# Patient Record
Sex: FEMALE | Race: BLACK OR AFRICAN AMERICAN | Employment: FULL TIME | URBAN - METROPOLITAN AREA
[De-identification: names, ages, dates, MRNs, and addresses within clinical notes are randomized per-mention and may not be internally consistent; named-entity substitution may affect disease eponyms.]

---

## 2017-01-11 RX ORDER — DICLOFENAC SODIUM 75 MG/1
TABLET, DELAYED RELEASE ORAL
Qty: 90 TAB | Refills: 0 | Status: SHIPPED | OUTPATIENT
Start: 2017-01-11 | End: 2018-07-23 | Stop reason: SDUPTHER

## 2017-01-12 ENCOUNTER — OFFICE VISIT (OUTPATIENT)
Dept: FAMILY MEDICINE CLINIC | Age: 62
End: 2017-01-12

## 2017-01-12 VITALS
HEIGHT: 63 IN | RESPIRATION RATE: 16 BRPM | BODY MASS INDEX: 33.66 KG/M2 | SYSTOLIC BLOOD PRESSURE: 109 MMHG | HEART RATE: 76 BPM | TEMPERATURE: 97.3 F | DIASTOLIC BLOOD PRESSURE: 69 MMHG | WEIGHT: 190 LBS | OXYGEN SATURATION: 97 %

## 2017-01-12 DIAGNOSIS — J06.9 VIRAL URI WITH COUGH: ICD-10-CM

## 2017-01-12 DIAGNOSIS — I10 ESSENTIAL HYPERTENSION: ICD-10-CM

## 2017-01-12 DIAGNOSIS — E78.5 HYPERLIPIDEMIA LDL GOAL <100: ICD-10-CM

## 2017-01-12 DIAGNOSIS — J45.909 MILD ASTHMA: ICD-10-CM

## 2017-01-12 DIAGNOSIS — I10 ESSENTIAL HYPERTENSION: Primary | ICD-10-CM

## 2017-01-12 DIAGNOSIS — Z12.31 ENCOUNTER FOR SCREENING MAMMOGRAM FOR MALIGNANT NEOPLASM OF BREAST: ICD-10-CM

## 2017-01-12 RX ORDER — BENZONATATE 200 MG/1
200 CAPSULE ORAL
Qty: 30 CAP | Refills: 1 | Status: SHIPPED | OUTPATIENT
Start: 2017-01-12 | End: 2017-03-15

## 2017-01-12 RX ORDER — HYDROCORTISONE 25 MG/G
CREAM TOPICAL
COMMUNITY
Start: 2016-12-01 | End: 2019-04-11 | Stop reason: ALTCHOICE

## 2017-01-12 NOTE — PROGRESS NOTES
Adelita Mendiola 63 y.o. female   Chief Complaint   Patient presents with    Follow-up     3 month f/u    Hypertension    Cholesterol Problem    Cough     at night without fever, chills, or sore throat         1. Have you been to the ER, urgent care clinic since your last visit? Hospitalized since your last visit? No    2. Have you seen or consulted any other health care providers outside of the [address] [place] [address] since your last visit? Include any pap smears or colon screening.  No

## 2017-01-12 NOTE — PROGRESS NOTES
HISTORY OF PRESENT ILLNESS  Laura Rose is a 63 y.o. female. Chief Complaint   Patient presents with    Follow-up     3 month f/u    Hypertension    Cholesterol Problem    Cough     at night without fever, chills, or sore throat       HPI  Pt is here for follow up of Hypertension, and Cholesterol. Pt does not need medication refills today. New concerns today: Pt reports having a cough at night without fever, chills, or sore throat. She had a cold about 2 wks ago with some sinus congestion and facial pain. That has improved but she still has the pnd and the cough starts at night when she lays down. Review of Systems   Constitutional: Negative. HENT: Positive for congestion. Negative for sore throat. Respiratory: Positive for cough. Negative for sputum production, shortness of breath and wheezing. Cardiovascular: Negative. Neurological: Positive for headaches. All other systems reviewed and are negative. Physical Exam   Constitutional: She is oriented to person, place, and time. She appears well-developed and well-nourished. HENT:   Head: Normocephalic and atraumatic. Right Ear: Hearing, tympanic membrane, external ear and ear canal normal.   Left Ear: Hearing, tympanic membrane, external ear and ear canal normal.   Nose: Mucosal edema present. Right sinus exhibits no maxillary sinus tenderness and no frontal sinus tenderness. Left sinus exhibits no maxillary sinus tenderness and no frontal sinus tenderness. Mouth/Throat: Uvula is midline, oropharynx is clear and moist and mucous membranes are normal.   Eyes: Conjunctivae and EOM are normal.   Neck: Normal range of motion. Neck supple. No JVD present. No thyromegaly present. Cardiovascular: Normal rate, regular rhythm and normal heart sounds. Exam reveals no gallop and no friction rub. No murmur heard. Pulmonary/Chest: Effort normal and breath sounds normal. She has no wheezes. She has no rhonchi.  She has no rales.   Musculoskeletal: Normal range of motion. Lymphadenopathy:     She has no cervical adenopathy. Neurological: She is alert and oriented to person, place, and time. Coordination normal.   Skin: Skin is warm and dry. Psychiatric: She has a normal mood and affect. Her behavior is normal. Judgment and thought content normal.   Nursing note and vitals reviewed. ASSESSMENT and [place] was seen today for follow-up, hypertension, cholesterol problem and cough. Diagnoses and all orders for this visit:    Essential hypertension  -     LIPID PANEL; Future  Stable, cont pres tx plan. Hyperlipidemia LDL goal <100  -     LIPID PANEL; Future  Stable, cont pres tx plan. Encounter for screening mammogram for malignant neoplasm of breast  -     SHIRIN MAMMO BI SCREENING INCL CAD; Future    Viral URI with cough  -     benzonatate (TESSALON) 200 mg capsule; Take 1 Cap by mouth three (3) times daily as needed for Cough. Mild asthma  Discussed prior pulm visit. Pt will resume qvar. She was not aware that she was to cont taking it longterm.       Follow-up Disposition: 3 months; sooner prn

## 2017-01-23 ENCOUNTER — OFFICE VISIT (OUTPATIENT)
Dept: ONCOLOGY | Age: 62
End: 2017-01-23

## 2017-01-23 ENCOUNTER — HOSPITAL ENCOUNTER (OUTPATIENT)
Dept: ONCOLOGY | Age: 62
Discharge: HOME | End: 2017-01-23

## 2017-01-23 VITALS
DIASTOLIC BLOOD PRESSURE: 89 MMHG | TEMPERATURE: 97.9 F | BODY MASS INDEX: 33.66 KG/M2 | HEIGHT: 63 IN | WEIGHT: 190 LBS | HEART RATE: 73 BPM | SYSTOLIC BLOOD PRESSURE: 141 MMHG

## 2017-01-23 DIAGNOSIS — Z12.31 ENCOUNTER FOR SCREENING MAMMOGRAM FOR MALIGNANT NEOPLASM OF BREAST: ICD-10-CM

## 2017-01-23 DIAGNOSIS — D64.9 ANEMIA, UNSPECIFIED TYPE: Primary | ICD-10-CM

## 2017-01-23 DIAGNOSIS — D64.9 ANEMIA, UNSPECIFIED TYPE: ICD-10-CM

## 2017-01-23 DIAGNOSIS — M19.90 ARTHRITIS: ICD-10-CM

## 2017-01-23 DIAGNOSIS — D46.9 MDS (MYELODYSPLASTIC SYNDROME) (HCC): ICD-10-CM

## 2017-01-23 LAB
BASO+EOS+MONOS # BLD AUTO: 0.4 K/UL (ref 0–2.3)
BASO+EOS+MONOS # BLD AUTO: 7 % (ref 0.1–17)
DIFFERENTIAL METHOD BLD: ABNORMAL
ERYTHROCYTE [DISTWIDTH] IN BLOOD BY AUTOMATED COUNT: 13.4 % (ref 11.5–14.5)
HCT VFR BLD AUTO: 29.6 % (ref 36–48)
HGB BLD-MCNC: 10 G/DL (ref 12–16)
LYMPHOCYTES # BLD AUTO: 30 % (ref 14–44)
LYMPHOCYTES # BLD: 1.7 K/UL (ref 1.1–5.9)
MCH RBC QN AUTO: 30.7 PG (ref 25–35)
MCHC RBC AUTO-ENTMCNC: 33.8 G/DL (ref 31–37)
MCV RBC AUTO: 90.8 FL (ref 78–102)
NEUTS SEG # BLD: 3.8 K/UL (ref 1.8–9.5)
NEUTS SEG NFR BLD AUTO: 64 % (ref 40–70)
PLATELET # BLD AUTO: 333 K/UL (ref 140–440)
RBC # BLD AUTO: 3.26 M/UL (ref 4.1–5.1)
WBC # BLD AUTO: 5.9 K/UL (ref 4.5–13)

## 2017-01-23 NOTE — MR AVS SNAPSHOT
Visit Information Date & Time Provider Department Dept. Phone Encounter #  
 1/23/2017  4:15 PM Greald Metz MD [facility] Office [phone] [id number] Follow-up Instructions Return in about 3 months (around 4/23/2017). Your Appointments 1/24/2017 12:45 PM  
Follow Up with Nessa Paul MD  
[address] (--) Appt Note: itching around breast  syb 01/19/17  
 [place] [address] [facility] [address] [address]  
  
    
 3/15/2017  9:40 AM  
Follow Up with Gray Miller MD  
[facility] ([facility]) Appt Note: Follow up in 1 year with EKG  
 [address] [id number] [address] [address] [phone] [address] 4/12/2017 10:00 AM  
Follow Up with Nessa Paul MD  
[address] (--) Appt Note: 3 month follow up [place] [address] [address] [address] [address] [phone] 4/17/2017  1:45 PM  
Office Visit with Gerald Metz MD  
[address] [address] [facility]) Appt Note: 3 month follow up  
 [address], [address] [place] [address] Upcoming Health Maintenance Date Due Pneumococcal 19-64 Highest Risk (1 of 3 - PCV13) 1/17/1976 DTaP/Tdap/Td series (1 - Tdap) 1/17/1978 ZOSTER VACCINE AGE 60> 1/17/2017 COLONOSCOPY 2/25/2017 BREAST CANCER SCRN MAMMOGRAM 4/30/2017* *Topic was postponed. The date shown is not the original due date. Allergies as of 1/23/2017  Review Complete On: 1/23/2017 By: Briana Grover NP No Known Allergies Current Immunizations  Reviewed on 4/25/2016 No immunizations on file. Not reviewed this visit You Were Diagnosed With   
  
 Codes Comments Anemia, unspecified type    -  Primary ICD-10-CM: D64.9 ICD-9-CM: 285.9 MDS (myelodysplastic syndrome) (HCC)     ICD-10-CM: D46.9 ICD-9-CM: 238.75 Arthritis     ICD-10-CM: M19.90 ICD-9-CM: 716.90 Encounter for screening mammogram for malignant neoplasm of breast     ICD-10-CM: Z12.31 
ICD-9-CM: V76.12 Vitals BP Pulse Temp Height(growth percentile) Weight(growth percentile) LMP  
 141/89 73 97.9 °F (36.6 °C) 5' 3\" (1.6 m) 190 lb (86.2 kg) 08/31/1998 BMI OB Status Smoking Status 33.66 kg/m2 Hysterectomy Never Smoker BMI and BSA Data Body Mass Index Body Surface Area  
 33.66 kg/m 2 1.96 m 2 Preferred Pharmacy Pharmacy Name Phone [facility] [address], [address] [facility] Your Updated Medication List  
  
   
This list is accurate as of: 1/23/17  5:24 PM.  Always use your most recent med list.  
  
  
  
  
 albuterol 90 mcg/actuation inhaler Commonly known as:  PROVENTIL HFA, VENTOLIN HFA, PROAIR HFA Take 2 Puffs by inhalation every six (6) hours as needed for Wheezing. beclomethasone 40 mcg/actuation inhaler Commonly known as:  QVAR Take 2 Puffs by inhalation two (2) times a day. benzonatate 200 mg capsule Commonly known as:  TESSALON Take 1 Cap by mouth three (3) times daily as needed for Cough. carvedilol 25 mg tablet Commonly known as:  COREG  
TAKE ONE TABLET BY MOUTH TWICE DAILY WITH MEALS  
  
 diclofenac EC 75 mg EC tablet Commonly known as:  VOLTAREN  
TAKE ONE TABLET BY MOUTH IN THE EVENING  
  
 ergocalciferol 50,000 unit capsule Commonly known as:  ERGOCALCIFEROL  
TAKE ONE CAPSULE BY MOUTH EVERY 7 DAYS FERREX 150 FORTE capsule Generic drug:  iron polysacch complex-b12-fa TAKE ONE CAPSULE BY MOUTH ONCE DAILY  
  
 hydrocortisone 2.5 % topical cream  
Commonly known as:  HYTONE  
  
 latanoprost 0.005 % ophthalmic solution Commonly known as:  Ashlee [place]  
 Administer 1 Drop to both eyes nightly. losartan-hydroCHLOROthiazide 100-12.5 mg per tablet Commonly known as:  HYZAAR  
TAKE ONE TABLET BY MOUTH ONCE DAILY  
  
 meloxicam 15 mg tablet Commonly known as:  MOBIC  
  
 pantoprazole 40 mg tablet Commonly known as:  PROTONIX  
  
 polyethylene glycol 17 gram/dose powder Commonly known as:  Alonza Schimke MIX 17 GRAMS IN LIQUID AND DRINK BY MOUTH ONCE DAILY FOR CONSTIPATION  
  
 simvastatin 20 mg tablet Commonly known as:  ZOCOR  
TAKE ONE TABLET BY MOUTH AT BEDTIME  
  
 triamcinolone acetonide 0.1 % topical cream  
Commonly known as:  KENALOG Apply  to affected area two (2) times a day. valACYclovir 500 mg tablet Commonly known as:  VALTREX  
TAKE ONE TABLET BY MOUTH TWICE DAILY We Performed the Following COMPLETE CBC & AUTO DIFF WBC [30011 CPT(R)] FERRITIN [00502 CPT(R)] IRON PROFILE [id number] CPT(R)] SHIRIN MAMMO BI SCREENING INCL CAD [85510 CPT(R)] METABOLIC PANEL, COMPREHENSIVE [03046 CPT(R)] Follow-up Instructions Return in about 3 months (around 4/23/2017). To-Do List   
 01/23/2017 Lab:  CBC WITH 3 PART DIFF   
  
 01/24/2017 Lab:  FERRITIN   
  
 01/24/2017 Lab:  IRON PROFILE   
  
 01/24/2017 Lab:  METABOLIC PANEL, COMPREHENSIVE Introducing \[facility]\"" & HEALTH SERVICES! Romayne Duster introduces [employer] patient portal. Now you can access parts of your medical record, email your doctor's office, and request medication refills online. 1. In your internet browser, go to [email or web address] 2. Click on the First Time User? Click Here link in the Sign In box. You will see the New Member Sign Up page. 3. Enter your [employer] Access Code exactly as it appears below. You will not need to use this code after youve completed the sign-up process. If you do not sign up before the expiration date, you must request a new code. · [employer] Access Code: [email or web address] Expires: 4/12/2017  8:48 AM 
 
4. Enter the last four digits of your Social Security Number (xxxx) and Date of Birth (mm/dd/yyyy) as indicated and click Submit. You will be taken to the next sign-up page. 5. Create a [employer] ID. This will be your [employer] login ID and cannot be changed, so think of one that is secure and easy to remember. 6. Create a [employer] password. You can change your password at any time. 7. Enter your Password Reset Question and Answer. This can be used at a later time if you forget your password. 8. Enter your e-mail address. You will receive e-mail notification when new information is available in [address] 9. Click Sign Up. You can now view and download portions of your medical record. 10. Click the Download Summary menu link to download a portable copy of your medical information. If you have questions, please visit the Frequently Asked Questions section of the [employer] website. Remember, [employer] is NOT to be used for urgent needs. For medical emergencies, dial 911. Now available from your iPhone and Android! Please provide this summary of care documentation to your next provider. Your primary care clinician is listed as Moise Em. If you have any questions after today's visit, please call [phone].

## 2017-01-23 NOTE — PROGRESS NOTES
Hematology/Oncology  Progress Note    Name: Constantien Palmer  Date: 2017  : 1955    PCP: Dr. Earlene Hernandez    Ms. Gordon Roy is a 63y.o. year old female who was seen for management of her myelodysplastic syndrome/refractory anemia    Current therapy: Iron supplement, Procrit or Aranesp is available whenever her hematocrit is below 30%. Subjective:     Ms. Gordon Roy is continuing to do well. The patient reports that she continues taking the over-the-counter iron supplement once daily. She still has occasional arthritic discomfort in her lower extremities. She uses Tylenol arthrithis as needed. She states that the pain has been well-controlled lately. Her only complaint is cough and congestion. She is using cough syrup and Sudafed as treatment modalities. Complaints of occasional fatigue but denies weakness. She has no other physical complaints at this time. Past medical history, family history, and social history were reviewed and remain unchanged. Past Medical History   Diagnosis Date    Echocardiogram 2011     Tech difficult. EF 60-65%. Mild LVH. RVSP 20-25 mmHg.  Essential hypertension     History of colon polyps     Hx of endometriosis      had hyst    Hyperlipidemia     Hypertension     MDS (myelodysplastic syndrome) (HCC)     Refractory anemia (HCC)      Past Surgical History   Procedure Laterality Date    Hx hysterectomy       Social History     Social History    Marital status: SINGLE     Spouse name: N/A    Number of children: N/A    Years of education: N/A     Occupational History    Not on file.      Social History Main Topics    Smoking status: Never Smoker    Smokeless tobacco: Never Used    Alcohol use No    Drug use: Yes     Special: Prescription, OTC    Sexual activity: Not on file     Other Topics Concern    Not on file     Social History Narrative     Family History   Problem Relation Age of Onset    Cancer Mother    Soni Reza Arthritis-rheumatoid Sister     Diabetes Sister     Hypertension Sister      Current Outpatient Prescriptions   Medication Sig Dispense Refill    hydrocortisone (HYTONE) 2.5 % topical cream       benzonatate (TESSALON) 200 mg capsule Take 1 Cap by mouth three (3) times daily as needed for Cough. 30 Cap 1    diclofenac EC (VOLTAREN) 75 mg EC tablet TAKE ONE TABLET BY MOUTH IN THE EVENING 90 Tab 0    meloxicam (MOBIC) 15 mg tablet       FERREX 150 FORTE capsule TAKE ONE CAPSULE BY MOUTH ONCE DAILY 30 Cap 3    polyethylene glycol (MIRALAX) 17 gram/dose powder MIX 17 GRAMS IN LIQUID AND DRINK BY MOUTH ONCE DAILY FOR CONSTIPATION 527 g 12    pantoprazole (PROTONIX) 40 mg tablet       simvastatin (ZOCOR) 20 mg tablet TAKE ONE TABLET BY MOUTH AT BEDTIME 90 Tab 3    losartan-hydrochlorothiazide (HYZAAR) 100-12.5 mg per tablet TAKE ONE TABLET BY MOUTH ONCE DAILY 90 Tab 3    valACYclovir (VALTREX) 500 mg tablet TAKE ONE TABLET BY MOUTH TWICE DAILY 30 Tab 12    beclomethasone (QVAR) 40 mcg/actuation inhaler Take 2 Puffs by inhalation two (2) times a day. 1 Inhaler 11    carvedilol (COREG) 25 mg tablet TAKE ONE TABLET BY MOUTH TWICE DAILY WITH MEALS 180 Tab 3    albuterol (PROVENTIL HFA, VENTOLIN HFA, PROAIR HFA) 90 mcg/actuation inhaler Take 2 Puffs by inhalation every six (6) hours as needed for Wheezing. 1 Inhaler 11    ergocalciferol (ERGOCALCIFEROL) 50,000 unit capsule TAKE ONE CAPSULE BY MOUTH EVERY 7 DAYS 4 Cap 12    triamcinolone acetonide (KENALOG) 0.1 % topical cream Apply  to affected area two (2) times a day. 15 g 2    latanoprost (XALATAN) 0.005 % ophthalmic solution Administer 1 Drop to both eyes nightly. Review of Systems  Constitutional: The patient complains of occasional fatigue and weakness. HEENT: The patient denies recent head trauma, eye pain, blurred vision,  hearing deficit, oropharyngeal mucosal pain or lesions, and the patient denies throat pain or discomfort. Lymphatics:  The patient denies palpable peripheral lymphadenopathy. Hematologic: The patient denies having bruising, bleeding, or progressive fatigue. Respiratory: Patient denies having shortness of breath, cough, sputum production, fever, or dyspnea on exertion. Cardiovascular: The patient denies having leg pain, leg swelling, heart palpitations, chest permit, chest pain, or lightheadedness. The patient denies having dyspnea on exertion. Gastrointestinal: The patient denies having nausea, emesis, or diarrhea. The patient denies having any hematemesis or blood in the stool. Genitourinary: Patient denies having urinary urgency, frequency, or dysuria. The patient denies having blood in the urine. Psychological: The patient denies having symptoms of nervousness, anxiety, depression, or thoughts of harming himself some of this. Skin: Patient denies having skin rashes, skin, ulcerations, or unexplained itching or pruritus. Musculoskeletal: The patient  is complaining of occasional musculoskeletal pain primarily in the lower extremities. Objective:     Visit Vitals    /89    Pulse 73    Temp 97.9 °F (36.6 °C)    Ht 5' 3\" (1.6 m)    Wt 86.2 kg (190 lb)    LMP 08/31/1998    BMI 33.66 kg/m2     Pain Scale 0/10  Physical Exam:   ECOG=0  Gen. Appearance: The patient is in no acute distress. Skin: There is no bruise or rash. HEENT: The exam is unremarkable. Neck: Supple without lymphadenopathy or thyromegaly. Lungs: Clear to auscultation and percussion; there are no wheezes or rhonchi. Heart: Regular rate and rhythm; there are no murmurs, gallops, or rubs. aanterior chest wall and breasts: Deferred. The axilla reveals no palpable axillary lymphadenopathy. Abdomen: Bowel sounds are present and normal.  There is no guarding, tenderness, or hepatosplenomegaly. Extremities: There is no clubbing, cyanosis, or edema. Neurologic: There are no focal neurologic deficits. Lymphatics: There is no palpable peripheral lymphadenopathy.     Lab data: Results for orders placed or performed during the hospital encounter of 01/23/17   CBC WITH 3 PART DIFF   Result Value Ref Range    WBC 5.9 4.5 - 13.0 K/uL    RBC 3.26 (L) 4.10 - 5.10 M/uL    HGB 10.0 (L) 12.0 - 16.0 g/dL    HCT 29.6 (L) 36 - 48 %    MCV 90.8 78 - 102 FL    MCH 30.7 25.0 - 35.0 PG    MCHC 33.8 31 - 37 g/dL    RDW 13.4 11.5 - 14.5 %    PLATELET 448 600 - 251 K/uL    NEUTROPHILS 64 40 - 70 %    MIXED CELLS 7 0.1 - 17 %    LYMPHOCYTES 30 14 - 44 %    ABS. NEUTROPHILS 3.8 1.8 - 9.5 K/UL    ABS. MIXED CELLS 0.4 0.0 - 2.3 K/uL    ABS. LYMPHOCYTES 1.7 1.1 - 5.9 K/UL    DF AUTOMATED          Assessment:     1. Anemia, unspecified type    2. MDS (myelodysplastic syndrome) ([facility]    3. Arthritis    4. Encounter for screening mammogram for malignant neoplasm of breast        Plan:   Arthritis (chronic problem): The patient reports that she has chronic arthritis and has been taking Tylenol Arthritis as needed. She will continue this plan of care since it is giving her relief. Refractory anemia/MDS: I have explained to the patient that her current hemoglobin and hematocrit were 10 g/dL and 29.6% respectively. We will continue to monitor her hemoglobin and hematocrit every 3 months and if she should have a decrease in her hematocrit below 30% we will offer interventional therapy with either Procrit or Aranesp. At this time I will check a ferritin level and iron profile. The comprehensive metabolic panel will also be ordered. The patient states she has been taking ferrous sulfate once daily. Myelodysplastic syndrome: I have explained to the patient that we do not need to pursue a bone marrow biopsy at this time.   However, if she experiences a rapid decline in the hemoglobin and hematocrit with an elevation in her WBC counts that would be an indication to do a bone marrow biopsy to rule out whether not she is converting to a chronic myelomonocytic leukemia component of her MDS versus converting to an acute leukemic process. Therapeutic intervention with Procrit will be provided for her  If the hematocrit declines below 30% with a hemoglobin below 10 g/dL. I have explained to the patient that the dose of Procrit  60,000 units given subcutaneously every 2 weeks. I will plan to have her return to clinic for a complete assessment again in 3 months. I will have her return to clinic for a complete reassessment again in 3 months.   Orders Placed This Encounter    COMPLETE CBC & AUTO DIFF WBC    InHouse CBC ([facility])     Standing Status:   Future     Number of Occurrences:   1     Standing Expiration Date:   9/43/8097    METABOLIC PANEL, COMPREHENSIVE     Standing Status:   Future     Number of Occurrences:   1     Standing Expiration Date:   1/24/2018    IRON PROFILE     Standing Status:   Future     Number of Occurrences:   1     Standing Expiration Date:   1/24/2018    FERRITIN     Standing Status:   Future     Number of Occurrences:   1     Standing Expiration Date:   1/24/2018       Hayley Brar NP  1/23/2017

## 2017-01-24 ENCOUNTER — OFFICE VISIT (OUTPATIENT)
Dept: FAMILY MEDICINE CLINIC | Age: 62
End: 2017-01-24

## 2017-01-24 VITALS
TEMPERATURE: 97.4 F | HEIGHT: 63 IN | OXYGEN SATURATION: 98 % | RESPIRATION RATE: 18 BRPM | SYSTOLIC BLOOD PRESSURE: 142 MMHG | DIASTOLIC BLOOD PRESSURE: 83 MMHG | WEIGHT: 189 LBS | BODY MASS INDEX: 33.49 KG/M2 | HEART RATE: 69 BPM

## 2017-01-24 DIAGNOSIS — B35.4 TINEA CORPORIS: Primary | ICD-10-CM

## 2017-01-24 LAB
ALBUMIN SERPL-MCNC: 4.5 G/DL (ref 3.6–4.8)
ALBUMIN/GLOB SERPL: 1.3 {RATIO} (ref 1.1–2.5)
ALP SERPL-CCNC: 91 IU/L (ref 39–117)
ALT SERPL-CCNC: 13 IU/L (ref 0–32)
AST SERPL-CCNC: 19 IU/L (ref 0–40)
BILIRUB SERPL-MCNC: 0.4 MG/DL (ref 0–1.2)
BUN SERPL-MCNC: 16 MG/DL (ref 8–27)
BUN/CREAT SERPL: 22 (ref 11–26)
CALCIUM SERPL-MCNC: 9.7 MG/DL (ref 8.7–10.3)
CHLORIDE SERPL-SCNC: 99 MMOL/L (ref 96–106)
CO2 SERPL-SCNC: 26 MMOL/L (ref 18–29)
CREAT SERPL-MCNC: 0.72 MG/DL (ref 0.57–1)
FERRITIN SERPL-MCNC: 293 NG/ML (ref 15–150)
GLOBULIN SER CALC-MCNC: 3.6 G/DL (ref 1.5–4.5)
GLUCOSE SERPL-MCNC: 75 MG/DL (ref 65–99)
IRON SATN MFR SERPL: 18 % (ref 15–55)
IRON SERPL-MCNC: 49 UG/DL (ref 27–159)
POTASSIUM SERPL-SCNC: 4.3 MMOL/L (ref 3.5–5.2)
PROT SERPL-MCNC: 8.1 G/DL (ref 6–8.5)
SODIUM SERPL-SCNC: 140 MMOL/L (ref 134–144)
TIBC SERPL-MCNC: 280 UG/DL (ref 250–450)
UIBC SERPL-MCNC: 231 UG/DL (ref 131–425)

## 2017-01-24 NOTE — PROGRESS NOTES
HISTORY OF PRESENT ILLNESS  Jenae Alonso is a 63 y.o. female. Chief Complaint   Patient presents with    Skin Problem     itching and slight irritation around breast x 1 week       HPI  Pt c/o itching on b breasts x 1 wk. The skin feels irritated. She has tried using a hydrocortisone cream but this seems to make it worse. Review of Systems   Constitutional: Negative. HENT: Negative. Respiratory: Negative. Skin: Positive for itching and rash. All other systems reviewed and are negative. Physical Exam   Constitutional: She is oriented to person, place, and time. She appears well-developed and well-nourished. HENT:   Head: Normocephalic and atraumatic. Right Ear: External ear normal.   Left Ear: External ear normal.   Nose: Nose normal.   Eyes: Conjunctivae and EOM are normal.   Neck: Normal range of motion. Pulmonary/Chest: Effort normal.   Musculoskeletal: Normal range of motion. Neurological: She is alert and oriented to person, place, and time. Coordination normal.   Skin: Skin is warm and dry. No lesion and no rash noted. Mild hyperpigmentation on undersides of breasts   Psychiatric: She has a normal mood and affect. Her behavior is normal. Judgment and thought content normal.   Nursing note and vitals reviewed. [place] and [place] was seen today for skin problem. Diagnoses and all orders for this visit:    Tinea corporis  Pt ed re: dx.  D/c hydrocortisone cream.  Trial lotrimin or lamisil; use bid x 2 wks.       Follow-up Disposition: prn

## 2017-01-24 NOTE — PROGRESS NOTES
Timur Perez 63 y.o. female   Chief Complaint   Patient presents with    Skin Problem     itching and slight irritation around breast x 1 week         1. Have you been to the ER, urgent care clinic since your last visit? Hospitalized since your last visit? No    2. Have you seen or consulted any other health care providers outside of the [facility] since your last visit? Include any pap smears or colon screening.  NO

## 2017-01-24 NOTE — MR AVS SNAPSHOT
Visit Information Date & Time Provider Department Dept. Phone Encounter #  
 1/24/2017 12:45 PM Gracie Salazar MD [address] [phone] [id number] Your Appointments 3/15/2017  9:40 AM  
Follow Up with Silas Finnegan MD  
[facility] ([address]) Appt Note: Follow up in 1 year with EKG  
 [address] [place] [address] [place] [address]  
[phone] [address] 108 4/12/2017 10:00 AM  
Follow Up with Gracie Salazar MD  
[address] (--) Appt Note: 3 month follow up [address] [place] [address]  
[phone]  
  
   
 [address] [place] [address] 4/17/2017  1:45 PM  
Office Visit with Eleanor Bass MD  
[address] [facility]) Appt Note: 3 month follow up  
 [address] [id number] [facility], [address] [place] [address] Upcoming Health Maintenance Date Due Pneumococcal 19-64 Highest Risk (1 of 3 - PCV13) 1/17/1976 DTaP/Tdap/Td series (1 - Tdap) 1/17/1978 ZOSTER VACCINE AGE 60> 1/17/2017 COLONOSCOPY 2/25/2017 BREAST CANCER SCRN MAMMOGRAM 4/30/2017* *Topic was postponed. The date shown is not the original due date. Allergies as of 1/24/2017  Review Complete On: 1/24/2017 By: Lonnie Farrell LPN No Known Allergies Current Immunizations  Reviewed on 4/25/2016 No immunizations on file. Not reviewed this visit Vitals BP Pulse Temp Resp Height(growth percentile) Weight(growth percentile) 142/83 (BP 1 Location: Left arm, BP Patient Position: Sitting) 69 97.4 °F (36.3 °C) (Oral) 18 5' 3\" (1.6 m) 189 lb (85.7 kg) LMP SpO2 BMI OB Status Smoking Status 08/31/1998 98% 33.48 kg/m2 Hysterectomy Never Smoker BMI and BSA Data Body Mass Index Body Surface Area  
 33.48 kg/m 2 1.95 m 2 Preferred Pharmacy Pharmacy Name Phone [facility] [address], [address] [facility] Your Updated Medication List  
  
   
This list is accurate as of: 1/24/17  1:57 PM.  Always use your most recent med list.  
  
  
  
  
 albuterol 90 mcg/actuation inhaler Commonly known as:  PROVENTIL HFA, VENTOLIN HFA, PROAIR HFA Take 2 Puffs by inhalation every six (6) hours as needed for Wheezing. beclomethasone 40 mcg/actuation inhaler Commonly known as:  QVAR Take 2 Puffs by inhalation two (2) times a day. benzonatate 200 mg capsule Commonly known as:  TESSALON Take 1 Cap by mouth three (3) times daily as needed for Cough. carvedilol 25 mg tablet Commonly known as:  COREG  
TAKE ONE TABLET BY MOUTH TWICE DAILY WITH MEALS  
  
 diclofenac EC 75 mg EC tablet Commonly known as:  VOLTAREN  
TAKE ONE TABLET BY MOUTH IN THE EVENING  
  
 ergocalciferol 50,000 unit capsule Commonly known as:  ERGOCALCIFEROL  
TAKE ONE CAPSULE BY MOUTH EVERY 7 DAYS FERREX 150 FORTE capsule Generic drug:  iron polysacch complex-b12-fa TAKE ONE CAPSULE BY MOUTH ONCE DAILY  
  
 hydrocortisone 2.5 % topical cream  
Commonly known as:  HYTONE  
  
 latanoprost 0.005 % ophthalmic solution Commonly known as:  Riceky Pry Administer 1 Drop to both eyes nightly. losartan-hydroCHLOROthiazide 100-12.5 mg per tablet Commonly known as:  HYZAAR  
TAKE ONE TABLET BY MOUTH ONCE DAILY  
  
 meloxicam 15 mg tablet Commonly known as:  MOBIC  
  
 pantoprazole 40 mg tablet Commonly known as:  PROTONIX  
  
 polyethylene glycol 17 gram/dose powder Commonly known as:  Kreg Patron MIX 17 GRAMS IN LIQUID AND DRINK BY MOUTH ONCE DAILY FOR CONSTIPATION  
  
 simvastatin 20 mg tablet Commonly known as:  ZOCOR  
TAKE ONE TABLET BY MOUTH AT BEDTIME  
  
 triamcinolone acetonide 0.1 % topical cream  
Commonly known as:  KENALOG Apply  to affected area two (2) times a day. valACYclovir 500 mg tablet Commonly known as:  VALTREX  
TAKE ONE TABLET BY MOUTH TWICE DAILY Introducing [facility] SERVICES! [address] introduces SwimTopia patient portal. Now you can access parts of your medical record, email your doctor's office, and request medication refills online. 1. In your internet browser, go to [email or web address] 2. Click on the First Time User? Click Here link in the Sign In box. You will see the New Member Sign Up page. 3. Enter your SwimTopia Access Code exactly as it appears below. You will not need to use this code after youve completed the sign-up process. If you do not sign up before the expiration date, you must request a new code. · SwimTopia Access Code: [email or web address] Expires: 4/12/2017  8:48 AM 
 
4. Enter the last four digits of your Social Security Number (xxxx) and Date of Birth (mm/dd/yyyy) as indicated and click Submit. You will be taken to the next sign-up page. 5. Create a SwimTopia ID. This will be your SwimTopia login ID and cannot be changed, so think of one that is secure and easy to remember. 6. Create a SwimTopia password. You can change your password at any time. 7. Enter your Password Reset Question and Answer. This can be used at a later time if you forget your password. 8. Enter your e-mail address. You will receive e-mail notification when new information is available in [address] 9. Click Sign Up. You can now view and download portions of your medical record. 10. Click the Download Summary menu link to download a portable copy of your medical information. If you have questions, please visit the Frequently Asked Questions section of the SwimTopia website. Remember, SwimTopia is NOT to be used for urgent needs. For medical emergencies, dial 911. Now available from your iPhone and Android! Please provide this summary of care documentation to your next provider. Your primary care clinician is listed as Marla Tam. If you have any questions after today's visit, please call [phone].

## 2017-01-31 ENCOUNTER — TELEPHONE (OUTPATIENT)
Dept: FAMILY MEDICINE CLINIC | Age: 62
End: 2017-01-31

## 2017-01-31 NOTE — TELEPHONE ENCOUNTER
Chief Complaint   Patient presents with    Other     Unable to reach patient by phone. Patient must call the surgeon for information regarding their visit.

## 2017-01-31 NOTE — TELEPHONE ENCOUNTER
Patient called and stated that she would like to know what the report said from Dr. Matias Wallace her Surgeon, about the knot on the back of her neck, Three years ago. Please advise.

## 2017-02-14 ENCOUNTER — OFFICE VISIT (OUTPATIENT)
Dept: FAMILY MEDICINE CLINIC | Age: 62
End: 2017-02-14

## 2017-02-14 VITALS
DIASTOLIC BLOOD PRESSURE: 80 MMHG | TEMPERATURE: 97.4 F | BODY MASS INDEX: 33.31 KG/M2 | SYSTOLIC BLOOD PRESSURE: 140 MMHG | WEIGHT: 188 LBS | RESPIRATION RATE: 18 BRPM | OXYGEN SATURATION: 98 % | HEIGHT: 63 IN | HEART RATE: 76 BPM

## 2017-02-14 DIAGNOSIS — R22.9 NODULE, SUBCUTANEOUS: Primary | ICD-10-CM

## 2017-02-14 NOTE — PROGRESS NOTES
Damian Davis 63 y.o. female   Chief Complaint   Patient presents with    Nodule     Right posterior auricular region, previously reported in 2013, and evaluated by [facility]. Pt states a possible increase in size, without pain         1. Have you been to the ER, urgent care clinic since your last visit? Hospitalized since your last visit? No    2. Have you seen or consulted any other health care providers outside of the [address] [place] [address] since your last visit? Include any pap smears or colon screening.  No

## 2017-02-14 NOTE — PROGRESS NOTES
HISTORY OF PRESENT ILLNESS  Ronald Lemon is a 63 y.o. female. Chief Complaint   Patient presents with    Nodule     Right posterior auricular region, previously reported in 2013, and evaluated by [facility]. Pt states a possible increase in size, without pain       HPI  Pt here for f/u of nodule in the r posterior auricular region. She was seen by Dr Reid Uribe in 2013. At that time, he recommended observation. No imaging was done. Pt is concerned that it may have increased in size and wanted to f/u on it. Review of Systems   Constitutional: Negative. HENT: Negative. Respiratory: Negative. Cardiovascular: Negative. Skin:        Nodule behind r ear    All other systems reviewed and are negative. Physical Exam   Constitutional: She is oriented to person, place, and time. She appears well-developed and well-nourished. HENT:   Head: Normocephalic and atraumatic. Right Ear: External ear normal.   Left Ear: External ear normal.   Nose: Nose normal.   7mm x 6mm nodule in R posterior auricular area   Eyes: Conjunctivae and EOM are normal.   Neck: Normal range of motion. Pulmonary/Chest: Effort normal.   Musculoskeletal: Normal range of motion. Neurological: She is alert and oriented to person, place, and time. Coordination normal.   Skin: Skin is warm and dry. Psychiatric: She has a normal mood and affect. Her behavior is normal. Judgment and thought content normal.   Nursing note and vitals reviewed. ASSESSMENT and PLAN  January Thakkar was seen today for nodule. Diagnoses and all orders for this visit:    Nodule, subcutaneous  Discussed imaging. Pt agrees with holding off for now. Will cont to monitor.       Follow-up Disposition: recheck at April 2017 appt, sooner prn

## 2017-03-15 ENCOUNTER — OFFICE VISIT (OUTPATIENT)
Dept: CARDIOLOGY CLINIC | Age: 62
End: 2017-03-15

## 2017-03-15 VITALS
BODY MASS INDEX: 33.31 KG/M2 | DIASTOLIC BLOOD PRESSURE: 82 MMHG | HEIGHT: 63 IN | OXYGEN SATURATION: 97 % | SYSTOLIC BLOOD PRESSURE: 132 MMHG | WEIGHT: 188 LBS | HEART RATE: 65 BPM

## 2017-03-15 DIAGNOSIS — R00.2 PALPITATIONS: Primary | ICD-10-CM

## 2017-03-15 DIAGNOSIS — I10 ESSENTIAL HYPERTENSION: ICD-10-CM

## 2017-03-15 DIAGNOSIS — E78.49 OTHER HYPERLIPIDEMIA: ICD-10-CM

## 2017-03-15 NOTE — PROGRESS NOTES
HISTORY OF PRESENT ILLNESS  Damian Davis is a 63 y.o. female. HPI  She has been doing very well. She looks much younger than her age of [de-identified]. She denies chest pain, dyspnea, orthopnea or PND. She has had some palpitations infrequently, which are described as flip-flops or skipping that last less than ten seconds each time. She has had no prolonged palpitations. She denies dizziness or syncope. She has had no symptoms to indicate claudication, TIA or amaurosis fugax. She was originally referred to my office for evaluation of elevated CRP on 5/6/10. She has a history of hypertension and dyslipidemia. She has no history of diabetes mellitus or tobacco abuse. She has no family history of coronary artery disease. She has been intolerant to statins, but currently on Simvastatin 10 mg a day which she has been able to tolerate. She had an echocardiogram on 2/8/11 which demonstrated normal LV function with EF in the 60-65% range. There is mild concentric left ventricular hypertrophy. There is no significant valvular pathology.    Her repeat CRP in June 2013 was still elevated at 10. 9.    She has had a bone marrow biopsy and her anemia was most likely related to myelodysplastic syndrome.    She underwent the coronary calcium score on 05/19/15, which was calculated at zero indicating very low probability of coronary artery disease statistically. Review of Systems   Constitutional: Negative for malaise/fatigue and weight loss. HENT: Negative for hearing loss. Eyes: Negative for blurred vision and double vision. Respiratory: Negative for shortness of breath. Cardiovascular: Positive for palpitations. Negative for chest pain, orthopnea, claudication, leg swelling and PND. Gastrointestinal: Negative for blood in stool, heartburn and melena. Genitourinary: Negative for dysuria, frequency, hematuria and urgency. Musculoskeletal: Negative for back pain and joint pain.    Skin: Negative for itching and rash. Neurological: Negative for dizziness, loss of consciousness, weakness and headaches. Psychiatric/Behavioral: Negative for depression and memory loss. Physical Exam   Constitutional: She is oriented to person, place, and time. She appears well-developed and well-nourished. HENT:   Head: Normocephalic and atraumatic. Eyes: Conjunctivae are normal. Pupils are equal, round, and reactive to light. Neck: Normal range of motion. Neck supple. No JVD present. Cardiovascular: Normal rate, regular rhythm, S1 normal and S2 normal.   No extrasystoles are present. PMI is not displaced. Exam reveals no gallop and no friction rub. No murmur heard. Pulses:       Carotid pulses are 3+ on the right side, and 3+ on the left side. Pulmonary/Chest: Effort normal. She has no rales. Abdominal: Soft. There is no tenderness. Musculoskeletal: She exhibits no edema. Neurological: She is alert and oriented to person, place, and time. No cranial nerve deficit. Skin: Skin is warm and dry. Psychiatric: She has a normal mood and affect. Her behavior is normal.     Visit Vitals    /82    Pulse 65    Ht 5' 3\" (1.6 m)    Wt 85.3 kg (188 lb)    LMP 08/31/1998    SpO2 97%    BMI 33.3 kg/m2       Past Medical History:   Diagnosis Date    Echocardiogram 02/08/2011    Tech difficult. EF 60-65%. Mild LVH. RVSP 20-25 mmHg.  Essential hypertension     History of colon polyps     Hx of endometriosis     had hyst    Hyperlipidemia     Hypertension     MDS (myelodysplastic syndrome) (HCC)     Refractory anemia (HCC)        Social History     Social History    Marital status: SINGLE     Spouse name: N/A    Number of children: N/A    Years of education: N/A     Occupational History    Not on file.      Social History Main Topics    Smoking status: Never Smoker    Smokeless tobacco: Never Used    Alcohol use No    Drug use: Yes     Special: Prescription, OTC    Sexual activity: Not on file     Other Topics Concern    Not on file     Social History Narrative       Family History   Problem Relation Age of Onset    Cancer Mother     Arthritis-rheumatoid Sister     Diabetes Sister     Hypertension Sister        Past Surgical History:   Procedure Laterality Date    HX HYSTERECTOMY         Current Outpatient Prescriptions   Medication Sig Dispense Refill    ergocalciferol (ERGOCALCIFEROL) 50,000 unit capsule TAKE ONE CAPSULE BY MOUTH ONCE A WEEK 4 Cap 12    hydrocortisone (HYTONE) 2.5 % topical cream       diclofenac EC (VOLTAREN) 75 mg EC tablet TAKE ONE TABLET BY MOUTH IN THE EVENING 90 Tab 0    meloxicam (MOBIC) 15 mg tablet       FERREX 150 FORTE capsule TAKE ONE CAPSULE BY MOUTH ONCE DAILY 30 Cap 3    polyethylene glycol (MIRALAX) 17 gram/dose powder MIX 17 GRAMS IN LIQUID AND DRINK BY MOUTH ONCE DAILY FOR CONSTIPATION 527 g 12    pantoprazole (PROTONIX) 40 mg tablet       simvastatin (ZOCOR) 20 mg tablet TAKE ONE TABLET BY MOUTH AT BEDTIME 90 Tab 3    losartan-hydrochlorothiazide (HYZAAR) 100-12.5 mg per tablet TAKE ONE TABLET BY MOUTH ONCE DAILY 90 Tab 3    valACYclovir (VALTREX) 500 mg tablet TAKE ONE TABLET BY MOUTH TWICE DAILY 30 Tab 12    beclomethasone (QVAR) 40 mcg/actuation inhaler Take 2 Puffs by inhalation two (2) times a day. 1 Inhaler 11    carvedilol (COREG) 25 mg tablet TAKE ONE TABLET BY MOUTH TWICE DAILY WITH MEALS 180 Tab 3    albuterol (PROVENTIL HFA, VENTOLIN HFA, PROAIR HFA) 90 mcg/actuation inhaler Take 2 Puffs by inhalation every six (6) hours as needed for Wheezing. 1 Inhaler 11    triamcinolone acetonide (KENALOG) 0.1 % topical cream Apply  to affected area two (2) times a day. 15 g 2    latanoprost (XALATAN) 0.005 % ophthalmic solution Administer 1 Drop to both eyes nightly. EKG: normal EKG, normal sinus rhythm, unchanged from previous tracings. ASSESSMENT and PLAN  Encounter Diagnoses   Name Primary?     Palpitations Yes  Essential hypertension     Other hyperlipidemia    She has been doing well. Her palpitations seem to be related to benign PACs or PVCs and for which I would not recommend any diagnostic workup or treatment. The patient was once again reassured. She has very low probability of coronary artery disease with a calcium score of zero. The patient was reassured. Her blood pressure has been under control. Her anemia appears to be slightly better. Her cholesterol profile is largely satisfactory. For now, she will be continued on her current medical regimen.

## 2017-03-15 NOTE — MR AVS SNAPSHOT
Visit Information Date & Time Provider Department Dept. Phone Encounter #  
 3/15/2017  9:40 AM Derek Clarke MD [facility] [phone] [id number] Your Appointments 4/12/2017 10:00 AM  
Follow Up with Romana Diallo MD  
[facility] (--) Appt Note: 3 month follow up [address] [id number] [address] [address] [phone]  
  
   
 [address] [address] [address] [address] 4/17/2017  1:45 PM  
Office Visit with Elier Berger MD  
[address] [address] [address] [place] [address]) Appt Note: 3 month follow up  
 [address], [address] [place] [address] Upcoming Health Maintenance Date Due Pneumococcal 19-64 Highest Risk (1 of 3 - PCV13) 1/17/1976 DTaP/Tdap/Td series (1 - Tdap) 1/17/1978 COLONOSCOPY 2/25/2017 BREAST CANCER SCRN MAMMOGRAM 4/30/2017* ZOSTER VACCINE AGE 60> 7/28/2017* *Topic was postponed. The date shown is not the original due date. Allergies as of 3/15/2017  Review Complete On: 3/15/2017 By: Derek Clarke MD  
 No Active Allergies Current Immunizations  Reviewed on 4/25/2016 No immunizations on file. Not reviewed this visit You Were Diagnosed With   
  
 Codes Comments Palpitations    -  Primary ICD-10-CM: R00.2 ICD-9-CM: 785.1 Essential hypertension     ICD-10-CM: I10 
ICD-9-CM: 401.9 Other hyperlipidemia     ICD-10-CM: E78.4 ICD-9-CM: 272.4 Vitals BP Pulse Height(growth percentile) Weight(growth percentile) LMP SpO2  
 132/82 65 5' 3\" (1.6 m) 188 lb (85.3 kg) 08/31/1998 97% BMI OB Status Smoking Status 33.3 kg/m2 Hysterectomy Never Smoker Vitals History BMI and BSA Data Body Mass Index Body Surface Area  
 33.3 kg/m 2 1.95 m 2 Preferred Pharmacy Pharmacy Name Phone [facility] [address] [facility] [address] [facility] Your Updated Medication List  
  
   
This list is accurate as of: 3/15/17 10:41 AM.  Always use your most recent med list.  
  
  
  
  
 albuterol 90 mcg/actuation inhaler Commonly known as:  PROVENTIL HFA, VENTOLIN HFA, PROAIR HFA Take 2 Puffs by inhalation every six (6) hours as needed for Wheezing. beclomethasone 40 mcg/actuation [employer] Commonly known as:  QVAR Take 2 Puffs by inhalation two (2) times a day. carvedilol 25 mg tablet Commonly known as:  COREG  
TAKE ONE TABLET BY MOUTH TWICE DAILY WITH MEALS  
  
 diclofenac EC 75 mg EC tablet Commonly known as:  VOLTAREN  
TAKE ONE TABLET BY MOUTH IN THE EVENING  
  
 ergocalciferol 50,000 unit capsule Commonly known as:  ERGOCALCIFEROL  
TAKE ONE CAPSULE BY MOUTH ONCE A WEEK FERREX 150 FORTE capsule Generic drug:  iron polysacch complex-b12-fa TAKE ONE CAPSULE BY MOUTH ONCE DAILY  
  
 hydrocortisone 2.5 % topical cream  
Commonly known as:  HYTONE  
  
 latanoprost 0.005 % ophthalmic solution Commonly known as:  Nam Slimmer Administer 1 Drop to both eyes nightly. losartan-hydroCHLOROthiazide 100-12.5 mg per tablet Commonly known as:  HYZAAR  
TAKE ONE TABLET BY MOUTH ONCE DAILY  
  
 meloxicam 15 mg tablet Commonly known as:  MOBIC  
  
 pantoprazole 40 mg tablet Commonly known as:  PROTONIX  
  
 polyethylene glycol 17 gram/dose powder Commonly known as:  Austin Armor MIX 17 GRAMS IN LIQUID AND DRINK BY MOUTH ONCE DAILY FOR CONSTIPATION  
  
 simvastatin 20 mg tablet Commonly known as:  ZOCOR  
TAKE ONE TABLET BY MOUTH AT BEDTIME  
  
 triamcinolone acetonide 0.1 % topical cream  
Commonly known as:  KENALOG Apply  to affected area two (2) times a day. valACYclovir 500 mg tablet Commonly known as:  VALTREX  
TAKE ONE TABLET BY MOUTH TWICE DAILY We Performed the Following AMB POC EKG ROUTINE W/ 12 LEADS, INTER & REP [35922 CPT(R)] Introducing [facility] & HE[facility] SERVICES! Kali Farley introduces [employer] patient portal. Now you can access parts of your medical record, email your doctor's office, and request medication refills online. 1. In your internet browser, go to [email or web address] 2. Click on the First Time User? Click Here link in the Sign In box. You will see the New Member Sign Up page. 3. Enter your Re[employer] Access Code exactly as it appears below. You will not need to use this code after youve completed the sign-up process. If you do not sign up before the expiration date, you must request a new code. · [employer] Access Code: [email or web address] Expires: 4/12/2017  9:48 AM 
 
4. Enter the last four digits of your Social Security Number (xxxx) and Date of Birth (mm/dd/yyyy) as indicated and click Submit. You will be taken to the next sign-up page. 5. Create a Reachpod - [employer] ID. This will be your [employer] login ID and cannot be changed, so think of one that is secure and easy to remember. 6. Create a [employer] password. You can change your password at any time. 7. Enter your Password Reset Question and Answer. This can be used at a later time if you forget your password. 8. Enter your e-mail address. You will receive e-mail notification when new information is available in [address] 9. Click Sign Up. You can now view and download portions of your medical record. 10. Click the Download Summary menu link to download a portable copy of your medical information. If you have questions, please visit the Frequently Asked Questions section of the [employer] website. Remember, [employer] is NOT to be used for urgent needs. For medical emergencies, dial 911. Now available from your iPhone and Android! Please provide this summary of care documentation to your next provider. Your primary care clinician is listed as Raoul Shed.  If you have any questions after today's visit, please call [phone].

## 2017-03-15 NOTE — PROGRESS NOTES
1. Have you been to the ER, urgent care clinic since your last visit? Hospitalized since your last visit? no  2. Have you seen or consulted any other health care providers outside of the [address] [place] [address] since your last visit? Include any pap smears or colon screening.  no

## 2017-04-04 LAB — MAMMOGRAPHY, EXTERNAL: NORMAL

## 2017-04-08 LAB
CHOLEST SERPL-MCNC: 165 MG/DL (ref 100–199)
HDLC SERPL-MCNC: 53 MG/DL
INTERPRETATION, 910389: NORMAL
LDLC SERPL CALC-MCNC: 95 MG/DL (ref 0–99)
TRIGL SERPL-MCNC: 84 MG/DL (ref 0–149)
VLDLC SERPL CALC-MCNC: 17 MG/DL (ref 5–40)

## 2017-04-12 ENCOUNTER — OFFICE VISIT (OUTPATIENT)
Dept: FAMILY MEDICINE CLINIC | Age: 62
End: 2017-04-12

## 2017-04-12 VITALS
BODY MASS INDEX: 33.31 KG/M2 | OXYGEN SATURATION: 98 % | SYSTOLIC BLOOD PRESSURE: 121 MMHG | HEIGHT: 63 IN | DIASTOLIC BLOOD PRESSURE: 77 MMHG | WEIGHT: 188 LBS | TEMPERATURE: 97.3 F | HEART RATE: 68 BPM | RESPIRATION RATE: 18 BRPM

## 2017-04-12 DIAGNOSIS — L85.3 DRY SKIN DERMATITIS: ICD-10-CM

## 2017-04-12 DIAGNOSIS — I10 ESSENTIAL HYPERTENSION: ICD-10-CM

## 2017-04-12 DIAGNOSIS — Z23 ENCOUNTER FOR IMMUNIZATION: ICD-10-CM

## 2017-04-12 DIAGNOSIS — E78.5 HYPERLIPIDEMIA, UNSPECIFIED HYPERLIPIDEMIA TYPE: Primary | ICD-10-CM

## 2017-04-12 NOTE — MR AVS SNAPSHOT
Visit Information Date & Time Provider Department Dept. Phone Encounter #  
 4/12/2017 10:00 AM Sophia Garcia MD [address] [id number] Your Appointments 4/17/2017  1:45 PM  
Office Visit with Sajan Isaacs MD  
[address] [facility] [address] [facility] [address]) Appt Note: 3 month follow up  
 [address], [place] [address] [facility] [address] [facility], [address]  
  
    
 7/28/2017  9:00 AM  
Follow Up with MD Connie Wen 72 (--) Appt Note: Follow up [place] [address] [facility] [address] [address]  
  
    
 3/14/2018  8:00 AM  
Follow Up with Blair Sharpe MD  
[facility] 1 ([address]) Appt Note: 1 year follow up [facility] [address] [address] [address] [phone] [address] [place] [address] [address] Upcoming Health Maintenance Date Due COLONOSCOPY 2/25/2017 ZOSTER VACCINE AGE 60> 7/28/2017* BREAST CANCER SCRN MAMMOGRAM 10/4/2017 Pneumococcal 19-64 Highest Risk (2 of 3 - PCV13) 4/12/2018 DTaP/Tdap/Td series (2 - Td) 4/12/2027 *Topic was postponed. The date shown is not the original due date. Allergies as of 4/12/2017  Review Complete On: 4/12/2017 By: Sophia Garcia MD  
 No Known Allergies Current Immunizations  Reviewed on 4/25/2016 Name Date Tdap 4/12/2017 Not reviewed this visit You Were Diagnosed With   
  
 Codes Comments Hyperlipidemia, unspecified hyperlipidemia type    -  Primary ICD-10-CM: E78.5 ICD-9-CM: 272.4 Essential hypertension     ICD-10-CM: I10 
ICD-9-CM: 401.9 Encounter for immunization     ICD-10-CM: O94 ICD-9-CM: V03.89 Vitals BP Pulse Temp Resp Height(growth percentile) Weight(growth percentile) 121/77 68 97.3 °F (36.3 °C) (Oral) 18 5' 3\" (1.6 m) 188 lb (85.3 kg) LMP SpO2 BMI OB Status Smoking Status 08/31/1998 98% 33.3 kg/m2 Hysterectomy Never Smoker BMI and BSA Data Body Mass Index Body Surface Area  
 33.3 kg/m 2 1.95 m 2 Preferred Pharmacy Pharmacy Name Phone [facility] [address], [address] [facility] Your Updated Medication List  
  
   
This list is accurate as of: 4/12/17  2:49 PM.  Always use your most recent med list.  
  
  
  
  
 albuterol 90 mcg/actuation inhaler Commonly known as:  PROVENTIL HFA, VENTOLIN HFA, PROAIR HFA Take 2 Puffs by inhalation every six (6) hours as needed for Wheezing. beclomethasone 40 mcg/actuation [employer] Commonly known as:  QVAR Take 2 Puffs by inhalation two (2) times a day. carvedilol 25 mg tablet Commonly known as:  COREG  
TAKE ONE TABLET BY MOUTH TWICE DAILY WITH MEALS  
  
 diclofenac EC 75 mg EC tablet Commonly known as:  VOLTAREN  
TAKE ONE TABLET BY MOUTH IN THE EVENING  
  
 ergocalciferol 50,000 unit capsule Commonly known as:  ERGOCALCIFEROL  
TAKE ONE CAPSULE BY MOUTH ONCE A WEEK FERREX 150 FORTE capsule Generic drug:  iron polysacch complex-b12-fa TAKE ONE CAPSULE BY MOUTH ONCE DAILY  
  
 hydrocortisone 2.5 % topical cream  
Commonly known as:  HYTONE  
  
 latanoprost 0.005 % ophthalmic solution Commonly known as:  Julious Lathe Administer 1 Drop to both eyes nightly. losartan-hydroCHLOROthiazide 100-12.5 mg per tablet Commonly known as:  HYZAAR  
TAKE ONE TABLET BY MOUTH ONCE DAILY  
  
 meloxicam 15 mg tablet Commonly known as:  MOBIC  
  
 pantoprazole 40 mg tablet Commonly known as:  PROTONIX  
  
 polyethylene glycol 17 gram/dose powder Commonly known as:  Agustin Jeong MIX 17 GRAMS IN LIQUID AND DRINK BY MOUTH ONCE DAILY FOR CONSTIPATION  
  
 simvastatin 20 mg tablet Commonly known as:  ZOCOR  
TAKE ONE TABLET BY MOUTH AT BEDTIME  
  
 triamcinolone acetonide 0.1 % topical cream  
Commonly known as:  KENALOG Apply  to affected area two (2) times a day. valACYclovir 500 mg tablet Commonly known as:  VALTREX  
TAKE ONE TABLET BY MOUTH TWICE DAILY We Performed the Following TETANUS, DIPHTHERIA TOXOIDS AND ACELLULAR PERTUSSIS VACCINE (TDAP), IN INDIVIDS. >=7, IM [id number] CPT(R)] Patient Instructions Please contact our office if you have any questions about your visit today. Introducing [facility]! Kenia Chaudhary introduces [email or web address] patient portal. Now you can access parts of your medical record, email your doctor's office, and request medication refills online. 1. In your internet browser, go to [email or web address] 2. Click on the First Time User? Click Here link in the Sign In box. You will see the New Member Sign Up page. 3. Enter your [email or web address] Access Code exactly as it appears below. You will not need to use this code after youve completed the sign-up process. If you do not sign up before the expiration date, you must request a new code. · [email or web address] Access Code: [email or web address] Expires: 7/11/2017  2:49 PM 
 
4. Enter the last four digits of your Social Security Number (xxxx) and Date of Birth (mm/dd/yyyy) as indicated and click Submit. You will be taken to the next sign-up page. 5. Create a [email or web address] ID. This will be your [email or web address] login ID and cannot be changed, so think of one that is secure and easy to remember. 6. Create a [email or web address] password. You can change your password at any time. 7. Enter your Password Reset Question and Answer. This can be used at a later time if you forget your password. 8. Enter your e-mail address. You will receive e-mail notification when new information is available in [address]. 9. Click Sign Up.  You can now view and download portions of your medical record. 10. Click the Download Summary menu link to download a portable copy of your medical information. If you have questions, please visit the Frequently Asked Questions section of the [employer] website. Remember, [employer] is NOT to be used for urgent needs. For medical emergencies, dial 911. Now available from your iPhone and Android! Please provide this summary of care documentation to your next provider. Your primary care clinician is listed as Mikel Joshi. If you have any questions after today's visit, please call [phone].

## 2017-04-12 NOTE — PROGRESS NOTES
HISTORY OF PRESENT ILLNESS  Tabitha Sofia is a 63 y.o. female. Chief Complaint   Patient presents with    Follow-up     3 month f/u    Cholesterol Problem    Hypertension    Labs     completed on 4-7-17    Skin Problem     irritation of bilateral breast returned       HPI  Pt is here for follow up of Hypertension, and Cholesterol. Pt had labs on 4-7-17. Labs reviewed in detail with pt. Pt does not need medication refills today. New concerns today: Pt states that the skin irritation on both breasts has returned. It had improved with the cream she was using. Health Maintenance reviewed - tetanus booster given. Review of Systems   Constitutional: Negative. HENT: Negative. Respiratory: Negative. Cardiovascular: Negative. Skin: Positive for rash. Negative for itching. All other systems reviewed and are negative. Physical Exam  Physical Exam   Nursing note and vitals reviewed. Constitutional: She is oriented to person, place, and time. She appears well-developed and well-nourished. HENT:   Head: Normocephalic and atraumatic. Right Ear: External ear normal.   Left Ear: External ear normal.   Nose: Nose normal.   Eyes: Conjunctivae and EOM are normal.   Neck: Normal range of motion. Neck supple. No JVD present. Carotid bruit is not present. No thyromegaly present. Cardiovascular: Normal rate, regular rhythm, normal heart sounds and intact distal pulses. Exam reveals no gallop and no friction rub. No murmur heard. Pulmonary/Chest: Effort normal and breath sounds normal. She has no wheezes. She has no rhonchi. She has no rales. Abdominal: Soft. Bowel sounds are normal.   Musculoskeletal: Normal range of motion. Neurological: She is alert and oriented to person, place, and time. Coordination normal.   Skin: Skin is warm and dry. Dry skin noted on breast.    Psychiatric: She has a normal mood and affect.  Her behavior is normal. Judgment and thought content normal.     ASSESSMENT and PLAN  Chavez Baldwin was seen today for follow-up, cholesterol problem, hypertension, labs and skin problem. Diagnoses and all orders for this visit:    Hyperlipidemia, unspecified hyperlipidemia type  Stable, cont pres tx plan. Essential hypertension  Stable, cont pres tx plan. Encounter for immunization  -     Tetanus, diphtheria toxoids and acellular pertussis (TDAP) vaccine, in individuals >=7 years, IM    Dry skin dermatitis  Recommend daily skin moisturizer use.       Follow-up Disposition: 3 months; sooner prn

## 2017-04-12 NOTE — PROGRESS NOTES
Alcides Yonatan 63 y.o. female   Chief Complaint   Patient presents with    Follow-up     3 month f/u    Cholesterol Problem    Hypertension    Labs     completed on 4-7-17    Skin Problem     irritation of bilateral breast returned         1. Have you been to the ER, urgent care clinic since your last visit? Hospitalized since your last visit? No    2. Have you seen or consulted any other health care providers outside of the [address] [place] [address] since your last visit? Include any pap smears or colon screening. No   Alcides Tam 1955 . female who presents for routine immunizations. She denies any symptoms , reactions or allergies that would exclude them from being immunized today. Risks and adverse reactions were discussed and the VIS was given to them. All questions were addressed. Order placed for TDAP,  per Verbal Order from DR. TUCKER with read back. Patient was observed for 15 min post injection. There were no reactions observed.     Anisha Hedrick LPN

## 2017-04-17 ENCOUNTER — OFFICE VISIT (OUTPATIENT)
Dept: ONCOLOGY | Age: 62
End: 2017-04-17

## 2017-04-17 ENCOUNTER — HOSPITAL ENCOUNTER (OUTPATIENT)
Dept: ONCOLOGY | Age: 62
Discharge: HOME | End: 2017-04-17

## 2017-04-17 VITALS
SYSTOLIC BLOOD PRESSURE: 105 MMHG | WEIGHT: 185 LBS | HEART RATE: 70 BPM | BODY MASS INDEX: 32.78 KG/M2 | TEMPERATURE: 97.2 F | HEIGHT: 63 IN | DIASTOLIC BLOOD PRESSURE: 70 MMHG

## 2017-04-17 DIAGNOSIS — D46.4 REFRACTORY ANEMIA (HCC): ICD-10-CM

## 2017-04-17 DIAGNOSIS — D64.9 ANEMIA, UNSPECIFIED TYPE: ICD-10-CM

## 2017-04-17 DIAGNOSIS — M47.812 FACET ARTHRITIS OF CERVICAL REGION: ICD-10-CM

## 2017-04-17 DIAGNOSIS — D46.9 MDS (MYELODYSPLASTIC SYNDROME) (HCC): ICD-10-CM

## 2017-04-17 DIAGNOSIS — D64.9 ANEMIA, UNSPECIFIED TYPE: Primary | ICD-10-CM

## 2017-04-17 PROBLEM — J30.89 ENVIRONMENTAL AND SEASONAL ALLERGIES: Status: ACTIVE | Noted: 2017-04-17

## 2017-04-17 LAB
BASO+EOS+MONOS # BLD AUTO: 0.4 K/UL (ref 0–2.3)
BASO+EOS+MONOS # BLD AUTO: 8 % (ref 0.1–17)
DIFFERENTIAL METHOD BLD: ABNORMAL
ERYTHROCYTE [DISTWIDTH] IN BLOOD BY AUTOMATED COUNT: 13.5 % (ref 11.5–14.5)
HCT VFR BLD AUTO: 30.5 % (ref 36–48)
HGB BLD-MCNC: 10.1 G/DL (ref 12–16)
LYMPHOCYTES # BLD AUTO: 28 % (ref 14–44)
LYMPHOCYTES # BLD: 1.7 K/UL (ref 1.1–5.9)
MCH RBC QN AUTO: 30.1 PG (ref 25–35)
MCHC RBC AUTO-ENTMCNC: 33.1 G/DL (ref 31–37)
MCV RBC AUTO: 91 FL (ref 78–102)
NEUTS SEG # BLD: 3.8 K/UL (ref 1.8–9.5)
NEUTS SEG NFR BLD AUTO: 64 % (ref 40–70)
PLATELET # BLD AUTO: 282 K/UL (ref 140–440)
RBC # BLD AUTO: 3.35 M/UL (ref 4.1–5.1)
WBC # BLD AUTO: 5.9 K/UL (ref 4.5–13)

## 2017-04-17 NOTE — MR AVS SNAPSHOT
Visit Information Date & Time Provider Department Dept. Phone Encounter #  
 4/17/2017  1:45 PM Buck Domínguez MD [facility] [phone] [id number] Follow-up Instructions Return in about 3 months (around 7/17/2017). Your Appointments 7/24/2017  3:45 PM  
Office Visit with Buck Domínguez MD  
[facility] [facility]  On[facility] Appt Note: 3 month follow up appointment [address] [address] [place] [address], [address]  
  
    
 7/28/2017  9:00 AM  
Follow Up with Jose Rafael Katz MD  
[facility] (--) Appt Note: Follow up [place] [address] [address]  
  
    
 3/14/2018  8:00 AM  
Follow Up with Hardie Primrose, MD  
[facility] ([facility]) Appt Note: 1 year follow up [place] [address] [address] [address]  
[phone] [address] [place] [address] [address] Upcoming Health Maintenance Date Due COLONOSCOPY 2/25/2017 ZOSTER VACCINE AGE 60> 7/28/2017* BREAST CANCER SCRN MAMMOGRAM 10/4/2017 Pneumococcal 19-64 Highest Risk (2 of 3 - PCV13) 4/12/2018 DTaP/Tdap/Td series (2 - Td) 4/12/2027 *Topic was postponed. The date shown is not the original due date. Allergies as of 4/17/2017  Review Complete On: 4/17/2017 By: Buck Domínguez MD  
 No Known Allergies Current Immunizations  Reviewed on 4/25/2016 Name Date Tdap 4/12/2017 Not reviewed this visit You Were Diagnosed With   
  
 Codes Comments Anemia, unspecified type    -  Primary ICD-10-CM: D64.9 ICD-9-CM: 285.9 MDS (myelodysplastic syndrome) (HCC)     ICD-10-CM: D46.9 ICD-9-CM: 238.75   
 Refractory anemia (HCC)     ICD-10-CM: D46.4 ICD-9-CM: 238.72 Facet arthritis of cervical region [facility])     ICD-10-CM: M46.92 
ICD-9-CM: 721.0 Vitals BP Pulse Temp Height(growth percentile) Weight(growth percentile) LMP  
 105/70 70 97.2 °F (36.2 °C) 5' 3\" (1.6 m) 185 lb (83.9 kg) 08/31/1998 BMI OB Status Smoking Status 32.77 kg/m2 Hysterectomy Never Smoker Vitals History BMI and BSA Data Body Mass Index Body Surface Area 32.77 kg/m 2 1.93 m 2 Preferred Pharmacy Pharmacy Name Phone [facility] [address], [address] [facility] Your Updated Medication List  
  
   
This list is accurate as of: 4/17/17  2:16 PM.  Always use your most recent med list.  
  
  
  
  
 albuterol 90 mcg/actuation inhaler Commonly known as:  PROVENTIL HFA, VENTOLIN HFA, PROAIR HFA Take 2 Puffs by inhalation every six (6) hours as needed for Wheezing. beclomethasone 40 mcg/actuation [employer] Commonly known as:  QVAR Take 2 Puffs by inhalation two (2) times a day. carvedilol 25 mg tablet Commonly known as:  COREG  
TAKE ONE TABLET BY MOUTH TWICE DAILY WITH MEALS  
  
 diclofenac EC 75 mg EC tablet Commonly known as:  VOLTAREN  
TAKE ONE TABLET BY MOUTH IN THE EVENING  
  
 ergocalciferol 50,000 unit capsule Commonly known as:  ERGOCALCIFEROL  
TAKE ONE CAPSULE BY MOUTH ONCE A WEEK FERREX 150 FORTE capsule Generic drug:  iron polysacch complex-b12-fa TAKE ONE CAPSULE BY MOUTH ONCE DAILY  
  
 hydrocortisone 2.5 % topical cream  
Commonly known as:  HYTONE  
  
 latanoprost 0.005 % ophthalmic solution Commonly known as:  Julious Lathe Administer 1 Drop to both eyes nightly. losartan-hydroCHLOROthiazide 100-12.5 mg per tablet Commonly known as:  HYZAAR  
TAKE ONE TABLET BY MOUTH ONCE DAILY  
  
 meloxicam 15 mg tablet Commonly known as:  MOBIC  
  
 pantoprazole 40 mg tablet Commonly known as:  PROTONIX polyethylene glycol 17 gram/dose powder Commonly known as:  Tray Spotted MIX 17 GRAMS IN LIQUID AND DRINK BY MOUTH ONCE DAILY FOR CONSTIPATION  
  
 simvastatin 20 mg tablet Commonly known as:  ZOCOR  
TAKE ONE TABLET BY MOUTH AT BEDTIME  
  
 triamcinolone acetonide 0.1 % topical cream  
Commonly known as:  KENALOG Apply  to affected area two (2) times a day. valACYclovir 500 mg tablet Commonly known as:  VALTREX  
TAKE ONE TABLET BY MOUTH TWICE DAILY We Performed the Following COMPLETE CBC & AUTO DIFF WBC [89873 CPT(R)] Follow-up Instructions Return in about 3 months (around 7/17/2017). To-Do List   
 04/17/2017 Lab:  CBC WITH 3 PART DIFF Patient Instructions Anemia: Care Instructions Your Care Instructions Anemia is a low level of red blood cells, which carry oxygen throughout your body. Many things can cause anemia. Lack of iron is one of the most common causes. Your body needs iron to make hemoglobin, a substance in red blood cells that carries oxygen from the lungs to your body's cells. Without enough iron, the body produces fewer and smaller red blood cells. As a result, your body's cells do not get enough oxygen, and you feel tired and weak. And you may have trouble concentrating. Bleeding is the most common cause of a lack of iron. You may have heavy menstrual bleeding or bleeding caused by conditions such as ulcers, hemorrhoids, or cancer. Regular use of aspirin or other anti-inflammatory medicines (such as ibuprofen) also can cause bleeding in some people. A lack of iron in your diet also can cause anemia, especially at times when the body needs more iron, such as during pregnancy, infancy, and the teen years. Your doctor may have prescribed iron pills. It may take several months of treatment for your iron levels to return to normal. Your doctor also may suggest that you eat foods that are rich in iron, such as meat and beans. There are many other causes of anemia. It is not always due to a lack of iron. Finding the specific cause of your anemia will help your doctor find the right treatment for you. Follow-up care is a key part of your treatment and safety. Be sure to make and go to all appointments, and call your doctor if you are having problems. It's also a good idea to know your test results and keep a list of the medicines you take. How can you care for yourself at home? · Take your medicines exactly as prescribed. Call your doctor if you think you are having a problem with your medicine. · If your doctor recommends iron pills, take them as directed: ¨ Try to take the pills on an empty stomach about 1 hour before or 2 hours after meals. But you may need to take iron with food to avoid an upset stomach. ¨ Do not take antacids or drink milk or caffeine drinks (such as coffee, tea, or cola) at the same time or within 2 hours of the time that you take your iron. They can make it hard for your body to absorb the iron. ¨ Vitamin C (from food or supplements) helps your body absorb iron. Try taking iron pills with a glass of orange juice or some other food that is high in vitamin C, such as citrus fruits. ¨ Iron pills may cause stomach problems, such as heartburn, nausea, diarrhea, constipation, and cramps. Be sure to drink plenty of fluids, and include fruits, vegetables, and fiber in your diet each day. Iron pills often make your bowel movements dark or green. ¨ If you forget to take an iron pill, do not take a double dose of iron the next time you take a pill. ¨ Keep iron pills out of the reach of small children. An overdose of iron can be very dangerous. · Follow your doctor's advice about eating iron-rich foods. These include red meat, shellfish, poultry, eggs, beans, raisins, whole-grain bread, and leafy green vegetables. · Steam vegetables to help them keep their iron content. When should you call for help? Call 911 anytime you think you may need emergency care. For example, call if: 
· You have symptoms of a heart attack. These may include: ¨ Chest pain or pressure, or a strange feeling in the chest. 
¨ Sweating. ¨ Shortness of breath. ¨ Nausea or vomiting. ¨ Pain, pressure, or a strange feeling in the back, neck, jaw, or upper belly or in one or both shoulders or arms. ¨ Lightheadedness or sudden weakness. ¨ A fast or irregular heartbeat. After you call 911, the  may tell you to chew 1 adult-strength or 2 to 4 low-dose aspirin. Wait for an ambulance. Do not try to drive yourself. · You passed out (lost consciousness). Call your doctor now or seek immediate medical care if: 
· You have new or increased shortness of breath. · You are dizzy or lightheaded, or you feel like you may faint. · Your fatigue and weakness continue or get worse. · You have any abnormal bleeding, such as: 
¨ Nosebleeds. ¨ Vaginal bleeding that is different (heavier, more frequent, at a different time of the month) than what you are used to. ¨ Bloody or black stools, or rectal bleeding. ¨ Bloody or pink urine. Watch closely for changes in your health, and be sure to contact your doctor if: 
· You do not get better as expected. Where can you learn more? Go to http://uday-bhavna.info/. Enter R301 in the search box to learn more about \"Anemia: Care Instructions. \" Current as of: October 13, 2016 Content Version: 11.2 © 3545-[address] [employer]. Care instructions adapted under license by [employer] [facility] (which disclaims liability or warranty for this information). If you have questions about a medical condition or this instruction, always ask your healthcare professional. [place] [address] any warranty or liability for your use of this information. Myelodysplastic Syndromes: Care Instructions Your Care Instructions Myelodysplastic syndromes, also called MDS, are a group of rare conditions in which the bone marrow does not make enough healthy blood cells. Normally, the bone marrow makes red blood cells, white blood cells, and platelets. These cells carry oxygen in the blood, help the body fight infections, and help the blood clot. With MDS, you may feel weak and tired, get infections often, and bruise easily, although symptoms tend to vary. MDS is a form of blood cancer. In some cases, MDS can turn into acute myeloid leukemia (AML), another type of cancer. Some people develop MDS after treatment for cancer or exposure to pesticides or other chemicals. But in most cases, the cause of MDS is not known. Your doctor will use the results of blood tests to guide your treatment. There are many types of MDS, with different treatment plans for each. If you have enough red blood cells and are feeling all right, you may not need active treatment, but you and your doctor will want to watch your condition carefully. If you start feeling lightheaded and have no energy, you may need a blood transfusion. Your doctor also may give you antibiotics to prevent or treat infection. Follow-up care is a key part of your treatment and safety. Be sure to make and go to all appointments, and call your doctor if you are having problems. It's also a good idea to know your test results and keep a list of the medicines you take. How can you care for yourself at home? · Take your medicines exactly as prescribed. Call your doctor if you think you are having a problem with your medicine. You will get more details on the specific medicines your doctor prescribes. · If your doctor prescribed antibiotics, take them as directed. Do not stop taking them just because you feel better. You need to take the full course of antibiotics. If you have side effects from antibiotics, tell your doctor. · Take steps to control your stress and workload.  Learn relaxation techniques. ¨ Share your feelings. Stress and tension affect our emotions. By expressing your feelings to others, you may be able to understand and cope with them. ¨ Consider joining a support group. Talking about a problem with your spouse, a good friend, or other people with similar problems is a good way to reduce tension and stress. ¨ Express yourself with art. Try writing, crafts, dance, or art to relieve stress. ¨ Be kind to your body and mind. Getting enough sleep, eating a healthy diet, and taking time to do things you enjoy can contribute to an overall feeling of balance in your life and help reduce stress. ¨ Get help if you need it. Discuss your concerns with your doctor or counselor. · Do not smoke. Smoking can make blood problems worse. If you need help quitting, talk to your doctor about stop-smoking programs and medicines. These can increase your chances of quitting for good. · If you have not already done so, prepare a list of advance directives. Advance directives are instructions to your doctor and family members about what kind of care you want if you become unable to speak or express yourself. · Call the [employer] (8-423.483.9713) or visit its website at [address]. com. [place]! for more information. When should you call for help? Call 911 anytime you think you may need emergency care. For example, call if: 
· You passed out (lost consciousness). · You vomit blood or what looks like coffee grounds. · You pass maroon or very bloody stools. Call your doctor now or seek immediate medical care if: 
· Your stools are black and tarlike or have streaks of blood. · You have any unusual bleeding, such as: ¨ Blood spots under the skin. ¨ A nosebleed that you cannot stop. ¨ Bleeding gums when you brush your teeth. ¨ Blood in your urine. ¨ Vaginal bleeding when you are not having your period, or heavy period bleeding. · Your fatigue and weakness continue or get worse. · You have signs of infection, such as: 
¨ Increased pain, swelling, warmth, or redness. ¨ Red streaks leading from a sore. ¨ Pus draining from a sore. ¨ A fever. Watch closely for changes in your health, and be sure to contact your doctor if you have any problems. Where can you learn more? Go to http://uday-bhavna.info/. Enter Ajith [place] in the search box to learn more about \"Myelodysplastic Syndromes: Care Instructions. \" Current as of: October 24, 2016 Content Version: 11.2 © 3834-7776 [employer]. Care instructions adapted under license by [employer] (which disclaims liability or warranty for this information). If you have questions about a medical condition or this instruction, always ask your healthcare professional. [address] any warranty or liability for your use of this information. Introducing [facility]! Rhoda Soriano introduces Tears for Life patient portal. Now you can access parts of your medical record, email your doctor's office, and request medication refills online. 1. In your internet browser, go to [email or web address] 2. Click on the First Time User? Click Here link in the Sign In box. You will see the New Member Sign Up page. 3. Enter your Tears for Life Access Code exactly as it appears below. You will not need to use this code after youve completed the sign-up process. If you do not sign up before the expiration date, you must request a new code. · Tears for Life Access Code: [email or web address] Expires: 7/11/2017  2:49 PM 
 
4. Enter the last four digits of your Social Security Number (xxxx) and Date of Birth (mm/dd/yyyy) as indicated and click Submit. You will be taken to the next sign-up page. 5. Create a Tears for Life ID. This will be your Tears for Life login ID and cannot be changed, so think of one that is secure and easy to remember. 6. Create a Tears for Life password. You can change your password at any time. 7. Enter your Password Reset Question and Answer. This can be used at a later time if you forget your password. 8. Enter your e-mail address. You will receive e-mail notification when new information is available in [address] 9. Click Sign Up. You can now view and download portions of your medical record. 10. Click the Download Summary menu link to download a portable copy of your medical information. If you have questions, please visit the Frequently Asked Questions section of the [employer] website. Remember, [employer] is NOT to be used for urgent needs. For medical emergencies, dial 911. Now available from your iPhone and Android! Please provide this summary of care documentation to your next provider. Your primary care clinician is listed as Ilean Bound. If you have any questions after today's visit, please call [phone].

## 2017-04-17 NOTE — PROGRESS NOTES
Hematology/Oncology  Progress Note    Name: Conchita Rios  Date: 2017  : 1955    PCP: Dr. Sarah Tucker    Ms. Maryuri Bull is a 63y.o. year old female who was seen for management of her myelodysplastic syndrome/refractory anemia    Current therapy: Iron supplement, Procrit or Aranesp is available whenever her hematocrit is below 30%. Subjective:     Ms. Maryuri Bull is a 72-year-old -American woman with myelodysplastic syndrome/refractory anemia. She is continuing to do well. The patient reports that she continues taking the over-the-counter iron supplement once daily. She still has occasional arthritic discomfort in her lower extremities. She uses Tylenol arthrithis as needed. She states that the pain has been well-controlled lately. Her only complaint is cough and congestion. She is using cough syrup and Sudafed as treatment modalities. She complaints of occasional fatigue but denies weakness. She has no other physical complaints at this time. Past medical history, family history, and social history were reviewed and remain unchanged. Past Medical History:   Diagnosis Date    Echocardiogram 2011    Tech difficult. EF 60-65%. Mild LVH. RVSP 20-25 mmHg.  Essential hypertension     History of colon polyps     Hx of endometriosis     had hyst    Hyperlipidemia     Hypertension     MDS (myelodysplastic syndrome) (HCC)     Refractory anemia (HCC)      Past Surgical History:   Procedure Laterality Date    HX HYSTERECTOMY       Social History     Social History    Marital status: SINGLE     Spouse name: N/A    Number of children: N/A    Years of education: N/A     Occupational History    Not on file.      Social History Main Topics    Smoking status: Never Smoker    Smokeless tobacco: Never Used    Alcohol use No    Drug use: Yes     Special: Prescription, OTC    Sexual activity: Not on file     Other Topics Concern    Not on file     Social History Narrative     Family History   Problem Relation Age of Onset    Cancer Mother     Arthritis-rheumatoid Sister     Diabetes Sister     Hypertension Sister      Current Outpatient Prescriptions   Medication Sig Dispense Refill    ergocalciferol (ERGOCALCIFEROL) 50,000 unit capsule TAKE ONE CAPSULE BY MOUTH ONCE A WEEK 4 Cap 12    hydrocortisone (HYTONE) 2.5 % topical cream       diclofenac EC (VOLTAREN) 75 mg EC tablet TAKE ONE TABLET BY MOUTH IN THE EVENING 90 Tab 0    meloxicam (MOBIC) 15 mg tablet       FERREX 150 FORTE capsule TAKE ONE CAPSULE BY MOUTH ONCE DAILY 30 Cap 3    polyethylene glycol (MIRALAX) 17 gram/dose powder MIX 17 GRAMS IN LIQUID AND DRINK BY MOUTH ONCE DAILY FOR CONSTIPATION 527 g 12    pantoprazole (PROTONIX) 40 mg tablet       simvastatin (ZOCOR) 20 mg tablet TAKE ONE TABLET BY MOUTH AT BEDTIME 90 Tab 3    losartan-hydrochlorothiazide (HYZAAR) 100-12.5 mg per tablet TAKE ONE TABLET BY MOUTH ONCE DAILY 90 Tab 3    valACYclovir (VALTREX) 500 mg tablet TAKE ONE TABLET BY MOUTH TWICE DAILY 30 Tab 12    beclomethasone (QVAR) 40 mcg/actuation inhaler Take 2 Puffs by inhalation two (2) times a day. 1 Inhaler 11    carvedilol (COREG) 25 mg tablet TAKE ONE TABLET BY MOUTH TWICE DAILY WITH MEALS 180 Tab 3    albuterol (PROVENTIL HFA, VENTOLIN HFA, PROAIR HFA) 90 mcg/actuation inhaler Take 2 Puffs by inhalation every six (6) hours as needed for Wheezing. 1 Inhaler 11    triamcinolone acetonide (KENALOG) 0.1 % topical cream Apply  to affected area two (2) times a day. 15 g 2    latanoprost (XALATAN) 0.005 % ophthalmic solution Administer 1 Drop to both eyes nightly. Review of Systems  Constitutional: The patient complains of occasional fatigue and weakness. HEENT: The patient denies recent head trauma, eye pain, blurred vision,  hearing deficit, oropharyngeal mucosal pain or lesions, and the patient denies throat pain or discomfort. Lymphatics:  The patient denies palpable peripheral lymphadenopathy. Hematologic: The patient denies having bruising, bleeding, or progressive fatigue. Respiratory: Patient denies having shortness of breath, cough, sputum production, fever, or dyspnea on exertion. Cardiovascular: The patient denies having leg pain, leg swelling, heart palpitations, chest permit, chest pain, or lightheadedness. The patient denies having dyspnea on exertion. Gastrointestinal: The patient denies having nausea, emesis, or diarrhea. The patient denies having any hematemesis or blood in the stool. Genitourinary: Patient denies having urinary urgency, frequency, or dysuria. The patient denies having blood in the urine. Psychological: The patient denies having symptoms of nervousness, anxiety, depression, or thoughts of harming himself some of this. Skin: Patient denies having skin rashes, skin, ulcerations, or unexplained itching or pruritus. Musculoskeletal: The patient  is complaining of occasional musculoskeletal pain primarily in the lower extremities. Objective:     Visit Vitals    /70    Pulse 70    Temp 97.2 °F (36.2 °C)    Ht 5' 3\" (1.6 m)    Wt 83.9 kg (185 lb)    LMP 08/31/1998    BMI 32.77 kg/m2     Pain Scale 0/10  Physical Exam:   ECOG=0  Gen. Appearance: The patient is in no acute distress. Skin: There is no bruise or rash. HEENT: The exam is unremarkable. Neck: Supple without lymphadenopathy or thyromegaly. Lungs: Clear to auscultation and percussion; there are no wheezes or rhonchi. Heart: Regular rate and rhythm; there are no murmurs, gallops, or rubs. aanterior chest wall and breasts: Deferred. The axilla reveals no palpable axillary lymphadenopathy. Abdomen: Bowel sounds are present and normal.  There is no guarding, tenderness, or hepatosplenomegaly. Extremities: There is no clubbing, cyanosis, or edema. Neurologic: There are no focal neurologic deficits. Lymphatics: There is no palpable peripheral lymphadenopathy.     Lab data: Results for orders placed or performed in visit on 04/10/17    MAMMOGRAPHY   Result Value Ref Range    Mammography, External benign findings         Assessment:     1. Anemia, unspecified type    2. MDS (myelodysplastic syndrome) ([address]    3. Refractory anemia (HCC)    4. Facet arthritis of cervical region [facility])        Plan:   Arthritis/cervical cervicitis (chronic problem): The patient reports that she has chronic arthritis and has been taking Tylenol Arthritis as needed. She will continue this plan of care since it is giving her relief. Refractory anemia/MDS: I have explained to the patient that her current hemoglobin and hematocrit were 10.1 g/dL and 30.5 % respectively. We will continue to monitor her hemoglobin and hematocrit every 3 months and if she should have a decrease in her hematocrit below 30% we will offer interventional therapy with either Procrit or Aranesp. At this time I will check a ferritin level and iron profile. The comprehensive metabolic panel will also be ordered. The patient states she has been taking ferrous sulfate once daily. Myelodysplastic syndrome: I have explained to the patient that we do not need to pursue a bone marrow biopsy at this time. However, if she experiences a rapid decline in the hemoglobin and hematocrit with an elevation in her WBC counts that would be an indication to do a bone marrow biopsy to rule out whether not she is converting to a chronic myelomonocytic leukemia component of her MDS versus converting to an acute leukemic process. Therapeutic intervention with Procrit will be provided for her  If the hematocrit declines below 30% with a hemoglobin below 10 g/dL. I have explained to the patient that the dose of Procrit  60,000 units given subcutaneously every 2 weeks. I will plan to have her return to clinic for a complete assessment again in 3 months.     Seasonal and environmental allergies (new problem): I have advised the patient to begin taking Allegra-181 tablet daily and to begin using the Flonase nasal inhaler 2 puffs to each nostril once daily. I will have her return to clinic for a complete reassessment again in 3 months.   Orders Placed This Encounter    COMPLETE CBC & AUTO DIFF WBC    InHouse CBC ([employer])     Standing Status:   Future     Number of Occurrences:   1     Standing Expiration Date:   9/69/2749    METABOLIC PANEL, COMPREHENSIVE     Standing Status:   Future     Number of Occurrences:   1     Standing Expiration Date:   4/18/2018    IRON PROFILE     Standing Status:   Future     Number of Occurrences:   1     Standing Expiration Date:   4/18/2018    FERRITIN     Standing Status:   Future     Number of Occurrences:   1     Standing Expiration Date:   4/18/2018       Brianna Gomez MD  4/17/2017

## 2017-04-17 NOTE — PATIENT INSTRUCTIONS
Anemia: Care Instructions  Your Care Instructions    Anemia is a low level of red blood cells, which carry oxygen throughout your body. Many things can cause anemia. Lack of iron is one of the most common causes. Your body needs iron to make hemoglobin, a substance in red blood cells that carries oxygen from the lungs to your body's cells. Without enough iron, the body produces fewer and smaller red blood cells. As a result, your body's cells do not get enough oxygen, and you feel tired and weak. And you may have trouble concentrating. Bleeding is the most common cause of a lack of iron. You may have heavy menstrual bleeding or bleeding caused by conditions such as ulcers, hemorrhoids, or cancer. Regular use of aspirin or other anti-inflammatory medicines (such as ibuprofen) also can cause bleeding in some people. A lack of iron in your diet also can cause anemia, especially at times when the body needs more iron, such as during pregnancy, infancy, and the teen years. Your doctor may have prescribed iron pills. It may take several months of treatment for your iron levels to return to normal. Your doctor also may suggest that you eat foods that are rich in iron, such as meat and beans. There are many other causes of anemia. It is not always due to a lack of iron. Finding the specific cause of your anemia will help your doctor find the right treatment for you. Follow-up care is a key part of your treatment and safety. Be sure to make and go to all appointments, and call your doctor if you are having problems. It's also a good idea to know your test results and keep a list of the medicines you take. How can you care for yourself at home? · Take your medicines exactly as prescribed. Call your doctor if you think you are having a problem with your medicine. · If your doctor recommends iron pills, take them as directed:  ¨ Try to take the pills on an empty stomach about 1 hour before or 2 hours after meals. But you may need to take iron with food to avoid an upset stomach. ¨ Do not take antacids or drink milk or caffeine drinks (such as coffee, tea, or cola) at the same time or within 2 hours of the time that you take your iron. They can make it hard for your body to absorb the iron. ¨ Vitamin C (from food or supplements) helps your body absorb iron. Try taking iron pills with a glass of orange juice or some other food that is high in vitamin C, such as citrus fruits. ¨ Iron pills may cause stomach problems, such as heartburn, nausea, diarrhea, constipation, and cramps. Be sure to drink plenty of fluids, and include fruits, vegetables, and fiber in your diet each day. Iron pills often make your bowel movements dark or green. ¨ If you forget to take an iron pill, do not take a double dose of iron the next time you take a pill. ¨ Keep iron pills out of the reach of small children. An overdose of iron can be very dangerous. · Follow your doctor's advice about eating iron-rich foods. These include red meat, shellfish, poultry, eggs, beans, raisins, whole-grain bread, and leafy green vegetables. · Steam vegetables to help them keep their iron content. When should you call for help? Call 911 anytime you think you may need emergency care. For example, call if:  · You have symptoms of a heart attack. These may include:  ¨ Chest pain or pressure, or a strange feeling in the chest.  ¨ Sweating. ¨ Shortness of breath. ¨ Nausea or vomiting. ¨ Pain, pressure, or a strange feeling in the back, neck, jaw, or upper belly or in one or both shoulders or arms. ¨ Lightheadedness or sudden weakness. ¨ A fast or irregular heartbeat. After you call 911, the  may tell you to chew 1 adult-strength or 2 to 4 low-dose aspirin. Wait for an ambulance. Do not try to drive yourself. · You passed out (lost consciousness).   Call your doctor now or seek immediate medical care if:  · You have new or increased shortness of breath. · You are dizzy or lightheaded, or you feel like you may faint. · Your fatigue and weakness continue or get worse. · You have any abnormal bleeding, such as:  ¨ Nosebleeds. ¨ Vaginal bleeding that is different (heavier, more frequent, at a different time of the month) than what you are used to. ¨ Bloody or black stools, or rectal bleeding. ¨ Bloody or pink urine. Watch closely for changes in your health, and be sure to contact your doctor if:  · You do not get better as expected. Where can you learn more? Go to http://uday-bhavna.info/. Enter R301 in the search box to learn more about \"Anemia: Care Instructions. \"  Current as of: October 13, 2016  Content Version: 11.2  © 1774-2121 [employer]. Care instructions adapted under license by [employer] (which disclaims liability or warranty for this information). If you have questions about a medical condition or this instruction, always ask your healthcare professional. [place] [address] any warranty or liability for your use of this information. Myelodysplastic Syndromes: Care Instructions  Your Care Instructions  Myelodysplastic syndromes, also called MDS, are a group of rare conditions in which the bone marrow does not make enough healthy blood cells. Normally, the bone marrow makes red blood cells, white blood cells, and platelets. These cells carry oxygen in the blood, help the body fight infections, and help the blood clot. With MDS, you may feel weak and tired, get infections often, and bruise easily, although symptoms tend to vary. MDS is a form of blood cancer. In some cases, MDS can turn into acute myeloid leukemia (AML), another type of cancer. Some people develop MDS after treatment for cancer or exposure to pesticides or other chemicals. But in most cases, the cause of MDS is not known. Your doctor will use the results of blood tests to guide your treatment.  There are many types of MDS, with different treatment plans for each. If you have enough red blood cells and are feeling all right, you may not need active treatment, but you and your doctor will want to watch your condition carefully. If you start feeling lightheaded and have no energy, you may need a blood transfusion. Your doctor also may give you antibiotics to prevent or treat infection. Follow-up care is a key part of your treatment and safety. Be sure to make and go to all appointments, and call your doctor if you are having problems. It's also a good idea to know your test results and keep a list of the medicines you take. How can you care for yourself at home? · Take your medicines exactly as prescribed. Call your doctor if you think you are having a problem with your medicine. You will get more details on the specific medicines your doctor prescribes. · If your doctor prescribed antibiotics, take them as directed. Do not stop taking them just because you feel better. You need to take the full course of antibiotics. If you have side effects from antibiotics, tell your doctor. · Take steps to control your stress and workload. Learn relaxation techniques. ¨ Share your feelings. Stress and tension affect our emotions. By expressing your feelings to others, you may be able to understand and cope with them. ¨ Consider joining a support group. Talking about a problem with your spouse, a good friend, or other people with similar problems is a good way to reduce tension and stress. ¨ Express yourself with art. Try writing, crafts, dance, or art to relieve stress. ¨ Be kind to your body and mind. Getting enough sleep, eating a healthy diet, and taking time to do things you enjoy can contribute to an overall feeling of balance in your life and help reduce stress. ¨ Get help if you need it. Discuss your concerns with your doctor or counselor. · Do not smoke. Smoking can make blood problems worse.  If you need help quitting, talk to your doctor about stop-smoking programs and medicines. These can increase your chances of quitting for good. · If you have not already done so, prepare a list of advance directives. Advance directives are instructions to your doctor and family members about what kind of care you want if you become unable to speak or express yourself. · Call the [facility] [facility] ([phone]) or visit its website at [address] for more information. When should you call for help? Call 911 anytime you think you may need emergency care. For example, call if:  · You passed out (lost consciousness). · You vomit blood or what looks like coffee grounds. · You pass maroon or very bloody stools. Call your doctor now or seek immediate medical care if:  · Your stools are black and tarlike or have streaks of blood. · You have any unusual bleeding, such as:  ¨ Blood spots under the skin. ¨ A nosebleed that you cannot stop. ¨ Bleeding gums when you brush your teeth. ¨ Blood in your urine. ¨ Vaginal bleeding when you are not having your period, or heavy period bleeding. · Your fatigue and weakness continue or get worse. · You have signs of infection, such as:  ¨ Increased pain, swelling, warmth, or redness. ¨ Red streaks leading from a sore. ¨ Pus draining from a sore. ¨ A fever. Watch closely for changes in your health, and be sure to contact your doctor if you have any problems. Where can you learn more? Go to http://uday-bhavna.info/. Enter Gregory Duncanlaurozhaneon in the search box to learn more about \"Myelodysplastic Syndromes: Care Instructions. \"  Current as of: October 24, 2016  Content Version: 11.2  © 6230-7245 [employer]. Care instructions adapted under license by [employer] (which disclaims liability or warranty for this information).  If you have questions about a medical condition or this instruction, always ask your healthcare professional. [employer] disclaims any warranty or liability for your use of this information.

## 2017-04-18 LAB
ALBUMIN SERPL-MCNC: 4.3 G/DL (ref 3.6–4.8)
ALBUMIN/GLOB SERPL: 1.3 {RATIO} (ref 1.2–2.2)
ALP SERPL-CCNC: 90 IU/L (ref 39–117)
ALT SERPL-CCNC: 13 IU/L (ref 0–32)
AST SERPL-CCNC: 22 IU/L (ref 0–40)
BILIRUB SERPL-MCNC: 0.5 MG/DL (ref 0–1.2)
BUN SERPL-MCNC: 14 MG/DL (ref 8–27)
BUN/CREAT SERPL: 19 (ref 12–28)
CALCIUM SERPL-MCNC: 9.9 MG/DL (ref 8.7–10.3)
CHLORIDE SERPL-SCNC: 98 MMOL/L (ref 96–106)
CO2 SERPL-SCNC: 28 MMOL/L (ref 18–29)
CREAT SERPL-MCNC: 0.72 MG/DL (ref 0.57–1)
FERRITIN SERPL-MCNC: 220 NG/ML (ref 15–150)
GLOBULIN SER CALC-MCNC: 3.4 G/DL (ref 1.5–4.5)
GLUCOSE SERPL-MCNC: 83 MG/DL (ref 65–99)
IRON SATN MFR SERPL: 18 % (ref 15–55)
IRON SERPL-MCNC: 53 UG/DL (ref 27–159)
POTASSIUM SERPL-SCNC: 3.9 MMOL/L (ref 3.5–5.2)
PROT SERPL-MCNC: 7.7 G/DL (ref 6–8.5)
SODIUM SERPL-SCNC: 141 MMOL/L (ref 134–144)
TIBC SERPL-MCNC: 287 UG/DL (ref 250–450)
UIBC SERPL-MCNC: 234 UG/DL (ref 131–425)

## 2017-05-08 ENCOUNTER — OFFICE VISIT (OUTPATIENT)
Dept: PULMONOLOGY | Age: 62
End: 2017-05-08

## 2017-05-08 VITALS
SYSTOLIC BLOOD PRESSURE: 140 MMHG | BODY MASS INDEX: 33.66 KG/M2 | WEIGHT: 190 LBS | RESPIRATION RATE: 16 BRPM | HEIGHT: 63 IN | OXYGEN SATURATION: 98 % | DIASTOLIC BLOOD PRESSURE: 74 MMHG | TEMPERATURE: 98.3 F | HEART RATE: 71 BPM

## 2017-05-08 DIAGNOSIS — R06.2 WHEEZING: Primary | ICD-10-CM

## 2017-05-08 NOTE — PROGRESS NOTES
[facility]  Pulmonary, Critical Care, and Sleep Medicine      Chief complaint:  Wheezing sensation    HPI:  Radha Pantoja 63years old and returns to the office today for follow. She was originally seen because of the sensation she noted substernally when she would sit up at night and repeat or watch TV before she went to bed. This sensation would come and go. It was never associated with chest pain shortness of breath or chronic cough or leg swelling. She also denies significant allergy symptoms. She does however have occasional gastroesophageal reflux symptoms. .  The patient does not take any inhaled therapy for asthma  No Known Allergies  Current Outpatient Prescriptions   Medication Sig    ergocalciferol (ERGOCALCIFEROL) 50,000 unit capsule TAKE ONE CAPSULE BY MOUTH ONCE A WEEK    hydrocortisone (HYTONE) 2.5 % topical cream     diclofenac EC (VOLTAREN) 75 mg EC tablet TAKE ONE TABLET BY MOUTH IN THE EVENING    FERREX 150 FORTE capsule TAKE ONE CAPSULE BY MOUTH ONCE DAILY    polyethylene glycol (MIRALAX) 17 gram/dose powder MIX 17 GRAMS IN LIQUID AND DRINK BY MOUTH ONCE DAILY FOR CONSTIPATION    simvastatin (ZOCOR) 20 mg tablet TAKE ONE TABLET BY MOUTH AT BEDTIME    losartan-hydrochlorothiazide (HYZAAR) 100-12.5 mg per tablet TAKE ONE TABLET BY MOUTH ONCE DAILY    carvedilol (COREG) 25 mg tablet TAKE ONE TABLET BY MOUTH TWICE DAILY WITH MEALS    albuterol (PROVENTIL HFA, VENTOLIN HFA, PROAIR HFA) 90 mcg/actuation inhaler Take 2 Puffs by inhalation every six (6) hours as needed for Wheezing.  latanoprost (XALATAN) 0.005 % ophthalmic solution Administer 1 Drop to both eyes nightly.  meloxicam (MOBIC) 15 mg tablet     pantoprazole (PROTONIX) 40 mg tablet     valACYclovir (VALTREX) 500 mg tablet TAKE ONE TABLET BY MOUTH TWICE DAILY    beclomethasone (QVAR) 40 mcg/actuation inhaler Take 2 Puffs by inhalation two (2) times a day.     triamcinolone acetonide (KENALOG) 0.1 % topical cream Apply  to affected area two (2) times a day. No current facility-administered medications for this visit. Past Medical History:   Diagnosis Date    Echocardiogram 02/08/2011    Tech difficult. EF 60-65%. Mild LVH. RVSP 20-25 mmHg.  Essential hypertension     History of colon polyps     Hx of endometriosis     had hyst    Hyperlipidemia     Hypertension     MDS (myelodysplastic syndrome) (HCC)     Refractory anemia (HCC)      Past Surgical History:   Procedure Laterality Date    HX HYSTERECTOMY       Social History     Social History    Marital status: SINGLE     Spouse name: N/A    Number of children: N/A    Years of education: N/A     Occupational History    Not on file. Social History Main Topics    Smoking status: Never Smoker    Smokeless tobacco: Never Used    Alcohol use No    Drug use: Yes     Special: Prescription, OTC    Sexual activity: Not on file     Other Topics Concern    Not on file     Social History Narrative     Family History   Problem Relation Age of Onset    Cancer Mother     Arthritis-rheumatoid Sister     Diabetes Sister     Hypertension Sister        Review of systems:  She denies fever chills or appetite or weight loss    Physical Exam:  Visit Vitals    /74 (BP 1 Location: Left arm, BP Patient Position: Sitting)    Pulse 71    Temp 98.3 °F (36.8 °C) (Oral)    Resp 16    Ht 5' 3\" (1.6 m)    Wt 86.2 kg (190 lb)    LMP 08/31/1998    SpO2 98%  Comment: RA Rest    BMI 33.66 kg/m2       Well-developed well-nourished  HEENT: WNL  Lymph node exam: Supraclavicular cervical lymph nodes negative  Chest: Equal symmetrical expansion no dullness or wheezes rales rubs  Heart: Regular rhythm no gallop or murmur no JVD no peripheral edema  Extremities: No cyanosis clubbing or calf tenderness  Neurological: Alert and oriented  Skin: No rashes  Musculoskeletal: No acute joint inflammation or muscle wasting.      LABS:    O2 sat 98% room air    Impression:   The cause of the sensation in her chest is not clear but I am not convinced his asthma because of her normal FEV1/ FVC or reactive airway disease on her pulmonary function tests and her absence of cough and shortness of breath and wheezing at other times. She did have a mild restrictive defect but no evidence of restrictive lung disease but no x-ray to confirm this    Plan:   advised to stop all treatment for asthma at this time  Impair a treatment for gastroesophageal reflux with over-the-counter Prilosec  Followup in 3 months with chest x-ray or sooner if symptoms occur    Mirta Clemente MD , [facility]    CC: Kendell Crabtree MD     [address]  [place], [address] [address]     P: [phone]     F: [phone]

## 2017-05-08 NOTE — MR AVS SNAPSHOT
Visit Information Date & Time Provider Department Dept. Phone Encounter #  
 5/8/2017  3:15 PM MD Penny Lara [facility] [id number] Follow-up Instructions Return in about 3 months (around 8/8/2017). Follow-up and Disposition History Your Appointments 7/24/2017  3:45 PM  
Office Visit with Sajan Isaacs MD  
[facility] [address] [place] [address]) Appt Note: 3 month follow up appointment [facility] [address], [place] [address] [facility] [address] [place] [address], [address]  
  
    
 7/28/2017  9:00 AM  
Follow Up with Sophia Garcia MD  
[address] [place] [address] (--) Appt Note: Follow up [place] [address] [facility] [address] [address]  
  
    
 3/14/2018  8:00 AM  
Follow Up with Blair Sharpe MD  
[facility] ([facility]) Appt Note: 1 year follow up [place] [address] [address] [address] [phone] [address] [place] [address] [address] Upcoming Health Maintenance Date Due COLONOSCOPY 2/25/2017 ZOSTER VACCINE AGE 60> 7/28/2017* INFLUENZA AGE 9 TO ADULT 8/1/2017 BREAST CANCER SCRN MAMMOGRAM 10/4/2017 Pneumococcal 19-64 Highest Risk (2 of 3 - PCV13) 4/12/2018 DTaP/Tdap/Td series (2 - Td) 4/12/2027 *Topic was postponed. The date shown is not the original due date. Allergies as of 5/8/2017  Review Complete On: 5/8/2017 By: Stacey Freeman LPN No Known Allergies Current Immunizations  Reviewed on 4/25/2016 Name Date Tdap 4/12/2017 Not reviewed this visit You Were Diagnosed With   
  
 Codes Comments Wheezing    -  Primary ICD-10-CM: R06.2 ICD-9-CM: 786.07 Vitals BP Pulse Temp Resp Height(growth percentile) Weight(growth percentile) 140/74 (BP 1 Location: Left arm, BP Patient Position: Sitting) 71 98.3 °F (36.8 °C) (Oral) 16 5' 3\" (1.6 m) 190 lb (86.2 kg) LMP SpO2 BMI OB Status Smoking Status 08/31/1998 98% 33.66 kg/m2 Hysterectomy Never Smoker BMI and BSA Data Body Mass Index Body Surface Area  
 33.66 kg/m 2 1.96 m 2 Preferred Pharmacy Pharmacy Name Phone [facility] [address], [address] [facility] Your Updated Medication List  
  
   
This list is accurate as of: 5/8/17  4:24 PM.  Always use your most recent med list.  
  
  
  
  
 albuterol 90 mcg/actuation inhaler Commonly known as:  PROVENTIL HFA, VENTOLIN HFA, PROAIR HFA Take 2 Puffs by inhalation every six (6) hours as needed for Wheezing. beclomethasone 40 mcg/actuation [employer] Commonly known as:  QVAR Take 2 Puffs by inhalation two (2) times a day. carvedilol 25 mg tablet Commonly known as:  COREG  
TAKE ONE TABLET BY MOUTH TWICE DAILY WITH MEALS  
  
 diclofenac EC 75 mg EC tablet Commonly known as:  VOLTAREN  
TAKE ONE TABLET BY MOUTH IN THE EVENING  
  
 ergocalciferol 50,000 unit capsule Commonly known as:  ERGOCALCIFEROL  
TAKE ONE CAPSULE BY MOUTH ONCE A WEEK FERREX 150 FORTE capsule Generic drug:  iron polysacch complex-b12-fa TAKE ONE CAPSULE BY MOUTH ONCE DAILY  
  
 hydrocortisone 2.5 % topical cream  
Commonly known as:  HYTONE  
  
 latanoprost 0.005 % ophthalmic solution Commonly known as:  Nathalie Tucker Administer 1 Drop to both eyes nightly. losartan-hydroCHLOROthiazide 100-12.5 mg per tablet Commonly known as:  HYZAAR  
TAKE ONE TABLET BY MOUTH ONCE DAILY  
  
 meloxicam 15 mg tablet Commonly known as:  MOBIC  
  
 pantoprazole 40 mg tablet Commonly known as:  PROTONIX  
  
 polyethylene glycol 17 gram/dose powder Commonly known as:  Stefan Osgood MIX 17 GRAMS IN LIQUID AND DRINK BY MOUTH ONCE DAILY FOR CONSTIPATION  
  
 simvastatin 20 mg tablet Commonly known as:  ZOCOR  
TAKE ONE TABLET BY MOUTH AT BEDTIME  
  
 triamcinolone acetonide 0.1 % topical cream  
Commonly known as:  KENALOG Apply  to affected area two (2) times a day. valACYclovir 500 mg tablet Commonly known as:  VALTREX  
TAKE ONE TABLET BY MOUTH TWICE DAILY Follow-up Instructions Return in about 3 months (around 8/8/2017). Patient Instructions Try over-the-counter Prilosec daily and see if the sensation in your chest resolves Call for symptoms such as unexplained cough shortness of breath or significant wheezing Introducing [facility]! Kitelin Chavez introduces [employer] patient portal. Now you can access parts of your medical record, email your doctor's office, and request medication refills online. 1. In your internet browser, go to [email or web address] 2. Click on the First Time User? Click Here link in the Sign In box. You will see the New Member Sign Up page. 3. Enter your [employer] Access Code exactly as it appears below. You will not need to use this code after youve completed the sign-up process. If you do not sign up before the expiration date, you must request a new code. · [employer] Access Code: [email or web address] Expires: 7/11/2017  2:49 PM 
 
4. Enter the last four digits of your Social Security Number (xxxx) and Date of Birth (mm/dd/yyyy) as indicated and click Submit. You will be taken to the next sign-up page. 5. Create a [employer] ID. This will be your [employer] login ID and cannot be changed, so think of one that is secure and easy to remember. 6. Create a [employer] password. You can change your password at any time. 7. Enter your Password Reset Question and Answer. This can be used at a later time if you forget your password. 8. Enter your e-mail address.  You will receive e-mail notification when new information is available in [employer]. 9. Click Sign Up. You can now view and download portions of your medical record. 10. Click the Download Summary menu link to download a portable copy of your medical information. If you have questions, please visit the Frequently Asked Questions section of the [employer] website. Remember, [employer] is NOT to be used for urgent needs. For medical emergencies, dial 911. Now available from your iPhone and Android! Please provide this summary of care documentation to your next provider. Your primary care clinician is listed as Kenneth Hook. If you have any questions after today's visit, please call [phone].

## 2017-05-08 NOTE — PATIENT INSTRUCTIONS
Try over-the-counter Prilosec daily and see if the sensation in your chest resolves  Call for symptoms such as unexplained cough shortness of breath or significant wheezing

## 2017-07-20 ENCOUNTER — TELEPHONE (OUTPATIENT)
Dept: FAMILY MEDICINE CLINIC | Age: 62
End: 2017-07-20

## 2017-07-20 NOTE — TELEPHONE ENCOUNTER
Pt called stated that she needed to a referral to see Dr. Romy Chua. She stated she was told that her referral  in .  Pt has a appt on 17 @ 3:45 with Dr. Erika Casanova pt provided   Dr. Romy Chua  [address] [facility] [phone]

## 2017-07-24 ENCOUNTER — HOSPITAL ENCOUNTER (OUTPATIENT)
Dept: ONCOLOGY | Age: 62
Discharge: HOME | End: 2017-07-24

## 2017-07-24 ENCOUNTER — OFFICE VISIT (OUTPATIENT)
Dept: ONCOLOGY | Age: 62
End: 2017-07-24

## 2017-07-24 VITALS
TEMPERATURE: 97.9 F | HEIGHT: 63 IN | DIASTOLIC BLOOD PRESSURE: 77 MMHG | SYSTOLIC BLOOD PRESSURE: 134 MMHG | OXYGEN SATURATION: 100 % | WEIGHT: 188.6 LBS | HEART RATE: 63 BPM | BODY MASS INDEX: 33.42 KG/M2

## 2017-07-24 DIAGNOSIS — D46.4 REFRACTORY ANEMIA (HCC): ICD-10-CM

## 2017-07-24 DIAGNOSIS — D46.9 MDS (MYELODYSPLASTIC SYNDROME) (HCC): Primary | ICD-10-CM

## 2017-07-24 DIAGNOSIS — D46.9 MDS (MYELODYSPLASTIC SYNDROME) (HCC): ICD-10-CM

## 2017-07-24 DIAGNOSIS — M19.90 ARTHRITIS: ICD-10-CM

## 2017-07-24 DIAGNOSIS — J30.89 ENVIRONMENTAL AND SEASONAL ALLERGIES: ICD-10-CM

## 2017-07-24 LAB
BASO+EOS+MONOS # BLD AUTO: 0.3 K/UL (ref 0–2.3)
BASO+EOS+MONOS # BLD AUTO: 6 % (ref 0.1–17)
DIFFERENTIAL METHOD BLD: ABNORMAL
ERYTHROCYTE [DISTWIDTH] IN BLOOD BY AUTOMATED COUNT: 13.8 % (ref 11.5–14.5)
HCT VFR BLD AUTO: 29.7 % (ref 36–48)
HGB BLD-MCNC: 10.1 G/DL (ref 12–16)
LYMPHOCYTES # BLD AUTO: 38 % (ref 14–44)
LYMPHOCYTES # BLD: 1.9 K/UL (ref 1.1–5.9)
MCH RBC QN AUTO: 31 PG (ref 25–35)
MCHC RBC AUTO-ENTMCNC: 34 G/DL (ref 31–37)
MCV RBC AUTO: 91.1 FL (ref 78–102)
NEUTS SEG # BLD: 2.9 K/UL (ref 1.8–9.5)
NEUTS SEG NFR BLD AUTO: 56 % (ref 40–70)
PLATELET # BLD AUTO: 305 K/UL (ref 140–440)
RBC # BLD AUTO: 3.26 M/UL (ref 4.1–5.1)
WBC # BLD AUTO: 5.1 K/UL (ref 4.5–13)

## 2017-07-24 NOTE — PROGRESS NOTES
Hematology/Oncology  Progress Note    Name: Radha Rivers  Date: 2017  : 1955    PCP: Dr. Alex Romero    Ms. Morris Garcia is a 63y.o. year old female who was seen for management of her myelodysplastic syndrome/refractory anemia    Current therapy: Iron supplement, Procrit or Aranesp is available whenever her hematocrit is below 30%. Subjective:     Ms. Morris Garcia is a 24-year-old -American woman with myelodysplastic syndrome/refractory anemia. She is continuing to do well. The patient reports that she continues taking the over-the-counter iron supplement once daily. She still has occasional arthritic discomfort in her lower extremities. She uses Tylenol arthrithis as needed. She states that the pain has been well-controlled lately. She complaints of occasional fatigue but denies weakness. She has no other physical complaints at this time. Past medical history, family history, and social history were reviewed and remain unchanged. Past Medical History:   Diagnosis Date    Echocardiogram 2011    Tech difficult. EF 60-65%. Mild LVH. RVSP 20-25 mmHg.  Essential hypertension     History of colon polyps     Hx of endometriosis     had hyst    Hyperlipidemia     Hypertension     MDS (myelodysplastic syndrome) (HCC)     Refractory anemia (HCC)      Past Surgical History:   Procedure Laterality Date    HX HYSTERECTOMY       Social History     Social History    Marital status: SINGLE     Spouse name: N/A    Number of children: N/A    Years of education: N/A     Occupational History    Not on file.      Social History Main Topics    Smoking status: Never Smoker    Smokeless tobacco: Never Used    Alcohol use No    Drug use: Yes     Special: Prescription, OTC    Sexual activity: Not on file     Other Topics Concern    Not on file     Social History Narrative     Family History   Problem Relation Age of Onset    Cancer Mother     Arthritis-rheumatoid Sister     Diabetes Sister  Hypertension Sister      Current Outpatient Prescriptions   Medication Sig Dispense Refill    carvedilol (COREG) 25 mg tablet TAKE ONE TABLET BY MOUTH TWICE DAILY WITH MEALS 180 Tab 3    ergocalciferol (ERGOCALCIFEROL) 50,000 unit capsule TAKE ONE CAPSULE BY MOUTH ONCE A WEEK 4 Cap 12    hydrocortisone (HYTONE) 2.5 % topical cream       diclofenac EC (VOLTAREN) 75 mg EC tablet TAKE ONE TABLET BY MOUTH IN THE EVENING 90 Tab 0    meloxicam (MOBIC) 15 mg tablet       FERREX 150 FORTE capsule TAKE ONE CAPSULE BY MOUTH ONCE DAILY 30 Cap 3    polyethylene glycol (MIRALAX) 17 gram/dose powder MIX 17 GRAMS IN LIQUID AND DRINK BY MOUTH ONCE DAILY FOR CONSTIPATION 527 g 12    pantoprazole (PROTONIX) 40 mg tablet       simvastatin (ZOCOR) 20 mg tablet TAKE ONE TABLET BY MOUTH AT BEDTIME 90 Tab 3    losartan-hydrochlorothiazide (HYZAAR) 100-12.5 mg per tablet TAKE ONE TABLET BY MOUTH ONCE DAILY 90 Tab 3    valACYclovir (VALTREX) 500 mg tablet TAKE ONE TABLET BY MOUTH TWICE DAILY 30 Tab 12    beclomethasone (QVAR) 40 mcg/actuation inhaler Take 2 Puffs by inhalation two (2) times a day. 1 Inhaler 11    albuterol (PROVENTIL HFA, VENTOLIN HFA, PROAIR HFA) 90 mcg/actuation inhaler Take 2 Puffs by inhalation every six (6) hours as needed for Wheezing. 1 Inhaler 11    triamcinolone acetonide (KENALOG) 0.1 % topical cream Apply  to affected area two (2) times a day. 15 g 2    latanoprost (XALATAN) 0.005 % ophthalmic solution Administer 1 Drop to both eyes nightly. Review of Systems  Constitutional: The patient complains of occasional fatigue  HEENT: The patient denies recent head trauma, eye pain, blurred vision,  hearing deficit, oropharyngeal mucosal pain or lesions, and the patient denies throat pain or discomfort. Lymphatics: The patient denies palpable peripheral lymphadenopathy. Hematologic: The patient denies having bruising, bleeding, or progressive fatigue.   Respiratory: Patient denies having shortness of breath, cough, sputum production, fever, or dyspnea on exertion. Cardiovascular: The patient denies having leg pain, leg swelling, heart palpitations, chest permit, chest pain, or lightheadedness. The patient denies having dyspnea on exertion. Gastrointestinal: The patient denies having nausea, emesis, or diarrhea. The patient denies having any hematemesis or blood in the stool. Genitourinary: Patient denies having urinary urgency, frequency, or dysuria. The patient denies having blood in the urine. Psychological: The patient denies having symptoms of nervousness, anxiety, depression, or thoughts of harming himself some of this. Skin: Patient denies having skin rashes, skin, ulcerations, or unexplained itching or pruritus. Musculoskeletal: The patient  is complaining of occasional musculoskeletal pain primarily in the lower extremities. Objective:     Visit Vitals    /77 (BP 1 Location: Right arm, BP Patient Position: Sitting)    Pulse 63    Temp 97.9 °F (36.6 °C) (Oral)    Ht 5' 3\" (1.6 m)    Wt 85.5 kg (188 lb 9.6 oz)    LMP 08/31/1998    SpO2 100%    BMI 33.41 kg/m2     Pain Scale 0/10  Physical Exam:   ECOG=0  Gen. Appearance: The patient is in no acute distress. Skin: There is no bruise or rash. HEENT: The exam is unremarkable. Neck: Supple without lymphadenopathy or thyromegaly. Lungs: Clear to auscultation and percussion; there are no wheezes or rhonchi. Heart: Regular rate and rhythm; there are no murmurs, gallops, or rubs. aanterior chest wall and breasts: Deferred. The axilla reveals no palpable axillary lymphadenopathy. Abdomen: Bowel sounds are present and normal.  There is no guarding, tenderness, or hepatosplenomegaly. Extremities: There is no clubbing, cyanosis, or edema. Neurologic: There are no focal neurologic deficits. Lymphatics: There is no palpable peripheral lymphadenopathy.     Lab data:      Results for orders placed or performed during the hospital encounter of 04/17/17   CBC WITH 3 PART DIFF   Result Value Ref Range    WBC 5.9 4.5 - 13.0 K/uL    RBC 3.35 (L) 4.10 - 5.10 M/uL    HGB 10.1 (L) 12.0 - 16 g/dL    HCT 30.5 (L) 36 - 48 %    MCV 91.0 78 - 102 FL    MCH 30.1 25.0 - 35.0 PG    MCHC 33.1 31 - 37 g/dL    RDW 13.5 11.5 - 14.5 %    PLATELET 209 865 - 134 K/uL    NEUTROPHILS 64 40 - 70 %    MIXED CELLS 8 0.1 - 17 %    LYMPHOCYTES 28 14 - 44 %    ABS. NEUTROPHILS 3.8 1.8 - 9.5 K/UL    ABS. MIXED CELLS 0.4 0.0 - 2.3 K/uL    ABS. LYMPHOCYTES 1.7 1.1 - 5.9 K/UL    DF AUTOMATED          Assessment:     1. MDS (myelodysplastic syndrome) ([facility] [address]    2. Refractory anemia (HCC)    3. Arthritis        Plan:   Arthritis/cervical cervicitis (chronic problem): The patient reports that she has chronic arthritis and has been taking Tylenol Arthritis as needed. She will continue this plan of care since it is giving her relief. Refractory anemia/MDS: I have explained to the patient that her current hemoglobin and hematocrit were 10.1 g/dL and 29.7 % respectively. We will continue to monitor her hemoglobin and hematocrit every 3 months and if she should have a decrease in her hemoglobin below 10 g/dl and  hematocrit below 30% we will offer interventional therapy with either Procrit or Aranesp. At this time I will check a ferritin level and iron profile. The comprehensive metabolic panel will also be ordered. The patient states she has been taking ferrous sulfate once daily. Myelodysplastic syndrome: I have explained to the patient that we do not need to pursue a bone marrow biopsy at this time. However, if she experiences a rapid decline in the hemoglobin and hematocrit with an elevation in her WBC counts that would be an indication to do a bone marrow biopsy to rule out whether not she is converting to a chronic myelomonocytic leukemia component of her MDS versus converting to an acute leukemic process. Therapeutic intervention with Procrit will be provided for her  If the hematocrit declines below 30% with a hemoglobin below 10 g/dL. I have explained to the patient that the dose of Procrit  60,000 units given subcutaneously every 2 weeks. I will plan to have her return to clinic for a complete assessment again in 3 months. I will have her return to clinic for a complete reassessment again in 3 months.   Orders Placed This Encounter    COMPLETE CBC & AUTO DIFF WBC    METABOLIC PANEL, COMPREHENSIVE     Standing Status:   Future     Number of Occurrences:   1     Standing Expiration Date:   7/25/2018    IRON PROFILE     Standing Status:   Future     Number of Occurrences:   1     Standing Expiration Date:   7/25/2018    FERRITIN     Standing Status:   Future     Number of Occurrences:   1     Standing Expiration Date:   7/25/2018    InHouse CBC ([employer])     Standing Status:   Future     Number of Occurrences:   1     Standing Expiration Date:   7/31/2017       Teresa Melgar MD  7/24/2017

## 2017-07-24 NOTE — MR AVS SNAPSHOT
Visit Information Date & Time Provider Department Dept. Phone Encounter #  
 7/24/2017  3:45 PM Zheng Dahl MD [facility] [phone] [id number] Follow-up Instructions Return in about 3 months (around 10/24/2017). Your Appointments 7/28/2017  9:00 AM  
Follow Up with Ronan De Guzman MD  
[address] [facility] (--) Appt Note: Follow up Markell [address] [place] [id number] [phone]  
  
   
 [address] [place] [id number]  
  
    
 8/8/2017 10:00 AM  
XRAY with [facility] [address] ([address]) Appt Note: [email or web address]  
 [address] [place] [address], [address] [address]  
[phone]  
  
   
 [address] [place] [address], [address] [place] [address],[address] [facility] [address]  
  
    
 8/8/2017 10:15 AM  
Follow Up with Sandhya Loyola MD  
[address] ([address]) Appt Note: [email or web address] - [address], [address] [address]  
[phone]  
  
   
 [address] [place] [address], [address] [place] [address],[address] [facility] [address]  
  
    
 10/23/2017  3:15 PM  
Office Visit with Zheng Dahl MD  
[email or web address]) Appt Note: 3 MO RET  
 [address] [facility] [address]  
[address], Rahul [address] [address]  
  
    
 3/14/2018  8:00 AM  
Follow Up with Jay Pederson MD  
[facility] ([address]) Appt Note: [facility] [address] [address] [address] [phone] [address] [facility] [address] Upcoming Health Maintenance Date Due COLONOSCOPY 2/25/2017 BREAST CANCER SCRN MAMMOGRAM 10/4/2017 ZOSTER VACCINE AGE 60> 7/28/2017* INFLUENZA AGE 9 TO ADULT 8/1/2017 Pneumococcal 19-64 Highest Risk (2 of 3 - PCV13) 4/12/2018 DTaP/Tdap/Td series (2 - Td) 4/12/2027 *Topic was postponed. The date shown is not the original due date. Allergies as of 7/24/2017  Review Complete On: 5/8/2017 By: Calderon Almaraz LPN No Known Allergies Current Immunizations  Reviewed on 4/25/2016 Name Date Tdap 4/12/2017 Not reviewed this visit You Were Diagnosed With   
  
 Codes Comments MDS (myelodysplastic syndrome) ([facility]    -  Primary ICD-10-CM: D46.9 ICD-9-CM: 238.75 Refractory anemia (HCC)     ICD-10-CM: D46.4 ICD-9-CM: 238.72 Arthritis     ICD-10-CM: M19.90 ICD-9-CM: 716.90 Vitals BP Pulse Temp Height(growth percentile) Weight(growth percentile) LMP  
 134/77 (BP 1 Location: Right arm, BP Patient Position: Sitting) 63 97.9 °F (36.6 °C) (Oral) 5' 3\" (1.6 m) 188 lb 9.6 oz (85.5 kg) 08/31/1998 SpO2 BMI OB Status Smoking Status 100% 33.41 kg/m2 Hysterectomy Never Smoker Vitals History BMI and BSA Data Body Mass Index Body Surface Area  
 33.41 kg/m 2 1.95 m 2 Preferred Pharmacy Pharmacy Name Phone [facility] [address], [address] [facility] Your Updated Medication List  
  
   
This list is accurate as of: 7/24/17  4:45 PM.  Always use your most recent med list.  
  
  
  
  
 albuterol 90 mcg/actuation inhaler Commonly known as:  PROVENTIL HFA, VENTOLIN HFA, PROAIR HFA Take 2 Puffs by inhalation every six (6) hours as needed for Wheezing. beclomethasone 40 mcg/actuation [employer] Commonly known as:  QVAR Take 2 Puffs by inhalation two (2) times a day. carvedilol 25 mg tablet Commonly known as:  COREG  
TAKE ONE TABLET BY MOUTH TWICE DAILY WITH MEALS  
  
 diclofenac EC 75 mg EC tablet Commonly known as:  VOLTAREN  
TAKE ONE TABLET BY MOUTH IN THE EVENING  
  
 ergocalciferol 50,000 unit capsule Commonly known as:  ERGOCALCIFEROL  
TAKE ONE CAPSULE BY MOUTH ONCE A WEEK FERREX 150 FORTE capsule Generic drug:  iron polysacch complex-b12-fa TAKE ONE CAPSULE BY MOUTH ONCE DAILY  
  
 hydrocortisone 2.5 % topical cream  
Commonly known as:  HYTONE  
  
 latanoprost 0.005 % ophthalmic solution Commonly known as:  Mat Roch Administer 1 Drop to both eyes nightly. losartan-hydroCHLOROthiazide 100-12.5 mg per tablet Commonly known as:  HYZAAR  
TAKE ONE TABLET BY MOUTH ONCE DAILY  
  
 meloxicam 15 mg tablet Commonly known as:  MOBIC  
  
 pantoprazole 40 mg tablet Commonly known as:  PROTONIX  
  
 polyethylene glycol 17 gram/dose powder Commonly known as:  [employer] MIX 17 GRAMS IN LIQUID AND DRINK BY MOUTH ONCE DAILY FOR CONSTIPATION  
  
 simvastatin 20 mg tablet Commonly known as:  ZOCOR  
TAKE ONE TABLET BY MOUTH AT BEDTIME  
  
 triamcinolone acetonide 0.1 % topical cream  
Commonly known as:  KENALOG Apply  to affected area two (2) times a day. valACYclovir 500 mg tablet Commonly known as:  VALTREX  
TAKE ONE TABLET BY MOUTH TWICE DAILY We Performed the Following COMPLETE CBC & AUTO DIFF WBC [77966 CPT(R)] FERRITIN [55949 CPT(R)] IRON PROFILE [id number] CPT(R)] METABOLIC PANEL, COMPREHENSIVE [29450 CPT(R)] Follow-up Instructions Return in about 3 months (around 10/24/2017). To-Do List   
 07/24/2017 Lab:  CBC WITH 3 PART DIFF   
  
 07/24/2017 Lab:  FERRITIN   
  
 07/24/2017 Lab:  IRON PROFILE   
  
 07/24/2017 Lab:  METABOLIC PANEL, COMPREHENSIVE Patient Instructions Myelodysplastic Syndromes: Care Instructions Your Care Instructions Myelodysplastic syndromes, also called MDS, are a group of rare conditions in which the bone marrow does not make enough healthy blood cells. Normally, the bone marrow makes red blood cells, white blood cells, and platelets. These cells carry oxygen in the blood, help the body fight infections, and help the blood clot.  With MDS, you may feel weak and tired, get infections often, and bruise easily, although symptoms tend to vary. MDS is a form of blood cancer. In some cases, MDS can turn into acute myeloid leukemia (AML), another type of cancer. Some people develop MDS after treatment for cancer or exposure to pesticides or other chemicals. But in most cases, the cause of MDS is not known. Your doctor will use the results of blood tests to guide your treatment. There are many types of MDS, with different treatment plans for each. If you have enough red blood cells and are feeling all right, you may not need active treatment, but you and your doctor will want to watch your condition carefully. If you start feeling lightheaded and have no energy, you may need a blood transfusion. Your doctor also may give you antibiotics to prevent or treat infection. Follow-up care is a key part of your treatment and safety. Be sure to make and go to all appointments, and call your doctor if you are having problems. It's also a good idea to know your test results and keep a list of the medicines you take. How can you care for yourself at home? · Take your medicines exactly as prescribed. Call your doctor if you think you are having a problem with your medicine. You will get more details on the specific medicines your doctor prescribes. · If your doctor prescribed antibiotics, take them as directed. Do not stop taking them just because you feel better. You need to take the full course of antibiotics. If you have side effects from antibiotics, tell your doctor. · Take steps to control your stress and workload. Learn relaxation techniques. ¨ Share your feelings. Stress and tension affect our emotions. By expressing your feelings to others, you may be able to understand and cope with them. ¨ Consider joining a support group. Talking about a problem with your spouse, a good friend, or other people with similar problems is a good way to reduce tension and stress. ¨ Express yourself with art. Try writing, crafts, dance, or art to relieve stress. ¨ Be kind to your body and mind. Getting enough sleep, eating a healthy diet, and taking time to do things you enjoy can contribute to an overall feeling of balance in your life and help reduce stress. ¨ Get help if you need it. Discuss your concerns with your doctor or counselor. · Do not smoke. Smoking can make blood problems worse. If you need help quitting, talk to your doctor about stop-smoking programs and medicines. These can increase your chances of quitting for good. · If you have not already done so, prepare a list of advance directives. Advance directives are instructions to your doctor and family members about what kind of care you want if you become unable to speak or express yourself. · Call the [employer] (0-280.512.6826) or visit its website at [address] [employer] for more information. When should you call for help? Call 911 anytime you think you may need emergency care. For example, call if: 
· You passed out (lost consciousness). · You vomit blood or what looks like coffee grounds. · You pass maroon or very bloody stools. Call your doctor now or seek immediate medical care if: 
· Your stools are black and tarlike or have streaks of blood. · You have any unusual bleeding, such as: ¨ Blood spots under the skin. ¨ A nosebleed that you cannot stop. ¨ Bleeding gums when you brush your teeth. ¨ Blood in your urine. ¨ Vaginal bleeding when you are not having your period, or heavy period bleeding. · Your fatigue and weakness continue or get worse. · You have signs of infection, such as: 
¨ Increased pain, swelling, warmth, or redness. ¨ Red streaks leading from a sore. ¨ Pus draining from a sore. ¨ A fever. Watch closely for changes in your health, and be sure to contact your doctor if you have any problems. Where can you learn more? Go to http://nancy.info/. Enter Malvin Roberts in the search box to learn more about \"Myelodysplastic Syndromes: Care Instructions. \" Current as of: October 24, 2016 Content Version: 11.3 © 9346-5462 [email or web address], [employer]. Care instructions adapted under license by [employer] ([employer]) (which disclaims liability or warranty for this information). If you have questions about a medical condition or this instruction, always ask your healthcare professional. [place] [address] any warranty or liability for your use of this information. Introducing [facility]! Mariluz Varma introduces [employer] patient portal. Now you can access parts of your medical record, email your doctor's office, and request medication refills online. 1. In your internet browser, go to [email or web address] 2. Click on the First Time User? Click Here link in the Sign In box. You will see the New Member Sign Up page. 3. Enter your [employer] Access Code exactly as it appears below. You will not need to use this code after youve completed the sign-up process. If you do not sign up before the expiration date, you must request a new code. · [employer] Access Code: [email or web address] Expires: 10/22/2017  4:45 PM 
 
4. Enter the last four digits of your Social Security Number (xxxx) and Date of Birth (mm/dd/yyyy) as indicated and click Submit. You will be taken to the next sign-up page. 5. Create a [employer] ID. This will be your [employer] login ID and cannot be changed, so think of one that is secure and easy to remember. 6. Create a [employer] password. You can change your password at any time. 7. Enter your Password Reset Question and Answer. This can be used at a later time if you forget your password. 8. Enter your e-mail address. You will receive e-mail notification when new information is available in [address] 9. Click Sign Up. You can now view and download portions of your medical record. 10. Click the Download Summary menu link to download a portable copy of your medical information. If you have questions, please visit the Frequently Asked Questions section of the [employer] website. Remember, [employer] is NOT to be used for urgent needs. For medical emergencies, dial 911. Now available from your iPhone and Android! Please provide this summary of care documentation to your next provider. Your primary care clinician is listed as Berenice Sarah. If you have any questions after today's visit, please call [phone].

## 2017-07-24 NOTE — PATIENT INSTRUCTIONS
Myelodysplastic Syndromes: Care Instructions  Your Care Instructions  Myelodysplastic syndromes, also called MDS, are a group of rare conditions in which the bone marrow does not make enough healthy blood cells. Normally, the bone marrow makes red blood cells, white blood cells, and platelets. These cells carry oxygen in the blood, help the body fight infections, and help the blood clot. With MDS, you may feel weak and tired, get infections often, and bruise easily, although symptoms tend to vary. MDS is a form of blood cancer. In some cases, MDS can turn into acute myeloid leukemia (AML), another type of cancer. Some people develop MDS after treatment for cancer or exposure to pesticides or other chemicals. But in most cases, the cause of MDS is not known. Your doctor will use the results of blood tests to guide your treatment. There are many types of MDS, with different treatment plans for each. If you have enough red blood cells and are feeling all right, you may not need active treatment, but you and your doctor will want to watch your condition carefully. If you start feeling lightheaded and have no energy, you may need a blood transfusion. Your doctor also may give you antibiotics to prevent or treat infection. Follow-up care is a key part of your treatment and safety. Be sure to make and go to all appointments, and call your doctor if you are having problems. It's also a good idea to know your test results and keep a list of the medicines you take. How can you care for yourself at home? · Take your medicines exactly as prescribed. Call your doctor if you think you are having a problem with your medicine. You will get more details on the specific medicines your doctor prescribes. · If your doctor prescribed antibiotics, take them as directed. Do not stop taking them just because you feel better. You need to take the full course of antibiotics.  If you have side effects from antibiotics, tell your doctor. · Take steps to control your stress and workload. Learn relaxation techniques. ¨ Share your feelings. Stress and tension affect our emotions. By expressing your feelings to others, you may be able to understand and cope with them. ¨ Consider joining a support group. Talking about a problem with your spouse, a good friend, or other people with similar problems is a good way to reduce tension and stress. ¨ Express yourself with art. Try writing, crafts, dance, or art to relieve stress. ¨ Be kind to your body and mind. Getting enough sleep, eating a healthy diet, and taking time to do things you enjoy can contribute to an overall feeling of balance in your life and help reduce stress. ¨ Get help if you need it. Discuss your concerns with your doctor or counselor. · Do not smoke. Smoking can make blood problems worse. If you need help quitting, talk to your doctor about stop-smoking programs and medicines. These can increase your chances of quitting for good. · If you have not already done so, prepare a list of advance directives. Advance directives are instructions to your doctor and family members about what kind of care you want if you become unable to speak or express yourself. · Call the [employer] (2-160.507.8495) or visit its website at [employer] (mytaxi) for more information. When should you call for help? Call 911 anytime you think you may need emergency care. For example, call if:  · You passed out (lost consciousness). · You vomit blood or what looks like coffee grounds. · You pass maroon or very bloody stools. Call your doctor now or seek immediate medical care if:  · Your stools are black and tarlike or have streaks of blood. · You have any unusual bleeding, such as:  ¨ Blood spots under the skin. ¨ A nosebleed that you cannot stop. ¨ Bleeding gums when you brush your teeth. ¨ Blood in your urine.   ¨ Vaginal bleeding when you are not having your period, or heavy period bleeding. · Your fatigue and weakness continue or get worse. · You have signs of infection, such as:  ¨ Increased pain, swelling, warmth, or redness. ¨ Red streaks leading from a sore. ¨ Pus draining from a sore. ¨ A fever. Watch closely for changes in your health, and be sure to contact your doctor if you have any problems. Where can you learn more? Go to http://uday-bhavna.info/. Enter Katalina Wang in the search box to learn more about \"Myelodysplastic Syndromes: Care Instructions. \"  Current as of: October 24, 2016  Content Version: 11.3  © 0122-3047 [employer]. Care instructions adapted under license by [facility] (which disclaims liability or warranty for this information). If you have questions about a medical condition or this instruction, always ask your healthcare professional. [address] any warranty or liability for your use of this information.

## 2017-07-25 LAB
ALBUMIN SERPL-MCNC: 4.3 G/DL (ref 3.6–4.8)
ALBUMIN/GLOB SERPL: 1.2 {RATIO} (ref 1.2–2.2)
ALP SERPL-CCNC: 87 IU/L (ref 39–117)
ALT SERPL-CCNC: 14 IU/L (ref 0–32)
AST SERPL-CCNC: 23 IU/L (ref 0–40)
BILIRUB SERPL-MCNC: 0.5 MG/DL (ref 0–1.2)
BUN SERPL-MCNC: 15 MG/DL (ref 8–27)
BUN/CREAT SERPL: 17 (ref 12–28)
CALCIUM SERPL-MCNC: 9.8 MG/DL (ref 8.7–10.3)
CHLORIDE SERPL-SCNC: 100 MMOL/L (ref 96–106)
CO2 SERPL-SCNC: 27 MMOL/L (ref 18–29)
CREAT SERPL-MCNC: 0.87 MG/DL (ref 0.57–1)
FERRITIN SERPL-MCNC: 236 NG/ML (ref 15–150)
GLOBULIN SER CALC-MCNC: 3.6 G/DL (ref 1.5–4.5)
GLUCOSE SERPL-MCNC: 72 MG/DL (ref 65–99)
IRON SATN MFR SERPL: 21 % (ref 15–55)
IRON SERPL-MCNC: 60 UG/DL (ref 27–159)
POTASSIUM SERPL-SCNC: 3.7 MMOL/L (ref 3.5–5.2)
PROT SERPL-MCNC: 7.9 G/DL (ref 6–8.5)
SODIUM SERPL-SCNC: 141 MMOL/L (ref 134–144)
TIBC SERPL-MCNC: 291 UG/DL (ref 250–450)
UIBC SERPL-MCNC: 231 UG/DL (ref 131–425)

## 2017-07-25 RX ORDER — IRON PS COMPLEX/B12/FOLIC ACID 150-25-1
CAPSULE ORAL
Qty: 30 CAP | Refills: 0 | Status: SHIPPED | OUTPATIENT
Start: 2017-07-25 | End: 2017-09-25 | Stop reason: SDUPTHER

## 2017-07-28 ENCOUNTER — OFFICE VISIT (OUTPATIENT)
Dept: FAMILY MEDICINE CLINIC | Age: 62
End: 2017-07-28

## 2017-07-28 VITALS
HEART RATE: 66 BPM | BODY MASS INDEX: 33.49 KG/M2 | HEIGHT: 63 IN | DIASTOLIC BLOOD PRESSURE: 78 MMHG | SYSTOLIC BLOOD PRESSURE: 124 MMHG | TEMPERATURE: 97.2 F | OXYGEN SATURATION: 97 % | WEIGHT: 189 LBS

## 2017-07-28 DIAGNOSIS — E78.5 HYPERLIPIDEMIA, UNSPECIFIED HYPERLIPIDEMIA TYPE: ICD-10-CM

## 2017-07-28 DIAGNOSIS — I10 ESSENTIAL HYPERTENSION: Primary | ICD-10-CM

## 2017-07-28 NOTE — PROGRESS NOTES
Chief Complaint   Patient presents with    Hypertension    Cholesterol Problem     HISTORY OF PRESENT ILLNESS  Ivory Kelly is a 63 y.o. female. HPI  Pt is here for follow up of htn and lipids. Pt had labs on 7/24/17. Labs reviewed in detail with pt. Pt does not need medication refills today. .    New concerns today: none      Health Maintenance reviewed - discussed option of colonoscopy vs FIT testing as screening for colon cancer. Pt has just done the colonoscopy on 7/7/17 with Dr Jarrell Longo at [facility]. ROS  Review of Systems   Constitutional: Negative. HENT: Negative. Respiratory: Negative. Cardiovascular: Negative. All other systems reviewed and are negative. Physical Exam  Physical Exam   Nursing note and vitals reviewed. Constitutional: She is oriented to person, place, and time. She appears well-developed and well-nourished. HENT:   Head: Normocephalic and atraumatic. Right Ear: External ear normal.   Left Ear: External ear normal.   Nose: Nose normal.   Eyes: Conjunctivae and EOM are normal.   Neck: Normal range of motion. Neck supple. No JVD present. Carotid bruit is not present. No thyromegaly present. Cardiovascular: Normal rate, regular rhythm, normal heart sounds and intact distal pulses. Exam reveals no gallop and no friction rub. No murmur heard. Pulmonary/Chest: Effort normal and breath sounds normal. She has no wheezes. She has no rhonchi. She has no rales. Abdominal: Soft. Bowel sounds are normal.   Musculoskeletal: Normal range of motion. Neurological: She is alert and oriented to person, place, and time. Coordination normal.   Skin: Skin is warm and dry. Psychiatric: She has a normal mood and affect. Her behavior is normal. Judgment and thought content normal.     ASSESSMENT and PLAN  Diagnoses and all orders for this visit:    1. Essential hypertension  Stable, cont pres tx plan.      2. Hyperlipidemia, unspecified hyperlipidemia type  Stable, cont pres tx plan.        Follow-up Disposition: 3 months; sooner prn

## 2017-07-28 NOTE — PROGRESS NOTES
Chief Complaint   Patient presents with    Hypertension    Cholesterol Problem     Health Maintenance Due   Topic Date Due    COLONOSCOPY  02/25/2017    BREAST CANCER SCRN MAMMOGRAM  10/04/2017

## 2017-08-16 DIAGNOSIS — I10 ESSENTIAL HYPERTENSION: ICD-10-CM

## 2017-08-17 RX ORDER — LOSARTAN POTASSIUM AND HYDROCHLOROTHIAZIDE 12.5; 1 MG/1; MG/1
TABLET ORAL
Qty: 30 TAB | Refills: 11 | Status: SHIPPED | OUTPATIENT
Start: 2017-08-17 | End: 2018-09-07 | Stop reason: SDUPTHER

## 2017-09-12 DIAGNOSIS — E78.5 HYPERLIPIDEMIA, UNSPECIFIED HYPERLIPIDEMIA TYPE: ICD-10-CM

## 2017-09-15 RX ORDER — SIMVASTATIN 20 MG/1
TABLET, FILM COATED ORAL
Qty: 30 TAB | Refills: 11 | Status: SHIPPED | OUTPATIENT
Start: 2017-09-15 | End: 2018-11-19 | Stop reason: SDUPTHER

## 2017-09-26 RX ORDER — IRON PS COMPLEX/B12/FOLIC ACID 150-25-1
CAPSULE ORAL
Qty: 30 CAP | Refills: 0 | Status: SHIPPED | OUTPATIENT
Start: 2017-09-26 | End: 2017-12-27 | Stop reason: SDUPTHER

## 2017-10-23 ENCOUNTER — HOSPITAL ENCOUNTER (OUTPATIENT)
Dept: ONCOLOGY | Age: 62
Discharge: HOME | End: 2017-10-23

## 2017-10-23 ENCOUNTER — OFFICE VISIT (OUTPATIENT)
Dept: ONCOLOGY | Age: 62
End: 2017-10-23

## 2017-10-23 VITALS
HEART RATE: 71 BPM | DIASTOLIC BLOOD PRESSURE: 88 MMHG | SYSTOLIC BLOOD PRESSURE: 142 MMHG | WEIGHT: 193 LBS | TEMPERATURE: 97.8 F | BODY MASS INDEX: 34.19 KG/M2

## 2017-10-23 DIAGNOSIS — D46.9 MDS (MYELODYSPLASTIC SYNDROME) (HCC): ICD-10-CM

## 2017-10-23 DIAGNOSIS — D46.9 MDS (MYELODYSPLASTIC SYNDROME) (HCC): Primary | ICD-10-CM

## 2017-10-23 DIAGNOSIS — D46.4 REFRACTORY ANEMIA (HCC): ICD-10-CM

## 2017-10-23 LAB
BASO+EOS+MONOS # BLD AUTO: 0.5 K/UL (ref 0–2.3)
BASO+EOS+MONOS # BLD AUTO: 10 % (ref 0.1–17)
DIFFERENTIAL METHOD BLD: ABNORMAL
ERYTHROCYTE [DISTWIDTH] IN BLOOD BY AUTOMATED COUNT: 12.9 % (ref 11.5–14.5)
HCT VFR BLD AUTO: 29.9 % (ref 36–48)
HGB BLD-MCNC: 9.7 G/DL (ref 12–16)
LYMPHOCYTES # BLD: 1.7 K/UL (ref 1.1–5.9)
LYMPHOCYTES NFR BLD: 35 % (ref 14–44)
MCH RBC QN AUTO: 30.2 PG (ref 25–35)
MCHC RBC AUTO-ENTMCNC: 32.4 G/DL (ref 31–37)
MCV RBC AUTO: 93.1 FL (ref 78–102)
NEUTS SEG # BLD: 2.7 K/UL (ref 1.8–9.5)
NEUTS SEG NFR BLD: 55 % (ref 40–70)
PLATELET # BLD AUTO: 283 K/UL (ref 140–440)
RBC # BLD AUTO: 3.21 M/UL (ref 4.1–5.1)
WBC # BLD AUTO: 4.9 K/UL (ref 4.5–13)

## 2017-10-23 NOTE — PROGRESS NOTES
Hematology/Oncology  Progress Note    Name: Timur Perez  Date: 10/23/2017  : 1955    PCP: Dr. Criss Cleaning    Ms. Franck Velarde is a 63y.o. year old female who was seen for management of her myelodysplastic syndrome/refractory anemia    Current therapy: Iron supplement, Procrit or Aranesp is available whenever her hematocrit is below 30%. Subjective:     Ms. Franck Velarde is a 70-year-old -American woman with myelodysplastic syndrome/refractory anemia. She is continuing to do well. The patient reports that she continues taking the over-the-counter iron supplement once daily. She still has occasional arthritic discomfort in her lower extremities. She uses Tylenol arthrithis as needed. She has no other physical complaints at this time. Past medical history, family history, and social history were reviewed and remain unchanged. Past Medical History:   Diagnosis Date    Echocardiogram 2011    Tech difficult. EF 60-65%. Mild LVH. RVSP 20-25 mmHg.  Essential hypertension     History of colon polyps     Hx of endometriosis     had hyst    Hyperlipidemia     Hypertension     MDS (myelodysplastic syndrome) (HCC)     Refractory anemia (HCC)      Past Surgical History:   Procedure Laterality Date    HX HYSTERECTOMY       Social History     Social History    Marital status: SINGLE     Spouse name: N/A    Number of children: N/A    Years of education: N/A     Occupational History    Not on file.      Social History Main Topics    Smoking status: Never Smoker    Smokeless tobacco: Never Used    Alcohol use No    Drug use: Yes     Special: Prescription, OTC    Sexual activity: Not on file     Other Topics Concern    Not on file     Social History Narrative     Family History   Problem Relation Age of Onset    Cancer Mother    [employer] Arthritis-rheumatoid Sister     Diabetes Sister     Hypertension Sister      Current Outpatient Prescriptions   Medication Sig Dispense Refill    POLY-IRON 150 FORTE capsule TAKE ONE CAPSULE BY MOUTH ONCE DAILY 30 Cap 0    simvastatin (ZOCOR) 20 mg tablet TAKE ONE TABLET BY MOUTH ONCE DAILY AT BEDTIME 30 Tab 11    losartan-hydroCHLOROthiazide (HYZAAR) 100-12.5 mg per tablet TAKE ONE TABLET BY MOUTH ONCE DAILY 30 Tab 11    valACYclovir (VALTREX) 500 mg tablet TAKE ONE TABLET BY MOUTH TWICE DAILY 30 Tab 12    carvedilol (COREG) 25 mg tablet TAKE ONE TABLET BY MOUTH TWICE DAILY WITH MEALS 180 Tab 3    ergocalciferol (ERGOCALCIFEROL) 50,000 unit capsule TAKE ONE CAPSULE BY MOUTH ONCE A WEEK 4 Cap 12    hydrocortisone (HYTONE) 2.5 % topical cream       diclofenac EC (VOLTAREN) 75 mg EC tablet TAKE ONE TABLET BY MOUTH IN THE EVENING 90 Tab 0    meloxicam (MOBIC) 15 mg tablet       polyethylene glycol (MIRALAX) 17 gram/dose powder MIX 17 GRAMS IN LIQUID AND DRINK BY MOUTH ONCE DAILY FOR CONSTIPATION 527 g 12    pantoprazole (PROTONIX) 40 mg tablet       beclomethasone (QVAR) 40 mcg/actuation inhaler Take 2 Puffs by inhalation two (2) times a day. 1 Inhaler 11    albuterol (PROVENTIL HFA, VENTOLIN HFA, PROAIR HFA) 90 mcg/actuation inhaler Take 2 Puffs by inhalation every six (6) hours as needed for Wheezing. 1 Inhaler 11    triamcinolone acetonide (KENALOG) 0.1 % topical cream Apply  to affected area two (2) times a day. 15 g 2    latanoprost (XALATAN) 0.005 % ophthalmic solution Administer 1 Drop to both eyes nightly. Review of Systems  Constitutional: The patient complains of occasional fatigue  HEENT: The patient denies recent head trauma, eye pain, blurred vision,  hearing deficit, oropharyngeal mucosal pain or lesions, and the patient denies throat pain or discomfort. Lymphatics: The patient denies palpable peripheral lymphadenopathy. Hematologic: The patient denies having bruising, bleeding, or progressive fatigue. Respiratory: Patient denies having shortness of breath, cough, sputum production, fever, or dyspnea on exertion. Cardiovascular:  The patient denies having leg pain, leg swelling, heart palpitations, chest permit, chest pain, or lightheadedness. The patient denies having dyspnea on exertion. Gastrointestinal: The patient denies having nausea, emesis, or diarrhea. The patient denies having any hematemesis or blood in the stool. Genitourinary: Patient denies having urinary urgency, frequency, or dysuria. The patient denies having blood in the urine. Psychological: The patient denies having symptoms of nervousness, anxiety, depression, or thoughts of harming himself some of this. Skin: Patient denies having skin rashes, skin, ulcerations, or unexplained itching or pruritus. Musculoskeletal: The patient  is complaining of occasional musculoskeletal pain primarily in the lower extremities. Objective:     Visit Vitals    /88 (BP 1 Location: Left arm, BP Patient Position: Sitting)    Pulse 71    Temp 97.8 °F (36.6 °C) (Oral)    Wt 87.5 kg (193 lb)    LMP 08/31/1998    BMI 34.19 kg/m2     Pain Scale 0/10  Physical Exam:   ECOG=0  Gen. Appearance: The patient is in no acute distress. Skin: There is no bruise or rash. HEENT: The exam is unremarkable. Neck: Supple without lymphadenopathy or thyromegaly. Lungs: Clear to auscultation and percussion; there are no wheezes or rhonchi. Heart: Regular rate and rhythm; there are no murmurs, gallops, or rubs. aanterior chest wall and breasts: Deferred. The axilla reveals no palpable axillary lymphadenopathy. Abdomen: Bowel sounds are present and normal.  There is no guarding, tenderness, or hepatosplenomegaly. Extremities: There is no clubbing, cyanosis, or edema. Neurologic: There are no focal neurologic deficits. Lymphatics: There is no palpable peripheral lymphadenopathy.     Lab data:      Results for orders placed or performed during the hospital encounter of 10/23/17   CBC WITH 3 PART DIFF   Result Value Ref Range    WBC 4.9 4.5 - 13.0 K/uL    RBC 3.21 (L) 4.10 - 5.10 M/uL    HGB 9.7 (L) 12.0 - 16 g/dL    HCT 29.9 (L) 36 - 48 %    MCV 93.1 78 - 102 FL    MCH 30.2 25.0 - 35.0 PG    MCHC 32.4 31 - 37 g/dL    RDW 12.9 11.5 - 14.5 %    PLATELET 102 769 - 775 K/uL    NEUTROPHILS 55 40 - 70 %    MIXED CELLS 10 0.1 - 17 %    LYMPHOCYTES 35 14 - 44 %    ABS. NEUTROPHILS 2.7 1.8 - 9.5 K/UL    ABS. MIXED CELLS 0.5 0.0 - 2.3 K/uL    ABS. LYMPHOCYTES 1.7 1.1 - 5.9 K/UL    DF AUTOMATED          Assessment:     1. MDS (myelodysplastic syndrome) ([facility] [address]    2. Refractory anemia (HCC)        Plan:   Arthritis/cervical cervicitis (chronic problem): The patient reports that she has chronic arthritis and has been taking Tylenol Arthritis as needed. She will continue this plan of care since it is giving her relief. Refractory anemia/MDS: I have explained to the patient that her current hemoglobin and hematocrit were 9.7 g/dL and 29.9% respectively. We will continue to monitor her hemoglobin and hematocrit every 3 months and if she should have a decrease in her hemoglobin below 10 g/dl and  hematocrit below 30% we will offer interventional therapy with either Procrit or Aranesp. At this time I will check a ferritin level and iron profile. The comprehensive metabolic panel will also be ordered. The patient states she has been taking ferrous sulfate once daily. She report that due to insurance issues she want to wait or hold off on the procrit for today. She will be schedule for a month for just a CBC check to see what her levels are and if her H&H is below 10/30 respectively she will be started on the Procrit or Aranesp. Myelodysplastic syndrome: I have explained that her WBC is stable at 4.9, PLT is 283,000. We do not need to pursue a bone marrow biopsy at this time.   However, if she experiences a rapid decline in the hemoglobin and hematocrit with an elevation in her WBC counts that would be an indication to do a bone marrow biopsy to rule out whether not she is converting to a chronic myelomonocytic leukemia component of her MDS versus converting to an acute leukemic process. Therapeutic intervention with Procrit will be provided for her  If the hematocrit declines below 30% with a hemoglobin below 10 g/dL. I have explained to the patient that the dose of Procrit  60,000 units given subcutaneously every 2 weeks. I will plan to have her return to clinic for a complete assessment again in 3 months. CBC in 1 month. I will have her return to clinic for a complete reassessment again in 3 months.   Orders Placed This Encounter    COMPLETE CBC & AUTO DIFF WBC    InHouse CBC ([employer])     Standing Status:   Future     Number of Occurrences:   1     Standing Expiration Date:   10/30/2017    IRON PROFILE     Standing Status:   Future     Number of Occurrences:   1     Standing Expiration Date:   10/24/2018    FERRITIN     Standing Status:   Future     Number of Occurrences:   1     Standing Expiration Date:   51/21/9335    METABOLIC PANEL, COMPREHENSIVE     Standing Status:   Future     Number of Occurrences:   1     Standing Expiration Date:   10/24/2018       Toni Maza MD  10/23/2017

## 2017-10-23 NOTE — PATIENT INSTRUCTIONS
Myelodysplastic Syndromes: Care Instructions  Your Care Instructions  Myelodysplastic syndromes, also called MDS, are a group of rare conditions in which the bone marrow does not make enough healthy blood cells. Normally, the bone marrow makes red blood cells, white blood cells, and platelets. These cells carry oxygen in the blood, help the body fight infections, and help the blood clot. With MDS, you may feel weak and tired, get infections often, and bruise easily, although symptoms tend to vary. MDS is a form of blood cancer. In some cases, MDS can turn into acute myeloid leukemia (AML), another type of cancer. Some people develop MDS after treatment for cancer or exposure to pesticides or other chemicals. But in most cases, the cause of MDS is not known. Your doctor will use the results of blood tests to guide your treatment. There are many types of MDS, with different treatment plans for each. If you have enough red blood cells and are feeling all right, you may not need active treatment, but you and your doctor will want to watch your condition carefully. If you start feeling lightheaded and have no energy, you may need a blood transfusion. Your doctor also may give you antibiotics to prevent or treat infection. Follow-up care is a key part of your treatment and safety. Be sure to make and go to all appointments, and call your doctor if you are having problems. It's also a good idea to know your test results and keep a list of the medicines you take. How can you care for yourself at home? · Take your medicines exactly as prescribed. Call your doctor if you think you are having a problem with your medicine. You will get more details on the specific medicines your doctor prescribes. · If your doctor prescribed antibiotics, take them as directed. Do not stop taking them just because you feel better. You need to take the full course of antibiotics.  If you have side effects from antibiotics, tell your doctor. · Take steps to control your stress and workload. Learn relaxation techniques. ¨ Share your feelings. Stress and tension affect our emotions. By expressing your feelings to others, you may be able to understand and cope with them. ¨ Consider joining a support group. Talking about a problem with your spouse, a good friend, or other people with similar problems is a good way to reduce tension and stress. ¨ Express yourself with art. Try writing, crafts, dance, or art to relieve stress. ¨ Be kind to your body and mind. Getting enough sleep, eating a healthy diet, and taking time to do things you enjoy can contribute to an overall feeling of balance in your life and help reduce stress. ¨ Get help if you need it. Discuss your concerns with your doctor or counselor. · Do not smoke. Smoking can make blood problems worse. If you need help quitting, talk to your doctor about stop-smoking programs and medicines. These can increase your chances of quitting for good. · If you have not already done so, prepare a list of advance directives. Advance directives are instructions to your doctor and family members about what kind of care you want if you become unable to speak or express yourself. · Call the [employer] ([phone]) or visit its website at [employer] for more information. When should you call for help? Call 911 anytime you think you may need emergency care. For example, call if:  · You passed out (lost consciousness). · You vomit blood or what looks like coffee grounds. · You pass maroon or very bloody stools. Call your doctor now or seek immediate medical care if:  · Your stools are black and tarlike or have streaks of blood. · You have any unusual bleeding, such as:  ¨ Blood spots under the skin. ¨ A nosebleed that you cannot stop. ¨ Bleeding gums when you brush your teeth. ¨ Blood in your urine.   ¨ Vaginal bleeding when you are not having your period, or heavy period bleeding. · Your fatigue and weakness continue or get worse. · You have signs of infection, such as:  ¨ Increased pain, swelling, warmth, or redness. ¨ Red streaks leading from a sore. ¨ Pus draining from a sore. ¨ A fever. Watch closely for changes in your health, and be sure to contact your doctor if you have any problems. Where can you learn more? Go to http://uday-bhavna.info/. Enter Jannet Cano in the search box to learn more about \"Myelodysplastic Syndromes: Care Instructions. \"  Current as of: October 24, 2016  Content Version: 11.3  © 1282-2927 [employer]. Care instructions adapted under license by [employer] (which disclaims liability or warranty for this information). If you have questions about a medical condition or this instruction, always ask your healthcare professional. [address] any warranty or liability for your use of this information.

## 2017-10-23 NOTE — MR AVS SNAPSHOT
Visit Information Date & Time Provider Department Dept. Phone Encounter #  
 10/23/2017  3:15 PM Eva Mcfadden MD [facility] [phone] [id number] Follow-up Instructions Return in about 3 months (around 1/23/2018). Your Appointments 10/31/2017  9:45 AM  
Follow Up with Rakel Rodriguez MD  
[facility] (--) Appt Note: 3 month fu; left voicemail to r/s appt; 3 month fu  
 [address] [facility] [address]  
[phone]  
  
   
 [address] [place] [address]  
  
    
 11/22/2017  9:00 AM  
Nurse Visit with DARSHANA RICO Medical Oncology ([facility]) Appt Note: [facility]  
 [address] [facility], [place] [address]  
  
    
 1/22/2018  9:30 AM  
Office Visit with Eva Mcfadden MD  
[facility]) Appt Note: 4 month  
 [address] [facility] [address]  
[address], [address] [facility]  
  
    
 3/14/2018  8:00 AM  
Follow Up with Nadya Vargas MD  
[facility] ([facility]) Appt Note: 1 year follow up Neva Krauses [id number]  
[phone] [address] Upcoming Health Maintenance Date Due ZOSTER VACCINE AGE 60> 11/17/2016 INFLUENZA AGE 9 TO ADULT 8/1/2017 BREAST CANCER SCRN MAMMOGRAM 10/4/2017 Pneumococcal 19-64 Highest Risk (2 of 3 - PCV13) 4/12/2018 COLONOSCOPY 7/7/2022 DTaP/Tdap/Td series (2 - Td) 4/12/2027 Allergies as of 10/23/2017  Review Complete On: 7/28/2017 By: Rakel Rodriguez MD  
 No Known Allergies Current Immunizations  Reviewed on 4/25/2016 Name Date Tdap 4/12/2017 Not reviewed this visit You Were Diagnosed With   
  
 Codes Comments MDS (myelodysplastic syndrome) ([facility]    -  Primary ICD-10-CM: D46.9 ICD-9-CM: 238.75 Refractory anemia (HCC)     ICD-10-CM: D46.4 ICD-9-CM: 238.72 Vitals BP Pulse Temp Weight(growth percentile) LMP BMI  
 142/88 (BP 1 Location: Left arm, BP Patient Position: Sitting) 71 97.8 °F (36.6 °C) (Oral) 193 lb (87.5 kg) 08/31/1998 34.19 kg/m2 OB Status Smoking Status Hysterectomy Never Smoker Vitals History BMI and BSA Data Body Mass Index Body Surface Area  
 34.19 kg/m 2 1.97 m 2 Preferred Pharmacy Pharmacy Name Phone [facility] [address], [address] [facility] Your Updated Medication List  
  
   
This list is accurate as of: 10/23/17  3:57 PM.  Always use your most recent med list.  
  
  
  
  
 albuterol 90 mcg/actuation inhaler Commonly known as:  PROVENTIL HFA, VENTOLIN HFA, PROAIR HFA Take 2 Puffs by inhalation every six (6) hours as needed for Wheezing. beclomethasone 40 mcg/actuation [employer] Commonly known as:  QVAR Take 2 Puffs by inhalation two (2) times a day. carvedilol 25 mg tablet Commonly known as:  COREG  
TAKE ONE TABLET BY MOUTH TWICE DAILY WITH MEALS  
  
 diclofenac EC 75 mg EC tablet Commonly known as:  VOLTAREN  
TAKE ONE TABLET BY MOUTH IN THE EVENING  
  
 ergocalciferol 50,000 unit capsule Commonly known as:  ERGOCALCIFEROL  
TAKE ONE CAPSULE BY MOUTH ONCE A WEEK  
  
 hydrocortisone 2.5 % topical cream  
Commonly known as:  HYTONE  
  
 latanoprost 0.005 % ophthalmic solution Commonly known as:  Joyceann Shock Administer 1 Drop to both eyes nightly. losartan-hydroCHLOROthiazide 100-12.5 mg per tablet Commonly known as:  HYZAAR  
TAKE ONE TABLET BY MOUTH ONCE DAILY  
  
 meloxicam 15 mg tablet Commonly known as:  MOBIC  
  
 pantoprazole 40 mg tablet Commonly known as:  PROTONIX POLY-IRON 150 FORTE capsule Generic drug:  iron polysacch complex-b12-fa TAKE ONE CAPSULE BY MOUTH ONCE DAILY polyethylene glycol 17 gram/dose powder Commonly known as:  Queta Look MIX 17 GRAMS IN LIQUID AND DRINK BY MOUTH ONCE DAILY FOR CONSTIPATION  
  
 simvastatin 20 mg tablet Commonly known as:  ZOCOR  
TAKE ONE TABLET BY MOUTH ONCE DAILY AT BEDTIME  
  
 triamcinolone acetonide 0.1 % topical cream  
Commonly known as:  KENALOG Apply  to affected area two (2) times a day. valACYclovir 500 mg tablet Commonly known as:  VALTREX  
TAKE ONE TABLET BY MOUTH TWICE DAILY We Performed the Following COMPLETE CBC & AUTO DIFF WBC [22293 CPT(R)] Follow-up Instructions Return in about 3 months (around 1/23/2018). To-Do List   
 10/23/2017 Lab:  CBC WITH 3 PART DIFF   
  
 10/23/2017 Lab:  FERRITIN   
  
 10/23/2017 Lab:  IRON PROFILE   
  
 10/23/2017 Lab:  METABOLIC PANEL, COMPREHENSIVE Patient Instructions Myelodysplastic Syndromes: Care Instructions Your Care Instructions Myelodysplastic syndromes, also called MDS, are a group of rare conditions in which the bone marrow does not make enough healthy blood cells. Normally, the bone marrow makes red blood cells, white blood cells, and platelets. These cells carry oxygen in the blood, help the body fight infections, and help the blood clot. With MDS, you may feel weak and tired, get infections often, and bruise easily, although symptoms tend to vary. MDS is a form of blood cancer. In some cases, MDS can turn into acute myeloid leukemia (AML), another type of cancer. Some people develop MDS after treatment for cancer or exposure to pesticides or other chemicals. But in most cases, the cause of MDS is not known. Your doctor will use the results of blood tests to guide your treatment. There are many types of MDS, with different treatment plans for each.  If you have enough red blood cells and are feeling all right, you may not need active treatment, but you and your doctor will want to watch your condition carefully. If you start feeling lightheaded and have no energy, you may need a blood transfusion. Your doctor also may give you antibiotics to prevent or treat infection. Follow-up care is a key part of your treatment and safety. Be sure to make and go to all appointments, and call your doctor if you are having problems. It's also a good idea to know your test results and keep a list of the medicines you take. How can you care for yourself at home? · Take your medicines exactly as prescribed. Call your doctor if you think you are having a problem with your medicine. You will get more details on the specific medicines your doctor prescribes. · If your doctor prescribed antibiotics, take them as directed. Do not stop taking them just because you feel better. You need to take the full course of antibiotics. If you have side effects from antibiotics, tell your doctor. · Take steps to control your stress and workload. Learn relaxation techniques. ¨ Share your feelings. Stress and tension affect our emotions. By expressing your feelings to others, you may be able to understand and cope with them. ¨ Consider joining a support group. Talking about a problem with your spouse, a good friend, or other people with similar problems is a good way to reduce tension and stress. ¨ Express yourself with art. Try writing, crafts, dance, or art to relieve stress. ¨ Be kind to your body and mind. Getting enough sleep, eating a healthy diet, and taking time to do things you enjoy can contribute to an overall feeling of balance in your life and help reduce stress. ¨ Get help if you need it. Discuss your concerns with your doctor or counselor. · Do not smoke. Smoking can make blood problems worse.  If you need help quitting, talk to your doctor about stop-smoking programs and medicines. These can increase your chances of quitting for good. · If you have not already done so, prepare a list of advance directives. Advance directives are instructions to your doctor and family members about what kind of care you want if you become unable to speak or express yourself. · Call the [employer] (5-302.511.3948) or visit its website at [address] for more information. When should you call for help? Call 911 anytime you think you may need emergency care. For example, call if: 
· You passed out (lost consciousness). · You vomit blood or what looks like coffee grounds. · You pass maroon or very bloody stools. Call your doctor now or seek immediate medical care if: 
· Your stools are black and tarlike or have streaks of blood. · You have any unusual bleeding, such as: ¨ Blood spots under the skin. ¨ A nosebleed that you cannot stop. ¨ Bleeding gums when you brush your teeth. ¨ Blood in your urine. ¨ Vaginal bleeding when you are not having your period, or heavy period bleeding. · Your fatigue and weakness continue or get worse. · You have signs of infection, such as: 
¨ Increased pain, swelling, warmth, or redness. ¨ Red streaks leading from a sore. ¨ Pus draining from a sore. ¨ A fever. Watch closely for changes in your health, and be sure to contact your doctor if you have any problems. Where can you learn more? Go to http://uday-bhavna.info/. Enter Deejay Rivas in the search box to learn more about \"Myelodysplastic Syndromes: Care Instructions. \" Current as of: October 24, 2016 Content Version: 11.3 © 5674-5260 "dot life, ltd.". Care instructions adapted under license by [employer] [facility] (which disclaims liability or warranty for this information).  If you have questions about a medical condition or this instruction, always ask your healthcare professional. Heidy Daugherty, Incorporated disclaims any warranty or liability for your use of this information. Introducing [facility]! Shari Urias introduces Socket Mobile patient portal. Now you can access parts of your medical record, email your doctor's office, and request medication refills online. 1. In your internet browser, go to [email or web address] 2. Click on the First Time User? Click Here link in the Sign In box. You will see the New Member Sign Up page. 3. Enter your Socket Mobile Access Code exactly as it appears below. You will not need to use this code after youve completed the sign-up process. If you do not sign up before the expiration date, you must request a new code. · Socket Mobile Access Code: [email or web address] Expires: 1/21/2018  2:57 PM 
 
4. Enter the last four digits of your Social Security Number (xxxx) and Date of Birth (mm/dd/yyyy) as indicated and click Submit. You will be taken to the next sign-up page. 5. Create a Socket Mobile ID. This will be your Socket Mobile login ID and cannot be changed, so think of one that is secure and easy to remember. 6. Create a Socket Mobile password. You can change your password at any time. 7. Enter your Password Reset Question and Answer. This can be used at a later time if you forget your password. 8. Enter your e-mail address. You will receive e-mail notification when new information is available in [address] 9. Click Sign Up. You can now view and download portions of your medical record. 10. Click the Download Summary menu link to download a portable copy of your medical information. If you have questions, please visit the Frequently Asked Questions section of the S[employer] website. Remember, S[employer] Mobile is NOT to be used for urgent needs. For medical emergencies, dial 911. Now available from your iPhone and Android! Please provide this summary of care documentation to your next provider. Your primary care clinician is listed as Arvind Regalado. If you have any questions after today's visit, please call [phone].

## 2017-10-24 LAB
ALBUMIN SERPL-MCNC: 4.1 G/DL (ref 3.6–4.8)
ALBUMIN/GLOB SERPL: 1.1 {RATIO} (ref 1.2–2.2)
ALP SERPL-CCNC: 87 IU/L (ref 39–117)
ALT SERPL-CCNC: 12 IU/L (ref 0–32)
AST SERPL-CCNC: 17 IU/L (ref 0–40)
BILIRUB SERPL-MCNC: 0.5 MG/DL (ref 0–1.2)
BUN SERPL-MCNC: 14 MG/DL (ref 8–27)
BUN/CREAT SERPL: 19 (ref 12–28)
CALCIUM SERPL-MCNC: 9.8 MG/DL (ref 8.7–10.3)
CHLORIDE SERPL-SCNC: 102 MMOL/L (ref 96–106)
CO2 SERPL-SCNC: 27 MMOL/L (ref 18–29)
CREAT SERPL-MCNC: 0.72 MG/DL (ref 0.57–1)
FERRITIN SERPL-MCNC: 231 NG/ML (ref 15–150)
GFR SERPLBLD CREATININE-BSD FMLA CKD-EPI: 105 ML/MIN/1.73
GFR SERPLBLD CREATININE-BSD FMLA CKD-EPI: 91 ML/MIN/1.73
GLOBULIN SER CALC-MCNC: 3.6 G/DL (ref 1.5–4.5)
GLUCOSE SERPL-MCNC: 73 MG/DL (ref 65–99)
IRON SATN MFR SERPL: 19 % (ref 15–55)
IRON SERPL-MCNC: 58 UG/DL (ref 27–159)
POTASSIUM SERPL-SCNC: 4 MMOL/L (ref 3.5–5.2)
PROT SERPL-MCNC: 7.7 G/DL (ref 6–8.5)
SODIUM SERPL-SCNC: 144 MMOL/L (ref 134–144)
TIBC SERPL-MCNC: 298 UG/DL (ref 250–450)
UIBC SERPL-MCNC: 240 UG/DL (ref 131–425)

## 2017-10-31 ENCOUNTER — OFFICE VISIT (OUTPATIENT)
Dept: FAMILY MEDICINE CLINIC | Age: 62
End: 2017-10-31

## 2017-10-31 VITALS
HEIGHT: 63 IN | OXYGEN SATURATION: 98 % | TEMPERATURE: 97.7 F | SYSTOLIC BLOOD PRESSURE: 126 MMHG | DIASTOLIC BLOOD PRESSURE: 79 MMHG | HEART RATE: 68 BPM | BODY MASS INDEX: 33.66 KG/M2 | WEIGHT: 190 LBS | RESPIRATION RATE: 16 BRPM

## 2017-10-31 DIAGNOSIS — Z28.21 INFLUENZA VACCINATION DECLINED: ICD-10-CM

## 2017-10-31 DIAGNOSIS — E78.5 HYPERLIPIDEMIA, UNSPECIFIED HYPERLIPIDEMIA TYPE: Primary | ICD-10-CM

## 2017-10-31 DIAGNOSIS — E87.6 HYPOKALEMIA: ICD-10-CM

## 2017-10-31 DIAGNOSIS — I10 ESSENTIAL HYPERTENSION: ICD-10-CM

## 2017-10-31 NOTE — PROGRESS NOTES
HISTORY OF PRESENT ILLNESS  Sonia Scott is a 63 y.o. female. Chief Complaint   Patient presents with    Follow Up Chronic Condition    Cholesterol Problem    Hypertension       HPI  Pt is here for follow up of Hypertension, and Cholesterol. Pt does not need medication refills today. Pt declines flu shot. Pt aware that she is more anemic than usual.  She will f/u with hematology. Pt has been taking omeprazole prn reflux. ROS  Review of Systems   Constitutional: Negative. HENT: Negative. Respiratory: Negative. Cardiovascular: Negative. All other systems reviewed and are negative. Physical Exam  Physical Exam   Nursing note and vitals reviewed. Constitutional: She is oriented to person, place, and time. She appears well-developed and well-nourished. HENT:   Head: Normocephalic and atraumatic. Right Ear: External ear normal.   Left Ear: External ear normal.   Nose: Nose normal.   Eyes: Conjunctivae and EOM are normal.   Neck: Normal range of motion. Neck supple. No JVD present. Carotid bruit is not present. No thyromegaly present. Cardiovascular: Normal rate, regular rhythm, normal heart sounds and intact distal pulses. Exam reveals no gallop and no friction rub. No murmur heard. Pulmonary/Chest: Effort normal and breath sounds normal. She has no wheezes. She has no rhonchi. She has no rales. Abdominal: Soft. Bowel sounds are normal.   Musculoskeletal: Normal range of motion. Neurological: She is alert and oriented to person, place, and time. Coordination normal.   Skin: Skin is warm and dry. Psychiatric: She has a normal mood and affect. Her behavior is normal. Judgment and thought content normal.     ASSESSMENT and PLAN  Diagnoses and all orders for this visit:    1. Hyperlipidemia, unspecified hyperlipidemia type  Stable, cont pres tx plan. 2. Hypokalemia  Stable, cont pres tx plan. 3. Essential hypertension  Stable, cont pres tx plan.      4. Influenza vaccination declined      Follow-up Disposition: 3 months; sooner prn

## 2017-10-31 NOTE — PROGRESS NOTES
Jovanny Card 63 y.o. female   Chief Complaint   Patient presents with    Follow Up Chronic Condition    Cholesterol Problem    Hypertension         1. Have you been to the ER, urgent care clinic since your last visit? Hospitalized since your last visit? No    2. Have you seen or consulted any other health care providers outside of the [address] [place] [address] since your last visit? Include any pap smears or colon screening.  No

## 2017-11-22 ENCOUNTER — HOSPITAL ENCOUNTER (OUTPATIENT)
Dept: ONCOLOGY | Age: 62
Discharge: HOME | End: 2017-11-22

## 2017-11-22 ENCOUNTER — CLINICAL SUPPORT (OUTPATIENT)
Dept: ONCOLOGY | Age: 62
End: 2017-11-22

## 2017-11-22 DIAGNOSIS — D46.9 MDS (MYELODYSPLASTIC SYNDROME) (HCC): Primary | ICD-10-CM

## 2017-11-22 DIAGNOSIS — D46.9 MDS (MYELODYSPLASTIC SYNDROME) (HCC): ICD-10-CM

## 2017-11-22 LAB
BASO+EOS+MONOS # BLD AUTO: 0.4 K/UL (ref 0–2.3)
BASO+EOS+MONOS # BLD AUTO: 7 % (ref 0.1–17)
DIFFERENTIAL METHOD BLD: ABNORMAL
ERYTHROCYTE [DISTWIDTH] IN BLOOD BY AUTOMATED COUNT: 13.3 % (ref 11.5–14.5)
HCT VFR BLD AUTO: 30.4 % (ref 36–48)
HGB BLD-MCNC: 10.2 G/DL (ref 12–16)
LYMPHOCYTES # BLD: 1.5 K/UL (ref 1.1–5.9)
LYMPHOCYTES NFR BLD: 26 % (ref 14–44)
MCH RBC QN AUTO: 31.6 PG (ref 25–35)
MCHC RBC AUTO-ENTMCNC: 33.6 G/DL (ref 31–37)
MCV RBC AUTO: 94.1 FL (ref 78–102)
NEUTS SEG # BLD: 4 K/UL (ref 1.8–9.5)
NEUTS SEG NFR BLD: 67 % (ref 40–70)
PLATELET # BLD AUTO: 286 K/UL (ref 140–440)
RBC # BLD AUTO: 3.23 M/UL (ref 4.1–5.1)
WBC # BLD AUTO: 5.9 K/UL (ref 4.5–13)

## 2017-12-28 RX ORDER — IRON PS COMPLEX/B12/FOLIC ACID 150-25-1
CAPSULE ORAL
Qty: 30 CAP | Refills: 0 | Status: SHIPPED | OUTPATIENT
Start: 2017-12-28 | End: 2018-07-27 | Stop reason: ALTCHOICE

## 2018-01-07 DIAGNOSIS — K59.00 CONSTIPATION, UNSPECIFIED: ICD-10-CM

## 2018-01-07 DIAGNOSIS — K59.00 CONSTIPATION, UNSPECIFIED CONSTIPATION TYPE: Primary | ICD-10-CM

## 2018-01-09 RX ORDER — POLYETHYLENE GLYCOL 3350 17 G/17G
POWDER, FOR SOLUTION ORAL
Qty: 517 G | Refills: 12 | Status: SHIPPED | OUTPATIENT
Start: 2018-01-09 | End: 2021-07-16

## 2018-01-22 ENCOUNTER — HOSPITAL ENCOUNTER (OUTPATIENT)
Dept: ONCOLOGY | Age: 63
Discharge: HOME | End: 2018-01-22

## 2018-01-22 ENCOUNTER — OFFICE VISIT (OUTPATIENT)
Dept: ONCOLOGY | Age: 63
End: 2018-01-22

## 2018-01-22 VITALS
HEIGHT: 63 IN | SYSTOLIC BLOOD PRESSURE: 151 MMHG | HEART RATE: 71 BPM | DIASTOLIC BLOOD PRESSURE: 85 MMHG | TEMPERATURE: 98.4 F | WEIGHT: 193 LBS | BODY MASS INDEX: 34.2 KG/M2

## 2018-01-22 DIAGNOSIS — D46.4 REFRACTORY ANEMIA (HCC): ICD-10-CM

## 2018-01-22 DIAGNOSIS — M19.90 ARTHRITIS: ICD-10-CM

## 2018-01-22 DIAGNOSIS — D46.9 MDS (MYELODYSPLASTIC SYNDROME) (HCC): Primary | ICD-10-CM

## 2018-01-22 DIAGNOSIS — D46.9 MDS (MYELODYSPLASTIC SYNDROME) (HCC): ICD-10-CM

## 2018-01-22 LAB
BASO+EOS+MONOS # BLD AUTO: 0.5 K/UL (ref 0–2.3)
BASO+EOS+MONOS # BLD AUTO: 10 % (ref 0.1–17)
DIFFERENTIAL METHOD BLD: ABNORMAL
ERYTHROCYTE [DISTWIDTH] IN BLOOD BY AUTOMATED COUNT: 13.4 % (ref 11.5–14.5)
HCT VFR BLD AUTO: 32.1 % (ref 36–48)
HGB BLD-MCNC: 10.5 G/DL (ref 12–16)
LYMPHOCYTES # BLD: 1.4 K/UL (ref 1.1–5.9)
LYMPHOCYTES NFR BLD: 26 % (ref 14–44)
MCH RBC QN AUTO: 30.5 PG (ref 25–35)
MCHC RBC AUTO-ENTMCNC: 32.7 G/DL (ref 31–37)
MCV RBC AUTO: 93.3 FL (ref 78–102)
NEUTS SEG # BLD: 3.5 K/UL (ref 1.8–9.5)
NEUTS SEG NFR BLD: 64 % (ref 40–70)
PLATELET # BLD AUTO: 300 K/UL (ref 140–440)
RBC # BLD AUTO: 3.44 M/UL (ref 4.1–5.1)
WBC # BLD AUTO: 5.4 K/UL (ref 4.5–13)

## 2018-01-22 NOTE — MR AVS SNAPSHOT
Darya Shaw 
 
 
 [address] 
[phone] Patient: Mak Hobbs MRN: [id number] AZK:6/69/1861 Visit Information Date & Time Provider Department Dept. Phone Encounter #  
 1/22/2018  9:30 AM Serenity Arnett MD [facility] Oncology [phone] [id number] Your Appointments 1/23/2018  1:30 PM  
XRAY with [facility] [address] ([facility] [address]) Appt Note: FU FROM 5/8/17 W/CXR  
 [address] [place] [address], [address] [address]  
[phone]  
  
   
 [address] [place] [address], [address] [place] [address],[address] [place] [address]  
  
    
 1/23/2018  1:45 PM  
Follow Up with Earnie Goodell, MD  
[address] ([facility] [address]) Appt Note: FU FROM 5/8/17 W/CXR  
 [address] [place] [address], [address] [address]  
[phone]  
  
   
 [address] [place] [address], [address] [place] [address],[address] [place] [address]  
  
    
 1/31/2018 10:00 AM  
Follow Up with Jay Marquez MD  
[facility] (--) Appt Note: 3 month follow up [address] [place] [address]  
  
    
 3/14/2018  8:00 AM  
Follow Up with Rosa Isela Carrillo MD  
[facility] ([address]) Appt Note: 1 year follow up Neva Farmer [phone]  
[phone] [facility] [address] [facility] [address] Upcoming Health Maintenance Date Due  
 BREAST CANCER SCRN MAMMOGRAM 4/30/2018 Pneumococcal 19-64 Highest Risk (2 of 3 - PCV13) 4/12/2018 COLONOSCOPY 7/7/2022 DTaP/Tdap/Td series (2 - Td) 4/12/2027 Allergies as of 1/22/2018  Review Complete On: 10/31/2017 By: Jay Marquez MD  
 No Known Allergies Current Immunizations  Reviewed on 4/25/2016 Name Date Tdap 4/12/2017 Not reviewed this visit You Were Diagnosed With   
  
 Codes Comments MDS ([facility]elodysplastic syndrome) ([facility]    -  Primary ICD-10-CM: D46.9 ICD-9-CM: 238.75 Refractory anemia (HCC)     ICD-10-CM: D46.4 ICD-9-CM: 238.72 Arthritis     ICD-10-CM: M19.90 ICD-9-CM: 716.90 Vitals BP Pulse Temp Height(growth percentile) Weight(growth percentile) LMP  
 151/85 (BP 1 Location: Right arm) 71 98.4 °F (36.9 °C) 5' 3\" (1.6 m) 193 lb (87.5 kg) 08/31/1998 BMI OB Status Smoking Status 34.19 kg/m2 Hysterectomy Never Smoker BMI and BSA Data Body Mass Index Body Surface Area  
 34.19 kg/m 2 1.97 m 2 Preferred Pharmacy Pharmacy Name Phone [facility] [address] [facility], [address] [facility] Your Updated Medication List  
  
   
This list is accurate as of: 1/22/18 10:02 AM.  Always use your most recent med list.  
  
  
  
  
 albuterol 90 mcg/actuation inhaler Commonly known as:  PROVENTIL HFA, VENTOLIN HFA, PROAIR HFA Take 2 Puffs by inhalation every six (6) hours as needed for Wheezing. carvedilol 25 mg tablet Commonly known as:  COREG  
TAKE ONE TABLET BY MOUTH TWICE DAILY WITH MEALS  
  
 diclofenac EC 75 mg EC tablet Commonly known as:  VOLTAREN  
TAKE ONE TABLET BY MOUTH IN THE EVENING  
  
 ergocalciferol 50,000 unit capsule Commonly known as:  ERGOCALCIFEROL  
TAKE ONE CAPSULE BY MOUTH ONCE A WEEK  
  
 hydrocortisone 2.5 % topical cream  
Commonly known as:  HYTONE  
  
 latanoprost 0.005 % ophthalmic solution Commonly known as:  Virlinda Seeds Administer 1 Drop to both eyes nightly. losartan-hydroCHLOROthiazide 100-12.5 mg per tablet Commonly known as:  HYZAAR  
TAKE ONE TABLET BY MOUTH ONCE DAILY POLY-IRON 150 FORTE capsule Generic drug:  iron polysacch complex-b12-fa TAKE ONE CAPSULE BY MOUTH ONCE DAILY polyethylene glycol 17 gram/dose powder Commonly known as:  Massiel Wood MIX 17 GRAMS (1 CAPFUL) IN LIQUID AND DRINK BY MOUTH ONCE DAILY FOR CONSTIPATION  
  
 simvastatin 20 mg tablet Commonly known as:  ZOCOR  
TAKE ONE TABLET BY MOUTH ONCE DAILY AT BEDTIME  
  
 triamcinolone acetonide 0.1 % topical cream  
Commonly known as:  KENALOG Apply  to affected area two (2) times a day. valACYclovir 500 mg tablet Commonly known as:  VALTREX  
TAKE ONE TABLET BY MOUTH TWICE DAILY We Performed the Following COMPLETE CBC & AUTO DIFF WBC [41799 CPT(R)] FERRITIN [39579 CPT(R)] IRON PROFILE [id number] CPT(R)] METABOLIC PANEL, COMPREHENSIVE [16226 CPT(R)] To-Do List   
 01/22/2018 Lab:  CBC WITH 3 PART DIFF   
  
 01/22/2018 Lab:  FERRITIN   
  
 01/22/2018 Lab:  IRON PROFILE   
  
 01/22/2018 Lab:  METABOLIC PANEL, COMPREHENSIVE Patient Instructions Arthritis: Care Instructions Your Care Instructions Arthritis, also called osteoarthritis, is a breakdown of the cartilage that cushions your joints. When the cartilage wears down, your bones rub against each other. This causes pain and stiffness. Many people have some arthritis as they age. Arthritis most often affects the joints of the spine, hands, hips, knees, or feet. You can take simple measures to protect your joints, ease your pain, and help you stay active. Follow-up care is a key part of your treatment and safety. Be sure to make and go to all appointments, and call your doctor if you are having problems. It's also a good idea to know your test results and keep a list of the medicines you take. How can you care for yourself at home? · Stay at a healthy weight. Being overweight puts extra strain on your joints. · Talk to your doctor or physical therapist about exercises that will help ease joint pain. ¨ Stretch. You may enjoy gentle forms of yoga to help keep your joints and muscles flexible. ¨ Walk instead of jog. Other types of exercise that are less stressful on the joints include riding a bicycle, swimming, keven chi, or water exercise. ¨ Lift weights. Strong muscles help reduce stress on your joints. Stronger thigh muscles, for example, take some of the stress off of the knees and hips. Learn the right way to lift weights so you do not make joint pain worse. · Take your medicines exactly as prescribed. Call your doctor if you think you are having a problem with your medicine. · Take pain medicines exactly as directed. ¨ If the doctor gave you a prescription medicine for pain, take it as prescribed. ¨ If you are not taking a prescription pain medicine, ask your doctor if you can take an over-the-counter medicine. · Use a cane, crutch, walker, or another device if you need help to get around. These can help rest your joints. You also can use other things to make life easier, such as a higher toilet seat and padded handles on kitchen utensils. · Do not sit in low chairs, which can make it hard to get up. · Put heat or cold on your sore joints as needed. Use whichever helps you most. You also can take turns with hot and cold packs. ¨ Apply heat 2 or 3 times a day for 20 to 30 minutes-using a heating pad, hot shower, or hot pack-to relieve pain and stiffness. ¨ Put ice or a cold pack on your sore joint for 10 to 20 minutes at a time. Put a thin cloth between the ice and your skin. When should you call for help? Call your doctor now or seek immediate medical care if: 
? · You have sudden swelling, warmth, or pain in any joint. ? · You have joint pain and a fever or rash. ? · You have such bad pain that you cannot use a joint. ? Watch closely for changes in your health, and be sure to contact your doctor if: 
? · You have mild joint symptoms that continue even with more than 6 weeks of care at home. ? · You have stomach pain or other problems with your medicine. Where can you learn more? Go to http://uday-bhavna.info/. Enter T306 in the search box to learn more about \"Arthritis: Care Instructions. \" Current as of: October 31, 2016 Content Version: 11.4 © 1634-4731 Healthwise, Incorporated. Care instructions adapted under license by [employer] (which disclaims liability or warranty for this information). If you have questions about a medical condition or this instruction, always ask your healthcare professional. [address] any warranty or liability for your use of this information. Introducing [facility]! Bernabe Gracia introduces fl3ur patient portal. Now you can access parts of your medical record, email your doctor's office, and request medication refills online. 1. In your internet browser, go to [email or web address] 2. Click on the First Time User? Click Here link in the Sign In box. You will see the New Member Sign Up page. 3. Enter your fl3ur Access Code exactly as it appears below. You will not need to use this code after youve completed the sign-up process. If you do not sign up before the expiration date, you must request a new code. · fl3ur Access Code: [email or web address] Expires: 4/22/2018  9:21 AM 
 
4. Enter the last four digits of your Social Security Number (xxxx) and Date of Birth (mm/dd/yyyy) as indicated and click Submit. You will be taken to the next sign-up page. 5. Create a Achieve3000t ID. This will be your fl3ur login ID and cannot be changed, so think of one that is secure and easy to remember. 6. Create a fl3ur password. You can change your password at any time. 7. Enter your Password Reset Question and Answer. This can be used at a later time if you forget your password. 8. Enter your e-mail address. You will receive e-mail notification when new information is available in [address] 9. Click Sign Up. You can now view and download portions of your medical record. 10. Click the Download Summary menu link to download a portable copy of your medical information. If you have questions, please visit the Frequently Asked Questions section of the [employer] website. Remember, [employer] is NOT to be used for urgent needs. For medical emergencies, dial 911. Now available from your iPhone and Android! Please provide this summary of care documentation to your next provider. Your primary care clinician is listed as Severino Merritt. If you have any questions after today's visit, please call [phone].

## 2018-01-22 NOTE — PATIENT INSTRUCTIONS
Arthritis: Care Instructions  Your Care Instructions  Arthritis, also called osteoarthritis, is a breakdown of the cartilage that cushions your joints. When the cartilage wears down, your bones rub against each other. This causes pain and stiffness. Many people have some arthritis as they age. Arthritis most often affects the joints of the spine, hands, hips, knees, or feet. You can take simple measures to protect your joints, ease your pain, and help you stay active. Follow-up care is a key part of your treatment and safety. Be sure to make and go to all appointments, and call your doctor if you are having problems. It's also a good idea to know your test results and keep a list of the medicines you take. How can you care for yourself at home? · Stay at a healthy weight. Being overweight puts extra strain on your joints. · Talk to your doctor or physical therapist about exercises that will help ease joint pain. ¨ Stretch. You may enjoy gentle forms of yoga to help keep your joints and muscles flexible. ¨ Walk instead of jog. Other types of exercise that are less stressful on the joints include riding a bicycle, swimming, keven chi, or water exercise. ¨ Lift weights. Strong muscles help reduce stress on your joints. Stronger thigh muscles, for example, take some of the stress off of the knees and hips. Learn the right way to lift weights so you do not make joint pain worse. · Take your medicines exactly as prescribed. Call your doctor if you think you are having a problem with your medicine. · Take pain medicines exactly as directed. ¨ If the doctor gave you a prescription medicine for pain, take it as prescribed. ¨ If you are not taking a prescription pain medicine, ask your doctor if you can take an over-the-counter medicine. · Use a cane, crutch, walker, or another device if you need help to get around. These can help rest your joints.  You also can use other things to make life easier, such as a higher toilet seat and padded handles on kitchen utensils. · Do not sit in low chairs, which can make it hard to get up. · Put heat or cold on your sore joints as needed. Use whichever helps you most. You also can take turns with hot and cold packs. ¨ Apply heat 2 or 3 times a day for 20 to 30 minutes-using a heating pad, hot shower, or hot pack-to relieve pain and stiffness. ¨ Put ice or a cold pack on your sore joint for 10 to 20 minutes at a time. Put a thin cloth between the ice and your skin. When should you call for help? Call your doctor now or seek immediate medical care if:  ? · You have sudden swelling, warmth, or pain in any joint. ? · You have joint pain and a fever or rash. ? · You have such bad pain that you cannot use a joint. ? Watch closely for changes in your health, and be sure to contact your doctor if:  ? · You have mild joint symptoms that continue even with more than 6 weeks of care at home. ? · You have stomach pain or other problems with your medicine. Where can you learn more? Go to http://uday-bhavna.info/. Enter Z769 in the search box to learn more about \"Arthritis: Care Instructions. \"  Current as of: October 31, 2016  Content Version: 11.4  © 4984-1653 [employer]. Care instructions adapted under license by [employer] (which disclaims liability or warranty for this information). If you have questions about a medical condition or this instruction, always ask your healthcare professional. [place] [address] any warranty or liability for your use of this information.

## 2018-01-22 NOTE — PROGRESS NOTES
Hematology/Oncology  Progress Note    Name: Tish Cool  Date: 2018  : 1955    PCP: Dr. Dona Rao    Ms. Nevin Betancourt is a 66y.o. year old female who was seen for management of her myelodysplastic syndrome/refractory anemia    Current therapy: Iron supplement, Procrit or Aranesp is available whenever her hematocrit is below 30%. Subjective:     Ms. Nevin Betancourt is a 73-year-old -American woman with myelodysplastic syndrome/refractory anemia. She is continuing to do well. The patient reports that she continues taking the over-the-counter iron supplement once daily. She still has occasional arthritic discomfort in her lower extremities. She uses Tylenol arthrithis as needed. She has no other physical complaints at this time. Past medical history, family history, and social history were reviewed and remain unchanged. Past Medical History:   Diagnosis Date    Echocardiogram 2011    Tech difficult. EF 60-65%. Mild LVH. RVSP 20-25 mmHg.  Essential hypertension     History of colon polyps     Hx of endometriosis     had hyst    Hyperlipidemia     Hypertension     MDS (myelodysplastic syndrome) (HCC)     Refractory anemia (HCC)      Past Surgical History:   Procedure Laterality Date    HX HYSTERECTOMY       Social History     Social History    Marital status: SINGLE     Spouse name: N/A    Number of children: N/A    Years of education: N/A     Occupational History    Not on file.      Social History Main Topics    Smoking status: Never Smoker    Smokeless tobacco: Never Used    Alcohol use No    Drug use: Yes     Special: Prescription, OTC    Sexual activity: Not on file     Other Topics Concern    Not on file     Social History Narrative     Family History   Problem Relation Age of Onset    Cancer Mother    Julita Nevarez Arthritis-rheumatoid Sister     Diabetes Sister     Hypertension Sister      Current Outpatient Prescriptions   Medication Sig Dispense Refill    polyethylene glycol (MIRALAX) 17 gram/dose powder MIX 17 GRAMS (1 CAPFUL) IN LIQUID AND DRINK BY MOUTH ONCE DAILY FOR CONSTIPATION 517 g 12    POLY-IRON 150 FORTE capsule TAKE ONE CAPSULE BY MOUTH ONCE DAILY 30 Cap 0    simvastatin (ZOCOR) 20 mg tablet TAKE ONE TABLET BY MOUTH ONCE DAILY AT BEDTIME 30 Tab 11    losartan-hydroCHLOROthiazide (HYZAAR) 100-12.5 mg per tablet TAKE ONE TABLET BY MOUTH ONCE DAILY 30 Tab 11    valACYclovir (VALTREX) 500 mg tablet TAKE ONE TABLET BY MOUTH TWICE DAILY 30 Tab 12    carvedilol (COREG) 25 mg tablet TAKE ONE TABLET BY MOUTH TWICE DAILY WITH MEALS 180 Tab 3    ergocalciferol (ERGOCALCIFEROL) 50,000 unit capsule TAKE ONE CAPSULE BY MOUTH ONCE A WEEK 4 Cap 12    hydrocortisone (HYTONE) 2.5 % topical cream       diclofenac EC (VOLTAREN) 75 mg EC tablet TAKE ONE TABLET BY MOUTH IN THE EVENING 90 Tab 0    albuterol (PROVENTIL HFA, VENTOLIN HFA, PROAIR HFA) 90 mcg/actuation inhaler Take 2 Puffs by inhalation every six (6) hours as needed for Wheezing. 1 Inhaler 11    triamcinolone acetonide (KENALOG) 0.1 % topical cream Apply  to affected area two (2) times a day. 15 g 2    latanoprost (XALATAN) 0.005 % ophthalmic solution Administer 1 Drop to both eyes nightly. Review of Systems  Constitutional: The patient complains of occasional fatigue  HEENT: The patient denies recent head trauma, eye pain, blurred vision,  hearing deficit, oropharyngeal mucosal pain or lesions, and the patient denies throat pain or discomfort. Lymphatics: The patient denies palpable peripheral lymphadenopathy. Hematologic: The patient denies having bruising, bleeding, or progressive fatigue. Respiratory: Patient denies having shortness of breath, cough, sputum production, fever, or dyspnea on exertion. Cardiovascular: The patient denies having leg pain, leg swelling, heart palpitations, chest permit, chest pain, or lightheadedness. The patient denies having dyspnea on exertion.   Gastrointestinal: The patient denies having nausea, emesis, or diarrhea. The patient denies having any hematemesis or blood in the stool. Genitourinary: Patient denies having urinary urgency, frequency, or dysuria. The patient denies having blood in the urine. Psychological: The patient denies having symptoms of nervousness, anxiety, depression, or thoughts of harming himself some of this. Skin: Patient denies having skin rashes, skin, ulcerations, or unexplained itching or pruritus. Musculoskeletal: The patient  is complaining of occasional musculoskeletal pain primarily in the lower extremities. Objective:     Visit Vitals    /85 (BP 1 Location: Right arm)    Pulse 71    Temp 98.4 °F (36.9 °C)    Ht 5' 3\" (1.6 m)    Wt 87.5 kg (193 lb)    LMP 08/31/1998    BMI 34.19 kg/m2     Pain Scale 0/10  Physical Exam:   ECOG=0  Gen. Appearance: The patient is in no acute distress. Skin: There is no bruise or rash. HEENT: The exam is unremarkable. Neck: Supple without lymphadenopathy or thyromegaly. Lungs: Clear to auscultation and percussion; there are no wheezes or rhonchi. Heart: Regular rate and rhythm; there are no murmurs, gallops, or rubs. aanterior chest wall and breasts: Deferred. The axilla reveals no palpable axillary lymphadenopathy. Abdomen: Bowel sounds are present and normal.  There is no guarding, tenderness, or hepatosplenomegaly. Extremities: There is no clubbing, cyanosis, or edema. Neurologic: There are no focal neurologic deficits. Lymphatics: There is no palpable peripheral lymphadenopathy.     Lab data:      Results for orders placed or performed in visit on 01/22/18   IRON PROFILE   Result Value Ref Range    TIBC 303 250 - 450 ug/dL    UIBC 243 118 - 369 ug/dL    Iron 60 27 - 139 ug/dL    Iron % saturation 20 15 - 55 %   FERRITIN   Result Value Ref Range    Ferritin 248 (H) 15 - 627 ng/mL   METABOLIC PANEL, COMPREHENSIVE   Result Value Ref Range    Glucose 94 65 - 99 mg/dL    BUN 19 8 - 27 mg/dL Creatinine 0.70 0.57 - 1.00 mg/dL    GFR est non-AA 94 >59 mL/min/1.73    GFR est  >59 mL/min/1.73    BUN/Creatinine ratio 27 12 - 28    Sodium 141 134 - 144 mmol/L    Potassium 4.0 3.5 - 5.2 mmol/L    Chloride 99 96 - 106 mmol/L    CO2 28 18 - 29 mmol/L    Calcium 10.0 8.7 - 10.3 mg/dL    Protein, total 8.0 6.0 - 8.5 g/dL    Albumin 4.6 3.6 - 4.8 g/dL    GLOBULIN, TOTAL 3.4 1.5 - 4.5 g/dL    A-G Ratio 1.4 1.2 - 2.2    Bilirubin, total 0.6 0.0 - 1.2 mg/dL    Alk. phosphatase 91 39 - 117 IU/L    AST (SGOT) 19 0 - 40 IU/L    ALT (SGPT) 13 0 - 32 IU/L        Assessment:     1. MDS (myelodysplastic syndrome) ([facility]    2. Refractory anemia (HCC)    3. Arthritis        Plan:   Myelodysplastic syndrome: I have explained to the patient that her WBC is stable at 5.4, PLT is 300,000. We do not need to pursue a bone marrow biopsy at this time. However, if she experiences a rapid decline in the hemoglobin and hematocrit with an elevation in her WBC counts that would be an indication to do a bone marrow biopsy to rule out whether not she is converting to a chronic myelomonocytic leukemia component of her MDS versus converting to an acute leukemic process. Therapeutic intervention with Procrit will be provided for her if the hematocrit declines below 30% with a hemoglobin below 10 g/dL. I have explained to the patient that the dose of Procrit  60,000 units given subcutaneously every 2 weeks. I will plan to have her return to clinic for a complete assessment in 3 months. Refractory anemia/MDS: I have explained to the patient that her current hemoglobin and hematocrit were 10.5g/dL and 32.1% respectively. We will continue to monitor her hemoglobin and hematocrit every 3 months and if she should have a decrease in her hemoglobin below 10 g/dl and  hematocrit below 30% we will offer interventional therapy with either Procrit or Aranesp. At this time I will check a ferritin level and iron profile.  The comprehensive metabolic panel will also be ordered. The patient states she has been taking ferrous sulfate once daily. Arthritis/cervical cervicitis (chronic problem): The patient reports that she has chronic arthritis and has been taking Tylenol Arthritis as needed. She will continue this plan of care since it is giving her relief. CBC in 1 month. I will have her return to clinic for a complete reassessment again in 3 months.   Orders Placed This Encounter    COMPLETE CBC & AUTO DIFF WBC    InHouse CBC ([facility])     Standing Status:   Future     Number of Occurrences:   1     Standing Expiration Date:   1/29/2018    IRON PROFILE     Standing Status:   Future     Number of Occurrences:   1     Standing Expiration Date:   1/23/2019    FERRITIN     Standing Status:   Future     Number of Occurrences:   1     Standing Expiration Date:   7/34/8011    METABOLIC PANEL, COMPREHENSIVE     Standing Status:   Future     Number of Occurrences:   1     Standing Expiration Date:   1/23/2019       Norris Schilling MD  1/22/2018

## 2018-01-23 ENCOUNTER — OFFICE VISIT (OUTPATIENT)
Dept: PULMONOLOGY | Age: 63
End: 2018-01-23

## 2018-01-23 VITALS
DIASTOLIC BLOOD PRESSURE: 80 MMHG | HEIGHT: 63 IN | SYSTOLIC BLOOD PRESSURE: 122 MMHG | OXYGEN SATURATION: 96 % | HEART RATE: 69 BPM | BODY MASS INDEX: 34.46 KG/M2 | RESPIRATION RATE: 18 BRPM | TEMPERATURE: 97.9 F | WEIGHT: 194.5 LBS

## 2018-01-23 DIAGNOSIS — R07.89 CHEST DISCOMFORT: Primary | ICD-10-CM

## 2018-01-23 LAB
ALBUMIN SERPL-MCNC: 4.6 G/DL (ref 3.6–4.8)
ALBUMIN/GLOB SERPL: 1.4 {RATIO} (ref 1.2–2.2)
ALP SERPL-CCNC: 91 IU/L (ref 39–117)
ALT SERPL-CCNC: 13 IU/L (ref 0–32)
AST SERPL-CCNC: 19 IU/L (ref 0–40)
BILIRUB SERPL-MCNC: 0.6 MG/DL (ref 0–1.2)
BUN SERPL-MCNC: 19 MG/DL (ref 8–27)
BUN/CREAT SERPL: 27 (ref 12–28)
CALCIUM SERPL-MCNC: 10 MG/DL (ref 8.7–10.3)
CHLORIDE SERPL-SCNC: 99 MMOL/L (ref 96–106)
CO2 SERPL-SCNC: 28 MMOL/L (ref 18–29)
CREAT SERPL-MCNC: 0.7 MG/DL (ref 0.57–1)
FERRITIN SERPL-MCNC: 248 NG/ML (ref 15–150)
GLOBULIN SER CALC-MCNC: 3.4 G/DL (ref 1.5–4.5)
GLUCOSE SERPL-MCNC: 94 MG/DL (ref 65–99)
IRON SATN MFR SERPL: 20 % (ref 15–55)
IRON SERPL-MCNC: 60 UG/DL (ref 27–139)
POTASSIUM SERPL-SCNC: 4 MMOL/L (ref 3.5–5.2)
PROT SERPL-MCNC: 8 G/DL (ref 6–8.5)
SODIUM SERPL-SCNC: 141 MMOL/L (ref 134–144)
TIBC SERPL-MCNC: 303 UG/DL (ref 250–450)
UIBC SERPL-MCNC: 243 UG/DL (ref 118–369)

## 2018-01-23 NOTE — PROGRESS NOTES
[facility]  Pulmonary, Critical Care, and Sleep Medicine      Chief complaint:  Chest discomfort    HPI:    Darlene Garcia    is 66years old returns the office today for follow-up concerning chest discomfort and initially the patient described a sensation in her chest but now she says is more like a feeling of congestion which she actually hears when  she sits down at night to watch TV for instance and it usually lasts most of the evening improving momentarily when she clears her throat and she does not notice it after she lays down and goes to bed. She says she does not have a chronic cough does not bother her during the daytime she does not bring up mucus she denies shortness of breath leg pain or swelling. She also relates over-the-counter Prilosec gave her no relief      No Known Allergies  Current Outpatient Prescriptions   Medication Sig    polyethylene glycol (MIRALAX) 17 gram/dose powder MIX 17 GRAMS (1 CAPFUL) IN LIQUID AND DRINK BY MOUTH ONCE DAILY FOR CONSTIPATION    POLY-IRON 150 FORTE capsule TAKE ONE CAPSULE BY MOUTH ONCE DAILY    simvastatin (ZOCOR) 20 mg tablet TAKE ONE TABLET BY MOUTH ONCE DAILY AT BEDTIME    losartan-hydroCHLOROthiazide (HYZAAR) 100-12.5 mg per tablet TAKE ONE TABLET BY MOUTH ONCE DAILY    valACYclovir (VALTREX) 500 mg tablet TAKE ONE TABLET BY MOUTH TWICE DAILY    carvedilol (COREG) 25 mg tablet TAKE ONE TABLET BY MOUTH TWICE DAILY WITH MEALS    ergocalciferol (ERGOCALCIFEROL) 50,000 unit capsule TAKE ONE CAPSULE BY MOUTH ONCE A WEEK    hydrocortisone (HYTONE) 2.5 % topical cream     diclofenac EC (VOLTAREN) 75 mg EC tablet TAKE ONE TABLET BY MOUTH IN THE EVENING    albuterol (PROVENTIL HFA, VENTOLIN HFA, PROAIR HFA) 90 mcg/actuation inhaler Take 2 Puffs by inhalation every six (6) hours as needed for Wheezing.  triamcinolone acetonide (KENALOG) 0.1 % topical cream Apply  to affected area two (2) times a day.     latanoprost (XALATAN) 0.005 % ophthalmic solution Administer 1 Drop to both eyes nightly. No current facility-administered medications for this visit. Past Medical History:   Diagnosis Date    Echocardiogram 02/08/2011    Tech difficult. EF 60-65%. Mild LVH. RVSP 20-25 mmHg.  Essential hypertension     History of colon polyps     Hx of endometriosis     had hyst    Hyperlipidemia     Hypertension     MDS (myelodysplastic syndrome) (HCC)     Refractory anemia (HCC)      Past Surgical History:   Procedure Laterality Date    HX HYSTERECTOMY       Social History     Social History    Marital status: SINGLE     Spouse name: N/A    Number of children: N/A    Years of education: N/A     Occupational History    Not on file.      Social History Main Topics    Smoking status: Never Smoker    Smokeless tobacco: Never Used    Alcohol use No    Drug use: Yes     Special: Prescription, OTC    Sexual activity: Not on file     Other Topics Concern    Not on file     Social History Narrative     Family History   Problem Relation Age of Onset    Cancer Mother     Arthritis-rheumatoid Sister     Diabetes Sister     Hypertension Sister        Review of systems:  She denies fever chills poor appetite or weight loss    Physical Exam:  Visit Vitals    /80 (BP 1 Location: Left arm, BP Patient Position: Sitting)    Pulse 69    Temp 97.9 °F (36.6 °C)    Resp 18    Ht 5' 3\" (1.6 m)    Wt 88.2 kg (194 lb 8 oz)    LMP 08/31/1998    SpO2 96%    BMI 34.45 kg/m2       Well-developed well-nourished  HEENT: WNL  Lymph node exam: Supraclavicular cervical lymph nodes negative  Chest: Equal symmetrical expansion no dullness no wheezes rales rubs  Heart: Regular rhythm no gallop no murmur no JVD no peripheral edema  Extremities: No cyanosis clubbing or calf tenderness  Neurological: Alert and oriented    Labs:  Chest x-ray 1/23/18 personally reviewed: Elevated left diaphragm otherwise unremarkable  O2 sat room air at rest 96% Spirometry 1/23/18: Slight reduction in FVC but no evidence of airway disease  Impression:   The cause of the patient's sensation of congestion in her chest at night is not clear her spirometry revealed no evidence of reactive airway disease. And chest x-ray was unremarkable. The patient still could have reactive airway disease and will require a methacholine challenge and this may be important since she is taking a beta-blocker for hypertension however in discussing with the patient she would prefer to monitor and weight and then we will make a decision about the methacholine challenge in 6 months unless her symptoms worsen    Plan:     Follow-up in 6 months    Jaqueline Rodriguez MD , [facility]    CC: Neetu Simons MD     [address] [facility]. [facility] [address]  [place], [address] [address]     P: [phone]     F: [phone]

## 2018-01-23 NOTE — MR AVS SNAPSHOT
[address] [facility], [address] [address] 
[phone] Patient: Abhi Trevizo MRN: [id number] KB:[id number] Visit Information Date & Time Provider Department Dept. Phone Encounter #  
 1/23/2018  1:45 PM Collin Castillo MD [facility] [place] [id number] [phone] Follow-up Instructions Return in about 6 months (around 7/23/2018). Follow-up and Disposition History Your Appointments 1/31/2018 10:00 AM  
Follow Up with Gerardo Field MD  
[address] (--) Appt Note: 3 month [facility] [address] [address]  
  
    
 3/14/2018  8:00 AM  
Follow Up with Kedar Berg MD  
[facility]) Appt Note: 1 year follow up Neva Hernandez [id number]  
[phone] [place]  
  
    
 4/23/2018  9:45 AM  
Office Visit with Norris Schilling MD  
[address] [address] [facility]) Appt Note: 3 month  
 [address] [facility] [address] [facility], [address] [place] [address] Upcoming Health Maintenance Date Due  
 BREAST CANCER SCRN MAMMOGRAM 4/30/2018 Pneumococcal 19-64 Highest Risk (2 of 3 - PCV13) 4/12/2018 COLONOSCOPY 7/7/2022 DTaP/Tdap/Td series (2 - Td) 4/12/2027 Allergies as of 1/23/2018  Review Complete On: 1/23/2018 By: Keya Draper LPN No Known Allergies Current Immunizations  Reviewed on 4/25/2016 Name Date Tdap 4/12/2017 Not reviewed this visit You Were Diagnosed With   
  
 Codes Comments Chest discomfort    -  Primary ICD-10-CM: R07.89 ICD-9-CM: 786.59 Vitals BP Pulse Temp Resp Height(growth percentile) Weight(growth percentile) 122/80 (BP 1 Location: Left arm, BP Patient Position: Sitting) 69 97.9 °F (36.6 °C) 18 5' 3\" (1.6 m) 194 lb 8 oz (88.2 kg) LMP SpO2 BMI OB Status Smoking Status 08/31/1998 96% 34.45 kg/m2 Hysterectomy Never Smoker BMI and BSA Data Body Mass Index Body Surface Area 34.45 kg/m 2 1.98 m 2 Preferred Pharmacy Pharmacy Name Phone [address], [address] [facility] Your Updated Medication List  
  
   
This list is accurate as of: 1/23/18  3:46 PM.  Always use your most recent med list.  
  
  
  
  
 albuterol 90 mcg/actuation inhaler Commonly known as:  PROVENTIL HFA, VENTOLIN HFA, PROAIR HFA Take 2 Puffs by inhalation every six (6) hours as needed for Wheezing. carvedilol 25 mg tablet Commonly known as:  COREG  
TAKE ONE TABLET BY MOUTH TWICE DAILY WITH MEALS  
  
 diclofenac EC 75 mg EC tablet Commonly known as:  VOLTAREN  
TAKE ONE TABLET BY MOUTH IN THE EVENING  
  
 ergocalciferol 50,000 unit capsule Commonly known as:  ERGOCALCIFEROL  
TAKE ONE CAPSULE BY MOUTH ONCE A WEEK  
  
 hydrocortisone 2.5 % topical cream  
Commonly known as:  HYTONE  
  
 latanoprost 0.005 % ophthalmic solution Commonly known as:  Harry Danker Administer 1 Drop to both eyes nightly. losartan-hydroCHLOROthiazide 100-12.5 mg per tablet Commonly known as:  HYZAAR  
TAKE ONE TABLET BY MOUTH ONCE DAILY POLY-IRON 150 FORTE capsule Generic drug:  iron polysacch complex-b12-fa TAKE ONE CAPSULE BY MOUTH ONCE DAILY polyethylene glycol 17 gram/dose powder Commonly known as:  Kristine Deem MIX 17 GRAMS (1 CAPFUL) IN LIQUID AND DRINK BY MOUTH ONCE DAILY FOR CONSTIPATION  
  
 simvastatin 20 mg tablet Commonly known as:  ZOCOR  
TAKE ONE TABLET BY MOUTH ONCE DAILY AT BEDTIME  
  
 triamcinolone acetonide 0.1 % topical cream  
Commonly known as:  KENALOG Apply  to affected area two (2) times a day. valACYclovir 500 mg tablet Commonly known as:  VALTREX  
TAKE ONE TABLET BY MOUTH TWICE DAILY We Performed the Following AMB POC SPIROMETRY W/BRONCHODILATOR [65618 CPT(R)] Follow-up Instructions Return in about 6 months (around 7/23/2018). To-Do List   
 Around 01/23/2018 Imaging:  XR CHEST PA LAT Patient Instructions Call for worsening symptoms Introducing [facility]! [facility] introduces [employer] patient portal. Now you can access parts of your medical record, email your doctor's office, and request medication refills online. 1. In your internet browser, go to [email or web address] 2. Click on the First Time User? Click Here link in the Sign In box. You will see the New Member Sign Up page. 3. Enter your [employer] Access Code exactly as it appears below. You will not need to use this code after youve completed the sign-up process. If you do not sign up before the expiration date, you must request a new code. · [employer] Access Code: [email or web address] Expires: 4/22/2018  9:21 AM 
 
4. Enter the last four digits of your Social Security Number (xxxx) and Date of Birth (mm/dd/yyyy) as indicated and click Submit. You will be taken to the next sign-up page. 5. Create a [employer] ID. This will be your [employer] login ID and cannot be changed, so think of one that is secure and easy to remember. 6. Create a [employer] password. You can change your password at any time. 7. Enter your Password Reset Question and Answer. This can be used at a later time if you forget your password. 8. Enter your e-mail address. You will receive e-mail notification when new information is available in [address] 9. Click Sign Up. You can now view and download portions of your medical record. 10. Click the Download Summary menu link to download a portable copy of your medical information. If you have questions, please visit the Frequently Asked Questions section of the [employer] website. Remember, [employer] is NOT to be used for urgent needs. For medical emergencies, dial 911. Now available from your iPhone and Android! Please provide this summary of care documentation to your next provider. Your primary care clinician is listed as [facility]. If you have any questions after today's visit, please call [phone].

## 2018-01-31 ENCOUNTER — OFFICE VISIT (OUTPATIENT)
Dept: FAMILY MEDICINE CLINIC | Age: 63
End: 2018-01-31

## 2018-01-31 VITALS
HEART RATE: 67 BPM | SYSTOLIC BLOOD PRESSURE: 137 MMHG | DIASTOLIC BLOOD PRESSURE: 82 MMHG | HEIGHT: 63 IN | RESPIRATION RATE: 18 BRPM | WEIGHT: 196 LBS | OXYGEN SATURATION: 98 % | BODY MASS INDEX: 34.73 KG/M2 | TEMPERATURE: 98.1 F

## 2018-01-31 DIAGNOSIS — I10 ESSENTIAL HYPERTENSION: Primary | ICD-10-CM

## 2018-01-31 DIAGNOSIS — E78.5 HYPERLIPIDEMIA, UNSPECIFIED HYPERLIPIDEMIA TYPE: ICD-10-CM

## 2018-01-31 DIAGNOSIS — E87.6 HYPOKALEMIA: ICD-10-CM

## 2018-01-31 DIAGNOSIS — M25.511 ACUTE PAIN OF RIGHT SHOULDER: ICD-10-CM

## 2018-01-31 DIAGNOSIS — Z23 ENCOUNTER FOR IMMUNIZATION: ICD-10-CM

## 2018-01-31 RX ORDER — CARVEDILOL 25 MG/1
TABLET ORAL
Qty: 180 TAB | Refills: 3 | Status: SHIPPED | OUTPATIENT
Start: 2018-01-31 | End: 2018-04-30 | Stop reason: SDUPTHER

## 2018-01-31 NOTE — PROGRESS NOTES
Oneyda Mcmullen 66 y.o. female   Chief Complaint   Patient presents with    Follow-up    Cholesterol Problem    Hypertension    Other     hypokalemia    Shoulder Pain     Right shoulder x 2 months with pain at night    Medication Refill         1. Have you been to the ER, urgent care clinic since your last visit? Hospitalized since your last visit? No    2. Have you seen or consulted any other health care providers outside of the [address] [place] [address] since your last visit? Include any pap smears or colon screening. No    Oneyda Mcmullen 1955 female who presents for routine immunizations. Patient denies any symptoms , reactions or allergies that would exclude them from being immunized today. Risks and adverse reactions were discussed and the VIS was given to them. All questions were addressed. Order placed for FLU,  per Verbal Order from DR. TUCKER with read back. Patient was observed for 15 min post injection. There were no reactions observed.     Louie Maddox LPN

## 2018-01-31 NOTE — PROGRESS NOTES
HISTORY OF PRESENT ILLNESS  Zeinab Loomis is a 66 y.o. female. Chief Complaint   Patient presents with    Follow-up    Cholesterol Problem    Hypertension    Other     hypokalemia    Shoulder Pain     Right shoulder x 2 months with pain at night    Medication Refill       HPI  Patient is here for a  follow up of Htn, lipids, and hypokalemia. Patient does need medication refills today. Patient requests electronic refills. New concerns today: Patient reports having right shoulder pain at night x 2 months. She is having increased pain lately. She has trouble raising her arm above shoulder level. Pt is interested in getting a flu shot today. Review of Systems   Constitutional: Negative. HENT: Negative. Respiratory: Negative. Cardiovascular: Negative. Musculoskeletal: Positive for joint pain. All other systems reviewed and are negative. Physical Exam   Constitutional: She is oriented to person, place, and time. She appears well-developed and well-nourished. No distress. HENT:   Head: Normocephalic and atraumatic. Right Ear: External ear normal.   Left Ear: External ear normal.   Nose: Nose normal.   Eyes: Conjunctivae and EOM are normal.   Neck: Normal range of motion. Neck supple. No JVD present. No thyromegaly present. Cardiovascular: Normal rate, regular rhythm and normal heart sounds. Exam reveals no gallop and no friction rub. No murmur heard. Pulmonary/Chest: Effort normal and breath sounds normal. She has no wheezes. She has no rhonchi. She has no rales. Musculoskeletal:        Right shoulder: She exhibits decreased range of motion, pain and decreased strength. Left shoulder: Normal.   Neurological: She is alert and oriented to person, place, and time. Coordination normal.   Skin: Skin is warm and dry. Psychiatric: She has a normal mood and affect.  Her behavior is normal. Judgment and thought content normal.   Nursing note and vitals reviewed. ASSESSMENT and PLAN  Diagnoses and all orders for this visit:    1. Essential hypertension  -     carvedilol (COREG) 25 mg tablet; TAKE ONE TABLET BY MOUTH TWICE DAILY WITH MEALS  Stable, cont pres tx plan. 2. Hypokalemia  Stable, cont pres tx plan. 3. Hyperlipidemia, unspecified hyperlipidemia type  Check lipids at next visit. 4. Acute pain of right shoulder  -     [address] to use tylenol prn. Has voltaren gel samples at home; ok to use on shoulder. Can call if rx needed prior to appt with ortho.      5. Encounter for immunization  -     Influenza virus vaccine (QUADRIVALENT PRES FREE SYRINGE) IM ([id number])      Follow-up Disposition: 3 months; sooner prn

## 2018-02-07 ENCOUNTER — OFFICE VISIT (OUTPATIENT)
Dept: ORTHOPEDIC SURGERY | Age: 63
End: 2018-02-07

## 2018-02-07 VITALS
WEIGHT: 195.6 LBS | HEART RATE: 70 BPM | BODY MASS INDEX: 34.66 KG/M2 | SYSTOLIC BLOOD PRESSURE: 144 MMHG | OXYGEN SATURATION: 100 % | DIASTOLIC BLOOD PRESSURE: 90 MMHG | HEIGHT: 63 IN

## 2018-02-07 DIAGNOSIS — M25.511 RIGHT SHOULDER PAIN, UNSPECIFIED CHRONICITY: ICD-10-CM

## 2018-02-07 DIAGNOSIS — M75.51 SUBACROMIAL BURSITIS OF RIGHT SHOULDER JOINT: Primary | ICD-10-CM

## 2018-02-07 RX ORDER — TRIAMCINOLONE ACETONIDE 40 MG/ML
40 INJECTION, SUSPENSION INTRA-ARTICULAR; INTRAMUSCULAR ONCE
Qty: 1 ML | Refills: 0
Start: 2018-02-07 | End: 2018-02-07

## 2018-02-07 NOTE — PROGRESS NOTES
Moris Lee  1955   Chief Complaint   Patient presents with    Shoulder Pain     right        HISTORY OF PRESENT ILLNESS  Moris Lee is a 66 y.o. female who presents today for evaluation of right shoulder pain. she rates her pain 8/10 today. Pain has been present since the fall when she was doing a lot of raking, progressively worsening. Patient describes the pain as aching, stabbing and throbbing that is Intermittent in nature. Symptoms are worse with reaching overhead and out, certain movements of the arm, lifting any weight, Activity and is better with  Rest. Associated symptoms include stiffness, popping. Since problem started, it: has worsened. Pain does wake patient up at night. Has taken Tylenol for the problem. Has tried following treatments: Injections:NO; Brace:NO; Therapy:NO; Cane/Crutch:NO       No Known Allergies     Past Medical History:   Diagnosis Date    Echocardiogram 02/08/2011    Tech difficult. EF 60-65%. Mild LVH. RVSP 20-25 mmHg.  Essential hypertension     History of colon polyps     Hx of endometriosis     had hyst    Hyperlipidemia     Hypertension     MDS (myelodysplastic syndrome) (HCC)     Refractory anemia (HCC)       Social History     Social History    Marital status: SINGLE     Spouse name: N/A    Number of children: N/A    Years of education: N/A     Occupational History    Not on file.      Social History Main Topics    Smoking status: Never Smoker    Smokeless tobacco: Never Used    Alcohol use No    Drug use: Yes     Special: Prescription, OTC    Sexual activity: Not on file     Other Topics Concern    Not on file     Social History Narrative      Past Surgical History:   Procedure Laterality Date    HX HYSTERECTOMY        Family History   Problem Relation Age of Onset    Cancer Mother    [de-identified] Arthritis-rheumatoid Sister     Diabetes Sister     Hypertension Sister       Current Outpatient Prescriptions   Medication Sig    carvedilol (COREG) 25 mg tablet TAKE ONE TABLET BY MOUTH TWICE DAILY WITH MEALS    polyethylene glycol (MIRALAX) 17 gram/dose powder MIX 17 GRAMS (1 CAPFUL) IN LIQUID AND DRINK BY MOUTH ONCE DAILY FOR CONSTIPATION    POLY-IRON 150 FORTE capsule TAKE ONE CAPSULE BY MOUTH ONCE DAILY    simvastatin (ZOCOR) 20 mg tablet TAKE ONE TABLET BY MOUTH ONCE DAILY AT BEDTIME    losartan-hydroCHLOROthiazide (HYZAAR) 100-12.5 mg per tablet TAKE ONE TABLET BY MOUTH ONCE DAILY    valACYclovir (VALTREX) 500 mg tablet TAKE ONE TABLET BY MOUTH TWICE DAILY    ergocalciferol (ERGOCALCIFEROL) 50,000 unit capsule TAKE ONE CAPSULE BY MOUTH ONCE A WEEK    diclofenac EC (VOLTAREN) 75 mg EC tablet TAKE ONE TABLET BY MOUTH IN THE EVENING    latanoprost (XALATAN) 0.005 % ophthalmic solution Administer 1 Drop to both eyes nightly.  hydrocortisone (HYTONE) 2.5 % topical cream     albuterol (PROVENTIL HFA, VENTOLIN HFA, PROAIR HFA) 90 mcg/actuation inhaler Take 2 Puffs by inhalation every six (6) hours as needed for Wheezing.  triamcinolone acetonide (KENALOG) 0.1 % topical cream Apply  to affected area two (2) times a day. No current facility-administered medications for this visit. REVIEW OF SYSTEM   Patient denies: Weight loss, Fever/Chills, HA, Visual changes, Fatigue, Chest pain, SOB, Abdominal pain, N/V/D/C, Blood in stool or urine, Edema. Pertinent positive as above in HPI. All others were negative    PHYSICAL EXAM:   Visit Vitals    /90 (BP 1 Location: Left arm)    Pulse 70    Ht 5' 3\" (1.6 m)    Wt 195 lb 9.6 oz (88.7 kg)    LMP 08/31/1998    SpO2 100%    BMI 34.65 kg/m2     The patient is a well-developed, well-nourished female   in no acute distress. The patient is alert and oriented times three. The patient is alert and oriented times three. Mood and affect are normal.  LYMPHATIC: lymph nodes are not enlarged and are within normal limits  SKIN: normal in color and non tender to palpation. There are no bruises or abrasions noted. NEUROLOGICAL: Motor sensory exam is within normal limits. Reflexes are equal bilaterally. There is normal sensation to pinprick and light touch  MUSCULOSKELETAL:  Examination Right shoulder   Skin Intact   AC joint tenderness +   Biceps tenderness -   Forward flexion/Elevation    Active abduction    Glenohumeral abduction 90   External rotation ROM 90   Internal rotation ROM 70   Apprehension -   Khalifs Relocation -   Jerk -   Load and Shift -   Obriens -   Speeds -   Impingement sign +   Supraspinatus/Empty Can -, 5/5   External Rotation Strength -, 5/5   Lift Off/Belly Press -, 5/5   Neurovascular Intact        PROCEDURE: After sterile prep, 6 cc of Xylocaine and 1 cc of Kenalog were injected into the right shoulder.        [facility]  OFFICE PROCEDURE PROGRESS NOTE        Chart reviewed for the following:  Sonia Maza MD, have reviewed the History, Physical and updated the Allergic reactions for [address] performed immediately prior to start of procedure:  Sonia Maza MD, have performed the following [facility] to the start of the procedure:            * Patient was identified by name and date of birth   * Agreement on procedure being performed was verified  * Risks and Benefits explained to the patient  * Procedure site verified and marked as necessary  * Patient was positioned for comfort  * Consent was signed and verified     Time: 8:59 AM    Date of procedure: 2/7/2018    Procedure performed by:  Sujatha Alfonso MD    Provider assisted by: (see medication administration)    How tolerated by patient: tolerated the procedure well with no complications    Comments: none      IMAGING: XR of the right shoulder dated 2/7/18 was reviewed and read: 3 views, grade III subacromial spur, no acute abnormalities      IMPRESSION:      ICD-10-CM ICD-9-CM    1. Subacromial bursitis of right shoulder joint M75.51 726.19 TRIAMCINOLONE ACETONIDE INJ      triamcinolone acetonide (KENALOG) 40 mg/mL injection      DRAIN/INJECT LARGE JOINT/BURSA   2. Right shoulder pain, unspecified chronicity M25.511 719.41 AMB POC XRAY, SHOULDER; COMPLETE, 2+        PLAN:  1. Patient has subacromial bursitis in the right shoulder. If she continues to have pain, we will consider an MRI to assess possible damage to the rotator cuff. Risk factors include: htn  2. Yes cortisone injection indicated today R SHOULDER  3. No Physical/Occupational Therapy indicated today  4. No diagnostic test indicated today  5. No durable medical equipment indicated today  6. No referral indicated today   7. No medications indicated today  8.  No Narcotic indicated today       RTC 3 weeks  Follow-up Disposition: Not on File    Scribed by Kiko Cross [facility]) as dictated by LISA Parrish [address] and Spine Specialist

## 2018-02-11 RX ORDER — ERGOCALCIFEROL 1.25 MG/1
CAPSULE ORAL
Qty: 4 CAP | Refills: 12 | Status: SHIPPED | OUTPATIENT
Start: 2018-02-11 | End: 2019-03-21 | Stop reason: SDUPTHER

## 2018-03-12 ENCOUNTER — OFFICE VISIT (OUTPATIENT)
Dept: ORTHOPEDIC SURGERY | Age: 63
End: 2018-03-12

## 2018-03-12 VITALS
HEIGHT: 63 IN | BODY MASS INDEX: 34.34 KG/M2 | WEIGHT: 193.8 LBS | SYSTOLIC BLOOD PRESSURE: 141 MMHG | DIASTOLIC BLOOD PRESSURE: 90 MMHG | OXYGEN SATURATION: 100 % | TEMPERATURE: 96.5 F | HEART RATE: 73 BPM

## 2018-03-12 DIAGNOSIS — M75.101 TEAR OF RIGHT ROTATOR CUFF, UNSPECIFIED TEAR EXTENT: Primary | ICD-10-CM

## 2018-03-12 NOTE — PROGRESS NOTES
Mak Hobbs  1955   Chief Complaint   Patient presents with    Shoulder Pain     right        HISTORY OF PRESENT ILLNESS  Mak Hobbs is a 66 y.o. female who presents today for reevaluation of right shoulder pain. Patient rates pain as 3/10 today. At last OV, patient had a cortisone injection which provided limited relief. She has bad aching. It wakes her up at night. She has weakness and is unable to lift objects. It hurts when reaching out. Patient denies any fever, chills, chest pain, shortness of breath or calf pain. There are no new illness or injuries to report since last seen in the office. There are no changes to medications, allergies, family or social history. PHYSICAL EXAM:   Visit Vitals    /90    Pulse 73    Temp 96.5 °F (35.8 °C) (Oral)    Ht 5' 3\" (1.6 m)    Wt 193 lb 12.8 oz (87.9 kg)    LMP 08/31/1998    SpO2 100%    BMI 34.33 kg/m2     The patient is a well-developed, well-nourished female   in no acute distress. The patient is alert and oriented times three. The patient is alert and oriented times three. Mood and affect are normal.  LYMPHATIC: lymph nodes are not enlarged and are within normal limits  SKIN: normal in color and non tender to palpation. There are no bruises or abrasions noted. NEUROLOGICAL: Motor sensory exam is within normal limits. Reflexes are equal bilaterally.  There is normal sensation to pinprick and light touch  MUSCULOSKELETAL:  Examination Right shoulder   Skin Intact   AC joint tenderness +   Biceps tenderness -   Forward flexion/Elevation    Active abduction    Glenohumeral abduction 90   External rotation ROM 45   Internal rotation ROM 30   Apprehension -   Khlaifs Relocation -   Jerk -   Load and Shift -   Obriens -   Speeds -   Impingement sign +   Supraspinatus/Empty Can -, 5/5   External Rotation Strength -, 5/5   Lift Off/Belly Press -, 5/5   Neurovascular Intact       IMAGING: XR of the right shoulder dated 2/7/18 was reviewed and read: 3 views, grade III subacromial spur, no acute abnormalities    IMPRESSION:      ICD-10-CM ICD-9-CM    1. Tear of right rotator cuff, unspecified tear extent M75.101 840.4 MRI SHOULDER RT WO CONT        PLAN:   1. Patient continues to have right shoulder pain despite previous injection. I am concerned that she may have damage to the right rotator cuff. Discussed future surgery. Risk factors include: htn  2. No cortisone injection indicated today   3. No Physical/Occupational Therapy indicated today  4. Yes diagnostic test indicated today MRI R SHOULDER  5. No durable medical equipment indicated today  6. No referral indicated today   7. No medications indicated today  8. No Narcotic indicated today     RTC following MRI  Follow-up Disposition: Not on File    Scribed by Marilin Weiss [address]) as dictated by Lucero Duncan MD    I, Dr. Lucero Duncan, confirm that all documentation is accurate.     Lucero Duncan M.D.   [facility]

## 2018-03-14 ENCOUNTER — OFFICE VISIT (OUTPATIENT)
Dept: CARDIOLOGY CLINIC | Age: 63
End: 2018-03-14

## 2018-03-14 VITALS
HEART RATE: 60 BPM | RESPIRATION RATE: 18 BRPM | BODY MASS INDEX: 34.38 KG/M2 | DIASTOLIC BLOOD PRESSURE: 90 MMHG | SYSTOLIC BLOOD PRESSURE: 136 MMHG | WEIGHT: 194 LBS | OXYGEN SATURATION: 96 % | HEIGHT: 63 IN

## 2018-03-14 DIAGNOSIS — E78.5 HYPERLIPIDEMIA LDL GOAL <100: ICD-10-CM

## 2018-03-14 DIAGNOSIS — I10 ESSENTIAL HYPERTENSION: ICD-10-CM

## 2018-03-14 DIAGNOSIS — R00.2 PALPITATIONS: Primary | ICD-10-CM

## 2018-03-14 NOTE — MR AVS SNAPSHOT
[address] [address] [address] [address] [phone] Patient: Darlene Garcia MRN: [id number] RRY:[birth year] Visit Information Date & Time Provider Department Dept. Phone Encounter #  
 3/14/2018  8:00 AM Terri Yoo MD [facility] [phone] [id number] Your Appointments 4/23/2018  9:45 AM  
Office Visit with Amena Sosa MD  
[address]  On[facility]) Appt Note: 3 month  
 [address], [address] [place] [address]  
  
    
 4/30/2018 10:15 AM  
Follow Up with Simone Fregoso MD  
[facility] (--) Appt Note: 3 month follow up [place] [address] [id number] [address] [address] [phone]  
  
   
 [place] [address] [address] [address] [address] Upcoming Health Maintenance Date Due  
 BREAST CANCER SCRN MAMMOGRAM 4/30/2018 Pneumococcal 19-64 Highest Risk (2 of 3 - PCV13) 4/12/2018 COLONOSCOPY 7/7/2022 DTaP/Tdap/Td series (2 - Td) 4/12/2027 Allergies as of 3/14/2018  Review Complete On: 3/14/2018 By: Whit Padgett No Known Allergies Current Immunizations  Reviewed on 1/31/2018 Name Date Influenza Vaccine (Quad) PF 1/31/2018 Tdap 4/12/2017 Not reviewed this visit You Were Diagnosed With   
  
 Codes Comments Hyperlipidemia LDL goal <100    -  Primary ICD-10-CM: E78.5 ICD-9-CM: 272.4 Essential hypertension     ICD-10-CM: I10 
ICD-9-CM: 401.9 Heart palpitations     ICD-10-CM: R00.2 ICD-9-CM: 785.1 Vitals BP Pulse Resp Height(growth percentile) Weight(growth percentile) LMP  
 136/90 (BP 1 Location: Left arm, BP Patient Position: Sitting) 60 18 5' 2.99\" (1.6 m) 194 lb (88 kg) 08/31/1998 SpO2 BMI OB Status Smoking Status 96% 34.37 kg/m2 Hysterectomy Never Smoker BMI and BSA Data Body Mass Index Body Surface Area  
 34.37 kg/m 2 1.98 m 2 Preferred Pharmacy Pharmacy Name Phone [address], [address] [facility] Your Updated Medication List  
  
   
This list is accurate as of 3/14/18  9:02 AM.  Always use your most recent med list.  
  
  
  
  
 albuterol 90 mcg/actuation inhaler Commonly known as:  PROVENTIL HFA, VENTOLIN HFA, PROAIR HFA Take 2 Puffs by inhalation every six (6) hours as needed for Wheezing. carvedilol 25 mg tablet Commonly known as:  COREG  
TAKE ONE TABLET BY MOUTH TWICE DAILY WITH MEALS  
  
 diclofenac EC 75 mg EC tablet Commonly known as:  VOLTAREN  
TAKE ONE TABLET BY MOUTH IN THE EVENING  
  
 hydrocortisone 2.5 % topical cream  
Commonly known as:  HYTONE  
  
 latanoprost 0.005 % ophthalmic solution Commonly known as:  Jakob Massing Administer 1 Drop to both eyes nightly. losartan-hydroCHLOROthiazide 100-12.5 mg per tablet Commonly known as:  HYZAAR  
TAKE ONE TABLET BY MOUTH ONCE DAILY POLY-IRON 150 FORTE capsule Generic drug:  iron polysacch complex-b12-fa TAKE ONE CAPSULE BY MOUTH ONCE DAILY polyethylene glycol 17 gram/dose powder Commonly known as:  Carey Beau MIX 17 GRAMS (1 CAPFUL) IN LIQUID AND DRINK BY MOUTH ONCE DAILY FOR CONSTIPATION  
  
 simvastatin 20 mg tablet Commonly known as:  ZOCOR  
TAKE ONE TABLET BY MOUTH ONCE DAILY AT BEDTIME  
  
 triamcinolone acetonide 0.1 % topical cream  
Commonly known as:  KENALOG Apply  to affected area two (2) times a day. valACYclovir 500 mg tablet Commonly known as:  VALTREX  
TAKE ONE TABLET BY MOUTH TWICE DAILY  
  
 VITAMIN D2 50,000 unit capsule Generic drug:  ergocalciferol TAKE ONE CAPSULE BY MOUTH ONCE A WEEK We Performed the Following AMB POC EKG ROUTINE W/ 12 LEADS, INTER & REP [64995 CPT(R)] Introducing hospitals & HEALTH SERVICES! [employer] introduces [employer] patient portal. Now you can access parts of your medical record, email your doctor's office, and request medication refills online. 1. In your internet browser, go to [email or web address] 2. Click on the First Time User? Click Here link in the Sign In box. You will see the New Member Sign Up page. 3. Enter your [employer] Access Code exactly as it appears below. You will not need to use this code after youve completed the sign-up process. If you do not sign up before the expiration date, you must request a new code. · [employer] Access Code: [email or web address] Expires: 4/22/2018 10:21 AM 
 
4. Enter the last four digits of your Social Security Number (xxxx) and Date of Birth (mm/dd/yyyy) as indicated and click Submit. You will be taken to the next sign-up page. 5. Create a [employer] ID. This will be your [employer] login ID and cannot be changed, so think of one that is secure and easy to remember. 6. Create a [employer] password. You can change your password at any time. 7. Enter your Password Reset Question and Answer. This can be used at a later time if you forget your password. 8. Enter your e-mail address. You will receive e-mail notification when new information is available in [address] 9. Click Sign Up. You can now view and download portions of your medical record. 10. Click the Download Summary menu link to download a portable copy of your medical information. If you have questions, please visit the Frequently Asked Questions section of the [employer] website. Remember, [employer] is NOT to be used for urgent needs. For medical emergencies, dial 911. Now available from your iPhone and Android! Please provide this summary of care documentation to your next provider. Your primary care clinician is listed as Darlin Torres. If you have any questions after today's visit, please call [phone].

## 2018-03-14 NOTE — PROGRESS NOTES
HISTORY OF PRESENT ILLNESS  Saleem Mcfadden is a 66 y.o. female. HPI  She has been doing very well. She has had very little palpitations lasting only for less than two minutes at a time. She has had no prolonged palpitations. She denies chest pain, dyspnea, orthopnea or PND. She has had no symptoms to indicate TIA or amaurosis fugax. She has had no dizziness or syncope. She has not been doing routine exercise. She was originally referred to my office for evaluation of elevated CRP on 5/6/10. She has a history of hypertension and dyslipidemia. She has no history of diabetes mellitus or tobacco abuse. She has no family history of coronary artery disease. She has been intolerant to statins, but currently on Simvastatin 10 mg a day which she has been able to tolerate. She had an echocardiogram on 2/8/11 which demonstrated normal LV function with EF in the 60-65% range. There is mild concentric left ventricular hypertrophy. There is no significant valvular pathology.    Her repeat CRP in June 2013 was still elevated at 10. 9.    She has had a bone marrow biopsy and her anemia was most likely related to myelodysplastic syndrome.    She underwent the coronary calcium score on 05/19/15, which was calculated at zero indicating very low probability of coronary artery disease statistically. Review of Systems   Constitutional: Negative for malaise/fatigue and weight loss. HENT: Negative for hearing loss. Eyes: Negative for blurred vision and double vision. Respiratory: Negative for shortness of breath. Cardiovascular: Positive for palpitations. Negative for chest pain, orthopnea, claudication, leg swelling and PND. Gastrointestinal: Negative for blood in stool, heartburn and melena. Genitourinary: Negative for dysuria, frequency, hematuria and urgency. Musculoskeletal: Negative for back pain and joint pain. Skin: Negative for itching and rash.    Neurological: Negative for dizziness, loss of consciousness and weakness. Psychiatric/Behavioral: Negative for depression and memory loss. Physical Exam   Constitutional: She is oriented to person, place, and time. She appears well-developed and well-nourished. HENT:   Head: Normocephalic and atraumatic. Eyes: Conjunctivae are normal. Pupils are equal, round, and reactive to light. Neck: Normal range of motion. Neck supple. No JVD present. Cardiovascular: Normal rate, regular rhythm, S1 normal and S2 normal.   No extrasystoles are present. PMI is not displaced. Exam reveals no gallop and no friction rub. No murmur heard. Pulses:       Carotid pulses are 3+ on the right side, and 3+ on the left side. Pulmonary/Chest: Effort normal. She has no rales. Abdominal: Soft. There is no tenderness. Musculoskeletal: She exhibits no edema. Neurological: She is alert and oriented to person, place, and time. No cranial nerve deficit. Skin: Skin is warm and dry. Psychiatric: She has a normal mood and affect. Her behavior is normal.     Visit Vitals    /90 (BP 1 Location: Left arm, BP Patient Position: Sitting)    Pulse 60    Resp 18    Ht 5' 2.99\" (1.6 m)    Wt 88 kg (194 lb)    LMP 08/31/1998    SpO2 96%    BMI 34.37 kg/m2       Past Medical History:   Diagnosis Date    Echocardiogram 02/08/2011    Tech difficult. EF 60-65%. Mild LVH. RVSP 20-25 mmHg.  Essential hypertension     History of colon polyps     Hx of endometriosis     had hyst    Hyperlipidemia     Hypertension     MDS (myelodysplastic syndrome) (HCC)     Refractory anemia (HCC)        Social History     Social History    Marital status: SINGLE     Spouse name: N/A    Number of children: N/A    Years of education: N/A     Occupational History    Not on file.      Social History Main Topics    Smoking status: Never Smoker    Smokeless tobacco: Never Used    Alcohol use No    Drug use: Yes     Special: Prescription, OTC    Sexual activity: Not on file     Other Topics Concern    Not on file     Social History Narrative       Family History   Problem Relation Age of Onset    Cancer Mother    [facility] Arthritis-rheumatoid Sister     Diabetes Sister     Hypertension Sister        Past Surgical History:   Procedure Laterality Date    HX HYSTERECTOMY         Current Outpatient Prescriptions   Medication Sig Dispense Refill    VITAMIN D2 50,000 unit capsule TAKE ONE CAPSULE BY MOUTH ONCE A WEEK 4 Cap 12    carvedilol (COREG) 25 mg tablet TAKE ONE TABLET BY MOUTH TWICE DAILY WITH MEALS 180 Tab 3    polyethylene glycol (MIRALAX) 17 gram/dose powder MIX 17 GRAMS (1 CAPFUL) IN LIQUID AND DRINK BY MOUTH ONCE DAILY FOR CONSTIPATION 517 g 12    POLY-IRON 150 FORTE capsule TAKE ONE CAPSULE BY MOUTH ONCE DAILY 30 Cap 0    simvastatin (ZOCOR) 20 mg tablet TAKE ONE TABLET BY MOUTH ONCE DAILY AT BEDTIME 30 Tab 11    losartan-hydroCHLOROthiazide (HYZAAR) 100-12.5 mg per tablet TAKE ONE TABLET BY MOUTH ONCE DAILY 30 Tab 11    valACYclovir (VALTREX) 500 mg tablet TAKE ONE TABLET BY MOUTH TWICE DAILY 30 Tab 12    diclofenac EC (VOLTAREN) 75 mg EC tablet TAKE ONE TABLET BY MOUTH IN THE EVENING 90 Tab 0    latanoprost (XALATAN) 0.005 % ophthalmic solution Administer 1 Drop to both eyes nightly.  hydrocortisone (HYTONE) 2.5 % topical cream       albuterol (PROVENTIL HFA, VENTOLIN HFA, PROAIR HFA) 90 mcg/actuation inhaler Take 2 Puffs by inhalation every six (6) hours as needed for Wheezing. 1 Inhaler 11    triamcinolone acetonide (KENALOG) 0.1 % topical cream Apply  to affected area two (2) times a day. 15 g 2       EKG: normal EKG, normal sinus rhythm, unchanged from previous tracings. ASSESSMENT and PLAN  Encounter Diagnoses   Name Primary?  Palpitations Yes    Hyperlipidemia LDL goal <100     Essential hypertension    She has been doing very well. She has had only occasional palpitations which are all brief most likely secondary to either PAC's or PVC's. She has had no symptoms to indicate angina or cardiac decompensation. Her blood pressure has been under reasonable control. For now, she will be continued on current medical regimen.

## 2018-03-22 ENCOUNTER — HOSPITAL ENCOUNTER (OUTPATIENT)
Age: 63
Discharge: HOME | End: 2018-03-22
Attending: ORTHOPAEDIC SURGERY
Payer: COMMERCIAL

## 2018-03-22 DIAGNOSIS — M75.101 TEAR OF RIGHT ROTATOR CUFF, UNSPECIFIED TEAR EXTENT: ICD-10-CM

## 2018-03-22 PROCEDURE — 73221 MRI JOINT UPR EXTREM W/O DYE: CPT

## 2018-03-26 ENCOUNTER — OFFICE VISIT (OUTPATIENT)
Dept: ORTHOPEDIC SURGERY | Age: 63
End: 2018-03-26

## 2018-03-26 VITALS
DIASTOLIC BLOOD PRESSURE: 69 MMHG | SYSTOLIC BLOOD PRESSURE: 131 MMHG | HEIGHT: 63 IN | HEART RATE: 72 BPM | BODY MASS INDEX: 34.94 KG/M2 | WEIGHT: 197.2 LBS | TEMPERATURE: 97.7 F | RESPIRATION RATE: 16 BRPM | OXYGEN SATURATION: 97 %

## 2018-03-26 DIAGNOSIS — M75.121 COMPLETE TEAR OF RIGHT ROTATOR CUFF: Primary | ICD-10-CM

## 2018-03-26 NOTE — PROGRESS NOTES
Orlin Lima  1955   Chief Complaint   Patient presents with    Shoulder Injury     R SHOULDER PAIN 0        HISTORY OF PRESENT ILLNESS  Orlin Lima is a 66 y.o. female who presents today for reevaluation of right shoulder pain and to review MRI results. Patient rates pain as 0/10 today. Patient's pain is worse at night. States that it only limits her with lifting. Has tried previous cortisone injections. 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 Patient denies any fever, chills, chest pain, shortness of breath or calf pain. There are no new illness or injuries to report since last seen in the office. There are no changes to medications, allergies, family or social history. PHYSICAL EXAM:   Visit Vitals    /69    Pulse 72    Temp 97.7 °F (36.5 °C) (Oral)    Resp 16    Ht 5' 3\" (1.6 m)    Wt 197 lb 3.2 oz (89.4 kg)    LMP 08/31/1998    SpO2 97%    BMI 34.93 kg/m2     The patient is a well-developed, well-nourished female   in no acute distress. The patient is alert and oriented times three. The patient is alert and oriented times three. Mood and affect are normal.  LYMPHATIC: lymph nodes are not enlarged and are within normal limits  SKIN: normal in color and non tender to palpation. There are no bruises or abrasions noted. NEUROLOGICAL: Motor sensory exam is within normal limits. Reflexes are equal bilaterally.  There is normal sensation to pinprick and light touch  MUSCULOSKELETAL:  Examination Right shoulder   Skin Intact   AC joint tenderness +   Biceps tenderness -   Forward flexion/Elevation    Active abduction    Glenohumeral abduction 90   External rotation ROM 45   Internal rotation ROM 30   Apprehension -   Khalifs Relocation -   Jerk -   Load and Shift -   Obriens -   Speeds -   Impingement sign +   Supraspinatus/Empty Can -, 5/5   External Rotation Strength -, 5/5   Lift Off/Belly Press -, 5/5   Neurovascular Intact       IMAGING: MRI of the right shoulder dated 3/22/18 was reviewed and read:   IMPRESSION:  1. Abnormal rotator cuff findings. - Full thickness tear of the anterior portion of the supraspinatus. Underlying supraspinatus tendinosis. - Moderate infraspinatus tendinosis with questionable small partial tear. - Moderate subscapularis tendinosis. 2.  Biceps long head with tendinosis. Superior labral tear. Paralabral cyst  adjacent to the superior labrum. No definite neuropathic changes at this time. 3.  AC joint osteoarthritis. Early developing stages of AC joint  osteoarthrosis. Subacromial spur and AC joint spur. XR of the right shoulder dated 2/7/18 was reviewed and read: 3 views, grade III subacromial spur, no acute abnormalities    IMPRESSION:      ICD-10-CM ICD-9-CM    1. Complete tear of right rotator cuff M75.121 727.61         PLAN:   1. I discussed the results of the MRI and the treatment options with the patient. Patient has an MRI documented right rotator cuff tear. Patient is not ready for surgery at this time. Provided with a work note. Risk factors include: htn  2. No cortisone injection indicated today   3. No Physical/Occupational Therapy indicated today  4. No diagnostic test indicated today   5. No durable medical equipment indicated today  6. No referral indicated today   7. No medications indicated today  8. No Narcotic indicated today     RTC prn  Follow-up Disposition: Not on File    Scribed by Rosaura Rosario [address]) as dictated by Nicky King MD    I, Dr. Nicky King, confirm that all documentation is accurate.     Nicky King M.D.   [facility]

## 2018-03-26 NOTE — LETTER
NOTIFICATION RETURN TO WORK / SCHOOL 
 
3/26/2018 4:14 PM 
 
Ms. Pat Gorman [address] To Whom It May Concern: 
 
Pat Gorman is currently under the care of [address] [place] [address] Bro Maier. She will return to work under the following restrictions: no overhead lifting, no lifting over 15 pounds. If there are questions or concerns please have the patient contact our office. Sincerely, Santiago Cortés MD

## 2018-04-23 ENCOUNTER — OFFICE VISIT (OUTPATIENT)
Dept: ONCOLOGY | Age: 63
End: 2018-04-23

## 2018-04-23 ENCOUNTER — HOSPITAL ENCOUNTER (OUTPATIENT)
Dept: ONCOLOGY | Age: 63
Discharge: HOME | End: 2018-04-23

## 2018-04-23 VITALS
DIASTOLIC BLOOD PRESSURE: 75 MMHG | HEIGHT: 63 IN | SYSTOLIC BLOOD PRESSURE: 122 MMHG | TEMPERATURE: 97.5 F | HEART RATE: 67 BPM

## 2018-04-23 DIAGNOSIS — D46.4 REFRACTORY ANEMIA (HCC): ICD-10-CM

## 2018-04-23 DIAGNOSIS — J30.89 ENVIRONMENTAL AND SEASONAL ALLERGIES: ICD-10-CM

## 2018-04-23 DIAGNOSIS — D46.9 MDS (MYELODYSPLASTIC SYNDROME) (HCC): Primary | ICD-10-CM

## 2018-04-23 DIAGNOSIS — M47.812 FACET ARTHRITIS OF CERVICAL REGION: ICD-10-CM

## 2018-04-23 DIAGNOSIS — D46.9 MDS (MYELODYSPLASTIC SYNDROME) (HCC): ICD-10-CM

## 2018-04-23 DIAGNOSIS — M19.90 ARTHRITIS: ICD-10-CM

## 2018-04-23 LAB
BASO+EOS+MONOS # BLD AUTO: 0.3 K/UL (ref 0–2.3)
BASO+EOS+MONOS # BLD AUTO: 8 % (ref 0.1–17)
DIFFERENTIAL METHOD BLD: ABNORMAL
ERYTHROCYTE [DISTWIDTH] IN BLOOD BY AUTOMATED COUNT: 13.1 % (ref 11.5–14.5)
HCT VFR BLD AUTO: 31.8 % (ref 36–48)
HGB BLD-MCNC: 10.6 G/DL (ref 12–16)
LYMPHOCYTES # BLD: 1.4 K/UL (ref 1.1–5.9)
LYMPHOCYTES NFR BLD: 30 % (ref 14–44)
MCH RBC QN AUTO: 31 PG (ref 25–35)
MCHC RBC AUTO-ENTMCNC: 33.3 G/DL (ref 31–37)
MCV RBC AUTO: 93 FL (ref 78–102)
NEUTS SEG # BLD: 2.8 K/UL (ref 1.8–9.5)
NEUTS SEG NFR BLD: 62 % (ref 40–70)
PLATELET # BLD AUTO: 291 K/UL (ref 140–440)
RBC # BLD AUTO: 3.42 M/UL (ref 4.1–5.1)
WBC # BLD AUTO: 4.5 K/UL (ref 4.5–13)

## 2018-04-23 NOTE — PATIENT INSTRUCTIONS
Myelodysplastic Syndromes: Care Instructions  Your Care Instructions  Myelodysplastic syndromes, also called MDS, are a group of rare conditions in which the bone marrow does not make enough healthy blood cells. Normally, the bone marrow makes red blood cells, white blood cells, and platelets. These cells carry oxygen in the blood, help the body fight infections, and help the blood clot. With MDS, you may feel weak and tired, get infections often, and bruise easily, although symptoms tend to vary. MDS is a form of blood cancer. In some cases, MDS can turn into acute myeloid leukemia (AML), another type of cancer. Some people develop MDS after treatment for cancer or exposure to pesticides or other chemicals. But in most cases, the cause of MDS is not known. Your doctor will use the results of blood tests to guide your treatment. There are many types of MDS, with different treatment plans for each. If you have enough red blood cells and are feeling all right, you may not need active treatment, but you and your doctor will want to watch your condition carefully. If you start feeling lightheaded and have no energy, you may need a blood transfusion. Your doctor also may give you antibiotics to prevent or treat infection. Follow-up care is a key part of your treatment and safety. Be sure to make and go to all appointments, and call your doctor if you are having problems. It's also a good idea to know your test results and keep a list of the medicines you take. How can you care for yourself at home? · Take your medicines exactly as prescribed. Call your doctor if you think you are having a problem with your medicine. You will get more details on the specific medicines your doctor prescribes. · If your doctor prescribed antibiotics, take them as directed. Do not stop taking them just because you feel better. You need to take the full course of antibiotics.  If you have side effects from antibiotics, tell your doctor. · Take steps to control your stress and workload. Learn relaxation techniques. ¨ Share your feelings. Stress and tension affect our emotions. By expressing your feelings to others, you may be able to understand and cope with them. ¨ Consider joining a support group. Talking about a problem with your spouse, a good friend, or other people with similar problems is a good way to reduce tension and stress. ¨ Express yourself with art. Try writing, crafts, dance, or art to relieve stress. ¨ Be kind to your body and mind. Getting enough sleep, eating a healthy diet, and taking time to do things you enjoy can contribute to an overall feeling of balance in your life and help reduce stress. ¨ Get help if you need it. Discuss your concerns with your doctor or counselor. · Do not smoke. Smoking can make blood problems worse. If you need help quitting, talk to your doctor about stop-smoking programs and medicines. These can increase your chances of quitting for good. · If you have not already done so, prepare a list of advance directives. Advance directives are instructions to your doctor and family members about what kind of care you want if you become unable to speak or express yourself. · Call the [employer] (4-148.998.2643) or visit its website at [employer] for more information. When should you call for help? Call 911 anytime you think you may need emergency care. For example, call if:  ? · You passed out (lost consciousness). ?Call your doctor now or seek immediate medical care if:  ? · You have a fever. ? · You have abnormal bleeding. ? · You have new or worse pain. ? · You think you have an infection. ? · You have new symptoms, such as a cough, belly pain, vomiting, diarrhea, or a rash. ? Watch closely for changes in your health, and be sure to contact your doctor if:  ? · You are much more tired than usual.   ? · You have swollen glands in your armpits, groin, or neck.    ? · You do not get better as expected. Where can you learn more? Go to http://uday-bhavna.info/. Enter Kris Dockery in the search box to learn more about \"Myelodysplastic Syndromes: Care Instructions. \"  Current as of: May 12, 2017  Content Version: 11.4  © 0356-4872 [employer]. Care instructions adapted under license by [employer] (which disclaims liability or warranty for this information). If you have questions about a medical condition or this instruction, always ask your healthcare professional. [address] any warranty or liability for your use of this information. Complete Blood Count (CBC): About This Test  What is it? A complete blood count (CBC) is a blood test that gives important information about your blood cells, especially red blood cells, white blood cells, and platelets. Why is this test done? A CBC may be done as part of a regular physical exam. There are many other reasons that a doctor may want this blood test, including to:  · Find the cause of symptoms such as fatigue, weakness, fever, bruising, or weight loss. · Find anemia or an infection. · See how much blood has been lost if there is bleeding. · Diagnose diseases of the blood, such as leukemia or polycythemia. How can you prepare for the test?  You do not need to do anything before having this test.  What happens during the test?  The health professional taking a sample of your blood will:  · Wrap an elastic band around your upper arm. This makes the veins below the band larger so it is easier to put a needle into the vein. · Clean the needle site with alcohol. · Put the needle into the vein. · Attach a tube to the needle to fill it with blood. · Remove the band from your arm when enough blood is collected. · Put a gauze pad or cotton ball over the needle site as the needle is removed. · Put pressure on the site and then put on a bandage.   If this blood test is done on a baby, a heel stick may be done instead of a blood draw from a vein. What happens after the test?  · You will probably be able to go home right away. · You can go back to your usual activities right away. Follow-up care is a key part of your treatment and safety. Be sure to make and go to all appointments, and call your doctor if you are having problems. It's also a good idea to keep a list of the medicines you take. Ask your doctor when you can expect to have your test results. Where can you learn more? Go to http://uday-bhavna.info/. Enter H112 in the search box to learn more about \"Complete Blood Count (CBC): About This Test.\"  Current as of: October 14, 2016  Content Version: 11.4  © 6604-4968 [employer]. Care instructions adapted under license by [employer] (which disclaims liability or warranty for this information). If you have questions about a medical condition or this instruction, always ask your healthcare professional. [address] any warranty or liability for your use of this information.

## 2018-04-23 NOTE — MR AVS SNAPSHOT
[facility] 
 
 
 [facility], [address] [facility] 
[phone] Patient: Tameka Pennington MRN: [id number] OSS:[id number]/39/4810 Visit Information Date & Time Provider Department Dept. Phone Encounter #  
 4/23/2018  9:45 AM Nuria Raymond MD [facility]cology [phone] [id number] Follow-up Instructions Return in about 3 months (around 7/23/2018). Your Appointments 4/30/2018 10:15 AM  
Follow Up with Wendy Colorado MD  
[address] [place] [address] (--) Appt Note: 3 month follow up [address] [facility] [id number] [phone]  
  
   
 [address] [place] [id number]  
  
    
 7/23/2018  4:15 PM  
Office Visit with Nuria Raymond MD  
[address] [address] Oncology [facility]) Appt Note: 3 MO RET  
 [address] [facility], [address] [facility] [address] [facility], [address] [address]  
  
    
 3/18/2019 10:00 AM  
Follow Up with Oskar Molina MD  
[facility] ([facility]) Appt Note: 1 year follow up [address] Saab [id number] [phone] [address] Upcoming Health Maintenance Date Due Pneumococcal 19-64 Highest Risk (2 of 3 - PCV13) 4/12/2018 BREAST CANCER SCRN MAMMOGRAM 4/30/2018 COLONOSCOPY 7/7/2022 DTaP/Tdap/Td series (2 - Td) 4/12/2027 Allergies as of 4/23/2018  Review Complete On: 4/23/2018 By: Nuria Raymond MD  
 No Known Allergies Current Immunizations  Reviewed on 1/31/2018 Name Date Influenza Vaccine (Quad) PF 1/31/2018 Tdap 4/12/2017 Not reviewed this visit You Were Diagnosed With   
  
 Codes Comments MDS (myelodysplastic syndrome) ([facility]    -  Primary ICD-10-CM: D46.9 ICD-9-CM: 238.75   
 Refractory anemia (HCC)     ICD-10-CM: D46.4 ICD-9-CM: 238.72 Environmental and seasonal allergies     ICD-10-CM: J30.89 ICD-9-CM: 477.8 Arthritis     ICD-10-CM: M19.90 ICD-9-CM: 716.90 Facet arthritis of cervical region [facility])     ICD-10-CM: M46.92 
ICD-9-CM: 721.0 Vitals BP Pulse Temp Height(growth percentile) LMP OB Status 122/75 67 97.5 °F (36.4 °C) 5' 3\" (1.6 m) 08/31/1998 Hysterectomy Smoking Status Never Smoker Preferred Pharmacy Pharmacy Name Phone [address], [address] [facility] Your Updated Medication List  
  
   
This list is accurate as of 4/23/18 11:17 AM.  Always use your most recent med list.  
  
  
  
  
 carvedilol 25 mg tablet Commonly known as:  COREG  
TAKE ONE TABLET BY MOUTH TWICE DAILY WITH MEALS  
  
 diclofenac EC 75 mg EC tablet Commonly known as:  VOLTAREN  
TAKE ONE TABLET BY MOUTH IN THE EVENING  
  
 hydrocortisone 2.5 % topical cream  
Commonly known as:  HYTONE  
  
 latanoprost 0.005 % ophthalmic solution Commonly known as:  Bhakti Adjutant Administer 1 Drop to both eyes nightly. losartan-hydroCHLOROthiazide 100-12.5 mg per tablet Commonly known as:  HYZAAR  
TAKE ONE TABLET BY MOUTH ONCE DAILY POLY-IRON 150 FORTE capsule Generic drug:  iron polysacch complex-b12-fa TAKE ONE CAPSULE BY MOUTH ONCE DAILY polyethylene glycol 17 gram/dose powder Commonly known as:  Bryant Lies MIX 17 GRAMS (1 CAPFUL) IN LIQUID AND DRINK BY MOUTH ONCE DAILY FOR CONSTIPATION  
  
 simvastatin 20 mg tablet Commonly known as:  ZOCOR  
TAKE ONE TABLET BY MOUTH ONCE DAILY AT BEDTIME  
  
 triamcinolone acetonide 0.1 % topical cream  
Commonly known as:  KENALOG Apply  to affected area two (2) times a day. valACYclovir 500 mg tablet Commonly known as:  VALTREX  
TAKE ONE TABLET BY MOUTH TWICE DAILY  
  
 VITAMIN D2 50,000 unit capsule Generic drug:  ergocalciferol TAKE ONE CAPSULE BY MOUTH ONCE A WEEK We Performed the Following COMPLETE CBC & AUTO DIFF WBC [97899 CPT(R)] Follow-up Instructions Return in about 3 months (around 7/23/2018). To-Do List   
 04/23/2018 Lab:  CBC WITH 3 PART DIFF   
  
 04/25/2018 8:30 AM  
  Appointment with BELEN BARTH at [address] [place] [address] ([phone]) PAYMENT  For Non-Medicare patients - $15.00 will be collected from you at the time of your exam.  You will be billed $35.00 from the reading [facility]. OUTSIDE FILMS  - Any outside films related to the study being scheduled should be brought with you on the day of the exam.  If this cannot be done there may be a delay in the reading of the study. MEDICATIONS  - Patient must bring a complete list of all medications currently taking to include prescriptions, over-the-counter meds, herbals, vitamins & any dietary supplements  GENERAL INSTRUCTIONS  - On the day of your exam do not use any bath powder, deodorant or lotions on the armpit area. -Tenderness of breasts may cause an increase of discomfort during procedure. If you are experiencing breast tenderness on the day of your appointment and would like to reschedule, please call [phone]. Introducing [facility]! Ayo Preciado introduces [employer] patient portal. Now you can access parts of your medical record, email your doctor's office, and request medication refills online. 1. In your internet browser, go to [email or web address] 2. Click on the First Time User? Click Here link in the Sign In box. You will see the New Member Sign Up page. 3. Enter your [employer] Access Code exactly as it appears below. You will not need to use this code after youve completed the sign-up process. If you do not sign up before the expiration date, you must request a new code.  
 
· [employer] Access Code: [email or web address] 
 Expires: 7/22/2018 11:17 AM 
 
4. Enter the last four digits of your Social Security Number (xxxx) and Date of Birth (mm/dd/yyyy) as indicated and click Submit. You will be taken to the next sign-up page. 5. Create a Movie Mouth ID. This will be your [employer] login ID and cannot be changed, so think of one that is secure and easy to remember. 6. Create a [employer] password. You can change your password at any time. 7. Enter your Password Reset Question and Answer. This can be used at a later time if you forget your password. 8. Enter your e-mail address. You will receive e-mail notification when new information is available in [address] 9. Click Sign Up. You can now view and download portions of your medical record. 10. Click the Download Summary menu link to download a portable copy of your medical information. If you have questions, please visit the Frequently Asked Questions section of the [employer] website. Remember, [employer] is NOT to be used for urgent needs. For medical emergencies, dial 911. Now available from your iPhone and Android! Please provide this summary of care documentation to your next provider. Your primary care clinician is listed as Alfredo Alonso. If you have any questions after today's visit, please call [phone].

## 2018-04-23 NOTE — PROGRESS NOTES
Hematology/Oncology  Progress Note    Name: Hal Section  Date: 2018  : 1955    PCP: Dr. Nichole Diaz    Ms. Yusuf Donahue is a 66y.o. year old female who was seen for management of her myelodysplastic syndrome/refractory anemia    Current therapy: Iron supplement, Procrit or Aranesp is available whenever her hematocrit is below 30%. Subjective:     Ms. Yusuf Donahue is a 71-year-old -American woman with myelodysplastic syndrome/refractory anemia. She is continuing to do well. The patient reports that she continues taking the over-the-counter iron supplement once daily. She has no other physical complaints at this time, except for her arthritic discomfort. Additionally she is beginning to experience the seasonal allergy symptoms as well. Past medical history, family history, and social history were reviewed and remain unchanged. Past Medical History:   Diagnosis Date    Echocardiogram 2011    Tech difficult. EF 60-65%. Mild LVH. RVSP 20-25 mmHg.  Essential hypertension     History of colon polyps     Hx of endometriosis     had hyst    Hyperlipidemia     Hypertension     MDS (myelodysplastic syndrome) (HCC)     Refractory anemia (HCC)      Past Surgical History:   Procedure Laterality Date    HX HYSTERECTOMY       Social History     Social History    Marital status: SINGLE     Spouse name: N/A    Number of children: N/A    Years of education: N/A     Occupational History    Not on file.      Social History Main Topics    Smoking status: Never Smoker    Smokeless tobacco: Never Used    Alcohol use No    Drug use: No    Sexual activity: Not on file     Other Topics Concern    Not on file     Social History Narrative     Family History   Problem Relation Age of Onset    Cancer Mother    Sim Molina Arthritis-rheumatoid Sister     Diabetes Sister     Hypertension Sister     No Known Problems Father      Current Outpatient Prescriptions   Medication Sig Dispense Refill    VITAMIN D2 50,000 unit capsule TAKE ONE CAPSULE BY MOUTH ONCE A WEEK 4 Cap 12    carvedilol (COREG) 25 mg tablet TAKE ONE TABLET BY MOUTH TWICE DAILY WITH MEALS 180 Tab 3    polyethylene glycol (MIRALAX) 17 gram/dose powder MIX 17 GRAMS (1 CAPFUL) IN LIQUID AND DRINK BY MOUTH ONCE DAILY FOR CONSTIPATION 517 g 12    POLY-IRON 150 FORTE capsule TAKE ONE CAPSULE BY MOUTH ONCE DAILY 30 Cap 0    simvastatin (ZOCOR) 20 mg tablet TAKE ONE TABLET BY MOUTH ONCE DAILY AT BEDTIME 30 Tab 11    losartan-hydroCHLOROthiazide (HYZAAR) 100-12.5 mg per tablet TAKE ONE TABLET BY MOUTH ONCE DAILY 30 Tab 11    valACYclovir (VALTREX) 500 mg tablet TAKE ONE TABLET BY MOUTH TWICE DAILY 30 Tab 12    hydrocortisone (HYTONE) 2.5 % topical cream       diclofenac EC (VOLTAREN) 75 mg EC tablet TAKE ONE TABLET BY MOUTH IN THE EVENING 90 Tab 0    triamcinolone acetonide (KENALOG) 0.1 % topical cream Apply  to affected area two (2) times a day. 15 g 2    latanoprost (XALATAN) 0.005 % ophthalmic solution Administer 1 Drop to both eyes nightly. Review of Systems  Constitutional: The patient complains of occasional fatigue  HEENT: The patient denies recent head trauma, eye pain, blurred vision,  hearing deficit, oropharyngeal mucosal pain or lesions, and the patient denies throat pain or discomfort. Lymphatics: The patient denies palpable peripheral lymphadenopathy. Hematologic: The patient denies having bruising, bleeding, or progressive fatigue. Respiratory: Patient denies having shortness of breath, cough, sputum production, fever, or dyspnea on exertion. Cardiovascular: The patient denies having leg pain, leg swelling, heart palpitations, chest permit, chest pain, or lightheadedness. The patient denies having dyspnea on exertion. Gastrointestinal: The patient denies having nausea, emesis, or diarrhea. The patient denies having any hematemesis or blood in the stool.   Genitourinary: Patient denies having urinary urgency, frequency, or dysuria. The patient denies having blood in the urine. Psychological: The patient denies having symptoms of nervousness, anxiety, depression, or thoughts of harming himself some of this. Skin: Patient denies having skin rashes, skin, ulcerations, or unexplained itching or pruritus. Musculoskeletal: The patient  is complaining of occasional musculoskeletal pain primarily in the lower extremities. Objective:     Visit Vitals    /75    Pulse 67    Temp 97.5 °F (36.4 °C)    Ht 5' 3\" (1.6 m)    LMP 08/31/1998     Pain Scale 0/10  Physical Exam:   ECOG=0  Gen. Appearance: The patient is in no acute distress. Skin: There is no bruise or rash. HEENT: The exam is unremarkable. Neck: Supple without lymphadenopathy or thyromegaly. Lungs: Clear to auscultation and percussion; there are no wheezes or rhonchi. Heart: Regular rate and rhythm; there are no murmurs, gallops, or rubs. aanterior chest wall and breasts: Deferred. The axilla reveals no palpable axillary lymphadenopathy. Abdomen: Bowel sounds are present and normal.  There is no guarding, tenderness, or hepatosplenomegaly. Extremities: There is no clubbing, cyanosis, or edema. Neurologic: There are no focal neurologic deficits. Lymphatics: There is no palpable peripheral lymphadenopathy. Lab data:      Results for orders placed or performed during the hospital encounter of 04/23/18   CBC WITH 3 PART DIFF   Result Value Ref Range    WBC 4.5 4.5 - 13.0 K/uL    RBC 3.42 (L) 4.10 - 5.10 M/uL    HGB 10.6 (L) 12.0 - 16 g/dL    HCT 31.8 (L) 36 - 48 %    MCV 93.0 78 - 102 FL    MCH 31.0 25.0 - 35.0 PG    MCHC 33.3 31 - 37 g/dL    RDW 13.1 11.5 - 14.5 %    PLATELET 409 253 - 106 K/uL    NEUTROPHILS 62 40 - 70 %    MIXED CELLS 8 0.1 - 17 %    LYMPHOCYTES 30 14 - 44 %    ABS. NEUTROPHILS 2.8 1.8 - 9.5 K/UL    ABS. MIXED CELLS 0.3 0.0 - 2.3 K/uL    ABS. LYMPHOCYTES 1.4 1.1 - 5.9 K/UL    DF AUTOMATED          Assessment:     1.  MDS (myelodysplastic syndrome) ([facility] [address]    2. Refractory anemia (HCC)    3. Environmental and seasonal allergies    4. Arthritis    5. Facet arthritis of cervical region [facility])        Plan:   Myelodysplastic syndrome: I have explained to the patient that her WBC is stable at 4.5, PLT is 291,000. Therapeutic intervention with Procrit will be provided for her if the hematocrit declines below 30% with a hemoglobin below 10 g/dL. I have explained to the patient that the dose of Procrit will be 60,000 units given subcutaneously every 2 or 4 weeks. We do not need to pursue a bone marrow biopsy at this time. However, if she experiences a rapid decline in the hemoglobin and hematocrit with an elevation in her WBC counts that would be an indication to do a bone marrow biopsy to rule out whether not she is converting to a chronic myelomonocytic leukemia component of her MDS versus converting to an acute leukemic process. I will plan to have her return to clinic for a complete assessment in 3 months. Refractory anemia/MDS: I have explained to the patient that her current hemoglobin and hematocrit were 10.6g/dL and 31.8% respectively. We will continue to monitor her hemoglobin and hematocrit every 3 months and if she should have a decrease in her hemoglobin below 10 g/dl and  hematocrit below 30% we will offer interventional therapy with either Procrit or Aranesp. At this time I will check a ferritin level and iron profile. The comprehensive metabolic panel will also be ordered. The patient states she has been taking ferrous sulfate once daily. Environmental and seasonal allergies: I have advised the patient to take Allegra-181 tablet daily and to use a Nasonex inhaler 2 puffs each nostril once daily. Arthritis/facet arthritis of the cervical region: The patient was advised to use Tylenol arthritis 2 tablets every 8 hours as needed for pain control.          will have her return to clinic for a complete reassessment again in 3 months. Orders Placed This Encounter    COMPLETE CBC & AUTO DIFF WBC    InHouse CBC ([employer])     Standing Status:   Future     Number of Occurrences:   1     Standing Expiration Date:   4/30/2018    IRON PROFILE     Standing Status:   Future     Standing Expiration Date:   1/98/9847    METABOLIC PANEL, COMPREHENSIVE     Standing Status:   Future     Standing Expiration Date:   4/24/2019    FERRITIN     Standing Status:   Future     Standing Expiration Date:   4/24/2019       Viktoria Mijares NP  4/23/2018    I have assessed the patient independently and  agree with the full assessment as outlined.   Quique Valenzuela MD, Ian Drafts

## 2018-04-24 LAB
ALBUMIN SERPL-MCNC: 4.3 G/DL (ref 3.6–4.8)
ALBUMIN/GLOB SERPL: 1.3 {RATIO} (ref 1.2–2.2)
ALP SERPL-CCNC: 83 IU/L (ref 39–117)
ALT SERPL-CCNC: 13 IU/L (ref 0–32)
AST SERPL-CCNC: 19 IU/L (ref 0–40)
BILIRUB SERPL-MCNC: 0.6 MG/DL (ref 0–1.2)
BUN SERPL-MCNC: 18 MG/DL (ref 8–27)
BUN/CREAT SERPL: 23 (ref 12–28)
CALCIUM SERPL-MCNC: 9.8 MG/DL (ref 8.7–10.3)
CHLORIDE SERPL-SCNC: 99 MMOL/L (ref 96–106)
CO2 SERPL-SCNC: 26 MMOL/L (ref 18–29)
CREAT SERPL-MCNC: 0.8 MG/DL (ref 0.57–1)
FERRITIN SERPL-MCNC: 195 NG/ML (ref 15–150)
GFR SERPLBLD CREATININE-BSD FMLA CKD-EPI: 80 ML/MIN/1.73
GFR SERPLBLD CREATININE-BSD FMLA CKD-EPI: 92 ML/MIN/1.73
GLOBULIN SER CALC-MCNC: 3.4 G/DL (ref 1.5–4.5)
GLUCOSE SERPL-MCNC: 93 MG/DL (ref 65–99)
IRON SATN MFR SERPL: 20 % (ref 15–55)
IRON SERPL-MCNC: 60 UG/DL (ref 27–139)
POTASSIUM SERPL-SCNC: 4.1 MMOL/L (ref 3.5–5.2)
PROT SERPL-MCNC: 7.7 G/DL (ref 6–8.5)
SODIUM SERPL-SCNC: 142 MMOL/L (ref 134–144)
TIBC SERPL-MCNC: 297 UG/DL (ref 250–450)
UIBC SERPL-MCNC: 237 UG/DL (ref 118–369)

## 2018-04-25 ENCOUNTER — HOSPITAL ENCOUNTER (OUTPATIENT)
Dept: MAMMOGRAPHY | Age: 63
Discharge: HOME | End: 2018-04-25
Attending: FAMILY MEDICINE
Payer: COMMERCIAL

## 2018-04-25 DIAGNOSIS — Z12.31 VISIT FOR SCREENING MAMMOGRAM: ICD-10-CM

## 2018-04-25 PROCEDURE — 77067 SCR MAMMO BI INCL CAD: CPT

## 2018-04-30 ENCOUNTER — OFFICE VISIT (OUTPATIENT)
Dept: FAMILY MEDICINE CLINIC | Age: 63
End: 2018-04-30

## 2018-04-30 VITALS
RESPIRATION RATE: 18 BRPM | DIASTOLIC BLOOD PRESSURE: 75 MMHG | BODY MASS INDEX: 34.91 KG/M2 | SYSTOLIC BLOOD PRESSURE: 122 MMHG | WEIGHT: 197 LBS | HEART RATE: 80 BPM | OXYGEN SATURATION: 100 % | TEMPERATURE: 96.9 F | HEIGHT: 63 IN

## 2018-04-30 DIAGNOSIS — I10 ESSENTIAL HYPERTENSION: Primary | ICD-10-CM

## 2018-04-30 DIAGNOSIS — M75.121 COMPLETE TEAR OF RIGHT ROTATOR CUFF: ICD-10-CM

## 2018-04-30 DIAGNOSIS — E78.5 HYPERLIPIDEMIA, UNSPECIFIED HYPERLIPIDEMIA TYPE: ICD-10-CM

## 2018-04-30 RX ORDER — CARVEDILOL 25 MG/1
TABLET ORAL
Qty: 180 TAB | Refills: 3 | Status: SHIPPED | OUTPATIENT
Start: 2018-04-30 | End: 2019-07-31 | Stop reason: SDUPTHER

## 2018-04-30 NOTE — PROGRESS NOTES
HISTORY OF PRESENT ILLNESS  Gina Piper is a 66 y.o. female. Chief Complaint   Patient presents with    Follow-up     3 month f/u    Hypertension    Cholesterol Problem    Other     Hypokalemia    Medication Refill       HPI  Patient is here for a 3 month follow up of Htn, lipids, and Hypokalemia. Patient does need medication refills today. Patient requests electronic refills. Pt cont to have a moderate amount of pain in her R shoulder. She has seen ortho and was advised that she needs surgery for her rotator cuff. She prefers to hold off on this surgery until after the trip she has planned for Aug 2018 due to the long recovery time that she was told to anticipate. She takes tylenol prn. Mri result reviewed with pt. Recent mammo result reviewed with pt. Her prior imaging center has closed; she has returned to [facility]. New concerns today: none      ROS  Review of Systems   Constitutional: Negative. HENT: Negative. Respiratory: Negative. Cardiovascular: Negative. All other systems reviewed and are negative. Physical Exam  Physical Exam   Nursing note and vitals reviewed. Constitutional: She is oriented to person, place, and time. She appears well-developed and well-nourished. HENT:   Head: Normocephalic and atraumatic. Right Ear: External ear normal.   Left Ear: External ear normal.   Nose: Nose normal.   Eyes: Conjunctivae and EOM are normal.   Neck: Normal range of motion. Neck supple. No JVD present. Carotid bruit is not present. No thyromegaly present. Cardiovascular: Normal rate, regular rhythm, normal heart sounds and intact distal pulses. Exam reveals no gallop and no friction rub. No murmur heard. Pulmonary/Chest: Effort normal and breath sounds normal. She has no wheezes. She has no rhonchi. She has no rales. Abdominal: Soft. Bowel sounds are normal.   Musculoskeletal: Normal range of motion.    Neurological: She is alert and oriented to person, place, and time. Coordination normal.   Skin: Skin is warm and dry. Psychiatric: She has a normal mood and affect. Her behavior is normal. Judgment and thought content normal.     ASSESSMENT and PLAN  Diagnoses and all orders for this visit:    1. Essential hypertension  -     carvedilol (COREG) 25 mg tablet; TAKE ONE TABLET BY MOUTH TWICE DAILY WITH MEALS  Stable, cont pres tx plan. 2. Hyperlipidemia, unspecified hyperlipidemia type  Labs pending. 3. Complete tear of right rotator cuff  Discussed mri. Recommend pt take tylenol prn. Anticipate she may need a stronger pain med for use during her vacation as she will have to handle her own baggage during the trip. Will discuss at f/u appt prior to her trip.      Follow-up Disposition: 3 months; sooner prn

## 2018-04-30 NOTE — PROGRESS NOTES
Lizet Castelan 66 y.o. female   Chief Complaint   Patient presents with    Follow-up     3 month f/u    Hypertension    Cholesterol Problem    Other     Hypokalemia         1. Have you been to the ER, urgent care clinic since your last visit? Hospitalized since your last visit? No    2. Have you seen or consulted any other health care providers outside of the [address] [place] [address] since your last visit? Include any pap smears or colon screening.  No

## 2018-05-25 ENCOUNTER — HOSPITAL ENCOUNTER (OUTPATIENT)
Dept: LAB | Age: 63
Discharge: HOME | End: 2018-05-25

## 2018-05-25 LAB
CHOLEST SERPL-MCNC: 179 MG/DL (ref 100–199)
HDLC SERPL-MCNC: 53 MG/DL
INTERPRETATION, 910389: NORMAL
LDLC SERPL CALC-MCNC: 108 MG/DL (ref 0–99)
TRIGL SERPL-MCNC: 91 MG/DL (ref 0–149)
VLDLC SERPL CALC-MCNC: 18 MG/DL (ref 5–40)

## 2018-05-25 PROCEDURE — 99001 SPECIMEN HANDLING PT-LAB: CPT | Performed by: FAMILY MEDICINE

## 2018-07-09 ENCOUNTER — TELEPHONE (OUTPATIENT)
Dept: FAMILY MEDICINE CLINIC | Age: 63
End: 2018-07-09

## 2018-07-09 NOTE — TELEPHONE ENCOUNTER
Patient needs a referral to see Ligia Fischer Has an appointment on July 23. Could you please have this done.

## 2018-07-23 ENCOUNTER — OFFICE VISIT (OUTPATIENT)
Dept: ONCOLOGY | Age: 63
End: 2018-07-23

## 2018-07-23 ENCOUNTER — HOSPITAL ENCOUNTER (OUTPATIENT)
Dept: ONCOLOGY | Age: 63
Discharge: HOME | End: 2018-07-23

## 2018-07-23 VITALS
SYSTOLIC BLOOD PRESSURE: 148 MMHG | RESPIRATION RATE: 16 BRPM | TEMPERATURE: 98.6 F | BODY MASS INDEX: 34.26 KG/M2 | HEART RATE: 71 BPM | DIASTOLIC BLOOD PRESSURE: 85 MMHG | WEIGHT: 193.4 LBS

## 2018-07-23 DIAGNOSIS — D46.9 MDS (MYELODYSPLASTIC SYNDROME) (HCC): Primary | ICD-10-CM

## 2018-07-23 DIAGNOSIS — D46.9 MDS (MYELODYSPLASTIC SYNDROME) (HCC): ICD-10-CM

## 2018-07-23 DIAGNOSIS — D46.4 REFRACTORY ANEMIA (HCC): ICD-10-CM

## 2018-07-23 DIAGNOSIS — M19.90 ARTHRITIS: ICD-10-CM

## 2018-07-23 LAB
BASO+EOS+MONOS # BLD AUTO: 0.4 K/UL (ref 0–2.3)
BASO+EOS+MONOS # BLD AUTO: 7 % (ref 0.1–17)
DIFFERENTIAL METHOD BLD: ABNORMAL
ERYTHROCYTE [DISTWIDTH] IN BLOOD BY AUTOMATED COUNT: 13.5 % (ref 11.5–14.5)
HCT VFR BLD AUTO: 31.8 % (ref 36–48)
HGB BLD-MCNC: 10.5 G/DL (ref 12–16)
LYMPHOCYTES # BLD: 1.9 K/UL (ref 1.1–5.9)
LYMPHOCYTES NFR BLD: 30 % (ref 14–44)
MCH RBC QN AUTO: 30.3 PG (ref 25–35)
MCHC RBC AUTO-ENTMCNC: 33 G/DL (ref 31–37)
MCV RBC AUTO: 91.9 FL (ref 78–102)
NEUTS SEG # BLD: 4.2 K/UL (ref 1.8–9.5)
NEUTS SEG NFR BLD: 63 % (ref 40–70)
PLATELET # BLD AUTO: 292 K/UL (ref 140–440)
RBC # BLD AUTO: 3.46 M/UL (ref 4.1–5.1)
WBC # BLD AUTO: 6.5 K/UL (ref 4.5–13)

## 2018-07-23 NOTE — MR AVS SNAPSHOT
[address] [address] [address] [facility] 
[phone] Patient: Jenae Alonso MRN: [id number] HFB:6/00/0449 Visit Information Date & Time Provider Department Dept. Phone Encounter #  
 7/23/2018  4:15 PM Elier Berger MD [facility] [phone] [id number] Your Appointments 7/27/2018 11:15 AM  
Follow Up with Romana Diallo MD  
[facility] (--) Appt Note: 3 months follow up [address] [facility] [address]  
[phone]  
  
   
 [facility] [address]  
  
    
 10/22/2018 10:45 AM  
Office Visit with Elier Berger MD  
[address] [place] [address]) Appt Note: 3 MO RET  
 [address] [facility], [address] [place] [address]  
  
    
 3/18/2019 10:00 AM  
Follow Up with Derek Clarke MD  
[facility] [place] [facility]"" ([address]) Appt Note: 1 year follow up [place] [address] [address] [address] [phone] [address] [place] [address] [address] Upcoming Health Maintenance Date Due Pneumococcal 19-64 Highest Risk (2 of 3 - PCV13) 4/12/2018 BREAST CANCER SCRN MAMMOGRAM 10/25/2018 Influenza Age 5 to Adult 8/1/2018 COLONOSCOPY 7/7/2022 DTaP/Tdap/Td series (2 - Td) 4/12/2027 Allergies as of 7/23/2018  Review Complete On: 7/23/2018 By: Elier Berger MD  
 No Known Allergies Current Immunizations  Reviewed on 1/31/2018 Name Date Influenza Vaccine (Quad) PF 1/31/2018 Tdap 4/12/2017 Not reviewed this visit You Were Diagnosed With   
  
 Codes Comments MDS (myelodysplastic syndrome) ([facility] [address]    -  Primary ICD-10-CM: D46.9 ICD-9-CM: 238.75 Vitals BP Pulse Temp Resp Weight(growth percentile) LMP  
 148/85 (BP 1 Location: Right arm, BP Patient Position: Sitting) 71 98.6 °F (37 °C) (Oral) 16 193 lb 6.4 oz (87.7 kg) 08/31/1998 BMI OB Status Smoking Status 34.26 kg/m2 Hysterectomy Never Smoker BMI and BSA Data Body Mass Index Body Surface Area  
 34.26 kg/m 2 1.97 m 2 Preferred Pharmacy Pharmacy Name Phone [address], [address] [facility] Your Updated Medication List  
  
   
This list is accurate as of 7/23/18  4:20 PM.  Always use your most recent med list.  
  
  
  
  
 carvedilol 25 mg tablet Commonly known as:  COREG  
TAKE ONE TABLET BY MOUTH TWICE DAILY WITH MEALS  
  
 diclofenac EC 75 mg EC tablet Commonly known as:  VOLTAREN  
TAKE ONE TABLET BY MOUTH IN THE EVENING  
  
 hydrocortisone 2.5 % topical cream  
Commonly known as:  HYTONE  
  
 latanoprost 0.005 % ophthalmic solution Commonly known as:  Freda Erazo Administer 1 Drop to both eyes nightly. losartan-hydroCHLOROthiazide 100-12.5 mg per tablet Commonly known as:  HYZAAR  
TAKE ONE TABLET BY MOUTH ONCE DAILY POLY-IRON 150 FORTE capsule Generic drug:  iron polysacch complex-b12-fa TAKE ONE CAPSULE BY MOUTH ONCE DAILY polyethylene glycol 17 gram/dose powder Commonly known as:  Deliliah Hefty MIX 17 GRAMS (1 CAPFUL) IN LIQUID AND DRINK BY MOUTH ONCE DAILY FOR CONSTIPATION  
  
 simvastatin 20 mg tablet Commonly known as:  ZOCOR  
TAKE ONE TABLET BY MOUTH ONCE DAILY AT BEDTIME  
  
 triamcinolone acetonide 0.1 % topical cream  
Commonly known as:  KENALOG Apply  to affected area two (2) times a day. valACYclovir 500 mg tablet Commonly known as:  VALTREX  
TAKE ONE TABLET BY MOUTH TWICE DAILY  
  
 VITAMIN D2 50,000 unit capsule Generic drug:  ergocalciferol TAKE ONE CAPSULE BY MOUTH ONCE A WEEK We Performed the Following COMPLETE CBC & AUTO DIFF WBC [61705 CPT(R)] To-Do List   
 07/23/2018 Lab:  CBC WITH 3 PART DIFF Patient Instructions Myelodysplastic Syndromes: Care Instructions Your Care Instructions Myelodysplastic syndromes, also called MDS, are a group of rare conditions in which the bone marrow does not make enough healthy blood cells. Normally, the bone marrow makes red blood cells, white blood cells, and platelets. These cells carry oxygen in the blood, help the body fight infections, and help the blood clot. With MDS, you may feel weak and tired, get infections often, and bruise easily, although symptoms tend to vary. MDS is a form of blood cancer. In some cases, MDS can turn into acute myeloid leukemia (AML), another type of cancer. Some people develop MDS after treatment for cancer or exposure to pesticides or other chemicals. But in most cases, the cause of MDS is not known. Your doctor will use the results of blood tests to guide your treatment. There are many types of MDS, with different treatment plans for each. If you have enough red blood cells and are feeling all right, you may not need active treatment, but you and your doctor will want to watch your condition carefully. If you start feeling lightheaded and have no energy, you may need a blood transfusion. Your doctor also may give you antibiotics to prevent or treat infection. Follow-up care is a key part of your treatment and safety. Be sure to make and go to all appointments, and call your doctor if you are having problems. It's also a good idea to know your test results and keep a list of the medicines you take. How can you care for yourself at home? · Take your medicines exactly as prescribed. Call your doctor if you think you are having a problem with your medicine. You will get more details on the specific medicines your doctor prescribes. · If your doctor prescribed antibiotics, take them as directed.  Do not stop taking them just because you feel better. You need to take the full course of antibiotics. If you have side effects from antibiotics, tell your doctor. · Take steps to control your stress and workload. Learn relaxation techniques. ¨ Share your feelings. Stress and tension affect our emotions. By expressing your feelings to others, you may be able to understand and cope with them. ¨ Consider joining a support group. Talking about a problem with your spouse, a good friend, or other people with similar problems is a good way to reduce tension and stress. ¨ Express yourself with art. Try writing, crafts, dance, or art to relieve stress. ¨ Be kind to your body and mind. Getting enough sleep, eating a healthy diet, and taking time to do things you enjoy can contribute to an overall feeling of balance in your life and help reduce stress. ¨ Get help if you need it. Discuss your concerns with your doctor or counselor. · Do not smoke. Smoking can make blood problems worse. If you need help quitting, talk to your doctor about stop-smoking programs and medicines. These can increase your chances of quitting for good. · If you have not already done so, prepare a list of advance directives. Advance directives are instructions to your doctor and family members about what kind of care you want if you become unable to speak or express yourself. · Call the [employer] (6-243.801.2100) or visit its website at [address] for more information. When should you call for help? Call 911 anytime you think you may need emergency care. For example, call if: 
  · You passed out (lost consciousness).  
 Call your doctor now or seek immediate medical care if: 
  · You have a fever.  
  · You have abnormal bleeding.  
  · You have new or worse pain.  
  · You think you have an infection.  
  · You have new symptoms, such as a cough, belly pain, vomiting, diarrhea, or a rash.  Watch closely for changes in your health, and be sure to contact your doctor if: 
  · You are much more tired than usual.  
  · You have swollen glands in your armpits, groin, or neck.  
  · You do not get better as expected. Where can you learn more? Go to http://uday-bhavna.info/. Enter Orlan Halsted in the search box to learn more about \"Myelodysplastic Syndromes: Care Instructions. \" Current as of: May 12, 2017 Content Version: 11.7 © 0432-8089 [employer]. Care instructions adapted under license by [employer] (which disclaims liability or warranty for this information). If you have questions about a medical condition or this instruction, always ask your healthcare professional. [address] any warranty or liability for your use of this information. Introducing [facility]! J Carlos He introduces [employer] patient portal. Now you can access parts of your medical record, email your doctor's office, and request medication refills online. 1. In your internet browser, go to [email or web address] 2. Click on the First Time User? Click Here link in the Sign In box. You will see the New Member Sign Up page. 3. Enter your [employer] Access Code exactly as it appears below. You will not need to use this code after youve completed the sign-up process. If you do not sign up before the expiration date, you must request a new code. · [employer] Access Code: [email or web address] Expires: 10/21/2018  4:20 PM 
 
4. Enter the last four digits of your Social Security Number (xxxx) and Date of Birth (mm/dd/yyyy) as indicated and click Submit. You will be taken to the next sign-up page. 5. Create a [employer] ID. This will be your [employer] login ID and cannot be changed, so think of one that is secure and easy to remember. 6. Create a [employer] password. You can change your password at any time. 7. Enter your Password Reset Question and Answer. This can be used at a later time if you forget your password. 8. Enter your e-mail address. You will receive e-mail notification when new information is available in [address] 9. Click Sign Up. You can now view and download portions of your medical record. 10. Click the Download Summary menu link to download a portable copy of your medical information. If you have questions, please visit the Frequently Asked Questions section of the [employer] website. Remember, [employer] is NOT to be used for urgent needs. For medical emergencies, dial 911. Now available from your iPhone and Android! Please provide this summary of care documentation to your next provider. Your primary care clinician is listed as Emily Gonzalez. If you have any questions after today's visit, please call [phone].

## 2018-07-23 NOTE — PATIENT INSTRUCTIONS
Myelodysplastic Syndromes: Care Instructions  Your Care Instructions  Myelodysplastic syndromes, also called MDS, are a group of rare conditions in which the bone marrow does not make enough healthy blood cells. Normally, the bone marrow makes red blood cells, white blood cells, and platelets. These cells carry oxygen in the blood, help the body fight infections, and help the blood clot. With MDS, you may feel weak and tired, get infections often, and bruise easily, although symptoms tend to vary. MDS is a form of blood cancer. In some cases, MDS can turn into acute myeloid leukemia (AML), another type of cancer. Some people develop MDS after treatment for cancer or exposure to pesticides or other chemicals. But in most cases, the cause of MDS is not known. Your doctor will use the results of blood tests to guide your treatment. There are many types of MDS, with different treatment plans for each. If you have enough red blood cells and are feeling all right, you may not need active treatment, but you and your doctor will want to watch your condition carefully. If you start feeling lightheaded and have no energy, you may need a blood transfusion. Your doctor also may give you antibiotics to prevent or treat infection. Follow-up care is a key part of your treatment and safety. Be sure to make and go to all appointments, and call your doctor if you are having problems. It's also a good idea to know your test results and keep a list of the medicines you take. How can you care for yourself at home? · Take your medicines exactly as prescribed. Call your doctor if you think you are having a problem with your medicine. You will get more details on the specific medicines your doctor prescribes. · If your doctor prescribed antibiotics, take them as directed. Do not stop taking them just because you feel better. You need to take the full course of antibiotics.  If you have side effects from antibiotics, tell your doctor. · Take steps to control your stress and workload. Learn relaxation techniques. ¨ Share your feelings. Stress and tension affect our emotions. By expressing your feelings to others, you may be able to understand and cope with them. ¨ Consider joining a support group. Talking about a problem with your spouse, a good friend, or other people with similar problems is a good way to reduce tension and stress. ¨ Express yourself with art. Try writing, crafts, dance, or art to relieve stress. ¨ Be kind to your body and mind. Getting enough sleep, eating a healthy diet, and taking time to do things you enjoy can contribute to an overall feeling of balance in your life and help reduce stress. ¨ Get help if you need it. Discuss your concerns with your doctor or counselor. · Do not smoke. Smoking can make blood problems worse. If you need help quitting, talk to your doctor about stop-smoking programs and medicines. These can increase your chances of quitting for good. · If you have not already done so, prepare a list of advance directives. Advance directives are instructions to your doctor and family members about what kind of care you want if you become unable to speak or express yourself. · Call the [employer] (6-390.634.7652) or visit its website at [address] [employer] for more information. When should you call for help? Call 911 anytime you think you may need emergency care.  For example, call if:    · You passed out (lost consciousness).    Call your doctor now or seek immediate medical care if:    · You have a fever.     · You have abnormal bleeding.     · You have new or worse pain.     · You think you have an infection.     · You have new symptoms, such as a cough, belly pain, vomiting, diarrhea, or a rash.    Watch closely for changes in your health, and be sure to contact your doctor if:    · You are much more tired than usual.     · You have swollen glands in your armpits, groin, or neck.     · You do not get better as expected. Where can you learn more? Go to http://uday-bhavna.info/. Enter Jean Fiore in the search box to learn more about \"Myelodysplastic Syndromes: Care Instructions. \"  Current as of: May 12, 2017  Content Version: 11.7  © 7585-8337 [employer]. Care instructions adapted under license by [employer] (which disclaims liability or warranty for this information). If you have questions about a medical condition or this instruction, always ask your healthcare professional. [address] any warranty or liability for your use of this information.

## 2018-07-23 NOTE — PROGRESS NOTES
Hematology/Oncology  Progress Note    Name: Ronald Lemon  Date: 2018  : 1955    PCP: Dr. Gillian Cash    Ms. Jayce Arnett is a 66y.o. year old female who was seen for management of her myelodysplastic syndrome/refractory anemia    Current therapy: Iron supplement, Procrit or Aranesp is available whenever her hematocrit is below 30%. Subjective:     Ms. Jayce Arnett is a 62-year-old -American woman with myelodysplastic syndrome/refractory anemia. She is continuing to do well. The patient reports that she continues taking the over-the-counter iron supplement once daily. She has no other physical complaints at this time, except for her arthritic discomfort. Past medical history, family history, and social history were reviewed and remain unchanged. Past Medical History:   Diagnosis Date    Echocardiogram 2011    Tech difficult. EF 60-65%. Mild LVH. RVSP 20-25 mmHg.  Essential hypertension     History of colon polyps     Hx of endometriosis     had hyst    Hyperlipidemia     Hypertension     MDS (myelodysplastic syndrome) (HCC)     Refractory anemia (HCC)      Past Surgical History:   Procedure Laterality Date    HX HYSTERECTOMY       Social History     Social History    Marital status: SINGLE     Spouse name: N/A    Number of children: N/A    Years of education: N/A     Occupational History    Not on file.      Social History Main Topics    Smoking status: Never Smoker    Smokeless tobacco: Never Used    Alcohol use No    Drug use: No    Sexual activity: Not on file     Other Topics Concern    Not on file     Social History Narrative     Family History   Problem Relation Age of Onset    Cancer Mother     Arthritis-rheumatoid Sister     Diabetes Sister     Hypertension Sister     No Known Problems Father      Current Outpatient Prescriptions   Medication Sig Dispense Refill    carvedilol (COREG) 25 mg tablet TAKE ONE TABLET BY MOUTH TWICE DAILY WITH MEALS 180 Tab 3  VITAMIN D2 50,000 unit capsule TAKE ONE CAPSULE BY MOUTH ONCE A WEEK 4 Cap 12    polyethylene glycol (MIRALAX) 17 gram/dose powder MIX 17 GRAMS (1 CAPFUL) IN LIQUID AND DRINK BY MOUTH ONCE DAILY FOR CONSTIPATION 517 g 12    POLY-IRON 150 FORTE capsule TAKE ONE CAPSULE BY MOUTH ONCE DAILY 30 Cap 0    simvastatin (ZOCOR) 20 mg tablet TAKE ONE TABLET BY MOUTH ONCE DAILY AT BEDTIME 30 Tab 11    losartan-hydroCHLOROthiazide (HYZAAR) 100-12.5 mg per tablet TAKE ONE TABLET BY MOUTH ONCE DAILY 30 Tab 11    valACYclovir (VALTREX) 500 mg tablet TAKE ONE TABLET BY MOUTH TWICE DAILY 30 Tab 12    hydrocortisone (HYTONE) 2.5 % topical cream       diclofenac EC (VOLTAREN) 75 mg EC tablet TAKE ONE TABLET BY MOUTH IN THE EVENING 90 Tab 0    triamcinolone acetonide (KENALOG) 0.1 % topical cream Apply  to affected area two (2) times a day. 15 g 2    latanoprost (XALATAN) 0.005 % ophthalmic solution Administer 1 Drop to both eyes nightly. Review of Systems  Constitutional: The patient complains of occasional fatigue  HEENT: The patient denies recent head trauma, eye pain, blurred vision,  hearing deficit, oropharyngeal mucosal pain or lesions, and the patient denies throat pain or discomfort. Lymphatics: The patient denies palpable peripheral lymphadenopathy. Hematologic: The patient denies having bruising, bleeding, or progressive fatigue. Respiratory: Patient denies having shortness of breath, cough, sputum production, fever, or dyspnea on exertion. Cardiovascular: The patient denies having leg pain, leg swelling, heart palpitations, chest permit, chest pain, or lightheadedness. The patient denies having dyspnea on exertion. Gastrointestinal: The patient denies having nausea, emesis, or diarrhea. The patient denies having any hematemesis or blood in the stool. Genitourinary: Patient denies having urinary urgency, frequency, or dysuria. The patient denies having blood in the urine.   Psychological: The patient denies having symptoms of nervousness, anxiety, depression, or thoughts of harming himself some of this. Skin: Patient denies having skin rashes, skin, ulcerations, or unexplained itching or pruritus. Musculoskeletal: The patient  is complaining of occasional musculoskeletal pain primarily in the lower extremities. Objective:     Visit Vitals    /85 (BP 1 Location: Right arm, BP Patient Position: Sitting)    Pulse 71    Temp 98.6 °F (37 °C) (Oral)    Resp 16    Wt 87.7 kg (193 lb 6.4 oz)    LMP 08/31/1998    BMI 34.26 kg/m2     Pain Scale 0/10  Physical Exam:   ECOG=0  Gen. Appearance: The patient is in no acute distress. Skin: There is no bruise or rash. HEENT: The exam is unremarkable. Neck: Supple without lymphadenopathy or thyromegaly. Lungs: Clear to auscultation and percussion; there are no wheezes or rhonchi. Heart: Regular rate and rhythm; there are no murmurs, gallops, or rubs. aanterior chest wall and breasts: Deferred. The axilla reveals no palpable axillary lymphadenopathy. Abdomen: Bowel sounds are present and normal.  There is no guarding, tenderness, or hepatosplenomegaly. Extremities: There is no clubbing, cyanosis, or edema. Neurologic: There are no focal neurologic deficits. Lymphatics: There is no palpable peripheral lymphadenopathy. Lab data:      Results for orders placed or performed during the hospital encounter of 07/23/18   CBC WITH 3 PART DIFF   Result Value Ref Range    WBC 6.5 4.5 - 13.0 K/uL    RBC 3.46 (L) 4.10 - 5.10 M/uL    HGB 10.5 (L) 12.0 - 16 g/dL    HCT 31.8 (L) 36 - 48 %    MCV 91.9 78 - 102 FL    MCH 30.3 25.0 - 35.0 PG    MCHC 33.0 31 - 37 g/dL    RDW 13.5 11.5 - 14.5 %    PLATELET 605 066 - 127 K/uL    NEUTROPHILS 63 40 - 70 %    MIXED CELLS 7 0.1 - 17 %    LYMPHOCYTES 30 14 - 44 %    ABS. NEUTROPHILS 4.2 1.8 - 9.5 K/UL    ABS. MIXED CELLS 0.4 0.0 - 2.3 K/uL    ABS.  LYMPHOCYTES 1.9 1.1 - 5.9 K/UL    DF AUTOMATED          Assessment: 1. MDS (myelodysplastic syndrome) ([facility] [address]    2. Refractory anemia (HCC)    3. Arthritis        Plan:   Myelodysplastic syndrome: I have explained to the patient that her WBC is stable at 6.5, PLT is 292,000. Therapeutic intervention with Procrit will be provided for her if the hematocrit declines below 30% with a hemoglobin below 10 g/dL. I have explained to the patient that the dose of Procrit will be 60,000 units given subcutaneously every 2 or 4 weeks. We do not need to pursue a bone marrow biopsy at this time. However, if she experiences a rapid decline in the hemoglobin and hematocrit with an elevation in her WBC counts that would be an indication to do a bone marrow biopsy to rule out whether not she is converting to a chronic myelomonocytic leukemia component of her MDS versus converting to an acute leukemic process. I will plan to have her return to clinic for a complete assessment in 3 months. Refractory anemia/MDS: I have explained to the patient that her current hemoglobin and hematocrit were 10.5g/dL and 31.8% respectively. We will continue to monitor her hemoglobin and hematocrit every 3 months and if she should have a decrease in her hemoglobin below 10 g/dl and  hematocrit below 30% we will offer interventional therapy with either Procrit or Aranesp. At this time I will check a ferritin level and iron profile. The comprehensive metabolic panel will also be ordered. The patient states she has been taking ferrous sulfate once daily. Arthritis/facet arthritis of the cervical region: The patient will continue to use Tylenol arthritis 2 tablets every 8 hours as needed for pain control. Patient will return to clinic for a complete reassessment again in 3 months.   Orders Placed This Encounter    COMPLETE CBC & AUTO DIFF WBC    InHouse CBC ([facility])     Standing Status:   Future     Number of Occurrences:   1     Standing Expiration Date:   7/30/2018    FERRITIN     Standing Status: Future     Number of Occurrences:   1     Standing Expiration Date:   7/24/2019    IRON PROFILE     Standing Status:   Future     Number of Occurrences:   1     Standing Expiration Date:   4/53/0019    METABOLIC PANEL, COMPREHENSIVE     Standing Status:   Future     Number of Occurrences:   1     Standing Expiration Date:   7/24/2019       Spencer Glynn NP  07/23/2018    I have assessed the patient independently and  agree with the full assessment as outlined.   Linwood Weaver MD, [address]

## 2018-07-24 LAB
ALBUMIN SERPL-MCNC: 4.5 G/DL (ref 3.6–4.8)
ALBUMIN/GLOB SERPL: 1.2 {RATIO} (ref 1.2–2.2)
ALP SERPL-CCNC: 94 IU/L (ref 39–117)
ALT SERPL-CCNC: 14 IU/L (ref 0–32)
AST SERPL-CCNC: 21 IU/L (ref 0–40)
BILIRUB SERPL-MCNC: 0.5 MG/DL (ref 0–1.2)
BUN SERPL-MCNC: 17 MG/DL (ref 8–27)
BUN/CREAT SERPL: 19 (ref 12–28)
CALCIUM SERPL-MCNC: 10.4 MG/DL (ref 8.7–10.3)
CHLORIDE SERPL-SCNC: 99 MMOL/L (ref 96–106)
CO2 SERPL-SCNC: 26 MMOL/L (ref 20–29)
CREAT SERPL-MCNC: 0.9 MG/DL (ref 0.57–1)
FERRITIN SERPL-MCNC: 253 NG/ML (ref 15–150)
GLOBULIN SER CALC-MCNC: 3.7 G/DL (ref 1.5–4.5)
GLUCOSE SERPL-MCNC: 77 MG/DL (ref 65–99)
IRON SATN MFR SERPL: 18 % (ref 15–55)
IRON SERPL-MCNC: 51 UG/DL (ref 27–139)
POTASSIUM SERPL-SCNC: 3.6 MMOL/L (ref 3.5–5.2)
PROT SERPL-MCNC: 8.2 G/DL (ref 6–8.5)
SODIUM SERPL-SCNC: 141 MMOL/L (ref 134–144)
TIBC SERPL-MCNC: 282 UG/DL (ref 250–450)
UIBC SERPL-MCNC: 231 UG/DL (ref 118–369)

## 2018-07-27 ENCOUNTER — OFFICE VISIT (OUTPATIENT)
Dept: FAMILY MEDICINE CLINIC | Age: 63
End: 2018-07-27

## 2018-07-27 VITALS
RESPIRATION RATE: 20 BRPM | DIASTOLIC BLOOD PRESSURE: 77 MMHG | WEIGHT: 194 LBS | SYSTOLIC BLOOD PRESSURE: 118 MMHG | HEIGHT: 63 IN | TEMPERATURE: 97.4 F | BODY MASS INDEX: 34.38 KG/M2 | HEART RATE: 65 BPM

## 2018-07-27 DIAGNOSIS — E87.6 HYPOKALEMIA: ICD-10-CM

## 2018-07-27 DIAGNOSIS — I10 ESSENTIAL HYPERTENSION: ICD-10-CM

## 2018-07-27 DIAGNOSIS — R53.83 FATIGUE, UNSPECIFIED TYPE: ICD-10-CM

## 2018-07-27 DIAGNOSIS — E78.5 HYPERLIPIDEMIA, UNSPECIFIED HYPERLIPIDEMIA TYPE: Primary | ICD-10-CM

## 2018-07-27 NOTE — PROGRESS NOTES
Chief Complaint   Patient presents with    Hypertension    Cholesterol Problem     high chol    Other     complete tear of the rotator cuff    Results     discuss lab results       Health Maintenance Due   Topic Date Due    Pneumococcal 19-64 Highest Risk (2 of 3 - PCV13) 04/12/2018    BREAST CANCER SCRN MAMMOGRAM  10/25/2018       Health Maintenance reviewed       1. Have you been to the ER, urgent care clinic since your last visit? Hospitalized since your last visit? No    2. Have you seen or consulted any other health care providers outside of the [address] [place] [address] since your last visit? Include any pap smears or colon screening.  Yes When: 7/18 Where: gyn Reason for visit: ov

## 2018-07-27 NOTE — PROGRESS NOTES
Chief Complaint   Patient presents with    Hypertension    Cholesterol Problem     high chol    Other     complete tear of the rotator cuff    Results     discuss lab results     HISTORY OF PRESENT ILLNESS  Jelena Sykes is a 66 y.o. female. HPI  Pt here for routine f/u of lipids and htn. 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 She will take tylenol as needed. She is usually able to sleep after that. Pt reports that she feels fatigued at times. It has been getting better. She had one day where she felt very drained after work. Review of Systems   Constitutional: Negative. HENT: Negative. Respiratory: Negative. Cardiovascular: Negative. Musculoskeletal: Positive for joint pain. All other systems reviewed and are negative. Physical Exam  Physical Exam   Nursing note and vitals reviewed. Constitutional: She is oriented to person, place, and time. She appears well-developed and well-nourished. HENT:   Head: Normocephalic and atraumatic. Right Ear: External ear normal.   Left Ear: External ear normal.   Nose: Nose normal.   Eyes: Conjunctivae and EOM are normal.   Neck: Normal range of motion. Neck supple. No JVD present. Carotid bruit is not present. No thyromegaly present. Cardiovascular: Normal rate, regular rhythm, normal heart sounds and intact distal pulses. Exam reveals no gallop and no friction rub. No murmur heard. Pulmonary/Chest: Effort normal and breath sounds normal. She has no wheezes. She has no rhonchi. She has no rales. Abdominal: Soft. Bowel sounds are normal.   Musculoskeletal: Normal range of motion. Neurological: She is alert and oriented to person, place, and time. Coordination normal.   Skin: Skin is warm and dry. Psychiatric: She has a normal mood and affect. Her behavior is normal. Judgment and thought content normal.     ASSESSMENT and PLAN  Diagnoses and all orders for this visit:    1. Hyperlipidemia, unspecified hyperlipidemia type  -     LIPID PANEL; Future  Stable, cont pres tx plan. 2. Hypokalemia  Stable, cont pres tx plan. 3. Essential hypertension  Stable, cont pres tx plan. 4. Fatigue, unspecified type  No clear etiology at this point. Pt reassured.      Follow-up Disposition: 3 months; sooner prn

## 2018-07-31 DIAGNOSIS — E78.5 HYPERLIPIDEMIA, UNSPECIFIED HYPERLIPIDEMIA TYPE: ICD-10-CM

## 2018-08-09 DIAGNOSIS — A60.00 GENITAL HERPES SIMPLEX, UNSPECIFIED SITE: ICD-10-CM

## 2018-08-10 RX ORDER — VALACYCLOVIR HYDROCHLORIDE 500 MG/1
TABLET, FILM COATED ORAL
Qty: 30 TAB | Refills: 12 | Status: SHIPPED | OUTPATIENT
Start: 2018-08-10 | End: 2020-04-02

## 2018-09-07 DIAGNOSIS — I10 ESSENTIAL HYPERTENSION: ICD-10-CM

## 2018-09-08 RX ORDER — LOSARTAN POTASSIUM AND HYDROCHLOROTHIAZIDE 12.5; 1 MG/1; MG/1
TABLET ORAL
Qty: 30 TAB | Refills: 11 | Status: SHIPPED | OUTPATIENT
Start: 2018-09-08 | End: 2019-09-25 | Stop reason: SDUPTHER

## 2018-10-22 ENCOUNTER — HOSPITAL ENCOUNTER (OUTPATIENT)
Dept: ONCOLOGY | Age: 63
Discharge: HOME | End: 2018-10-22

## 2018-10-22 ENCOUNTER — OFFICE VISIT (OUTPATIENT)
Dept: ONCOLOGY | Age: 63
End: 2018-10-22

## 2018-10-22 VITALS
SYSTOLIC BLOOD PRESSURE: 134 MMHG | HEIGHT: 63 IN | RESPIRATION RATE: 18 BRPM | TEMPERATURE: 96.6 F | OXYGEN SATURATION: 99 % | HEART RATE: 72 BPM | DIASTOLIC BLOOD PRESSURE: 70 MMHG | BODY MASS INDEX: 34.05 KG/M2 | WEIGHT: 192.2 LBS

## 2018-10-22 DIAGNOSIS — D46.4 REFRACTORY ANEMIA (HCC): ICD-10-CM

## 2018-10-22 DIAGNOSIS — D46.9 MYELODYSPLASTIC SYNDROME (HCC): Primary | ICD-10-CM

## 2018-10-22 DIAGNOSIS — D46.9 MYELODYSPLASTIC SYNDROME (HCC): ICD-10-CM

## 2018-10-22 DIAGNOSIS — M19.90 ARTHRITIS: ICD-10-CM

## 2018-10-22 DIAGNOSIS — M47.812 FACET ARTHRITIS OF CERVICAL REGION: ICD-10-CM

## 2018-10-22 LAB
BASO+EOS+MONOS # BLD AUTO: 0.4 K/UL (ref 0–2.3)
BASO+EOS+MONOS # BLD AUTO: 8 % (ref 0.1–17)
DIFFERENTIAL METHOD BLD: ABNORMAL
ERYTHROCYTE [DISTWIDTH] IN BLOOD BY AUTOMATED COUNT: 13.2 % (ref 11.5–14.5)
HCT VFR BLD AUTO: 32.9 % (ref 36–48)
HGB BLD-MCNC: 10.6 G/DL (ref 12–16)
LYMPHOCYTES # BLD: 1.4 K/UL (ref 1.1–5.9)
LYMPHOCYTES NFR BLD: 25 % (ref 14–44)
MCH RBC QN AUTO: 29.9 PG (ref 25–35)
MCHC RBC AUTO-ENTMCNC: 32.2 G/DL (ref 31–37)
MCV RBC AUTO: 92.9 FL (ref 78–102)
NEUTS SEG # BLD: 3.8 K/UL (ref 1.8–9.5)
NEUTS SEG NFR BLD: 67 % (ref 40–70)
PLATELET # BLD AUTO: 310 K/UL (ref 140–440)
RBC # BLD AUTO: 3.54 M/UL (ref 4.1–5.1)
WBC # BLD AUTO: 5.6 K/UL (ref 4.5–13)

## 2018-10-22 NOTE — PATIENT INSTRUCTIONS
Myelodysplastic Syndromes: Care Instructions  Your Care Instructions  Myelodysplastic syndromes, also called MDS, are a group of rare conditions in which the bone marrow does not make enough healthy blood cells. Normally, the bone marrow makes red blood cells, white blood cells, and platelets. These cells carry oxygen in the blood, help the body fight infections, and help the blood clot. With MDS, you may feel weak and tired, get infections often, and bruise easily, although symptoms tend to vary. MDS is a form of blood cancer. In some cases, MDS can turn into acute myeloid leukemia (AML), another type of cancer. Some people develop MDS after treatment for cancer or exposure to pesticides or other chemicals. But in most cases, the cause of MDS is not known. Your doctor will use the results of blood tests to guide your treatment. There are many types of MDS, with different treatment plans for each. If you have enough red blood cells and are feeling all right, you may not need active treatment, but you and your doctor will want to watch your condition carefully. If you start feeling lightheaded and have no energy, you may need a blood transfusion. Your doctor also may give you antibiotics to prevent or treat infection. Follow-up care is a key part of your treatment and safety. Be sure to make and go to all appointments, and call your doctor if you are having problems. It's also a good idea to know your test results and keep a list of the medicines you take. How can you care for yourself at home? · Take your medicines exactly as prescribed. Call your doctor if you think you are having a problem with your medicine. You will get more details on the specific medicines your doctor prescribes. · If your doctor prescribed antibiotics, take them as directed. Do not stop taking them just because you feel better. You need to take the full course of antibiotics.  If you have side effects from antibiotics, tell your doctor. · Take steps to control your stress and workload. Learn relaxation techniques. ? Share your feelings. Stress and tension affect our emotions. By expressing your feelings to others, you may be able to understand and cope with them. ? Consider joining a support group. Talking about a problem with your spouse, a good friend, or other people with similar problems is a good way to reduce tension and stress. ? Express yourself with art. Try writing, crafts, dance, or art to relieve stress. ? Be kind to your body and mind. Getting enough sleep, eating a healthy diet, and taking time to do things you enjoy can contribute to an overall feeling of balance in your life and help reduce stress. ? Get help if you need it. Discuss your concerns with your doctor or counselor. · Do not smoke. Smoking can make blood problems worse. If you need help quitting, talk to your doctor about stop-smoking programs and medicines. These can increase your chances of quitting for good. · If you have not already done so, prepare a list of advance directives. Advance directives are instructions to your doctor and family members about what kind of care you want if you become unable to speak or express yourself. · Call the [employer] (6-698.501.7175) or visit its website at [address] [employer] for more information. When should you call for help? Call 911 anytime you think you may need emergency care.  For example, call if:    · You passed out (lost consciousness).    Call your doctor now or seek immediate medical care if:    · You have a fever.     · You have abnormal bleeding.     · You have new or worse pain.     · You think you have an infection.     · You have new symptoms, such as a cough, belly pain, vomiting, diarrhea, or a rash.    Watch closely for changes in your health, and be sure to contact your doctor if:    · You are much more tired than usual.     · You have swollen glands in your armpits, groin, or neck.     · You do not get better as expected. Where can you learn more? Go to http://uday-bhavna.info/. Enter Grazyna Alvarez in the search box to learn more about \"Myelodysplastic Syndromes: Care Instructions. \"  Current as of: March 28, 2018  Content Version: 11.8  © 2649-1078 [employer]. Care instructions adapted under license by [employer] (which disclaims liability or warranty for this information). If you have questions about a medical condition or this instruction, always ask your healthcare professional. [address] any warranty or liability for your use of this information. Complete Blood Count (CBC): About This Test  What is it? A complete blood count (CBC) is a blood test that gives important information about your blood cells, especially red blood cells, white blood cells, and platelets. Why is this test done? A CBC may be done as part of a regular physical exam. There are many other reasons that a doctor may want this blood test, including to:  · Find the cause of symptoms such as fatigue, weakness, fever, bruising, or weight loss. · Find anemia or an infection. · See how much blood has been lost if there is bleeding. · Diagnose diseases of the blood, such as leukemia or polycythemia. How can you prepare for the test?  You do not need to do anything before having this test.  What happens during the test?  The health professional taking a sample of your blood will:  · Wrap an elastic band around your upper arm. This makes the veins below the band larger so it is easier to put a needle into the vein. · Clean the needle site with alcohol. · Put the needle into the vein. · Attach a tube to the needle to fill it with blood. · Remove the band from your arm when enough blood is collected. · Put a gauze pad or cotton ball over the needle site as the needle is removed. · Put pressure on the site and then put on a bandage.   If this blood test is done on a baby, a heel stick may be done instead of a blood draw from a vein. What happens after the test?  · You will probably be able to go home right away. · You can go back to your usual activities right away. Follow-up care is a key part of your treatment and safety. Be sure to make and go to all appointments, and call your doctor if you are having problems. It's also a good idea to keep a list of the medicines you take. Ask your doctor when you can expect to have your test results. Where can you learn more? Go to http://uday-bhavna.info/. Enter F774 in the search box to learn more about \"Complete Blood Count (CBC): About This Test.\"  Current as of: June 26, 2018  Content Version: 11.8  © 1850-9051 [employer]. Care instructions adapted under license by [employer] (which disclaims liability or warranty for this information). If you have questions about a medical condition or this instruction, always ask your healthcare professional. [address] any warranty or liability for your use of this information.

## 2018-10-22 NOTE — PROGRESS NOTES
Hematology/Oncology  Progress Note    Name: Collin Mendez  Date: 10/22/2018  : 1955    PCP: Dr. Zaheer Flores    Ms. Jo Lane is a 66y.o. year old female who was seen for management of her myelodysplastic syndrome/refractory anemia    Current therapy: Iron supplement, Procrit or Aranesp is available whenever her hematocrit is below 30%. Subjective:     Ms. Jo Lane is a 72-year-old -American woman with myelodysplastic syndrome/refractory anemia. She is continuing to do well. The patient reports that she continues taking the over-the-counter iron supplement as needed. She has no other physical complaints at this time. Past medical history, family history, and social history were reviewed and remain unchanged. Past Medical History:   Diagnosis Date    Echocardiogram 2011    Tech difficult. EF 60-65%. Mild LVH. RVSP 20-25 mmHg.       Essential hypertension     History of colon polyps     Hx of endometriosis     had hyst    Hyperlipidemia     Hypertension     MDS (myelodysplastic syndrome) (HCC)     Refractory anemia (HCC)      Past Surgical History:   Procedure Laterality Date    HX HYSTERECTOMY       Social History     Socioeconomic History    Marital status: SINGLE     Spouse name: Not on file    Number of children: Not on file    Years of education: Not on file    Highest education level: Not on file   Social Needs    Financial resource strain: Not on file    Food insecurity - worry: Not on file    Food insecurity - inability: Not on file   [employer] needs - medical: Not on file   [employer] needs - non-medical: Not on file   Occupational History    Not on file   Tobacco Use    Smoking status: Never Smoker    Smokeless tobacco: Never Used   Substance and Sexual Activity    Alcohol use: No    Drug use: No    Sexual activity: Not on file   Other Topics Concern    Not on file   Social History Narrative    Not on file     Family History   Problem Relation Age of Onset    Cancer Mother    [address] [facility] Arthritis-rheumatoid Sister     Diabetes Sister     Hypertension Sister     No Known Problems Father      Current Outpatient Medications   Medication Sig Dispense Refill    losartan-hydroCHLOROthiazide (HYZAAR) 100-12.5 mg per tablet TAKE ONE TABLET BY MOUTH ONCE DAILY 30 Tab 11    valACYclovir (VALTREX) 500 mg tablet TAKE ONE TABLET BY MOUTH TWICE DAILY 30 Tab 12    diclofenac EC (VOLTAREN) 75 mg EC tablet TAKE ONE TABLET BY MOUTH ONCE DAILY IN THE EVENING 30 Tab 5    carvedilol (COREG) 25 mg tablet TAKE ONE TABLET BY MOUTH TWICE DAILY WITH MEALS 180 Tab 3    VITAMIN D2 50,000 unit capsule TAKE ONE CAPSULE BY MOUTH ONCE A WEEK 4 Cap 12    polyethylene glycol (MIRALAX) 17 gram/dose powder MIX 17 GRAMS (1 CAPFUL) IN LIQUID AND DRINK BY MOUTH ONCE DAILY FOR CONSTIPATION 517 g 12    simvastatin (ZOCOR) 20 mg tablet TAKE ONE TABLET BY MOUTH ONCE DAILY AT BEDTIME 30 Tab 11    hydrocortisone (HYTONE) 2.5 % topical cream       latanoprost (XALATAN) 0.005 % ophthalmic solution Administer 1 Drop to both eyes nightly. Review of Systems  Constitutional: The patient complains of occasional fatigue  HEENT: The patient denies recent head trauma, eye pain, blurred vision,  hearing deficit, oropharyngeal mucosal pain or lesions, and the patient denies throat pain or discomfort. Lymphatics: The patient denies palpable peripheral lymphadenopathy. Hematologic: The patient denies having bruising, bleeding, or progressive fatigue. Respiratory: Patient denies having shortness of breath, cough, sputum production, fever, or dyspnea on exertion. Cardiovascular: The patient denies having leg pain, leg swelling, heart palpitations, chest permit, chest pain, or lightheadedness. The patient denies having dyspnea on exertion. Gastrointestinal: The patient denies having nausea, emesis, or diarrhea. The patient denies having any hematemesis or blood in the stool.   Genitourinary: Patient denies having urinary urgency, frequency, or dysuria. The patient denies having blood in the urine. Psychological: The patient denies having symptoms of nervousness, anxiety, depression, or thoughts of harming himself some of this. Skin: Patient denies having skin rashes, skin, ulcerations, or unexplained itching or pruritus. Musculoskeletal: The patient  is complaining of occasional musculoskeletal pain primarily in the lower extremities. Objective:     Visit Vitals  /70 (BP 1 Location: Left arm, BP Patient Position: Sitting)   Pulse 72   Temp 96.6 °F (35.9 °C) (Oral)   Resp 18   Ht 5' 3\" (1.6 m)   Wt 87.2 kg (192 lb 3.2 oz)   LMP 08/31/1998   SpO2 99%   BMI 34.05 kg/m²     Pain Scale 0/10  Physical Exam:   ECOG=0  Gen. Appearance: The patient is in no acute distress. Skin: There is no bruise or rash. HEENT: The exam is unremarkable. Neck: Supple without lymphadenopathy or thyromegaly. Lungs: Clear to auscultation and percussion; there are no wheezes or rhonchi. Heart: Regular rate and rhythm; there are no murmurs, gallops, or rubs. aanterior chest wall and breasts: Deferred. The axilla reveals no palpable axillary lymphadenopathy. Abdomen: Bowel sounds are present and normal.  There is no guarding, tenderness, or hepatosplenomegaly. Extremities: There is no clubbing, cyanosis, or edema. Neurologic: There are no focal neurologic deficits. Lymphatics: There is no palpable peripheral lymphadenopathy. Lab data:      Results for orders placed or performed during the hospital encounter of 10/22/18   CBC WITH 3 PART DIFF   Result Value Ref Range    WBC 5.6 4.5 - 13.0 K/uL    RBC 3.54 (L) 4.10 - 5.10 M/uL    HGB 10.6 (L) 12.0 - 16 g/dL    HCT 32.9 (L) 36 - 48 %    MCV 92.9 78 - 102 FL    MCH 29.9 25.0 - 35.0 PG    MCHC 32.2 31 - 37 g/dL    RDW 13.2 11.5 - 14.5 %    PLATELET 143 235 - 873 K/uL    NEUTROPHILS 67 40 - 70 %    MIXED CELLS 8 0.1 - 17 %    LYMPHOCYTES 25 14 - 44 %    ABS. NEUTROPHILS 3.8 1.8 - 9.5 K/UL    ABS. MIXED CELLS 0.4 0.0 - 2.3 K/uL    ABS. LYMPHOCYTES 1.4 1.1 - 5.9 K/UL    DF AUTOMATED          Assessment:     1. Myelodysplastic syndrome ([facility] [address]    2. Refractory anemia (HCC)    3. Arthritis    4. Facet arthritis of cervical region [facility]        Plan:   Myelodysplastic syndrome: I have explained to the patient that her WBC is stable at 5.6, PLT is 310,000. Therapeutic intervention with Procrit will be provided for her if the hematocrit declines below 30% with a hemoglobin below 10 g/dL. the dose of Procrit will be 60,000 units given subcutaneously every 2 or 4 weeks. We do not need to pursue a bone marrow biopsy at this time. However, if she experiences a rapid decline in the hemoglobin and hematocrit with an elevation in her WBC counts that would be an indication to do a bone marrow biopsy to rule out whether not she is converting to a chronic myelomonocytic leukemia component of her MDS versus converting to an acute leukemic process. I will plan to have her return to clinic for a complete assessment in 3 months. Refractory anemia/MDS: I have explained to the patient that her current hemoglobin and hematocrit were 10.6g/dL and 32.9% respectively. We will continue to monitor her hemoglobin and hematocrit every 3 months and if she should have a decrease in her hemoglobin below 10 g/dl and  hematocrit below 30% we will offer interventional therapy with either Procrit or Aranesp. At this time I will check a ferritin level and iron profile. The comprehensive metabolic panel will also be ordered. The patient states she has been taking ferrous sulfate as needed. I have advise patient to take medication daily. Arthritis/facet arthritis of the cervical region: The patient will continue to use Diclofenac ( Voltaren)  75 mg daily as provided to her for pain control by her PCP. Patient will return to clinic for a complete reassessment again in 3 months.   Orders Placed This Encounter    COMPLETE CBC & AUTO DIFF WBC    InHouse CBC ([facility])     Standing Status:   Future     Number of Occurrences:   1     Standing Expiration Date:   10/29/2018    IRON PROFILE     Standing Status:   Future     Standing Expiration Date:   10/23/2019    FERRITIN     Standing Status:   Future     Standing Expiration Date:   77/09/6818    METABOLIC PANEL, COMPREHENSIVE     Standing Status:   Future     Standing Expiration Date:   10/23/2019       Sam Appl, NP  10/22/2018

## 2018-10-23 LAB
ALBUMIN SERPL-MCNC: 4.6 G/DL (ref 3.6–4.8)
ALBUMIN/GLOB SERPL: 1.3 {RATIO} (ref 1.2–2.2)
ALP SERPL-CCNC: 93 IU/L (ref 39–117)
ALT SERPL-CCNC: 12 IU/L (ref 0–32)
AST SERPL-CCNC: 15 IU/L (ref 0–40)
BILIRUB SERPL-MCNC: 0.5 MG/DL (ref 0–1.2)
BUN SERPL-MCNC: 21 MG/DL (ref 8–27)
BUN/CREAT SERPL: 23 (ref 12–28)
CALCIUM SERPL-MCNC: 10.2 MG/DL (ref 8.7–10.3)
CHLORIDE SERPL-SCNC: 99 MMOL/L (ref 96–106)
CO2 SERPL-SCNC: 25 MMOL/L (ref 20–29)
CREAT SERPL-MCNC: 0.92 MG/DL (ref 0.57–1)
FERRITIN SERPL-MCNC: 244 NG/ML (ref 15–150)
GLOBULIN SER CALC-MCNC: 3.6 G/DL (ref 1.5–4.5)
GLUCOSE SERPL-MCNC: 94 MG/DL (ref 65–99)
IRON SATN MFR SERPL: 18 % (ref 15–55)
IRON SERPL-MCNC: 56 UG/DL (ref 27–139)
POTASSIUM SERPL-SCNC: 3.8 MMOL/L (ref 3.5–5.2)
PROT SERPL-MCNC: 8.2 G/DL (ref 6–8.5)
SODIUM SERPL-SCNC: 142 MMOL/L (ref 134–144)
TIBC SERPL-MCNC: 304 UG/DL (ref 250–450)
UIBC SERPL-MCNC: 248 UG/DL (ref 118–369)

## 2018-10-29 ENCOUNTER — OFFICE VISIT (OUTPATIENT)
Dept: FAMILY MEDICINE CLINIC | Age: 63
End: 2018-10-29

## 2018-10-29 VITALS
TEMPERATURE: 98.2 F | BODY MASS INDEX: 34.66 KG/M2 | DIASTOLIC BLOOD PRESSURE: 87 MMHG | WEIGHT: 195.6 LBS | HEART RATE: 76 BPM | HEIGHT: 63 IN | SYSTOLIC BLOOD PRESSURE: 132 MMHG | RESPIRATION RATE: 18 BRPM

## 2018-10-29 DIAGNOSIS — E78.5 HYPERLIPIDEMIA, UNSPECIFIED HYPERLIPIDEMIA TYPE: Chronic | ICD-10-CM

## 2018-10-29 DIAGNOSIS — Z71.85 VACCINE COUNSELING: ICD-10-CM

## 2018-10-29 DIAGNOSIS — I10 ESSENTIAL HYPERTENSION: Chronic | ICD-10-CM

## 2018-10-29 DIAGNOSIS — I10 ESSENTIAL HYPERTENSION: Primary | Chronic | ICD-10-CM

## 2018-10-29 DIAGNOSIS — E87.6 HYPOKALEMIA: Chronic | ICD-10-CM

## 2018-10-29 DIAGNOSIS — Z23 ENCOUNTER FOR IMMUNIZATION: ICD-10-CM

## 2018-10-29 NOTE — PATIENT INSTRUCTIONS
Vaccine Information Statement Influenza (Flu) Vaccine (Inactivated or Recombinant): What you need to know Many Vaccine Information Statements are available in Kiswahili and other languages. See www.immunize.org/vis Hojas de Información Sobre Vacunas están disponibles en Español y en muchos otros idiomas. Visite www.immunize.org/vis 1. Why get vaccinated? Influenza (flu) is a contagious disease that spreads around the [place] every year, usually between October and May. Flu is caused by influenza viruses, and is spread mainly by coughing, sneezing, and close contact. Anyone can get flu. Flu strikes suddenly and can last several days. Symptoms vary by age, but can include: 
 fever/chills  sore throat  muscle aches  fatigue  cough  headache  runny or stuffy nose Flu can also lead to pneumonia and blood infections, and cause diarrhea and seizures in children. If you have a medical condition, such as heart or lung disease, flu can make it worse. Flu is more dangerous for some people. Infants and young children, people 72years of age and older, pregnant women, and people with certain health conditions or a weakened immune system are at greatest risk. Each year thousands of people in the [facility] die from flu, and many more are hospitalized. Flu vaccine can: 
 keep you from getting flu, 
 make flu less severe if you do get it, and 
 keep you from spreading flu to your family and other people. 2. Inactivated and recombinant flu vaccines A dose of flu vaccine is recommended every flu season. Children 6 months through 6years of age may need two doses during the same flu season. Everyone else needs only one dose each flu season.   
 
 
Some inactivated flu vaccines contain a very small amount of a mercury-based preservative called thimerosal. Studies have not shown thimerosal in vaccines to be harmful, but flu vaccines that do not contain thimerosal are available. There is no live flu virus in flu shots. They cannot cause the flu. There are many flu viruses, and they are always changing. Each year a new flu vaccine is made to protect against three or four viruses that are likely to cause disease in the upcoming flu season. But even when the vaccine doesnt exactly match these viruses, it may still provide some protection Flu vaccine cannot prevent: 
 flu that is caused by a virus not covered by the vaccine, or 
 illnesses that look like flu but are not. It takes about 2 weeks for protection to develop after vaccination, and protection lasts through the flu season. 3. Some people should not get this vaccine Tell the person who is giving you the vaccine:  If you have any severe, life-threatening allergies. If you ever had a life-threatening allergic reaction after a dose of flu vaccine, or have a severe allergy to any part of this vaccine, you may be advised not to get vaccinated. Most, but not all, types of flu vaccine contain a small amount of egg protein.  If you ever had Guillain-Barré Syndrome (also called GBS). Some people with a history of GBS should not get this vaccine. This should be discussed with your doctor.  If you are not feeling well. It is usually okay to get flu vaccine when you have a mild illness, but you might be asked to come back when you feel better. 4. Risks of a vaccine reaction With any medicine, including vaccines, there is a chance of reactions. These are usually mild and go away on their own, but serious reactions are also possible. Most people who get a flu shot do not have any problems with it. Minor problems following a flu shot include:  
 soreness, redness, or swelling where the shot was given  hoarseness  sore, red or itchy eyes  cough  fever  aches  headache  itching  fatigue If these problems occur, they usually begin soon after the shot and last 1 or 2 days. More serious problems following a flu shot can include the following:  There may be a small increased risk of Guillain-Barré Syndrome (GBS) after inactivated flu vaccine. This risk has been estimated at 1 or 2 additional cases per million people vaccinated. This is much lower than the risk of severe complications from flu, which can be prevented by flu vaccine.  Young children who get the flu shot along with pneumococcal vaccine (PCV13) and/or DTaP vaccine at the same time might be slightly more likely to have a seizure caused by fever. Ask your doctor for more information. Tell your doctor if a child who is getting flu vaccine has ever had a seizure. Problems that could happen after any injected vaccine:  People sometimes faint after a medical procedure, including vaccination. Sitting or lying down for about 15 minutes can help prevent fainting, and injuries caused by a fall. Tell your doctor if you feel dizzy, or have vision changes or ringing in the ears.  Some people get severe pain in the shoulder and have difficulty moving the arm where a shot was given. This happens very rarely.  Any medication can cause a severe allergic reaction. Such reactions from a vaccine are very rare, estimated at about 1 in a million doses, and would happen within a few minutes to a few hours after the vaccination. As with any medicine, there is a very remote chance of a vaccine causing a serious injury or death. The safety of vaccines is always being monitored. For more information, visit: www.cdc.gov/vaccinesafety/ 
 
5. What if there is a serious reaction? What should I look for?  Look for anything that concerns you, such as signs of a severe allergic reaction, very high fever, or unusual behavior.  
 
Signs of a severe allergic reaction can include hives, swelling of the face and throat, difficulty breathing, a fast heartbeat, dizziness, and weakness  usually within a few minutes to a few hours after the vaccination. What should I do?  If you think it is a severe allergic reaction or other emergency that cant wait, call 9-1-1 and get the person to the nearest hospital. Otherwise, call your doctor.  Reactions should be reported to the Vaccine Adverse Event Reporting System (VAERS). Your doctor should file this report, or you can do it yourself through  the VAERS web site at www.vaers. [facility].gov, or by calling 0-340.256.3211. VAERS does not give medical advice. 6. The National Vaccine Injury Compensation Program 
 
The [facility] Vaccine Injury Compensation Program (VICP) is a federal program that was created to compensate people who may have been injured by certain vaccines. Persons who believe they may have been injured by a vaccine can learn about the program and about filing a claim by calling 6-606.287.6749 or visiting the [address] [employer] website at www.[facility].gov/vaccinecompensation. There is a time limit to file a claim for compensation. 7. How can I learn more?  Ask your healthcare provider. He or she can give you the vaccine package insert or suggest other sources of information.  Call your local or state health department.  Contact the Centers for Disease Control and Prevention (CDC): 
- Call 6-603.376.7781 (1-800-CDC-INFO) or 
- Visit CDCs website at www.cdc.gov/flu Vaccine Information Statement Inactivated Influenza Vaccine 8/7/2015 
[address] 544XD-03 Department of [facility] and [employer] Centers for Disease Control and Prevention Office Use Only

## 2018-10-29 NOTE — PROGRESS NOTES
Gerhardt Port presents today for Chief Complaint Patient presents with  Hypertension  
  follow up  Cholesterol Problem Gerhardt Port preferred language for health care discussion is english/other. Is someone accompanying this pt? no 
 
Is the patient using any DME equipment during OV? no 
 
Depression Screening: PHQ over the last two weeks 3/26/2018 Little interest or pleasure in doing things Not at all Feeling down, depressed, irritable, or hopeless Not at all Total Score PHQ 2 0 Learning Assessment: 
Learning Assessment 10/29/2018 PRIMARY LEARNER Patient HIGHEST LEVEL OF EDUCATION - PRIMARY LEARNER  4 YEARS OF COLLEGE  
BARRIERS PRIMARY LEARNER NONE  
CO-LEARNER CAREGIVER No  
PRIMARY LANGUAGE ENGLISH  NEED -  
LEARNER PREFERENCE PRIMARY LISTENING  
  -  
ANSWERED BY self RELATIONSHIP SELF Abuse Screening: 
Abuse Screening Questionnaire 10/29/2018 Do you ever feel afraid of your partner? Joaquina Chinchilla Are you in a relationship with someone who physically or mentally threatens you? Joaquina Chinchilla Is it safe for you to go home? Brain Ramirez Coordination of Care: 1. Have you been to the ER, urgent care clinic since your last visit? Hospitalized since your last visit? no 
 
2. Have you seen or consulted any other health care providers outside of the [facility] since your last visit? Include any pap smears or colon screening. no 
 
 
Advance Directive: 1. Do you have an advance directive in place? Patient Reply:no 2. If not, would you like material regarding how to put one in place?  Patient Reply: no

## 2018-10-29 NOTE — PROGRESS NOTES
Saintclair Pizza 1955 female who presents for routine immunizations. Patient denies any symptoms , reactions or allergies that would exclude them from being immunized today. Risks and adverse reactions were discussed and the VIS was given to them. All questions were addressed. Order placed for Flu,  per Verbal Order from Dr. Derek Clifton with read back. Patient was observed for 15 min post injection. There were no reactions observed.  
 
Mario Huddleston

## 2018-10-29 NOTE — PROGRESS NOTES
Chief Complaint Patient presents with  Hypertension  
  follow up  Cholesterol Problem HISTORY OF PRESENT ILLNESS Gerhardt Port is a 66 y.o. female. HPI Patient is here for a 3 mo follow up of htn, lipids, hypokalemia. Pt notes that her arthritis has been bothering her. Patient had labs on 10/22/18. Labs reviewed in detail with patient Patient does not need medication refills today. New concerns today: pt is interested in the flu shot. Pt also c/o a tingling, crawling sensation in the R forearm from her elbow to her wrist and fingers. Pt does not have any elbow or neck pain. Pt is not sure what triggers this sensation. Pt will keep an eye on this and update me at her next visit unless her sx worsen. ROS Review of Systems Constitutional: Negative. HENT: Negative. Respiratory: Negative. Cardiovascular: Negative. All other systems reviewed and are negative. Physical Exam 
Physical Exam  
Nursing note and vitals reviewed. Constitutional: She is oriented to person, place, and time. She appears well-developed and well-nourished. HENT:  
Head: Normocephalic and atraumatic. Right Ear: External ear normal.  
Left Ear: External ear normal.  
Nose: Nose normal.  
Eyes: Conjunctivae and EOM are normal.  
Neck: Normal range of motion. Neck supple. No JVD present. Carotid bruit is not present. No thyromegaly present. Cardiovascular: Normal rate, regular rhythm, normal heart sounds and intact distal pulses. Exam reveals no gallop and no friction rub. No murmur heard. Pulmonary/Chest: Effort normal and breath sounds normal. She has no wheezes. She has no rhonchi. She has no rales. Abdominal: Soft. Bowel sounds are normal.  
Musculoskeletal: Normal range of motion. Neurological: She is alert and oriented to person, place, and time. Coordination normal.  
Skin: Skin is warm and dry. Psychiatric: She has a normal mood and affect.  Her behavior is normal. Judgment and thought content normal.  
 
ASSESSMENT and PLAN Diagnoses and all orders for this visit: 1. Essential hypertension -     LIPID PANEL; Future Stable, cont pres tx plan. 2. Hypokalemia Stable, cont pres tx plan. 3. Hyperlipidemia, unspecified hyperlipidemia type -     LIPID PANEL; Future 4. Vaccine counseling Pt advised of need for shingles vaccination with new 2 dose shingrix. Pt aware that this can be requested at any retail pharmacy. 5. Encounter for immunization 
-     INFLUENZA VIRUS VAC QUAD,SPLIT,PRESV FREE SYRINGE IM Follow-up Disposition: 
Return in about 3 months (around 1/29/2019) for high blood pressure, high cholesterol.

## 2018-11-19 DIAGNOSIS — E78.5 HYPERLIPIDEMIA, UNSPECIFIED HYPERLIPIDEMIA TYPE: ICD-10-CM

## 2018-11-20 RX ORDER — SIMVASTATIN 20 MG/1
TABLET, FILM COATED ORAL
Qty: 30 TAB | Refills: 11 | Status: SHIPPED | OUTPATIENT
Start: 2018-11-20 | End: 2020-02-03

## 2018-12-21 ENCOUNTER — OFFICE VISIT (OUTPATIENT)
Dept: FAMILY MEDICINE CLINIC | Age: 63
End: 2018-12-21

## 2018-12-21 VITALS
RESPIRATION RATE: 20 BRPM | SYSTOLIC BLOOD PRESSURE: 147 MMHG | HEART RATE: 80 BPM | DIASTOLIC BLOOD PRESSURE: 56 MMHG | TEMPERATURE: 98.1 F | BODY MASS INDEX: 34.27 KG/M2 | HEIGHT: 63 IN | WEIGHT: 193.4 LBS

## 2018-12-21 DIAGNOSIS — N64.4 BREAST PAIN, LEFT: Primary | ICD-10-CM

## 2018-12-21 NOTE — PATIENT INSTRUCTIONS
Breast Pain: Care Instructions  Your Care Instructions    Breast tenderness and pain may come and go with your monthly periods (cyclic), or it may not follow any pattern (noncyclic). Breast pain is rarely caused by a serious health problem. You may need tests to find the cause. Follow-up care is a key part of your treatment and safety. Be sure to make and go to all appointments, and call your doctor if you are having problems. It's also a good idea to know your test results and keep a list of the medicines you take. How can you care for yourself at home? · If your doctor gave you medicine, take it exactly as prescribed. Call your doctor if you think you are having a problem with your medicine. · Take an over-the-counter pain medicine, such as acetaminophen (Tylenol), ibuprofen (Advil, Motrin), or naproxen (Aleve), to relieve pain and swelling. Read and follow all instructions on the label. · Do not take two or more pain medicines at the same time unless the doctor told you to. Many pain medicines have acetaminophen, which is Tylenol. Too much acetaminophen (Tylenol) can be harmful. · Wear a supportive bra, such as a sports bra or a jog bra. · Cut down on the amount of fat in your diet. If you need help planning healthy meals, see a dietitian. · Get at least 30 minutes of exercise on most days of the week. Walking is a good choice. You also may want to do other activities, such as running, swimming, cycling, or playing tennis or team sports. · Keep a healthy sleep pattern. Go to bed at the same time every night, and get up at the same time every day. When should you call for help? Call your doctor now or seek immediate medical care if:    · You have new changes in a breast, such as:  ? A lump or thickening in your breast or armpit. ? A change in the breast's size or shape. ? Skin changes, such as dimples or puckers. ? Nipple discharge.   ? A change in the color or feel of the skin of your breast or the darker area around the nipple (areola). ? A change in the shape of the nipple (it may look like it's being pulled into the breast).     · You have symptoms of a breast infection, such as:  ? Increased pain, swelling, redness, or warmth around a breast.  ? Red streaks extending from the breast.  ? Pus draining from a breast.  ? A fever.    Watch closely for changes in your health, and be sure to contact your doctor if:    · Your breast pain does not get better after 1 week.     · You have a lump or thickening in your breast or armpit. Where can you learn more? Go to http://uday-bhavna.info/. Enter C451 in the search box to learn more about \"Breast Pain: Care Instructions. \"  Current as of: November 20, 2017  Content Version: 11.8  © 3570-5419 [employer]. Care instructions adapted under license by [employer] (which disclaims liability or warranty for this information). If you have questions about a medical condition or this instruction, always ask your healthcare professional. [address] any warranty or liability for your use of this information.

## 2018-12-21 NOTE — PROGRESS NOTES
Chief Complaint   Patient presents with    Breast pain     left, off and on         Health Maintenance Due   Topic Date Due    Shingrix Vaccine Age 50> (1 of 2) 01/17/2007    Pneumococcal 19-64 Highest Risk (2 of 3 - PCV13) 04/12/2018       Health Maintenance reviewed     1. Have you been to the ER, urgent care clinic since your last visit? Hospitalized since your last visit? No    2. Have you seen or consulted any other health care providers outside of the [address] [place] [address] since your last visit? Include any pap smears or colon screening.  No

## 2018-12-21 NOTE — PROGRESS NOTES
OFFICE NOTE    Cherry Luke is a 66 y.o. female presenting today for office visit. 2018  9:25 AM    Chief Complaint   Patient presents with    Breast pain     left, off and on       HPI: Patient presents to the office for left breast upper breast.  Patient states she did have mammogram 2018 and results were normal.  Patient states her older sister  of breast cancer. Patient describe the pain as a sharp pain at the nipple and traveled to the upper breast.  This has happen multiple times within the past week. Patient states even when she move her left arm in different positions she feels tenderness in the left upper breast. Patient states when touching her left upper breast it is very tender. Pain is considered mild to severe at times. Patient denies any discharge from left nipple. No other concerns today    Review of Systems   Constitutional: Negative for chills, fatigue and fever. HENT: Negative. Eyes: Negative. Respiratory: Negative for cough, chest tightness and shortness of breath. Cardiovascular: Negative for chest pain. Musculoskeletal: Negative. Skin: Negative. Neurological: Negative. Breast:  Left breast discomfort/pain going from the nipple to upper left breast bone. PHQ Screening   PHQ over the last two weeks 3/26/2018   Little interest or pleasure in doing things Not at all   Feeling down, depressed, irritable, or hopeless Not at all   Total Score PHQ 2 0         History  Past Medical History:   Diagnosis Date    Echocardiogram 2011    Tech difficult. EF 60-65%. Mild LVH. RVSP 20-25 mmHg.       Essential hypertension     History of colon polyps     Hx of endometriosis     had hyst    Hyperlipidemia     Hypertension     MDS (myelodysplastic syndrome) (HCC)     Refractory anemia (HCC)        Past Surgical History:   Procedure Laterality Date    HX HYSTERECTOMY         Social History     Socioeconomic History    Marital status: SINGLE Spouse name: Not on file    Number of children: Not on file    Years of education: Not on file    Highest education level: Not on file   Social Needs    Financial resource strain: Not on file    Food insecurity - worry: Not on file    Food insecurity - inability: Not on file    Transportation needs - medical: Not on file    Transportation needs - non-medical: Not on file   Occupational History    Not on file   Tobacco Use    Smoking status: Never Smoker    Smokeless tobacco: Never Used   Substance and Sexual Activity    Alcohol use: No    Drug use: No    Sexual activity: Not on file   Other Topics Concern    Not on file   Social History Narrative    Not on file       Family History   Problem Relation Age of Onset    Cancer Mother     Arthritis-rheumatoid Sister     Diabetes Sister     Hypertension Sister     No Known Problems Father        No Known Allergies    Current Outpatient Medications   Medication Sig Dispense Refill    simvastatin (ZOCOR) 20 mg tablet TAKE ONE TABLET BY MOUTH ONCE DAILY AT BEDTIME 30 Tab 11    losartan-hydroCHLOROthiazide (HYZAAR) 100-12.5 mg per tablet TAKE ONE TABLET BY MOUTH ONCE DAILY 30 Tab 11    valACYclovir (VALTREX) 500 mg tablet TAKE ONE TABLET BY MOUTH TWICE DAILY 30 Tab 12    diclofenac EC (VOLTAREN) 75 mg EC tablet TAKE ONE TABLET BY MOUTH ONCE DAILY IN THE EVENING 30 Tab 5    carvedilol (COREG) 25 mg tablet TAKE ONE TABLET BY MOUTH TWICE DAILY WITH MEALS 180 Tab 3    VITAMIN D2 50,000 unit capsule TAKE ONE CAPSULE BY MOUTH ONCE A WEEK 4 Cap 12    polyethylene glycol (MIRALAX) 17 gram/dose powder MIX 17 GRAMS (1 CAPFUL) IN LIQUID AND DRINK BY MOUTH ONCE DAILY FOR CONSTIPATION 517 g 12    hydrocortisone (HYTONE) 2.5 % topical cream       latanoprost (XALATAN) 0.005 % ophthalmic solution Administer 1 Drop to both eyes nightly.                  Patient Care Team:  Patient Care Team:  Marvin Yun MD as PCP - [facility])  [address], Moose Bertrand, MD (Cardiology)  Rosa Valentine MD (Oncology)  Dillon Stewart DO (Rheumatology)  Donal Monroy MD (Orthopedic Surgery)  Barb Reina MD (Pulmonary Disease)  Rea Woods MD (Pulmonary Disease)        LABS/Results:  Results for orders placed or performed during the hospital encounter of 10/22/18   CBC WITH 3 PART DIFF   Result Value Ref Range    WBC 5.6 4.5 - 13.0 K/uL    RBC 3.54 (L) 4.10 - 5.10 M/uL    HGB 10.6 (L) 12.0 - 16 g/dL    HCT 32.9 (L) 36 - 48 %    MCV 92.9 78 - 102 FL    MCH 29.9 25.0 - 35.0 PG    MCHC 32.2 31 - 37 g/dL    RDW 13.2 11.5 - 14.5 %    PLATELET 336 775 - 747 K/uL    NEUTROPHILS 67 40 - 70 %    MIXED CELLS 8 0.1 - 17 %    LYMPHOCYTES 25 14 - 44 %    ABS. NEUTROPHILS 3.8 1.8 - 9.5 K/UL    ABS. MIXED CELLS 0.4 0.0 - 2.3 K/uL    ABS. LYMPHOCYTES 1.4 1.1 - 5.9 K/UL    DF AUTOMATED       RADIOLOGY:  None new to review      Physical Exam   Constitutional: She is oriented to person, place, and time. She appears well-developed and well-nourished. Neck: Normal range of motion. Cardiovascular: Normal rate, regular rhythm and normal heart sounds. Pulmonary/Chest: Effort normal and breath sounds normal. No respiratory distress. She has no wheezes. She exhibits no tenderness. Lymphadenopathy:     She has no cervical adenopathy. Neurological: She is alert and oriented to person, place, and time. Skin: Skin is warm and dry. Psychiatric: She has a normal mood and affect. Breast:  Left breast pain/tenderness at the nipple and upper breast bone. No discharge noted. Vitals:    12/21/18 0917   BP: 147/56   Pulse: 80   Resp: 20   Temp: 98.1 °F (36.7 °C)   TempSrc: Oral   Weight: 193 lb 6.4 oz (87.7 kg)   Height: 5' 3\" (1.6 m)   PainSc:   0 - No pain   LMP: 08/31/1998         Assessment and Plan      ICD-10-CM ICD-9-CM    1. Breast pain, left N64.4 611.71 SHIRIN MAMMO LT DX INCL CAD     Ordered a diagnostic mammogram due to family history of breast cancer.       *Plan of care reviewed with patient. Patient in agreement with plan and expresses understanding. All questions answered and patient encouraged to call or RTO if further questions or concerns. *After summary care printed, reviewed and given to patient. Follow-up Disposition:  Return if symptoms worsen or fail to improve.

## 2019-01-14 ENCOUNTER — HOSPITAL ENCOUNTER (OUTPATIENT)
Dept: ULTRASOUND IMAGING | Age: 64
Discharge: HOME | End: 2019-01-14
Attending: NURSE PRACTITIONER
Payer: COMMERCIAL

## 2019-01-14 ENCOUNTER — HOSPITAL ENCOUNTER (OUTPATIENT)
Dept: MAMMOGRAPHY | Age: 64
Discharge: HOME | End: 2019-01-14
Attending: NURSE PRACTITIONER
Payer: COMMERCIAL

## 2019-01-14 DIAGNOSIS — N64.4 BREAST PAIN, LEFT: ICD-10-CM

## 2019-01-14 DIAGNOSIS — N64.4 BREAST PAIN: ICD-10-CM

## 2019-01-14 PROCEDURE — 76642 ULTRASOUND BREAST LIMITED: CPT

## 2019-01-14 PROCEDURE — 77065 DX MAMMO INCL CAD UNI: CPT

## 2019-01-18 ENCOUNTER — HOSPITAL ENCOUNTER (OUTPATIENT)
Dept: LAB | Age: 64
Discharge: HOME | End: 2019-01-18

## 2019-01-18 LAB — XX-LABCORP SPECIMEN COL,LCBCF: NORMAL

## 2019-01-18 PROCEDURE — 99001 SPECIMEN HANDLING PT-LAB: CPT

## 2019-01-19 LAB
CHOLEST SERPL-MCNC: 160 MG/DL (ref 100–199)
HDLC SERPL-MCNC: 51 MG/DL
INTERPRETATION, 910389: NORMAL
LDLC SERPL CALC-MCNC: 94 MG/DL (ref 0–99)
TRIGL SERPL-MCNC: 76 MG/DL (ref 0–149)
VLDLC SERPL CALC-MCNC: 15 MG/DL (ref 5–40)

## 2019-01-30 ENCOUNTER — OFFICE VISIT (OUTPATIENT)
Dept: FAMILY MEDICINE CLINIC | Age: 64
End: 2019-01-30

## 2019-01-30 VITALS
HEIGHT: 63 IN | HEART RATE: 64 BPM | DIASTOLIC BLOOD PRESSURE: 82 MMHG | SYSTOLIC BLOOD PRESSURE: 129 MMHG | BODY MASS INDEX: 34.98 KG/M2 | WEIGHT: 197.4 LBS | TEMPERATURE: 98.1 F | RESPIRATION RATE: 18 BRPM

## 2019-01-30 DIAGNOSIS — M25.50 POLYARTHRALGIA: ICD-10-CM

## 2019-01-30 DIAGNOSIS — Z02.79 MEDICAL CERTIFICATE ISSUANCE: ICD-10-CM

## 2019-01-30 DIAGNOSIS — E78.5 HYPERLIPIDEMIA LDL GOAL <100: ICD-10-CM

## 2019-01-30 DIAGNOSIS — I10 ESSENTIAL HYPERTENSION: Primary | ICD-10-CM

## 2019-01-30 RX ORDER — DICLOFENAC SODIUM 10 MG/G
4 GEL TOPICAL 4 TIMES DAILY
Qty: 400 G | Refills: 12 | Status: SHIPPED | OUTPATIENT
Start: 2019-01-30

## 2019-01-30 NOTE — PROGRESS NOTES
Chief Complaint Patient presents with  Hypertension f/u  Cholesterol Problem  Labs 1/18/19 HISTORY OF PRESENT ILLNESS Soco Barrera is a 60 y.o. female. HPI Patient is here for a 3 mo follow up of htn, hld. Pt reports that she has been gaining wt. She plans to try to work on this. Patient had labs on 1/18/19. Labs reviewed in detail with patient Patient does not need medication refills today. New concerns today: pt reports that she cont to have joint pains. Her diclofenac works well but she doesn't like taking so many pills. She does take tylenol as needed also. Review of Systems Constitutional: Negative. HENT: Negative. Respiratory: Negative. Cardiovascular: Negative. Musculoskeletal: Positive for joint pain. All other systems reviewed and are negative. Physical Exam 
Physical Exam  
Nursing note and vitals reviewed. Constitutional: She is oriented to person, place, and time. She appears well-developed and well-nourished. HENT:  
Head: Normocephalic and atraumatic. Right Ear: External ear normal.  
Left Ear: External ear normal.  
Nose: Nose normal.  
Eyes: Conjunctivae and EOM are normal.  
Neck: Normal range of motion. Neck supple. No JVD present. Carotid bruit is not present. No thyromegaly present. Cardiovascular: Normal rate, regular rhythm, normal heart sounds and intact distal pulses. Exam reveals no gallop and no friction rub. No murmur heard. Pulmonary/Chest: Effort normal and breath sounds normal. She has no wheezes. She has no rhonchi. She has no rales. Abdominal: Soft. Bowel sounds are normal.  
Musculoskeletal: Normal range of motion. Neurological: She is alert and oriented to person, place, and time. Coordination normal.  
Skin: Skin is warm and dry. Psychiatric: She has a normal mood and affect.  Her behavior is normal. Judgment and thought content normal.  
 
ASSESSMENT and PLAN 
 Diagnoses and all orders for this visit: 1. Essential hypertension Stable, cont pres tx plan. 2. Hyperlipidemia LDL goal <100 Stable, cont pres tx plan. 3. Polyarthralgia 
-     diclofenac (VOLTAREN) 1 % gel; Apply 4 g to affected area four (4) times daily. 4. Medical certificate issuance Form completed and returned to pt. Follow-up Disposition: 
Return in about 3 months (around 4/30/2019) for high blood pressure, high cholesterol.

## 2019-02-18 ENCOUNTER — OFFICE VISIT (OUTPATIENT)
Dept: ONCOLOGY | Age: 64
End: 2019-02-18

## 2019-02-18 ENCOUNTER — HOSPITAL ENCOUNTER (OUTPATIENT)
Dept: ONCOLOGY | Age: 64
Discharge: HOME | End: 2019-02-18

## 2019-02-18 VITALS
DIASTOLIC BLOOD PRESSURE: 81 MMHG | OXYGEN SATURATION: 99 % | WEIGHT: 195 LBS | SYSTOLIC BLOOD PRESSURE: 141 MMHG | RESPIRATION RATE: 16 BRPM | BODY MASS INDEX: 34.55 KG/M2 | TEMPERATURE: 97.8 F | HEIGHT: 63 IN | HEART RATE: 62 BPM

## 2019-02-18 DIAGNOSIS — D46.9 MDS (MYELODYSPLASTIC SYNDROME) (HCC): Primary | ICD-10-CM

## 2019-02-18 DIAGNOSIS — D46.9 MDS (MYELODYSPLASTIC SYNDROME) (HCC): ICD-10-CM

## 2019-02-18 DIAGNOSIS — D46.4 REFRACTORY ANEMIA (HCC): ICD-10-CM

## 2019-02-18 DIAGNOSIS — M19.90 ARTHRITIS: ICD-10-CM

## 2019-02-18 LAB
BASO+EOS+MONOS # BLD AUTO: 0.5 K/UL (ref 0–2.3)
BASO+EOS+MONOS NFR BLD AUTO: 9 % (ref 0.1–17)
DIFFERENTIAL METHOD BLD: ABNORMAL
ERYTHROCYTE [DISTWIDTH] IN BLOOD BY AUTOMATED COUNT: 13.4 % (ref 11.5–14.5)
HCT VFR BLD AUTO: 31.6 % (ref 36–48)
HGB BLD-MCNC: 10.3 G/DL (ref 12–16)
LYMPHOCYTES # BLD: 1.3 K/UL (ref 1.1–5.9)
LYMPHOCYTES NFR BLD: 26 % (ref 14–44)
MCH RBC QN AUTO: 29.8 PG (ref 25–35)
MCHC RBC AUTO-ENTMCNC: 32.6 G/DL (ref 31–37)
MCV RBC AUTO: 91.3 FL (ref 78–102)
NEUTS SEG # BLD: 3.4 K/UL (ref 1.8–9.5)
NEUTS SEG NFR BLD: 65 % (ref 40–70)
PLATELET # BLD AUTO: 305 K/UL (ref 140–440)
RBC # BLD AUTO: 3.46 M/UL (ref 4.1–5.1)
WBC # BLD AUTO: 5.2 K/UL (ref 4.5–13)

## 2019-02-18 NOTE — PATIENT INSTRUCTIONS
Myelodysplastic Syndromes: Care Instructions  Your Care Instructions  Myelodysplastic syndromes, also called MDS, are a group of rare conditions in which the bone marrow does not make enough healthy blood cells. Normally, the bone marrow makes red blood cells, white blood cells, and platelets. These cells carry oxygen in the blood, help the body fight infections, and help the blood clot. With MDS, you may feel weak and tired, get infections often, and bruise easily, although symptoms tend to vary. MDS is a form of blood cancer. In some cases, MDS can turn into acute myeloid leukemia (AML), another type of cancer. Some people develop MDS after treatment for cancer or exposure to pesticides or other chemicals. But in most cases, the cause of MDS is not known. Your doctor will use the results of blood tests to guide your treatment. There are many types of MDS, with different treatment plans for each. If you have enough red blood cells and are feeling all right, you may not need active treatment, but you and your doctor will want to watch your condition carefully. If you start feeling lightheaded and have no energy, you may need a blood transfusion. Your doctor also may give you antibiotics to prevent or treat infection. Follow-up care is a key part of your treatment and safety. Be sure to make and go to all appointments, and call your doctor if you are having problems. It's also a good idea to know your test results and keep a list of the medicines you take. How can you care for yourself at home? · Take your medicines exactly as prescribed. Call your doctor if you think you are having a problem with your medicine. You will get more details on the specific medicines your doctor prescribes. · If your doctor prescribed antibiotics, take them as directed. Do not stop taking them just because you feel better. You need to take the full course of antibiotics.  If you have side effects from antibiotics, tell your doctor. · Take steps to control your stress and workload. Learn relaxation techniques. ? Share your feelings. Stress and tension affect our emotions. By expressing your feelings to others, you may be able to understand and cope with them. ? Consider joining a support group. Talking about a problem with your spouse, a good friend, or other people with similar problems is a good way to reduce tension and stress. ? Express yourself with art. Try writing, crafts, dance, or art to relieve stress. ? Be kind to your body and mind. Getting enough sleep, eating a healthy diet, and taking time to do things you enjoy can contribute to an overall feeling of balance in your life and help reduce stress. ? Get help if you need it. Discuss your concerns with your doctor or counselor. · Do not smoke. Smoking can make blood problems worse. If you need help quitting, talk to your doctor about stop-smoking programs and medicines. These can increase your chances of quitting for good. · If you have not already done so, prepare a list of advance directives. Advance directives are instructions to your doctor and family members about what kind of care you want if you become unable to speak or express yourself. · Call the [facility] (2-866.436.6758) or visit its website at [address] [employer] for more information. When should you call for help? Call 911 anytime you think you may need emergency care.  For example, call if:    · You passed out (lost consciousness).    Call your doctor now or seek immediate medical care if:    · You have a fever.     · You have abnormal bleeding.     · You have new or worse pain.     · You think you have an infection.     · You have new symptoms, such as a cough, belly pain, vomiting, diarrhea, or a rash.    Watch closely for changes in your health, and be sure to contact your doctor if:    · You are much more tired than usual.     · You have swollen glands in your armpits, groin, or neck.     · You do not get better as expected. Where can you learn more? Go to http://uday-bhavna.info/. Enter [place] in the search box to learn more about \"Myelodysplastic Syndromes: Care Instructions. \"  Current as of: March 27, 2018  Content Version: 11.9  © 1363-9043 [employer]. Care instructions adapted under license by [employer] (which disclaims liability or warranty for this information). If you have questions about a medical condition or this instruction, always ask your healthcare professional. [address] any warranty or liability for your use of this information.

## 2019-02-18 NOTE — PROGRESS NOTES
Hematology/Oncology  Progress Note    Name: Soco Barrera  Date: 2019  : 1955    PCP: Dr. Malvin Leyva    Ms. Adam Martinez is a 60y.o. year old female who was seen for management of her myelodysplastic syndrome/refractory anemia    Current therapy: Iron supplement, Procrit or Aranesp is available whenever her hematocrit is below 30%. Subjective:     Ms. Adam Martinez is a 62-year-old -American woman with myelodysplastic syndrome/refractory anemia. She is continuing to do well. The patient reports that she continues taking the over-the-counter iron supplement as needed. She has no other physical complaints at this time. Past medical history, family history, and social history were reviewed and remain unchanged. Past Medical History:   Diagnosis Date    Echocardiogram 2011    Tech difficult. EF 60-65%. Mild LVH. RVSP 20-25 mmHg.       Essential hypertension     History of colon polyps     Hx of endometriosis     had hyst    Hyperlipidemia     Hypertension     MDS (myelodysplastic syndrome) (HCC)     Refractory anemia (HCC)      Past Surgical History:   Procedure Laterality Date    HX HYSTERECTOMY       Social History     Socioeconomic History    Marital status: SINGLE     Spouse name: Not on file    Number of children: Not on file    Years of education: Not on file    Highest education level: Not on file   Social Needs    Financial resource strain: Not on file    Food insecurity - worry: Not on file    Food insecurity - inability: Not on file   [place] needs - medical: Not on file   [place] needs - non-medical: Not on file   Occupational History    Not on file   Tobacco Use    Smoking status: Never Smoker    Smokeless tobacco: Never Used   Substance and Sexual Activity    Alcohol use: No    Drug use: No    Sexual activity: Not on file   Other Topics Concern    Not on file   Social History Narrative    Not on file     Family History   Problem Relation Age of Onset    Cancer Mother     Arthritis-rheumatoid Sister     Diabetes Sister     Hypertension Sister     No Known Problems Father      Current Outpatient Medications   Medication Sig Dispense Refill    diclofenac (VOLTAREN) 1 % gel Apply 4 g to affected area four (4) times daily. 400 g 12    simvastatin (ZOCOR) 20 mg tablet TAKE ONE TABLET BY MOUTH ONCE DAILY AT BEDTIME 30 Tab 11    losartan-hydroCHLOROthiazide (HYZAAR) 100-12.5 mg per tablet TAKE ONE TABLET BY MOUTH ONCE DAILY 30 Tab 11    valACYclovir (VALTREX) 500 mg tablet TAKE ONE TABLET BY MOUTH TWICE DAILY 30 Tab 12    diclofenac EC (VOLTAREN) 75 mg EC tablet TAKE ONE TABLET BY MOUTH ONCE DAILY IN THE EVENING 30 Tab 5    carvedilol (COREG) 25 mg tablet TAKE ONE TABLET BY MOUTH TWICE DAILY WITH MEALS 180 Tab 3    VITAMIN D2 50,000 unit capsule TAKE ONE CAPSULE BY MOUTH ONCE A WEEK 4 Cap 12    polyethylene glycol (MIRALAX) 17 gram/dose powder MIX 17 GRAMS (1 CAPFUL) IN LIQUID AND DRINK BY MOUTH ONCE DAILY FOR CONSTIPATION 517 g 12    hydrocortisone (HYTONE) 2.5 % topical cream       latanoprost (XALATAN) 0.005 % ophthalmic solution Administer 1 Drop to both eyes nightly. Review of Systems  Constitutional: The patient complains of occasional fatigue  HEENT: The patient denies recent head trauma, eye pain, blurred vision,  hearing deficit, oropharyngeal mucosal pain or lesions, and the patient denies throat pain or discomfort. Lymphatics: The patient denies palpable peripheral lymphadenopathy. Hematologic: The patient denies having bruising, bleeding, or progressive fatigue. Respiratory: Patient denies having shortness of breath, cough, sputum production, fever, or dyspnea on exertion. Cardiovascular: The patient denies having leg pain, leg swelling, heart palpitations, chest permit, chest pain, or lightheadedness. The patient denies having dyspnea on exertion.   Gastrointestinal: The patient denies having nausea, emesis, or diarrhea. The patient denies having any hematemesis or blood in the stool. Genitourinary: Patient denies having urinary urgency, frequency, or dysuria. The patient denies having blood in the urine. Psychological: The patient denies having symptoms of nervousness, anxiety, depression, or thoughts of harming himself some of this. Skin: Patient denies having skin rashes, skin, ulcerations, or unexplained itching or pruritus. Musculoskeletal: The patient  is complaining of occasional musculoskeletal pain primarily in the lower extremities. Objective:     Visit Vitals  /81   Pulse 62   Temp 97.8 °F (36.6 °C) (Oral)   Resp 16   Ht 5' 3\" (1.6 m)   Wt 88.5 kg (195 lb)   LMP 08/31/1998   SpO2 99%   BMI 34.54 kg/m²     Pain Scale 0/10  Physical Exam:   ECOG=0  Gen. Appearance: The patient is in no acute distress. Skin: There is no bruise or rash. HEENT: The exam is unremarkable. Neck: Supple without lymphadenopathy or thyromegaly. Lungs: Clear to auscultation and percussion; there are no wheezes or rhonchi. Heart: Regular rate and rhythm; there are no murmurs, gallops, or rubs. aanterior chest wall and breasts: Deferred. The axilla reveals no palpable axillary lymphadenopathy. Abdomen: Bowel sounds are present and normal.  There is no guarding, tenderness, or hepatosplenomegaly. Extremities: There is no clubbing, cyanosis, or edema. Neurologic: There are no focal neurologic deficits. Lymphatics: There is no palpable peripheral lymphadenopathy.     Lab data:      Results for orders placed or performed during the hospital encounter of 02/18/19   CBC WITH 3 PART DIFF   Result Value Ref Range    WBC 5.2 4.5 - 13.0 K/uL    RBC 3.46 (L) 4.10 - 5.10 M/uL    HGB 10.3 (L) 12.0 - 16 g/dL    HCT 31.6 (L) 36 - 48 %    MCV 91.3 78 - 102 FL    MCH 29.8 25.0 - 35.0 PG    MCHC 32.6 31 - 37 g/dL    RDW 13.4 11.5 - 14.5 %    PLATELET 142 296 - 877 K/uL    NEUTROPHILS 65 40 - 70 %    MIXED CELLS 9 0.1 - 17 %    LYMPHOCYTES 26 14 - 44 %    ABS. NEUTROPHILS 3.4 1.8 - 9.5 K/UL    ABS. MIXED CELLS 0.5 0.0 - 2.3 K/uL    ABS. LYMPHOCYTES 1.3 1.1 - 5.9 K/UL    DF AUTOMATED          Assessment:     1. MDS (myelodysplastic syndrome) ([facility] [address]    2. Refractory anemia (HCC)    3. Arthritis        Plan:   Myelodysplastic syndrome: I have explained to the patient that her WBC is stable at 5.3 and a platelet count is 902,156. Therapeutic intervention with Procrit will be provided for her if the hematocrit declines below 30% with a hemoglobin below 10 g/dL. the dose of Procrit will be 60,000 units given subcutaneously every 2 or 4 weeks. We do not need to pursue a bone marrow biopsy at this time. However, if she experiences a rapid decline in the hemoglobin and hematocrit with an elevation in her WBC counts that would be an indication to do a bone marrow biopsy to rule out whether not she is converting to a chronic myelomonocytic leukemia component of her MDS versus converting to an acute leukemic process. I will plan to have her return to clinic for a complete assessment in 3 months. Refractory anemia/MDS: I have explained to the patient that her current hemoglobin and hematocrit were 10.3 g/dL and 31.6% respectively. We will continue to monitor her hemoglobin and hematocrit every 3 months and if she should have a decrease in her hemoglobin below 10 g/dl and  hematocrit below 30% we will offer interventional therapy with either Procrit or Aranesp. At this time I will check a ferritin level and iron profile. The comprehensive metabolic panel will also be ordered. The patient states she has been taking ferrous sulfate as needed. I have advise patient to take medication daily. Arthritis/facet arthritis of the cervical region: The patient will continue to use Diclofenac ( Voltaren)  75 mg daily as provided to her for pain control by her PCP. Patient will return to clinic for a complete reassessment again in 3 months.   Orders Placed This Encounter    COMPLETE CBC & AUTO DIFF WBC    InHouse CBC ([facility])     Standing Status:   Future     Number of Occurrences:   1     Standing Expiration Date:   3/28/9210    METABOLIC PANEL, COMPREHENSIVE     Standing Status:   Future     Number of Occurrences:   1     Standing Expiration Date:   2/19/2020    IRON PROFILE     Standing Status:   Future     Number of Occurrences:   1     Standing Expiration Date:   2/19/2020    FERRITIN     Standing Status:   Future     Number of Occurrences:   1     Standing Expiration Date:   2/19/2020       Alfredo Hemphill MD  10/22/2018

## 2019-02-19 LAB
ALBUMIN SERPL-MCNC: 4.4 G/DL (ref 3.6–4.8)
ALBUMIN/GLOB SERPL: 1.4 {RATIO} (ref 1.2–2.2)
ALP SERPL-CCNC: 93 IU/L (ref 39–117)
ALT SERPL-CCNC: 15 IU/L (ref 0–32)
AST SERPL-CCNC: 15 IU/L (ref 0–40)
BILIRUB SERPL-MCNC: 0.6 MG/DL (ref 0–1.2)
BUN SERPL-MCNC: 17 MG/DL (ref 8–27)
BUN/CREAT SERPL: 22 (ref 12–28)
CALCIUM SERPL-MCNC: 9.7 MG/DL (ref 8.7–10.3)
CHLORIDE SERPL-SCNC: 99 MMOL/L (ref 96–106)
CO2 SERPL-SCNC: 24 MMOL/L (ref 20–29)
CREAT SERPL-MCNC: 0.79 MG/DL (ref 0.57–1)
FERRITIN SERPL-MCNC: 230 NG/ML (ref 15–150)
GLOBULIN SER CALC-MCNC: 3.1 G/DL (ref 1.5–4.5)
GLUCOSE SERPL-MCNC: 89 MG/DL (ref 65–99)
IRON SATN MFR SERPL: 17 % (ref 15–55)
IRON SERPL-MCNC: 48 UG/DL (ref 27–139)
POTASSIUM SERPL-SCNC: 4 MMOL/L (ref 3.5–5.2)
PROT SERPL-MCNC: 7.5 G/DL (ref 6–8.5)
SODIUM SERPL-SCNC: 141 MMOL/L (ref 134–144)
TIBC SERPL-MCNC: 287 UG/DL (ref 250–450)
UIBC SERPL-MCNC: 239 UG/DL (ref 118–369)

## 2019-03-18 ENCOUNTER — OFFICE VISIT (OUTPATIENT)
Dept: CARDIOLOGY CLINIC | Age: 64
End: 2019-03-18

## 2019-03-18 VITALS
HEART RATE: 64 BPM | HEIGHT: 63 IN | SYSTOLIC BLOOD PRESSURE: 130 MMHG | DIASTOLIC BLOOD PRESSURE: 86 MMHG | BODY MASS INDEX: 34.55 KG/M2 | OXYGEN SATURATION: 95 % | WEIGHT: 195 LBS

## 2019-03-18 DIAGNOSIS — R00.2 PALPITATIONS: Primary | ICD-10-CM

## 2019-03-18 DIAGNOSIS — E78.5 HYPERLIPIDEMIA LDL GOAL <100: ICD-10-CM

## 2019-03-18 DIAGNOSIS — I10 ESSENTIAL HYPERTENSION: Chronic | ICD-10-CM

## 2019-03-18 NOTE — PROGRESS NOTES
Boyd Patrick presents today for   Chief Complaint   Patient presents with    Follow-up     1 year     Palpitations     one or two episodes of fluttering palpitations lasts 60 seconds        Boyd Patrick preferred language for health care discussion is english/other. Is someone accompanying this pt? No     Is the patient using any DME equipment during OV? No     Depression Screening:  3 most recent PHQ Screens 1/30/2019   Little interest or pleasure in doing things Not at all   Feeling down, depressed, irritable, or hopeless Not at all   Total Score PHQ 2 0       Learning Assessment:  Learning Assessment 1/30/2019   PRIMARY LEARNER Patient   HIGHEST LEVEL OF EDUCATION - PRIMARY LEARNER  4 YEARS OF COLLEGE   BARRIERS PRIMARY LEARNER NONE   CO-LEARNER CAREGIVER No   PRIMARY LANGUAGE ENGLISH    NEED -   LEARNER PREFERENCE PRIMARY READING     -     -     -     -   ANSWERED BY self   RELATIONSHIP SELF       Abuse Screening:  Abuse Screening Questionnaire 1/30/2019   Do you ever feel afraid of your partner? N   Are you in a relationship with someone who physically or mentally threatens you? N   Is it safe for you to go home? Y       Fall Risk  Fall Risk Assessment, last 12 mths 1/30/2019   Able to walk? Yes   Fall in past 12 months? No       Pt currently taking Antiplatelet therapy? NO     Coordination of Care:  1. Have you been to the ER, urgent care clinic since your last visit? Hospitalized since your last visit? No    2. Have you seen or consulted any other health care providers outside of the [address] [place] [address] since your last visit? Include any pap smears or colon screening.  No

## 2019-03-18 NOTE — PROGRESS NOTES
HISTORY OF PRESENT ILLNESS  Boyd Patrick is a 60 y.o. female. HPI  She has been feeling well other than occasional palpitation as a flutter or skipping, she has no complaints. She denies prolonged palpitation. She has had no chest pain, dyspnea, orthopnea, PND. She denies dizziness or syncope. She denies any symptoms of TIA or amaurosis fugax. She was originally referred to my office for evaluation of elevated CRP on 5/6/10. She has a history of hypertension and dyslipidemia. She has no history of diabetes mellitus or tobacco abuse. She has no family history of coronary artery disease. She has been intolerant to statins, but currently on Simvastatin 10 mg a day which she has been able to tolerate. She had an echocardiogram on 2/8/11 which demonstrated normal LV function with EF in the 60-65% range. There is mild concentric left ventricular hypertrophy. There is no significant valvular pathology.    Her repeat CRP in June 2013 was still elevated at 10. 9.    She has had a bone marrow biopsy and her anemia was most likely related to myelodysplastic syndrome.    She underwent the coronary calcium score on 05/19/15, which was calculated at zero indicating very low probability of coronary artery disease statistically. Review of Systems   Constitutional: Negative for malaise/fatigue and weight loss. HENT: Negative for hearing loss. Eyes: Negative for blurred vision and double vision. Respiratory: Negative for shortness of breath. Cardiovascular: Positive for palpitations. Negative for chest pain, orthopnea, claudication, leg swelling and PND. Gastrointestinal: Negative for blood in stool, heartburn and melena. Genitourinary: Negative for dysuria, frequency, hematuria and urgency. Musculoskeletal: Negative for back pain and joint pain. Skin: Negative for itching and rash. Neurological: Negative for dizziness, loss of consciousness and weakness.    Psychiatric/Behavioral: Negative for depression and memory loss. Physical Exam   Constitutional: She is oriented to person, place, and time. She appears well-developed and well-nourished. HENT:   Head: Normocephalic and atraumatic. Eyes: Conjunctivae are normal. Pupils are equal, round, and reactive to light. Neck: Normal range of motion. Neck supple. No JVD present. Cardiovascular: Normal rate, regular rhythm, S1 normal and S2 normal.  No extrasystoles are present. PMI is not displaced. Exam reveals no gallop and no friction rub. No murmur heard. Pulses:       Carotid pulses are 3+ on the right side, and 3+ on the left side. Pulmonary/Chest: Effort normal. She has no rales. Abdominal: Soft. There is no tenderness. Musculoskeletal: She exhibits no edema. Neurological: She is alert and oriented to person, place, and time. No cranial nerve deficit. Skin: Skin is warm and dry. Psychiatric: She has a normal mood and affect. Her behavior is normal.     Visit Vitals  /86   Pulse 64   Ht 5' 3\" (1.6 m)   Wt 88.5 kg (195 lb)   LMP 08/31/1998   SpO2 95%   BMI 34.54 kg/m²       Past Medical History:   Diagnosis Date    Echocardiogram 02/08/2011    Tech difficult. EF 60-65%. Mild LVH. RVSP 20-25 mmHg.       Essential hypertension     History of colon polyps     Hx of endometriosis     had hyst    Hyperlipidemia     Hypertension     MDS (myelodysplastic syndrome) (HCC)     Refractory anemia (HCC)        Social History     Socioeconomic History    Marital status: SINGLE     Spouse name: Not on file    Number of children: Not on file    Years of education: Not on file    Highest education level: Not on file   Social Needs    Financial resource strain: Not on file    Food insecurity - worry: Not on file    Food insecurity - inability: Not on file    Transportation needs - medical: Not on file   [place] needs - non-medical: Not on file   Occupational History    Not on file   Tobacco Use    Smoking status: Never Smoker    Smokeless tobacco: Never Used   Substance and Sexual Activity    Alcohol use: No    Drug use: No    Sexual activity: Not on file   Other Topics Concern    Not on file   Social History Narrative    Not on file       Family History   Problem Relation Age of Onset    Cancer Mother     Arthritis-rheumatoid Sister     Diabetes Sister     Hypertension Sister     No Known Problems Father        Past Surgical History:   Procedure Laterality Date    HX HYSTERECTOMY         Current Outpatient Medications   Medication Sig Dispense Refill    diclofenac (VOLTAREN) 1 % gel Apply 4 g to affected area four (4) times daily. 400 g 12    simvastatin (ZOCOR) 20 mg tablet TAKE ONE TABLET BY MOUTH ONCE DAILY AT BEDTIME 30 Tab 11    losartan-hydroCHLOROthiazide (HYZAAR) 100-12.5 mg per tablet TAKE ONE TABLET BY MOUTH ONCE DAILY 30 Tab 11    valACYclovir (VALTREX) 500 mg tablet TAKE ONE TABLET BY MOUTH TWICE DAILY 30 Tab 12    diclofenac EC (VOLTAREN) 75 mg EC tablet TAKE ONE TABLET BY MOUTH ONCE DAILY IN THE EVENING 30 Tab 5    carvedilol (COREG) 25 mg tablet TAKE ONE TABLET BY MOUTH TWICE DAILY WITH MEALS 180 Tab 3    VITAMIN D2 50,000 unit capsule TAKE ONE CAPSULE BY MOUTH ONCE A WEEK 4 Cap 12    polyethylene glycol (MIRALAX) 17 gram/dose powder MIX 17 GRAMS (1 CAPFUL) IN LIQUID AND DRINK BY MOUTH ONCE DAILY FOR CONSTIPATION 517 g 12    hydrocortisone (HYTONE) 2.5 % topical cream       latanoprost (XALATAN) 0.005 % ophthalmic solution Administer 1 Drop to both eyes nightly. EKG: normal EKG, normal sinus rhythm, unchanged from previous tracings. ASSESSMENT and PLAN  Encounter Diagnoses   Name Primary?  Palpitations Yes    Hyperlipidemia LDL goal <100     Essential hypertension    Her palpitations seem to be benign PVCs or PACs. The patient was reassured. I would not recommend further cardiac workup for her palpitation. Her blood pressure has been under control.  For now, continue on current medical regimen.

## 2019-03-22 RX ORDER — ERGOCALCIFEROL 1.25 MG/1
CAPSULE ORAL
Qty: 4 CAP | Refills: 12 | Status: SHIPPED | OUTPATIENT
Start: 2019-03-22 | End: 2020-04-20

## 2019-04-11 ENCOUNTER — OFFICE VISIT (OUTPATIENT)
Dept: FAMILY MEDICINE CLINIC | Age: 64
End: 2019-04-11

## 2019-04-11 VITALS
SYSTOLIC BLOOD PRESSURE: 112 MMHG | BODY MASS INDEX: 34.76 KG/M2 | WEIGHT: 196.2 LBS | RESPIRATION RATE: 16 BRPM | DIASTOLIC BLOOD PRESSURE: 75 MMHG | HEIGHT: 63 IN | HEART RATE: 76 BPM | TEMPERATURE: 97.6 F

## 2019-04-11 DIAGNOSIS — Z12.31 ENCOUNTER FOR SCREENING MAMMOGRAM FOR MALIGNANT NEOPLASM OF BREAST: Primary | ICD-10-CM

## 2019-04-11 DIAGNOSIS — I10 ESSENTIAL HYPERTENSION: Primary | Chronic | ICD-10-CM

## 2019-04-11 DIAGNOSIS — L30.9 DERMATITIS: ICD-10-CM

## 2019-04-11 DIAGNOSIS — E78.5 HYPERLIPIDEMIA, UNSPECIFIED HYPERLIPIDEMIA TYPE: Chronic | ICD-10-CM

## 2019-04-11 DIAGNOSIS — E87.6 HYPOKALEMIA: Chronic | ICD-10-CM

## 2019-04-11 NOTE — PROGRESS NOTES
Per- verbal order Dr. Trent Burkitt to delete medications patient is not taking.   Medications that where deleted are: Hytone

## 2019-04-11 NOTE — PROGRESS NOTES
Chief Complaint   Patient presents with    Hypertension    Cholesterol Problem     high chol       Health Maintenance Due   Topic Date Due    Shingrix Vaccine Age 51> (1 of 2) 01/17/2007       Health Maintenance reviewed     1. Have you been to the ER, urgent care clinic since your last visit? Hospitalized since your last visit? No    2. Have you seen or consulted any other health care providers outside of the [address] [place] [address] since your last visit? Include any pap smears or colon screening.  No

## 2019-04-11 NOTE — PROGRESS NOTES
Chief Complaint   Patient presents with    Hypertension    Cholesterol Problem     high chol       HPI    Earline Clarke is a 60 y.o. female presenting today for  3 months  follow up of htn, hld. Patient had labs on 1/18/19 and 2/18/19. Labs reviewed in detail with patient     Patient does not need medication refills today. New concerns today: pt reports that she is again having itching of her L breast.      Review of Systems   Constitutional: Negative. HENT: Negative. Respiratory: Negative. Cardiovascular: Negative. Skin: Positive for itching. All other systems reviewed and are negative. Physical Exam  Physical Exam   Nursing note and vitals reviewed. Constitutional: She is oriented to person, place, and time. She appears well-developed and well-nourished. HENT:   Head: Normocephalic and atraumatic. Right Ear: External ear normal.   Left Ear: External ear normal.   Nose: Nose normal.   Eyes: Conjunctivae and EOM are normal.   Neck: Normal range of motion. Neck supple. No JVD present. Carotid bruit is not present. No thyromegaly present. Cardiovascular: Normal rate, regular rhythm, normal heart sounds and intact distal pulses. Exam reveals no gallop and no friction rub. No murmur heard. Pulmonary/Chest: Effort normal and breath sounds normal. She has no wheezes. She has no rhonchi. She has no rales. Abdominal: Soft. Bowel sounds are normal.   Musculoskeletal: Normal range of motion. Neurological: She is alert and oriented to person, place, and time. Coordination normal.   Skin: Skin is warm and dry. Psychiatric: She has a normal mood and affect. Her behavior is normal. Judgment and thought content normal.     Diagnoses and all orders for this visit:    1. Essential hypertension  Stable, cont pres tx plan. 2. Hypokalemia  Stable, cont pres tx plan. 3. Hyperlipidemia, unspecified hyperlipidemia type  Stable, cont pres tx plan.      4. Dermatitis  -     REFERRAL TO DERMATOLOGY      Follow-up and Dispositions    · Return in about 4 months (around 8/11/2019) for high blood pressure, hypokalemia, high cholesterol.

## 2019-04-11 NOTE — LETTER
NOTIFICATION RETURN TO WORK / SCHOOL 
 
4/11/2019 10:20 AM 
 
Ms. Earline Clarke [address] To Whom It May Concern: 
 
Earline Clarke is currently under the care of Miguel King. She will return to work/school on: 4/12/19 If there are questions or concerns please have the patient contact our office. Sincerely, Rakel Rodriguez MD

## 2019-05-22 ENCOUNTER — HOSPITAL ENCOUNTER (OUTPATIENT)
Dept: ONCOLOGY | Age: 64
Discharge: HOME | End: 2019-05-22

## 2019-05-22 ENCOUNTER — OFFICE VISIT (OUTPATIENT)
Dept: ONCOLOGY | Age: 64
End: 2019-05-22

## 2019-05-22 VITALS
WEIGHT: 190 LBS | TEMPERATURE: 97.4 F | DIASTOLIC BLOOD PRESSURE: 89 MMHG | OXYGEN SATURATION: 99 % | SYSTOLIC BLOOD PRESSURE: 142 MMHG | RESPIRATION RATE: 16 BRPM | HEART RATE: 64 BPM | BODY MASS INDEX: 33.66 KG/M2 | HEIGHT: 63 IN

## 2019-05-22 DIAGNOSIS — D46.9 MDS (MYELODYSPLASTIC SYNDROME) (HCC): ICD-10-CM

## 2019-05-22 DIAGNOSIS — D46.4 REFRACTORY ANEMIA (HCC): ICD-10-CM

## 2019-05-22 DIAGNOSIS — D46.9 MDS (MYELODYSPLASTIC SYNDROME) (HCC): Primary | ICD-10-CM

## 2019-05-22 DIAGNOSIS — M19.90 ARTHRITIS: ICD-10-CM

## 2019-05-22 LAB
BASO+EOS+MONOS # BLD AUTO: 0.5 K/UL (ref 0–2.3)
BASO+EOS+MONOS NFR BLD AUTO: 9 % (ref 0.1–17)
DIFFERENTIAL METHOD BLD: ABNORMAL
ERYTHROCYTE [DISTWIDTH] IN BLOOD BY AUTOMATED COUNT: 13.5 % (ref 11.5–14.5)
HCT VFR BLD AUTO: 30 % (ref 36–48)
HGB BLD-MCNC: 9.9 G/DL (ref 12–16)
LYMPHOCYTES # BLD: 1.4 K/UL (ref 1.1–5.9)
LYMPHOCYTES NFR BLD: 25 % (ref 14–44)
MCH RBC QN AUTO: 30.2 PG (ref 25–35)
MCHC RBC AUTO-ENTMCNC: 33 G/DL (ref 31–37)
MCV RBC AUTO: 91.5 FL (ref 78–102)
NEUTS SEG # BLD: 3.9 K/UL (ref 1.8–9.5)
NEUTS SEG NFR BLD: 66 % (ref 40–70)
PLATELET # BLD AUTO: 283 K/UL (ref 140–440)
RBC # BLD AUTO: 3.28 M/UL (ref 4.1–5.1)
WBC # BLD AUTO: 5.8 K/UL (ref 4.5–13)

## 2019-05-22 NOTE — PATIENT INSTRUCTIONS
Myelodysplastic Syndromes: Care Instructions  Your Care Instructions  Myelodysplastic syndromes, also called MDS, are a group of rare conditions in which the bone marrow does not make enough healthy blood cells. Normally, the bone marrow makes red blood cells, white blood cells, and platelets. These cells carry oxygen in the blood, help the body fight infections, and help the blood clot. With MDS, you may feel weak and tired, get infections often, and bruise easily, although symptoms tend to vary. MDS is a form of blood cancer. In some cases, MDS can turn into acute myeloid leukemia (AML), another type of cancer. Some people develop MDS after treatment for cancer or exposure to pesticides or other chemicals. But in most cases, the cause of MDS is not known. Your doctor will use the results of blood tests to guide your treatment. There are many types of MDS, with different treatment plans for each. If you have enough red blood cells and are feeling all right, you may not need active treatment, but you and your doctor will want to watch your condition carefully. If you start feeling lightheaded and have no energy, you may need a blood transfusion. Your doctor also may give you antibiotics to prevent or treat infection. Follow-up care is a key part of your treatment and safety. Be sure to make and go to all appointments, and call your doctor if you are having problems. It's also a good idea to know your test results and keep a list of the medicines you take. How can you care for yourself at home? · Take your medicines exactly as prescribed. Call your doctor if you think you are having a problem with your medicine. You will get more details on the specific medicines your doctor prescribes. · If your doctor prescribed antibiotics, take them as directed. Do not stop taking them just because you feel better. You need to take the full course of antibiotics.  If you have side effects from antibiotics, tell your doctor. · Take steps to control your stress and workload. Learn relaxation techniques. ? Share your feelings. Stress and tension affect our emotions. By expressing your feelings to others, you may be able to understand and cope with them. ? Consider joining a support group. Talking about a problem with your spouse, a good friend, or other people with similar problems is a good way to reduce tension and stress. ? Express yourself with art. Try writing, crafts, dance, or art to relieve stress. ? Be kind to your body and mind. Getting enough sleep, eating a healthy diet, and taking time to do things you enjoy can contribute to an overall feeling of balance in your life and help reduce stress. ? Get help if you need it. Discuss your concerns with your doctor or counselor. · Do not smoke. Smoking can make blood problems worse. If you need help quitting, talk to your doctor about stop-smoking programs and medicines. These can increase your chances of quitting for good. · If you have not already done so, prepare a list of advance directives. Advance directives are instructions to your doctor and family members about what kind of care you want if you become unable to speak or express yourself. · Call the [employer] ([phone]) or visit its website at [employer] for more information. When should you call for help? Call 911 anytime you think you may need emergency care.  For example, call if:    · You passed out (lost consciousness).    Call your doctor now or seek immediate medical care if:    · You have a fever.     · You have abnormal bleeding.     · You have new or worse pain.     · You think you have an infection.     · You have new symptoms, such as a cough, belly pain, vomiting, diarrhea, or a rash.    Watch closely for changes in your health, and be sure to contact your doctor if:    · You are much more tired than usual.     · You have swollen glands in your armpits, groin, or neck.     · You do not get better as expected. Where can you learn more? Go to http://uday-bhavna.info/. Enter Orlan Halsted in the search box to learn more about \"Myelodysplastic Syndromes: Care Instructions. \"  Current as of: March 27, 2018  Content Version: 11.9  © 5541-5285 [employer]. Care instructions adapted under license by [employer] (which disclaims liability or warranty for this information). If you have questions about a medical condition or this instruction, always ask your healthcare professional. [address] any warranty or liability for your use of this information. Anemia: Care Instructions  Your Care Instructions    Anemia is a low level of red blood cells, which carry oxygen throughout your body. Many things can cause anemia. Lack of iron is one of the most common causes. Your body needs iron to make hemoglobin, a substance in red blood cells that carries oxygen from the lungs to your body's cells. Without enough iron, the body produces fewer and smaller red blood cells. As a result, your body's cells do not get enough oxygen, and you feel tired and weak. And you may have trouble concentrating. Bleeding is the most common cause of a lack of iron. You may have heavy menstrual bleeding or bleeding caused by conditions such as ulcers, hemorrhoids, or cancer. Regular use of aspirin or other anti-inflammatory medicines (such as ibuprofen) also can cause bleeding in some people. A lack of iron in your diet also can cause anemia, especially at times when the body needs more iron, such as during pregnancy, infancy, and the teen years. Your doctor may have prescribed iron pills. It may take several months of treatment for your iron levels to return to normal. Your doctor also may suggest that you eat foods that are rich in iron, such as meat and beans. There are many other causes of anemia. It is not always due to a lack of iron.  Finding the specific cause of your anemia will help your doctor find the right treatment for you. Follow-up care is a key part of your treatment and safety. Be sure to make and go to all appointments, and call your doctor if you are having problems. It's also a good idea to know your test results and keep a list of the medicines you take. How can you care for yourself at home? · Take your medicines exactly as prescribed. Call your doctor if you think you are having a problem with your medicine. · If your doctor recommends iron pills, take them as directed:  ? Try to take the pills on an empty stomach about 1 hour before or 2 hours after meals. But you may need to take iron with food to avoid an upset stomach. ? Do not take antacids or drink milk or caffeine drinks (such as coffee, tea, or cola) at the same time or within 2 hours of the time that you take your iron. They can make it hard for your body to absorb the iron. ? Vitamin C (from food or supplements) helps your body absorb iron. Try taking iron pills with a glass of orange juice or some other food that is high in vitamin C, such as citrus fruits. ? Iron pills may cause stomach problems, such as heartburn, nausea, diarrhea, constipation, and cramps. Be sure to drink plenty of fluids, and include fruits, vegetables, and fiber in your diet each day. Iron pills often make your bowel movements dark or green. ? If you forget to take an iron pill, do not take a double dose of iron the next time you take a pill. ? Keep iron pills out of the reach of small children. An overdose of iron can be very dangerous. · Follow your doctor's advice about eating iron-rich foods. These include red meat, shellfish, poultry, eggs, beans, raisins, whole-grain bread, and leafy green vegetables. · Steam vegetables to help them keep their iron content. When should you call for help? Call 911 anytime you think you may need emergency care.  For example, call if:    · You have symptoms of a heart attack. These may include:  ? Chest pain or pressure, or a strange feeling in the chest.  ? Sweating. ? Shortness of breath. ? Nausea or vomiting. ? Pain, pressure, or a strange feeling in the back, neck, jaw, or upper belly or in one or both shoulders or arms. ? Lightheadedness or sudden weakness. ? A fast or irregular heartbeat. After you call 911, the  may tell you to chew 1 adult-strength or 2 to 4 low-dose aspirin. Wait for an ambulance. Do not try to drive yourself.     · You passed out (lost consciousness).    Call your doctor now or seek immediate medical care if:    · You have new or increased shortness of breath.     · You are dizzy or lightheaded, or you feel like you may faint.     · Your fatigue and weakness continue or get worse.     · You have any abnormal bleeding, such as:  ? Nosebleeds. ? Vaginal bleeding that is different (heavier, more frequent, at a different time of the month) than what you are used to.  ? Bloody or black stools, or rectal bleeding. ? Bloody or pink urine.    Watch closely for changes in your health, and be sure to contact your doctor if:    · You do not get better as expected. Where can you learn more? Go to http://uday-bhavna.info/. Enter R301 in the search box to learn more about \"Anemia: Care Instructions. \"  Current as of: May 6, 2018  Content Version: 11.9  © 8301-9446 [employer], [email or web address]. Care instructions adapted under license by [employer] (which disclaims liability or warranty for this information). If you have questions about a medical condition or this instruction, always ask your healthcare professional. [place] [address] any warranty or liability for your use of this information.

## 2019-05-22 NOTE — PROGRESS NOTES
Hematology/Oncology  Progress Note Name: Jovanny Card Date: 2019 : 1955 PCP: Dr. Izzy Liang Ms. Partha Kim is a 60y.o. year old female who was seen for management of her myelodysplastic syndrome/refractory anemia Current therapy: Iron supplement, Procrit or Aranesp is available whenever her hematocrit is below 30%. Subjective:  
 
Ms. Partha Kim is a 72-year-old -American woman with myelodysplastic syndrome/refractory anemia. She is continuing to do well. The patient reports that she continues taking the over-the-counter iron supplement as needed. She has no other physical complaints at this time. Past medical history, family history, and social history were reviewed and remain unchanged. Past Medical History:  
Diagnosis Date  Echocardiogram 2011 Tech difficult. EF 60-65%. Mild LVH. RVSP 20-25 mmHg.  Essential hypertension  History of colon polyps  Hx of endometriosis   
 had hyst  
 Hyperlipidemia  Hypertension  MDS (myelodysplastic syndrome) ([facility] [address]  Refractory anemia (HCC) Past Surgical History:  
Procedure Laterality Date  HX HYSTERECTOMY Social History Socioeconomic History  Marital status: SINGLE Spouse name: Not on file  Number of children: Not on file  Years of education: Not on file  Highest education level: Not on file Occupational History  Not on file Social Needs  Financial resource strain: Not on file  Food insecurity:  
  Worry: Not on file Inability: Not on file  Transportation needs:  
  Medical: Not on file Non-medical: Not on file Tobacco Use  Smoking status: Never Smoker  Smokeless tobacco: Never Used Substance and Sexual Activity  Alcohol use: No  
 Drug use: No  
 Sexual activity: Not on file Lifestyle  Physical activity:  
  Days per week: Not on file Minutes per session: Not on file  Stress: Not on file Relationships  Social connections:  
  Talks on phone: Not on file Gets together: Not on file Attends Rastafari service: Not on file Active member of club or organization: Not on file Attends meetings of clubs or organizations: Not on file Relationship status: Not on file  Intimate partner violence:  
  Fear of current or ex partner: Not on file Emotionally abused: Not on file Physically abused: Not on file Forced sexual activity: Not on file Other Topics Concern  Not on file Social History Narrative  Not on file Family History Problem Relation Age of Onset  Cancer Mother [facility] Arthritis-rheumatoid Sister  Diabetes Sister  Hypertension Sister  No Known Problems Father Current Outpatient Medications Medication Sig Dispense Refill  VITAMIN D2 50,000 unit capsule TAKE 1 CAPSULE BY MOUTH ONCE A WEEK 4 Cap 12  
 diclofenac (VOLTAREN) 1 % gel Apply 4 g to affected area four (4) times daily. 400 g 12  
 simvastatin (ZOCOR) 20 mg tablet TAKE ONE TABLET BY MOUTH ONCE DAILY AT BEDTIME 30 Tab 11  
 losartan-hydroCHLOROthiazide (HYZAAR) 100-12.5 mg per tablet TAKE ONE TABLET BY MOUTH ONCE DAILY 30 Tab 11  
 valACYclovir (VALTREX) 500 mg tablet TAKE ONE TABLET BY MOUTH TWICE DAILY 30 Tab 12  
 diclofenac EC (VOLTAREN) 75 mg EC tablet TAKE ONE TABLET BY MOUTH ONCE DAILY IN THE EVENING 30 Tab 5  carvedilol (COREG) 25 mg tablet TAKE ONE TABLET BY MOUTH TWICE DAILY WITH MEALS 180 Tab 3  polyethylene glycol (MIRALAX) 17 gram/dose powder MIX 17 GRAMS (1 CAPFUL) IN LIQUID AND DRINK BY MOUTH ONCE DAILY FOR CONSTIPATION 517 g 12  
 latanoprost (XALATAN) 0.005 % ophthalmic solution Administer 1 Drop to both eyes nightly. Review of Systems Constitutional: The patient complains of occasional fatigue HEENT: The patient denies recent head trauma, eye pain, blurred vision,  hearing deficit, oropharyngeal mucosal pain or lesions, and the patient denies throat pain or discomfort. Lymphatics: The patient denies palpable peripheral lymphadenopathy. Hematologic: The patient denies having bruising, bleeding, or progressive fatigue. Respiratory: Patient denies having shortness of breath, cough, sputum production, fever, or dyspnea on exertion. Cardiovascular: The patient denies having leg pain, leg swelling, heart palpitations, chest permit, chest pain, or lightheadedness. The patient denies having dyspnea on exertion. Gastrointestinal: The patient denies having nausea, emesis, or diarrhea. The patient denies having any hematemesis or blood in the stool. Genitourinary: Patient denies having urinary urgency, frequency, or dysuria. The patient denies having blood in the urine. Psychological: The patient denies having symptoms of nervousness, anxiety, depression, or thoughts of harming himself some of this. Skin: Patient denies having skin rashes, skin, ulcerations, or unexplained itching or pruritus. Musculoskeletal: The patient  is complaining of occasional musculoskeletal pain primarily in the lower extremities. Objective:  
 
Visit Vitals LMP 08/31/1998 Pain Scale 0/10 Physical Exam:  
ECOG=0 Gen. Appearance: The patient is in no acute distress. Skin: There is no bruise or rash. HEENT: The exam is unremarkable. Neck: Supple without lymphadenopathy or thyromegaly. Lungs: Clear to auscultation and percussion; there are no wheezes or rhonchi. Heart: Regular rate and rhythm; there are no murmurs, gallops, or rubs. aanterior chest wall and breasts: Deferred. The axilla reveals no palpable axillary lymphadenopathy. Abdomen: Bowel sounds are present and normal.  There is no guarding, tenderness, or hepatosplenomegaly. Extremities: There is no clubbing, cyanosis, or edema. Neurologic: There are no focal neurologic deficits. Lymphatics: There is no palpable peripheral lymphadenopathy. Lab data: Results for orders placed or performed during the hospital encounter of 02/18/19 CBC WITH 3 PART DIFF Result Value Ref Range WBC 5.2 4.5 - 13.0 K/uL  
 RBC 3.46 (L) 4.10 - 5.10 M/uL  
 HGB 10.3 (L) 12.0 - 16 g/dL HCT 31.6 (L) 36 - 48 % MCV 91.3 78 - 102 FL  
 MCH 29.8 25.0 - 35.0 PG  
 MCHC 32.6 31 - 37 g/dL  
 RDW 13.4 11.5 - 14.5 % PLATELET 135 809 - 629 K/uL NEUTROPHILS 65 40 - 70 % MIXED CELLS 9 0.1 - 17 % LYMPHOCYTES 26 14 - 44 % ABS. NEUTROPHILS 3.4 1.8 - 9.5 K/UL  
 ABS. MIXED CELLS 0.5 0.0 - 2.3 K/uL  
 ABS. LYMPHOCYTES 1.3 1.1 - 5.9 K/UL  
 DF AUTOMATED Assessment:  
 
1. [facility] Plan: Myelodysplastic syndrome: I have explained to the patient that her WBC is stable at 5.3 and a platelet count is 851,352. Therapeutic intervention with Procrit will be provided for her if the hematocrit declines below 30% with a hemoglobin below 10 g/dL. the dose of Procrit will be 60,000 units given subcutaneously every 2 or 4 weeks. We do not need to pursue a bone marrow biopsy at this time. However, if she experiences a rapid decline in the hemoglobin and hematocrit with an elevation in her WBC counts that would be an indication to do a bone marrow biopsy to rule out whether not she is converting to a chronic myelomonocytic leukemia component of her MDS versus converting to an acute leukemic process. I will plan to have her return to clinic for a complete assessment in 3 months. Refractory anemia/MDS: I have explained to the patient that her current hemoglobin and hematocrit were 10.3 g/dL and 31.6% respectively. We will continue to monitor her hemoglobin and hematocrit every 3 months and if she should have a decrease in her hemoglobin below 10 g/dl and  hematocrit below 30% we will offer interventional therapy with either Procrit or Aranesp. At this time I will check a ferritin level and iron profile.  The comprehensive metabolic panel will also be ordered. The patient states she has been taking ferrous sulfate as needed. I have advise patient to take medication daily. Arthritis/facet arthritis of the cervical region: The patient will continue to use Diclofenac ( Voltaren)  75 mg daily as provided to her for pain control by her PCP. Patient will return to clinic for a complete reassessment again in 3 months. Orders Placed This Encounter  COMPLETE CBC & AUTO DIFF WBC  InHouse CBC ([facility]) Standing Status:   Future Number of Occurrences:   1 Standing Expiration Date:   5/29/2019 Faina Bull MD 
10/22/2018

## 2019-05-22 NOTE — PROGRESS NOTES
Hematology/Oncology  Progress Note    Name: Brinda Braxton  Date: 2019  : 1955    PCP: Dr. González Connor    Ms. German Jimenez is a 60y.o. year old female who was seen for management of her myelodysplastic syndrome/refractory anemia    Current therapy: Iron supplement, Procrit or Aranesp is available whenever her hematocrit is below 30%. Subjective:     Ms. German Jimenez is a 63-year-old -American woman with myelodysplastic syndrome/refractory anemia. She is continuing to do well. The patient reports that she continues taking the over-the-counter iron supplement as needed. She has no other physical complaints at this time. Past medical history, family history, and social history were reviewed and remain unchanged. Past Medical History:   Diagnosis Date    Echocardiogram 2011    Tech difficult. EF 60-65%. Mild LVH. RVSP 20-25 mmHg.       Essential hypertension     History of colon polyps     Hx of endometriosis     had hyst    Hyperlipidemia     Hypertension     MDS (myelodysplastic syndrome) (HCC)     Refractory anemia (HCC)      Past Surgical History:   Procedure Laterality Date    HX HYSTERECTOMY       Social History     Socioeconomic History    Marital status: SINGLE     Spouse name: Not on file    Number of children: Not on file    Years of education: Not on file    Highest education level: Not on file   Occupational History    Not on file   Social Needs    Financial resource strain: Not on file    Food insecurity:     Worry: Not on file     Inability: Not on file    Transportation needs:     Medical: Not on file     Non-medical: Not on file   Tobacco Use    Smoking status: Never Smoker    Smokeless tobacco: Never Used   Substance and Sexual Activity    Alcohol use: No    Drug use: No    Sexual activity: Not on file   Lifestyle    Physical activity:     Days per week: Not on file     Minutes per session: Not on file    Stress: Not on file   Relationships    Social connections:     Talks on phone: Not on file     Gets together: Not on file     Attends Evangelical service: Not on file     Active member of club or organization: Not on file     Attends meetings of clubs or organizations: Not on file     Relationship status: Not on file    Intimate partner violence:     Fear of current or ex partner: Not on file     Emotionally abused: Not on file     Physically abused: Not on file     Forced sexual activity: Not on file   Other Topics Concern    Not on file   Social History Narrative    Not on file     Family History   Problem Relation Age of Onset    Cancer Mother     Arthritis-rheumatoid Sister     Diabetes Sister     Hypertension Sister     No Known Problems Father      Current Outpatient Medications   Medication Sig Dispense Refill    VITAMIN D2 50,000 unit capsule TAKE 1 CAPSULE BY MOUTH ONCE A WEEK 4 Cap 12    diclofenac (VOLTAREN) 1 % gel Apply 4 g to affected area four (4) times daily. 400 g 12    simvastatin (ZOCOR) 20 mg tablet TAKE ONE TABLET BY MOUTH ONCE DAILY AT BEDTIME 30 Tab 11    losartan-hydroCHLOROthiazide (HYZAAR) 100-12.5 mg per tablet TAKE ONE TABLET BY MOUTH ONCE DAILY 30 Tab 11    valACYclovir (VALTREX) 500 mg tablet TAKE ONE TABLET BY MOUTH TWICE DAILY 30 Tab 12    diclofenac EC (VOLTAREN) 75 mg EC tablet TAKE ONE TABLET BY MOUTH ONCE DAILY IN THE EVENING 30 Tab 5    carvedilol (COREG) 25 mg tablet TAKE ONE TABLET BY MOUTH TWICE DAILY WITH MEALS 180 Tab 3    polyethylene glycol (MIRALAX) 17 gram/dose powder MIX 17 GRAMS (1 CAPFUL) IN LIQUID AND DRINK BY MOUTH ONCE DAILY FOR CONSTIPATION 517 g 12    latanoprost (XALATAN) 0.005 % ophthalmic solution Administer 1 Drop to both eyes nightly.            Review of Systems  Constitutional: The patient complains of occasional fatigue  HEENT: The patient denies recent head trauma, eye pain, blurred vision,  hearing deficit, oropharyngeal mucosal pain or lesions, and the patient denies throat pain or discomfort. Lymphatics: The patient denies palpable peripheral lymphadenopathy. Hematologic: The patient denies having bruising, bleeding, or progressive fatigue. Respiratory: Patient denies having shortness of breath, cough, sputum production, fever, or dyspnea on exertion. Cardiovascular: The patient denies having leg pain, leg swelling, heart palpitations, chest permit, chest pain, or lightheadedness. The patient denies having dyspnea on exertion. Gastrointestinal: The patient denies having nausea, emesis, or diarrhea. The patient denies having any hematemesis or blood in the stool. Genitourinary: Patient denies having urinary urgency, frequency, or dysuria. The patient denies having blood in the urine. Psychological: The patient denies having symptoms of nervousness, anxiety, depression, or thoughts of harming himself some of this. Skin: Patient denies having skin rashes, skin, ulcerations, or unexplained itching or pruritus. Musculoskeletal: The patient  is complaining of occasional musculoskeletal pain primarily in the lower extremities. Objective:     Visit Vitals  /89   Pulse 64   Temp 97.4 °F (36.3 °C) (Oral)   Resp 16   Ht 5' 3\" (1.6 m)   Wt 86.2 kg (190 lb)   LMP 08/31/1998   SpO2 99%   BMI 33.66 kg/m²     Pain Scale 0/10  Physical Exam:   ECOG=0  Gen. Appearance: The patient is in no acute distress. Skin: There is no bruise or rash. HEENT: The exam is unremarkable. Neck: Supple without lymphadenopathy or thyromegaly. Lungs: Clear to auscultation and percussion; there are no wheezes or rhonchi. Heart: Regular rate and rhythm; there are no murmurs, gallops, or rubs. aanterior chest wall and breasts: Deferred. The axilla reveals no palpable axillary lymphadenopathy. Abdomen: Bowel sounds are present and normal.  There is no guarding, tenderness, or hepatosplenomegaly. Extremities: There is no clubbing, cyanosis, or edema. Neurologic: There are no focal neurologic deficits. Lymphatics: There is no palpable peripheral lymphadenopathy. Lab data:      Results for orders placed or performed during the hospital encounter of 02/18/19   CBC WITH 3 PART DIFF   Result Value Ref Range    WBC 5.2 4.5 - 13.0 K/uL    RBC 3.46 (L) 4.10 - 5.10 M/uL    HGB 10.3 (L) 12.0 - 16 g/dL    HCT 31.6 (L) 36 - 48 %    MCV 91.3 78 - 102 FL    MCH 29.8 25.0 - 35.0 PG    MCHC 32.6 31 - 37 g/dL    RDW 13.4 11.5 - 14.5 %    PLATELET 522 545 - 713 K/uL    NEUTROPHILS 65 40 - 70 %    MIXED CELLS 9 0.1 - 17 %    LYMPHOCYTES 26 14 - 44 %    ABS. NEUTROPHILS 3.4 1.8 - 9.5 K/UL    ABS. MIXED CELLS 0.5 0.0 - 2.3 K/uL    ABS. LYMPHOCYTES 1.3 1.1 - 5.9 K/UL    DF AUTOMATED          Assessment:     1. MDS (myelodysplastic syndrome) ([facility] [address]    2. Refractory anemia (HCC)    3. Arthritis        Plan:   Myelodysplastic syndrome: I have explained to the patient that her WBC is stable at 5.8 and a platelet count is 295,301. Therapeutic intervention with Procrit will be provided for her if the hematocrit declines below 30% with a hemoglobin below 10 g/dL. the dose of Procrit will be 60,000 units given subcutaneously every 2 or 4 weeks. We do not need to pursue a bone marrow biopsy at this time. However, if she experiences a rapid decline in the hemoglobin and hematocrit with an elevation in her WBC counts that would be an indication to do a bone marrow biopsy to rule out whether not she is converting to a chronic myelomonocytic leukemia component of her MDS versus converting to an acute leukemic process. I will plan to have her return to clinic for a complete assessment in 3 months. Refractory anemia/MDS: I have explained to the patient that her current hemoglobin and hematocrit were 9.9 g/dL and 30.0% respectively.   We will continue to monitor her hemoglobin and hematocrit every 3 months and if she should have a decrease in her hemoglobin below 10 g/dl and  hematocrit below 30% we will offer interventional therapy with either Procrit or Aranesp. At this time I will check a ferritin level and iron profile. The comprehensive metabolic panel will also be ordered. The patient states she has been taking ferrous sulfate as needed. I have advise patient to take medication daily. Arthritis/facet arthritis of the cervical region: The patient will continue to use Diclofenac ( Voltaren)  75 mg daily as provided to her for pain control by her PCP. Patient will return to clinic for a complete reassessment again in 3 months. Orders Placed This Encounter    COMPLETE CBC & AUTO DIFF WBC    InHouse CBC ([place])     Standing Status:   Future     Number of Occurrences:   1     Standing Expiration Date:   9/61/8957    METABOLIC PANEL, COMPREHENSIVE     Standing Status:   Future     Standing Expiration Date:   5/22/2020    FERRITIN     Standing Status:   Future     Standing Expiration Date:   5/22/2020    IRON PROFILE     Standing Status:   Future     Standing Expiration Date:   5/22/2020       Chemo Leon NP  5/22/2019    I have assessed the patient independently and  agree with the full assessment as outlined.   Joshua Conway MD, Aury Treviño

## 2019-05-23 LAB
ALBUMIN SERPL-MCNC: 4.2 G/DL (ref 3.6–4.8)
ALBUMIN/GLOB SERPL: 1.2 {RATIO} (ref 1.2–2.2)
ALP SERPL-CCNC: 85 IU/L (ref 39–117)
ALT SERPL-CCNC: 12 IU/L (ref 0–32)
AST SERPL-CCNC: 17 IU/L (ref 0–40)
BILIRUB SERPL-MCNC: 0.6 MG/DL (ref 0–1.2)
BUN SERPL-MCNC: 14 MG/DL (ref 8–27)
BUN/CREAT SERPL: 17 (ref 12–28)
CALCIUM SERPL-MCNC: 9.8 MG/DL (ref 8.7–10.3)
CHLORIDE SERPL-SCNC: 102 MMOL/L (ref 96–106)
CO2 SERPL-SCNC: 25 MMOL/L (ref 20–29)
CREAT SERPL-MCNC: 0.81 MG/DL (ref 0.57–1)
FERRITIN SERPL-MCNC: 251 NG/ML (ref 15–150)
GLOBULIN SER CALC-MCNC: 3.4 G/DL (ref 1.5–4.5)
GLUCOSE SERPL-MCNC: 79 MG/DL (ref 65–99)
IRON SATN MFR SERPL: 19 % (ref 15–55)
IRON SERPL-MCNC: 51 UG/DL (ref 27–139)
POTASSIUM SERPL-SCNC: 3.9 MMOL/L (ref 3.5–5.2)
PROT SERPL-MCNC: 7.6 G/DL (ref 6–8.5)
SODIUM SERPL-SCNC: 142 MMOL/L (ref 134–144)
TIBC SERPL-MCNC: 275 UG/DL (ref 250–450)
UIBC SERPL-MCNC: 224 UG/DL (ref 118–369)

## 2019-07-24 ENCOUNTER — TELEPHONE (OUTPATIENT)
Dept: FAMILY MEDICINE CLINIC | Age: 64
End: 2019-07-24

## 2019-07-25 DIAGNOSIS — I10 ESSENTIAL HYPERTENSION: ICD-10-CM

## 2019-07-25 NOTE — TELEPHONE ENCOUNTER
Called patient and chart review was done. Patient stated that she called the wrong practice. Patient stated that she was calling the office she did her Mammogram at.

## 2019-07-26 NOTE — TELEPHONE ENCOUNTER
PCP: Wicho Cohen MD    Last appt: 4/11/2019  Future Appointments   Date Time Provider [place]   8/19/2019  9:00 AM Wicho Cohen MD [facility] None   8/26/2019 11:45 AM MD [facility]       Requested Prescriptions     Pending Prescriptions Disp Refills    carvedilol (COREG) 25 mg tablet [Pharmacy Med Name: CARVEDILOL 25MG     TAB] 180 Tab 3     Sig: TAKE 1 TABLET BY MOUTH [address] Outpatient Visit on 05/22/2019   Component Date Value Ref Range Status    WBC 05/22/2019 5.8  4.5 - 13.0 K/uL Final    RBC 05/22/2019 3.28* 4.10 - 5.10 M/uL Final    HGB 05/22/2019 9.9* 12.0 - 16 g/dL Final    HCT 05/22/2019 30.0* 36 - 48 % Final    MCV 05/22/2019 91.5  78 - 102 FL Final    MCH 05/22/2019 30.2  25.0 - 35.0 PG Final    MCHC 05/22/2019 33.0  31 - 37 g/dL Final    RDW 05/22/2019 13.5  11.5 - 14.5 % Final    PLATELET 92/46/8828 688  140 - 440 K/uL Final    NEUTROPHILS 05/22/2019 66  40 - 70 % Final    MIXED CELLS 05/22/2019 9  0.1 - 17 % Final    LYMPHOCYTES 05/22/2019 25  14 - 44 % Final    ABS. NEUTROPHILS 05/22/2019 3.9  1.8 - 9.5 K/UL Final    ABS. MIXED CELLS 05/22/2019 0.5  0.0 - 2.3 K/uL Final    ABS. LYMPHOCYTES 05/22/2019 1.4  1.1 - 5.9 K/UL Final    Test performed at [address] [place] [address] or Outpatient Infusion Center Location. Reviewed by Medical Director.     DF 05/22/2019 AUTOMATED    Final   Office Visit on 05/22/2019   Component Date Value Ref Range Status    TIBC 05/22/2019 275  250 - 450 ug/dL Final    UIBC 05/22/2019 224  118 - 369 ug/dL Final    Iron 05/22/2019 51  27 - 139 ug/dL Final    Iron % saturation 05/22/2019 19  15 - 55 % Final    Ferritin 05/22/2019 251* 15 - 150 ng/mL Final    Glucose 05/22/2019 79  65 - 99 mg/dL Final    BUN 05/22/2019 14  8 - 27 mg/dL Final    Creatinine 05/22/2019 0.81  0.57 - 1.00 mg/dL Final    GFR est non-AA 05/22/2019 78  >59 mL/min/1.73 Final    GFR est AA 05/22/2019 90  >59 mL/min/1.73 Final    BUN/Creatinine ratio 05/22/2019 17  12 - 28 Final    Sodium 05/22/2019 142  134 - 144 mmol/L Final    Potassium 05/22/2019 3.9  3.5 - 5.2 mmol/L Final    Chloride 05/22/2019 102  96 - 106 mmol/L Final    CO2 05/22/2019 25  20 - 29 mmol/L Final    Calcium 05/22/2019 9.8  8.7 - 10.3 mg/dL Final    Protein, total 05/22/2019 7.6  6.0 - 8.5 g/dL Final    Albumin 05/22/2019 4.2  3.6 - 4.8 g/dL Final    GLOBULIN, TOTAL 05/22/2019 3.4  1.5 - 4.5 g/dL Final    A-G Ratio 05/22/2019 1.2  1.2 - 2.2 Final    Bilirubin, total 05/22/2019 0.6  0.0 - 1.2 mg/dL Final    Alk.  phosphatase 05/22/2019 85  39 - 117 IU/L Final    AST (SGOT) 05/22/2019 17  0 - 40 IU/L Final    ALT (SGPT) 05/22/2019 12  0 - 32 IU/L Final

## 2019-07-30 RX ORDER — CARVEDILOL 25 MG/1
TABLET ORAL
Qty: 180 TAB | Refills: 3 | OUTPATIENT
Start: 2019-07-30

## 2019-07-31 RX ORDER — CARVEDILOL 25 MG/1
TABLET ORAL
Qty: 180 TAB | Refills: 3 | Status: SHIPPED | OUTPATIENT
Start: 2019-07-31 | End: 2020-06-10 | Stop reason: SDUPTHER

## 2019-08-19 ENCOUNTER — OFFICE VISIT (OUTPATIENT)
Dept: FAMILY MEDICINE CLINIC | Age: 64
End: 2019-08-19

## 2019-08-19 VITALS
SYSTOLIC BLOOD PRESSURE: 134 MMHG | RESPIRATION RATE: 18 BRPM | TEMPERATURE: 97.9 F | WEIGHT: 192.8 LBS | HEIGHT: 63 IN | DIASTOLIC BLOOD PRESSURE: 83 MMHG | HEART RATE: 62 BPM | BODY MASS INDEX: 34.16 KG/M2

## 2019-08-19 DIAGNOSIS — I10 ESSENTIAL HYPERTENSION: Primary | ICD-10-CM

## 2019-08-19 DIAGNOSIS — E87.6 HYPOKALEMIA: ICD-10-CM

## 2019-08-19 DIAGNOSIS — E55.9 VITAMIN D DEFICIENCY: ICD-10-CM

## 2019-08-19 DIAGNOSIS — R42 DIZZINESS: ICD-10-CM

## 2019-08-19 DIAGNOSIS — M79.671 PAIN OF RIGHT HEEL: ICD-10-CM

## 2019-08-19 DIAGNOSIS — I10 ESSENTIAL HYPERTENSION: ICD-10-CM

## 2019-08-19 DIAGNOSIS — E78.5 HYPERLIPIDEMIA, UNSPECIFIED HYPERLIPIDEMIA TYPE: ICD-10-CM

## 2019-08-19 NOTE — LETTER
NOTIFICATION RETURN TO WORK / SCHOOL 
 
8/19/2019 9:53 AM 
 
Ms. Lela Zimmer [address] To Whom It May Concern: 
 
Lela Zimmer is currently under the care of Miguel King. She will return to work/school on: 8/19/19 If there are questions or concerns please have the patient contact our office. Sincerely, Krystin Casey MD

## 2019-08-19 NOTE — PROGRESS NOTES
Chief Complaint   Patient presents with    Hypertension     follow up    Cholesterol Problem    Other     Hypokalemia    Dizziness     Patient stated that she was feeling Dizziness while on vacation.  Toe Pain     Patient stated that she been having some right foot pain x 1 months. Pa[place]arney stated that her pain is 10/10. SANTIAGO Quintanilla is a 60 y.o. female presenting today for 4 months  follow up of htn, hld, hypok. Pt reports she had dizziness prior to her recent trip. She was walking in the mornings prior to work and noticed it then. It has resolved. She did not have anything to drink prior to her walks. Pt c/o r posterior heel pain. She was dx with heel spurs several months ago. The heel has been very bothersome for over 2 wks now. Pt recalls a misstep when getting off of a bus while on vacation. Patient does not need medication refills today. Review of Systems   Constitutional: Positive for malaise/fatigue. HENT: Negative. Respiratory: Negative. Cardiovascular: Negative. Musculoskeletal: Positive for joint pain. All other systems reviewed and are negative. Physical Exam  Physical Exam   Nursing note and vitals reviewed. Constitutional: She is oriented to person, place, and time. She appears well-developed and well-nourished. HENT:   Head: Normocephalic and atraumatic. Right Ear: External ear normal.   Left Ear: External ear normal.   Nose: Nose normal.   Eyes: Conjunctivae and EOM are normal.   Neck: Normal range of motion. Neck supple. No JVD present. Carotid bruit is not present. No thyromegaly present. Cardiovascular: Normal rate, regular rhythm, normal heart sounds and intact distal pulses. Exam reveals no gallop and no friction rub. No murmur heard. Pulmonary/Chest: Effort normal and breath sounds normal. She has no wheezes. She has no rhonchi. She has no rales. Abdominal: Soft.  Bowel sounds are normal.   Musculoskeletal: Normal range of motion. Neurological: She is alert and oriented to person, place, and time. Coordination normal.   Skin: Skin is warm and dry. Psychiatric: She has a normal mood and affect. Her behavior is normal. Judgment and thought content normal.     Diagnoses and all orders for this visit:    1. Essential hypertension  -     METABOLIC PANEL, COMPREHENSIVE; Future  -     LIPID PANEL; Future  Stable, cont pres tx plan. 2. Hypokalemia  -     METABOLIC PANEL, COMPREHENSIVE; Future    3. Hyperlipidemia, unspecified hyperlipidemia type  -     METABOLIC PANEL, COMPREHENSIVE; Future  -     LIPID PANEL; Future    4. Dizziness  Resolved at this time. Discussed importance of good hydration with exercise, especially in hot weather. 5. Pain of right heel  -     REFERRAL TO PODIATRY    6. Vitamin D deficiency  -     VITAMIN D, 25 HYDROXY; Future        Follow-up and Dispositions    · Return in about 4 months (around 12/19/2019).

## 2019-08-19 NOTE — PROGRESS NOTES
Jo Francisco presents today for   Chief Complaint   Patient presents with    Hypertension     follow up    Cholesterol Problem    Other     Hypokalemia    Dizziness     Patient stated that she was feeling Dizziness while on vacation.  Toe Pain     Patient stated that she been having some right foot pain x 1 months. Paient stated that her pain is 10/10. Jo Francisco preferred language for health care discussion is english/other. Is someone accompanying this pt? no    Is the patient using any DME equipment during [address]? no    Depression Screening:  3 most recent PHQ Screens 1/30/2019   Little interest or pleasure in doing things Not at all   Feeling down, depressed, irritable, or hopeless Not at all   Total Score PHQ 2 0       Learning Assessment:  Learning Assessment 1/30/2019   PRIMARY LEARNER Patient   HIGHEST LEVEL OF EDUCATION - PRIMARY LEARNER  4 YEARS OF COLLEGE   BARRIERS PRIMARY LEARNER NONE   CO-LEARNER CAREGIVER No   PRIMARY LANGUAGE ENGLISH    NEED -   LEARNER PREFERENCE PRIMARY READING     -     -     -     -   ANSWERED BY self   RELATIONSHIP SELF       Abuse Screening:  Abuse Screening Questionnaire 1/30/2019   Do you ever feel afraid of your partner? N   Are you in a relationship with someone who physically or mentally threatens you? N   Is it safe for you to go home? Y       Generalized Anxiety  No flowsheet data found. Health Maintenance Due   Topic Date Due    Shingrix Vaccine Age 49> (1 of 2) 01/17/2007    Influenza Age 5 to Adult  08/01/2019   . Health Maintenance reviewed and discussed and ordered per Provider. Jo Francisco is updated on all     Coordination of Care:  1. Have you been to the ER, urgent care clinic since your last visit? Hospitalized since your last visit? no    2. Have you seen or consulted any other health care providers outside of the [address] [place] [address] since your last visit? Include any pap smears or colon screening. no      Advance Directive:  1. Do you have an advance directive in place? Patient Reply:no    2. If not, would you like material regarding how to put one in place?  Patient Reply: no

## 2019-08-26 ENCOUNTER — OFFICE VISIT (OUTPATIENT)
Dept: ONCOLOGY | Age: 64
End: 2019-08-26

## 2019-08-26 VITALS
RESPIRATION RATE: 16 BRPM | WEIGHT: 191 LBS | OXYGEN SATURATION: 99 % | DIASTOLIC BLOOD PRESSURE: 79 MMHG | HEART RATE: 78 BPM | HEIGHT: 63 IN | TEMPERATURE: 97.3 F | SYSTOLIC BLOOD PRESSURE: 155 MMHG | BODY MASS INDEX: 33.84 KG/M2

## 2019-08-26 DIAGNOSIS — D46.9 MDS (MYELODYSPLASTIC SYNDROME) (HCC): Primary | ICD-10-CM

## 2019-08-26 DIAGNOSIS — M19.90 ARTHRITIS: ICD-10-CM

## 2019-08-26 DIAGNOSIS — D64.9 ANEMIA, UNSPECIFIED TYPE: ICD-10-CM

## 2019-08-26 NOTE — LETTER
NOTIFICATION RETURN TO WORK / SCHOOL 
 
8/26/2019 12:33 PM 
 
Ms. Elder Raymond [address] To Whom It May Concern: 
 
Elder Raymond is currently under the care of [address] [place] [address]. She will return to work/school on: 08/27/2019 If there are questions or concerns please have the patient contact our office. Sincerely, Alvaro Herring MD

## 2019-08-26 NOTE — PROGRESS NOTES
Hematology/Oncology  Progress Note    Name: [facility]  Date: 2019  : 1955    PCP: Dr. Leo Yeager    Ms. Enrique Morataya is a 60y.o. year old female who was seen for management of her myelodysplastic syndrome/refractory anemia    Current therapy: Iron supplement, Procrit or Aranesp is available whenever her hematocrit is below 30%. Subjective:     Ms. Enrique Morataya is a 72-year-old -American woman with myelodysplastic syndrome/refractory anemia. She is continuing to do well. The patient reports that she continues taking the over-the-counter iron supplement as needed. She has no other physical complaints at this time. Past medical history, family history, and social history were reviewed and remain unchanged. Past Medical History:   Diagnosis Date    Echocardiogram 2011    Tech difficult. EF 60-65%. Mild LVH. RVSP 20-25 mmHg.       Essential hypertension     History of colon polyps     Hx of endometriosis     had hyst    Hyperlipidemia     Hypertension     MDS (myelodysplastic syndrome) (HCC)     Refractory anemia (HCC)      Past Surgical History:   Procedure Laterality Date    HX HYSTERECTOMY       Social History     Socioeconomic History    Marital status: SINGLE     Spouse name: Not on file    Number of children: Not on file    Years of education: Not on file    Highest education level: Not on file   Occupational History    Not on file   Social Needs    Financial resource strain: Not on file    Food insecurity:     Worry: Not on file     Inability: Not on file    Transportation needs:     Medical: Not on file     Non-medical: Not on file   Tobacco Use    Smoking status: Never Smoker    Smokeless tobacco: Never Used   Substance and Sexual Activity    Alcohol use: No    Drug use: No    Sexual activity: Not on file   Lifestyle    Physical activity:     Days per week: Not on file     Minutes per session: Not on file    Stress: Not on file   Relationships    Social connections:     Talks on phone: Not on file     Gets together: Not on file     Attends Anabaptist service: Not on file     Active member of club or organization: Not on file     Attends meetings of clubs or organizations: Not on file     Relationship status: Not on file    Intimate partner violence:     Fear of current or ex partner: Not on file     Emotionally abused: Not on file     Physically abused: Not on file     Forced sexual activity: Not on file   Other Topics Concern    Not on file   Social History Narrative    Not on file     Family History   Problem Relation Age of Onset    Cancer Mother     Arthritis-rheumatoid Sister     Diabetes Sister     Hypertension Sister     No Known Problems Father      Current Outpatient Medications   Medication Sig Dispense Refill    carvedilol (COREG) 25 mg tablet TAKE ONE TABLET BY MOUTH TWICE DAILY WITH MEALS 180 Tab 3    VITAMIN D2 50,000 unit capsule TAKE 1 CAPSULE BY MOUTH ONCE A WEEK 4 Cap 12    diclofenac (VOLTAREN) 1 % gel Apply 4 g to affected area four (4) times daily. 400 g 12    simvastatin (ZOCOR) 20 mg tablet TAKE ONE TABLET BY MOUTH ONCE DAILY AT BEDTIME 30 Tab 11    losartan-hydroCHLOROthiazide (HYZAAR) 100-12.5 mg per tablet TAKE ONE TABLET BY MOUTH ONCE DAILY 30 Tab 11    valACYclovir (VALTREX) 500 mg tablet TAKE ONE TABLET BY MOUTH TWICE DAILY 30 Tab 12    diclofenac EC (VOLTAREN) 75 mg EC tablet TAKE ONE TABLET BY MOUTH ONCE DAILY IN THE EVENING 30 Tab 5    polyethylene glycol (MIRALAX) 17 gram/dose powder MIX 17 GRAMS (1 CAPFUL) IN LIQUID AND DRINK BY MOUTH ONCE DAILY FOR CONSTIPATION 517 g 12    latanoprost (XALATAN) 0.005 % ophthalmic solution Administer 1 Drop to both eyes nightly.            Review of Systems  Constitutional: The patient complains of occasional fatigue  HEENT: The patient denies recent head trauma, eye pain, blurred vision,  hearing deficit, oropharyngeal mucosal pain or lesions, and the patient denies throat pain or discomfort. Lymphatics: The patient denies palpable peripheral lymphadenopathy. Hematologic: The patient denies having bruising, bleeding, or progressive fatigue. Respiratory: Patient denies having shortness of breath, cough, sputum production, fever, or dyspnea on exertion. Cardiovascular: The patient denies having leg pain, leg swelling, heart palpitations, chest permit, chest pain, or lightheadedness. The patient denies having dyspnea on exertion. Gastrointestinal: The patient denies having nausea, emesis, or diarrhea. The patient denies having any hematemesis or blood in the stool. Genitourinary: Patient denies having urinary urgency, frequency, or dysuria. The patient denies having blood in the urine. Psychological: The patient denies having symptoms of nervousness, anxiety, depression, or thoughts of harming himself some of this. Skin: Patient denies having skin rashes, skin, ulcerations, or unexplained itching or pruritus. Musculoskeletal: The patient  is complaining of occasional musculoskeletal pain primarily in the lower extremities. Objective:     Visit Vitals  /79   Pulse 78   Temp 97.3 °F (36.3 °C) (Oral)   Resp 16   Ht 5' 3\" (1.6 m)   Wt 86.6 kg (191 lb)   LMP 08/31/1998   SpO2 99%   BMI 33.83 kg/m²     Pain Scale 0/10  Physical Exam:   ECOG=0  Gen. Appearance: The patient is in no acute distress. Skin: There is no bruise or rash. HEENT: The exam is unremarkable. Neck: Supple without lymphadenopathy or thyromegaly. Lungs: Clear to auscultation and percussion; there are no wheezes or rhonchi. Heart: Regular rate and rhythm; there are no murmurs, gallops, or rubs. aanterior chest wall and breasts: Deferred. The axilla reveals no palpable axillary lymphadenopathy. Abdomen: Bowel sounds are present and normal.  There is no guarding, tenderness, or hepatosplenomegaly. Extremities: There is no clubbing, cyanosis, or edema. Neurologic: There are no focal neurologic deficits. Lymphatics: There is no palpable peripheral lymphadenopathy. Lab data:      Results for orders placed or performed during the hospital encounter of 05/22/19   CBC WITH 3 PART DIFF   Result Value Ref Range    WBC 5.8 4.5 - 13.0 K/uL    RBC 3.28 (L) 4.10 - 5.10 M/uL    HGB 9.9 (L) 12.0 - 16 g/dL    HCT 30.0 (L) 36 - 48 %    MCV 91.5 78 - 102 FL    MCH 30.2 25.0 - 35.0 PG    MCHC 33.0 31 - 37 g/dL    RDW 13.5 11.5 - 14.5 %    PLATELET 928 189 - 958 K/uL    NEUTROPHILS 66 40 - 70 %    MIXED CELLS 9 0.1 - 17 %    LYMPHOCYTES 25 14 - 44 %    ABS. NEUTROPHILS 3.9 1.8 - 9.5 K/UL    ABS. MIXED CELLS 0.5 0.0 - 2.3 K/uL    ABS. LYMPHOCYTES 1.4 1.1 - 5.9 K/UL    DF AUTOMATED          Assessment:     1. MDS (myelodysplastic syndrome) ([facility] [address]    2. Anemia, unspecified type    3. Arthritis        Plan:   Myelodysplastic syndrome: I have explained to the patient that her WBC is not available at this time. Therapeutic intervention with Procrit will be provided for her if the hematocrit declines below 30% with a hemoglobin below 10 g/dL. the dose of Procrit will be 60,000 units given subcutaneously every 2 or 4 weeks. We do not need to pursue a bone marrow biopsy at this time. However, if she experiences a rapid decline in the hemoglobin and hematocrit with an elevation in her WBC counts that would be an indication to do a bone marrow biopsy to rule out whether not she is converting to a chronic myelomonocytic leukemia component of her MDS versus converting to an acute leukemic process. I will plan to have her return to clinic for a complete assessment in 3 months. Refractory anemia/MDS: We will continue to monitor her hemoglobin and hematocrit every 3 months and if she should have a decrease in her hemoglobin below 10 g/dl and  hematocrit below 30% we will offer interventional therapy with either Procrit or Aranesp. At this time I will check a ferritin level and iron profile.  The comprehensive metabolic panel will also be ordered. The patient states she has been taking ferrous sulfate as needed. I have advise patient to take medication daily. Arthritis/facet arthritis of the cervical region: The patient will continue to use Diclofenac ( Voltaren)  75 mg daily as provided to her for pain control by her PCP. Patient will return to clinic for a complete reassessment again in 3 months. Orders Placed This Encounter    CBC WITH AUTOMATED DIFF     Standing Status:   Future     Number of Occurrences:   1     Standing Expiration Date:   5/36/8546    METABOLIC PANEL, COMPREHENSIVE     Standing Status:   Future     Number of Occurrences:   1     Standing Expiration Date:   8/26/2020    FERRITIN     Standing Status:   Future     Number of Occurrences:   1     Standing Expiration Date:   8/26/2020    IRON PROFILE     Standing Status:   Future     Number of Occurrences:   1     Standing Expiration Date:   8/26/2020       Deniz King NP  8/26/2019    I have assessed the patient independently and  agree with the full assessment as outlined.   Romayne Like, MD, [address]

## 2019-08-27 LAB
25(OH)D3+25(OH)D2 SERPL-MCNC: 50.9 NG/ML (ref 30–100)
ALBUMIN SERPL-MCNC: 4.5 G/DL (ref 3.6–4.8)
ALBUMIN/GLOB SERPL: 1.3 {RATIO} (ref 1.2–2.2)
ALP SERPL-CCNC: 96 IU/L (ref 39–117)
ALT SERPL-CCNC: 11 IU/L (ref 0–32)
AST SERPL-CCNC: 17 IU/L (ref 0–40)
BASOPHILS # BLD AUTO: 0.1 X10E3/UL (ref 0–0.2)
BASOPHILS NFR BLD AUTO: 1 %
BILIRUB SERPL-MCNC: 0.6 MG/DL (ref 0–1.2)
BUN SERPL-MCNC: 16 MG/DL (ref 8–27)
BUN/CREAT SERPL: 18 (ref 12–28)
CALCIUM SERPL-MCNC: 10 MG/DL (ref 8.7–10.3)
CHLORIDE SERPL-SCNC: 99 MMOL/L (ref 96–106)
CO2 SERPL-SCNC: 27 MMOL/L (ref 20–29)
CREAT SERPL-MCNC: 0.88 MG/DL (ref 0.57–1)
EOSINOPHIL # BLD AUTO: 0.1 X10E3/UL (ref 0–0.4)
EOSINOPHIL NFR BLD AUTO: 3 %
ERYTHROCYTE [DISTWIDTH] IN BLOOD BY AUTOMATED COUNT: 15.2 % (ref 12.3–15.4)
FERRITIN SERPL-MCNC: 324 NG/ML (ref 15–150)
GLOBULIN SER CALC-MCNC: 3.5 G/DL (ref 1.5–4.5)
GLUCOSE SERPL-MCNC: 75 MG/DL (ref 65–99)
HCT VFR BLD AUTO: 30 % (ref 34–46.6)
HGB BLD-MCNC: 9.6 G/DL (ref 11.1–15.9)
IMM GRANULOCYTES # BLD AUTO: 0 X10E3/UL (ref 0–0.1)
IMM GRANULOCYTES NFR BLD AUTO: 0 %
IRON SATN MFR SERPL: 20 % (ref 15–55)
IRON SERPL-MCNC: 55 UG/DL (ref 27–139)
LYMPHOCYTES # BLD AUTO: 1.7 X10E3/UL (ref 0.7–3.1)
LYMPHOCYTES NFR BLD AUTO: 30 %
MCH RBC QN AUTO: 29.1 PG (ref 26.6–33)
MCHC RBC AUTO-ENTMCNC: 32 G/DL (ref 31.5–35.7)
MCV RBC AUTO: 91 FL (ref 79–97)
MONOCYTES # BLD AUTO: 0.4 X10E3/UL (ref 0.1–0.9)
MONOCYTES NFR BLD AUTO: 7 %
NEUTROPHILS # BLD AUTO: 3.3 X10E3/UL (ref 1.4–7)
NEUTROPHILS NFR BLD AUTO: 59 %
PLATELET # BLD AUTO: 318 X10E3/UL (ref 150–450)
POTASSIUM SERPL-SCNC: 3.7 MMOL/L (ref 3.5–5.2)
PROT SERPL-MCNC: 8 G/DL (ref 6–8.5)
RBC # BLD AUTO: 3.3 X10E6/UL (ref 3.77–5.28)
SODIUM SERPL-SCNC: 141 MMOL/L (ref 134–144)
TIBC SERPL-MCNC: 278 UG/DL (ref 250–450)
UIBC SERPL-MCNC: 223 UG/DL (ref 118–369)
WBC # BLD AUTO: 5.7 X10E3/UL (ref 3.4–10.8)

## 2019-09-25 DIAGNOSIS — I10 ESSENTIAL HYPERTENSION: ICD-10-CM

## 2019-09-25 RX ORDER — LOSARTAN POTASSIUM AND HYDROCHLOROTHIAZIDE 12.5; 1 MG/1; MG/1
TABLET ORAL
Qty: 30 TAB | Refills: 11 | Status: SHIPPED | OUTPATIENT
Start: 2019-09-25 | End: 2020-04-03 | Stop reason: SDUPTHER

## 2019-12-02 ENCOUNTER — OFFICE VISIT (OUTPATIENT)
Dept: ONCOLOGY | Age: 64
End: 2019-12-02

## 2019-12-02 ENCOUNTER — HOSPITAL ENCOUNTER (OUTPATIENT)
Dept: ONCOLOGY | Age: 64
Discharge: HOME | End: 2019-12-02

## 2019-12-02 VITALS
HEIGHT: 63 IN | HEART RATE: 66 BPM | WEIGHT: 190 LBS | SYSTOLIC BLOOD PRESSURE: 132 MMHG | OXYGEN SATURATION: 99 % | DIASTOLIC BLOOD PRESSURE: 81 MMHG | RESPIRATION RATE: 18 BRPM | BODY MASS INDEX: 33.66 KG/M2

## 2019-12-02 DIAGNOSIS — D46.9 MDS (MYELODYSPLASTIC SYNDROME) (HCC): Primary | ICD-10-CM

## 2019-12-02 DIAGNOSIS — D46.9 MYELODYSPLASTIC SYNDROME (HCC): ICD-10-CM

## 2019-12-02 DIAGNOSIS — E55.9 VITAMIN D DEFICIENCY: ICD-10-CM

## 2019-12-02 DIAGNOSIS — D46.9 MDS (MYELODYSPLASTIC SYNDROME) (HCC): ICD-10-CM

## 2019-12-02 DIAGNOSIS — D64.9 ANEMIA, UNSPECIFIED TYPE: ICD-10-CM

## 2019-12-02 LAB
BASO+EOS+MONOS # BLD AUTO: 0.4 K/UL (ref 0–2.3)
BASO+EOS+MONOS NFR BLD AUTO: 7 % (ref 0.1–17)
DIFFERENTIAL METHOD BLD: ABNORMAL
ERYTHROCYTE [DISTWIDTH] IN BLOOD BY AUTOMATED COUNT: 12.9 % (ref 11.5–14.5)
HCT VFR BLD AUTO: 31.1 % (ref 36–48)
HGB BLD-MCNC: 10 G/DL (ref 12–16)
LYMPHOCYTES # BLD: 1.8 K/UL (ref 1.1–5.9)
LYMPHOCYTES NFR BLD: 34 % (ref 14–44)
MCH RBC QN AUTO: 29.3 PG (ref 25–35)
MCHC RBC AUTO-ENTMCNC: 32.2 G/DL (ref 31–37)
MCV RBC AUTO: 91.2 FL (ref 78–102)
NEUTS SEG # BLD: 3.1 K/UL (ref 1.8–9.5)
NEUTS SEG NFR BLD: 59 % (ref 40–70)
PLATELET # BLD AUTO: 289 K/UL (ref 140–440)
RBC # BLD AUTO: 3.41 M/UL (ref 4.1–5.1)
WBC # BLD AUTO: 5.3 K/UL (ref 4.5–13)

## 2019-12-02 NOTE — LETTER
NOTIFICATION RETURN TO WORK / SCHOOL 
 
12/2/2019 4:04 PM 
 
Ms. Susie Luna [address] To Whom It May Concern: 
 
Susie Luna is currently under the care of [address] [place] [address]. She will return to work/school on: 12/03/2019. If there are questions or concerns please have the patient contact our office. Sincerely, Redd Ruth MD

## 2019-12-03 LAB
25(OH)D3+25(OH)D2 SERPL-MCNC: 51.9 NG/ML (ref 30–100)
ALBUMIN SERPL-MCNC: 4.3 G/DL (ref 3.6–4.8)
ALBUMIN/GLOB SERPL: 1.2 {RATIO} (ref 1.2–2.2)
ALP SERPL-CCNC: 93 IU/L (ref 39–117)
ALT SERPL-CCNC: 10 IU/L (ref 0–32)
AST SERPL-CCNC: 17 IU/L (ref 0–40)
BILIRUB SERPL-MCNC: 0.5 MG/DL (ref 0–1.2)
BUN SERPL-MCNC: 20 MG/DL (ref 8–27)
BUN/CREAT SERPL: 22 (ref 12–28)
CALCIUM SERPL-MCNC: 10.3 MG/DL (ref 8.7–10.3)
CHLORIDE SERPL-SCNC: 102 MMOL/L (ref 96–106)
CO2 SERPL-SCNC: 24 MMOL/L (ref 20–29)
CREAT SERPL-MCNC: 0.89 MG/DL (ref 0.57–1)
FERRITIN SERPL-MCNC: 282 NG/ML (ref 15–150)
GLOBULIN SER CALC-MCNC: 3.5 G/DL (ref 1.5–4.5)
GLUCOSE SERPL-MCNC: 72 MG/DL (ref 65–99)
IRON SATN MFR SERPL: 20 % (ref 15–55)
IRON SERPL-MCNC: 57 UG/DL (ref 27–139)
POTASSIUM SERPL-SCNC: 4 MMOL/L (ref 3.5–5.2)
PROT SERPL-MCNC: 7.8 G/DL (ref 6–8.5)
SODIUM SERPL-SCNC: 145 MMOL/L (ref 134–144)
TIBC SERPL-MCNC: 283 UG/DL (ref 250–450)
UIBC SERPL-MCNC: 226 UG/DL (ref 118–369)

## 2019-12-06 ENCOUNTER — HOSPITAL ENCOUNTER (OUTPATIENT)
Dept: LAB | Age: 64
Discharge: HOME | End: 2019-12-06

## 2019-12-06 ENCOUNTER — OFFICE VISIT (OUTPATIENT)
Dept: FAMILY MEDICINE CLINIC | Age: 64
End: 2019-12-06

## 2019-12-06 VITALS
TEMPERATURE: 97.2 F | SYSTOLIC BLOOD PRESSURE: 124 MMHG | RESPIRATION RATE: 18 BRPM | HEART RATE: 74 BPM | BODY MASS INDEX: 33.7 KG/M2 | DIASTOLIC BLOOD PRESSURE: 66 MMHG | HEIGHT: 63 IN | WEIGHT: 190.2 LBS

## 2019-12-06 DIAGNOSIS — Z23 ENCOUNTER FOR IMMUNIZATION: ICD-10-CM

## 2019-12-06 DIAGNOSIS — E87.6 HYPOKALEMIA: ICD-10-CM

## 2019-12-06 DIAGNOSIS — E78.5 HYPERLIPIDEMIA, UNSPECIFIED HYPERLIPIDEMIA TYPE: ICD-10-CM

## 2019-12-06 DIAGNOSIS — M79.671 PAIN OF RIGHT HEEL: ICD-10-CM

## 2019-12-06 DIAGNOSIS — K59.00 CONSTIPATION, UNSPECIFIED CONSTIPATION TYPE: ICD-10-CM

## 2019-12-06 DIAGNOSIS — I10 ESSENTIAL HYPERTENSION: Primary | ICD-10-CM

## 2019-12-06 LAB — XX-LABCORP SPECIMEN COL,LCBCF: NORMAL

## 2019-12-06 PROCEDURE — 99001 SPECIMEN HANDLING PT-LAB: CPT

## 2019-12-06 RX ORDER — MELOXICAM 15 MG/1
15 TABLET ORAL DAILY
COMMUNITY
Start: 2019-12-06 | End: 2020-12-09

## 2019-12-06 NOTE — PROGRESS NOTES
Chief Complaint   Patient presents with    Hypertension     follow up    Cholesterol Problem    Constipation     Patient stated that she had having constipation and took some miralax and just kept going to the bath after that. SANTIAGO Muro is a 60 y.o. female presenting today for 4 months  follow up of htn, hypoK, vit d def'cy. Pt was referred to pod for heel pain. She did get seen and reports that it has been getting better. She had to have a steroid injection. They discussed a stem cell treatment but she is not interested in pursuing this. Patient had labs on 12/2/19 with hematology. Labs reviewed in detail with patient     Patient does not need medication refills today. New concerns today: pt took miralax for constipation after not having a bowel movement for 8 days. She took it for 3-4 days and eventually moved her bowels. She cont to have loose stools daily for a while. That has resolved but she now has occasional stomach pains and constipation. She is not taking a fiber supplement or a probiotic. Pt request order for mammo. Pt's rheum gave her mobic to try in place of diclofenac for one month. Review of Systems   Constitutional: Negative. HENT: Negative. Respiratory: Negative. Cardiovascular: Negative. Gastrointestinal: Positive for constipation. Musculoskeletal: Positive for joint pain. All other systems reviewed and are negative. Physical Exam  Physical Exam   Nursing note and vitals reviewed. Constitutional: She is oriented to person, place, and time. She appears well-developed and well-nourished. HENT:   Head: Normocephalic and atraumatic. Right Ear: External ear normal.   Left Ear: External ear normal.   Nose: Nose normal.   Eyes: Conjunctivae and EOM are normal.   Neck: Normal range of motion. Neck supple. No JVD present. Carotid bruit is not present. No thyromegaly present.    Cardiovascular: Normal rate, regular rhythm, normal heart sounds and intact distal pulses. Exam reveals no gallop and no friction rub. No murmur heard. Pulmonary/Chest: Effort normal and breath sounds normal. She has no wheezes. She has no rhonchi. She has no rales. Abdominal: Soft. Bowel sounds are normal.   Musculoskeletal: Normal range of motion. Neurological: She is alert and oriented to person, place, and time. Coordination normal.   Skin: Skin is warm and dry. Psychiatric: She has a normal mood and affect. Her behavior is normal. Judgment and thought content normal.     Diagnoses and all orders for this visit:    1. Essential hypertension  Stable, cont pres tx plan. 2. Hypokalemia  Stable, cont pres tx plan. 3. Hyperlipidemia, unspecified hyperlipidemia type  Pt to have lipid panel drawn    4. Pain of right heel  Improving. 5. Constipation, unspecified constipation type  Add probiotic every day x 1 month. Add daily fiber supplement. Ok to use miralax prn. If no improvement, call gi for f/u appt. 6. Encounter for immunization  -     INFLUENZA VIRUS VAC QUAD,SPLIT,PRESV FREE SYRINGE IM      Follow-up and Dispositions    · Return in about 3 months (around 3/6/2020).

## 2019-12-06 NOTE — PROGRESS NOTES
Eleanor Eddie presents today for   Chief Complaint   Patient presents with    Hypertension     follow up    Cholesterol Problem    Constipation     Patient stated that she had having constipation and took some miralax and just kept going to the bath after that. Eleanor Morgan preferred language for health care discussion is english/other. Is someone accompanying this pt? no    Is the patient using any DME equipment during [address]? no    Depression Screening:  3 most recent PHQ Screens 1/30/2019   Little interest or pleasure in doing things Not at all   Feeling down, depressed, irritable, or hopeless Not at all   Total Score PHQ 2 0       Learning Assessment:  Learning Assessment 1/30/2019   PRIMARY LEARNER Patient   HIGHEST LEVEL OF EDUCATION - PRIMARY LEARNER  4 YEARS OF COLLEGE   BARRIERS PRIMARY LEARNER NONE   CO-LEARNER CAREGIVER No   PRIMARY LANGUAGE ENGLISH    NEED -   LEARNER PREFERENCE PRIMARY READING     -     -     -     -   ANSWERED BY self   RELATIONSHIP SELF       Abuse Screening:  Abuse Screening Questionnaire 1/30/2019   Do you ever feel afraid of your partner? N   Are you in a relationship with someone who physically or mentally threatens you? N   Is it safe for you to go home? Y       Generalized Anxiety  No flowsheet data found. Health Maintenance Due   Topic Date Due    Shingrix Vaccine Age 49> (1 of 2) 01/17/2007    Influenza Age 5 to Adult  08/01/2019    BREAST CANCER SCRN MAMMOGRAM  01/14/2020   . Health Maintenance reviewed and discussed and ordered per Provider. Eleanor Morgan is updated on all     Coordination of Care:  1. Have you been to the ER, urgent care clinic since your last visit? Hospitalized since your last visit? no    2. Have you seen or consulted any other health care providers outside of the [address] [place] [address] since your last visit? Include any pap smears or colon screening. no      Advance Directive:  1.  Do you have an advance directive in place? Patient Reply:no    2. If not, would you like material regarding how to put one in place? Patient Reply: no    Kayla Albino 1955 female who presents for routine immunizations. Patient denies any symptoms , reactions or allergies that would exclude them from being immunized today. Risks and adverse reactions were discussed and the VIS was given to them. All questions were addressed. Order placed for Flu,  per Verbal Order from Dr. Honorio Shah with read back. Patient was observed for 15 min post injection. There were no reactions observed.     Alcides Drummond

## 2019-12-06 NOTE — PATIENT INSTRUCTIONS
Please call Tanna Francis at [phone] to schedule your imaging study (mammogram, dexa, ultrasound, CT, or MRI) that we discussed today. Vaccine Information Statement    Influenza (Flu) Vaccine (Inactivated or Recombinant): What You Need to Know    Many Vaccine Information Statements are available in [place] sor[place] and other languages. See www.immunize.org/vis  Hojas de información sobre vacunas están disponibles en español y en muchos otros idiomas. Visite www.immunize.org/vis    1. Why get vaccinated? Influenza vaccine can prevent influenza (flu). Flu is a contagious disease that spreads around the [place] every year, usually between October and May. Anyone can get the flu, but it is more dangerous for some people. Infants and young children, people 72years of age and older, pregnant women, and people with certain health conditions or a weakened immune system are at greatest risk of flu complications. Pneumonia, bronchitis, sinus infections and ear infections are examples of flu-related complications. If you have a medical condition, such as heart disease, cancer or diabetes, flu can make it worse. Flu can cause fever and chills, sore throat, muscle aches, fatigue, cough, headache, and runny or stuffy nose. Some people may have vomiting and diarrhea, though this is more common in children than adults. Each year thousands of people in the [facility] die from flu, and many more are hospitalized. Flu vaccine prevents millions of illnesses and flu-related visits to the doctor each year. 2. Influenza vaccines     CDC recommends everyone 10months of age and older get vaccinated every flu season. Children 6 months through 6years of age may need 2 doses during a single flu season. Everyone else needs only 1 dose each flu season. It takes about 2 weeks for protection to develop after vaccination. There are many flu viruses, and they are always changing.  Each year a new flu vaccine is made to protect against three or four viruses that are likely to cause disease in the upcoming flu season. Even when the vaccine doesnt exactly match these viruses, it may still provide some protection. Influenza vaccine does not cause flu. Influenza vaccine may be given at the same time as other vaccines. 3. Talk with your health care provider    Tell your vaccine provider if the person getting the vaccine:   Has had an allergic reaction after a previous dose of influenza vaccine, or has any severe, life-threatening allergies.  Has ever had Guillain-Barré Syndrome (also called GBS). In some cases, your health care provider may decide to postpone influenza vaccination to a future visit. People with minor illnesses, such as a cold, may be vaccinated. People who are moderately or severely ill should usually wait until they recover before getting influenza vaccine. Your health care provider can give you more information. 4. Risks of a reaction     Soreness, redness, and swelling where shot is given, fever, muscle aches, and headache can happen after influenza vaccine.  There may be a very small increased risk of Guillain-Barré Syndrome (GBS) after inactivated influenza vaccine (the flu shot). Sasha Vogt children who get the flu shot along with pneumococcal vaccine (PCV13), and/or DTaP vaccine at the same time might be slightly more likely to have a seizure caused by fever. Tell your health care provider if a child who is getting flu vaccine has ever had a seizure. People sometimes faint after medical procedures, including vaccination. Tell your provider if you feel dizzy or have vision changes or ringing in the ears. As with any medicine, there is a very remote chance of a vaccine causing a severe allergic reaction, other serious injury, or death. 5. What if there is a serious problem? An allergic reaction could occur after the vaccinated person leaves the clinic.  If you see signs of a severe allergic reaction (hives, swelling of the face and throat, difficulty breathing, a fast heartbeat, dizziness, or weakness), call 9-1-1 and get the person to the nearest hospital.    For other signs that concern you, call your health care provider. Adverse reactions should be reported to the Vaccine Adverse Event Reporting System (VAERS). Your health care provider will usually file this report, or you can do it yourself. Visit the VAERS website at www.vaers. [facility].gov or call 1-251.818.7995. VAERS is only for reporting reactions, and VAERS staff do not give medical advice. 6. The National Vaccine Injury Compensation Program    The [facility] Vaccine Injury Compensation Program (VICP) is a federal program that was created to compensate people who may have been injured by certain vaccines. Visit the VICP website at www.hrsa.gov/vaccinecompensation or call 2-196.347.3911 to learn about the program and about filing a claim. There is a time limit to file a claim for compensation. 7. How can I learn more?  Ask your health care provider.  Call your local or state health department.  Contact the Centers for Disease Control and Prevention (CDC):  - Call 7-600.688.7056 (3-076-OEM-INFO) or  - Visit CDCs influenza website at www.cdc.gov/flu    Vaccine Information Statement (Interim)  Inactivated Influenza Vaccine   8/15/2019  [address] [id number] Hunt [id number]   Department of Health and Human Services  Centers for Disease Control and Prevention    Office Use Only

## 2019-12-07 LAB
ALBUMIN SERPL-MCNC: 4.6 G/DL (ref 3.6–4.8)
ALBUMIN/GLOB SERPL: 1.3 {RATIO} (ref 1.2–2.2)
ALP SERPL-CCNC: 92 IU/L (ref 39–117)
ALT SERPL-CCNC: 12 IU/L (ref 0–32)
AST SERPL-CCNC: 15 IU/L (ref 0–40)
BILIRUB SERPL-MCNC: 0.6 MG/DL (ref 0–1.2)
BUN SERPL-MCNC: 20 MG/DL (ref 8–27)
BUN/CREAT SERPL: 21 (ref 12–28)
CALCIUM SERPL-MCNC: 10.4 MG/DL (ref 8.7–10.3)
CHLORIDE SERPL-SCNC: 99 MMOL/L (ref 96–106)
CHOLEST SERPL-MCNC: 188 MG/DL (ref 100–199)
CO2 SERPL-SCNC: 27 MMOL/L (ref 20–29)
CREAT SERPL-MCNC: 0.95 MG/DL (ref 0.57–1)
GLOBULIN SER CALC-MCNC: 3.6 G/DL (ref 1.5–4.5)
GLUCOSE SERPL-MCNC: 89 MG/DL (ref 65–99)
HDLC SERPL-MCNC: 54 MG/DL
INTERPRETATION, 910389: NORMAL
LDLC SERPL CALC-MCNC: 118 MG/DL (ref 0–99)
POTASSIUM SERPL-SCNC: 3.8 MMOL/L (ref 3.5–5.2)
PROT SERPL-MCNC: 8.2 G/DL (ref 6–8.5)
SODIUM SERPL-SCNC: 140 MMOL/L (ref 134–144)
TRIGL SERPL-MCNC: 81 MG/DL (ref 0–149)
VLDLC SERPL CALC-MCNC: 16 MG/DL (ref 5–40)

## 2020-01-07 ENCOUNTER — HOSPITAL ENCOUNTER (OUTPATIENT)
Dept: MAMMOGRAPHY | Age: 65
Discharge: HOME | End: 2020-01-07
Attending: FAMILY MEDICINE
Payer: COMMERCIAL

## 2020-01-07 DIAGNOSIS — Z12.31 ENCOUNTER FOR SCREENING MAMMOGRAM FOR MALIGNANT NEOPLASM OF BREAST: ICD-10-CM

## 2020-01-07 PROCEDURE — 77063 BREAST TOMOSYNTHESIS BI: CPT

## 2020-01-12 DIAGNOSIS — R92.8 ABNORMAL MAMMOGRAM: Primary | ICD-10-CM

## 2020-01-14 ENCOUNTER — TELEPHONE (OUTPATIENT)
Dept: FAMILY MEDICINE CLINIC | Age: 65
End: 2020-01-14

## 2020-01-16 ENCOUNTER — HOSPITAL ENCOUNTER (OUTPATIENT)
Dept: MAMMOGRAPHY | Age: 65
Discharge: HOME | End: 2020-01-16
Attending: FAMILY MEDICINE
Payer: COMMERCIAL

## 2020-01-16 ENCOUNTER — HOSPITAL ENCOUNTER (OUTPATIENT)
Dept: ULTRASOUND IMAGING | Age: 65
Discharge: HOME | End: 2020-01-16
Attending: FAMILY MEDICINE
Payer: COMMERCIAL

## 2020-01-16 DIAGNOSIS — R92.8 ABNORMAL MAMMOGRAM: ICD-10-CM

## 2020-01-16 PROCEDURE — 77061 BREAST TOMOSYNTHESIS UNI: CPT

## 2020-01-16 PROCEDURE — 76642 ULTRASOUND BREAST LIMITED: CPT

## 2020-01-16 PROCEDURE — G0279 TOMOSYNTHESIS, MAMMO: HCPCS

## 2020-02-24 ENCOUNTER — OFFICE VISIT (OUTPATIENT)
Dept: FAMILY MEDICINE CLINIC | Age: 65
End: 2020-02-24

## 2020-02-24 VITALS
OXYGEN SATURATION: 99 % | WEIGHT: 184.8 LBS | RESPIRATION RATE: 20 BRPM | HEIGHT: 63 IN | BODY MASS INDEX: 32.74 KG/M2 | TEMPERATURE: 98 F | SYSTOLIC BLOOD PRESSURE: 107 MMHG | DIASTOLIC BLOOD PRESSURE: 74 MMHG | HEART RATE: 67 BPM

## 2020-02-24 DIAGNOSIS — R05.9 COUGH: Primary | ICD-10-CM

## 2020-02-24 DIAGNOSIS — R51.9 ACUTE NONINTRACTABLE HEADACHE, UNSPECIFIED HEADACHE TYPE: ICD-10-CM

## 2020-02-24 RX ORDER — BENZONATATE 200 MG/1
200 CAPSULE ORAL
Qty: 21 CAP | Refills: 0 | Status: SHIPPED | OUTPATIENT
Start: 2020-02-24 | End: 2020-03-02

## 2020-02-24 NOTE — PROGRESS NOTES
Apple Sappelin presents today for   Chief Complaint   Patient presents with    Cough     non productive, head congestion    Diarrhea       Apple Sanchez preferred language for health care discussion is english/other. Is someone accompanying this pt? no    Is the patient using any DME equipment during [address]? no    Depression Screening:  3 most recent PHQ Screens 2/24/2020   Little interest or pleasure in doing things Not at all   Feeling down, depressed, irritable, or hopeless Not at all   Total Score PHQ 2 0       Learning Assessment:  Learning Assessment 2/24/2020   PRIMARY LEARNER Patient   HIGHEST LEVEL OF EDUCATION - PRIMARY LEARNER  4 YEARS OF COLLEGE   BARRIERS PRIMARY LEARNER NONE   CO-LEARNER CAREGIVER No   PRIMARY LANGUAGE ENGLISH    NEED No   LEARNER PREFERENCE PRIMARY DEMONSTRATION     -     -     -     -   ANSWERED BY patient   RELATIONSHIP SELF       Abuse Screening:  Abuse Screening Questionnaire 2/24/2020   Do you ever feel afraid of your partner? N   Are you in a relationship with someone who physically or mentally threatens you? N   Is it safe for you to go home? Y       Generalized Anxiety  No flowsheet data found. Health Maintenance Due   Topic Date Due    Shingrix Vaccine Age 49> (1 of 2) 01/17/2007   . Health Maintenance reviewed and discussed and ordered per Provider. Coordination of Care:  1. Have you been to the ER, urgent care clinic since your last visit? Hospitalized since your last visit? no    2. Have you seen or consulted any other health care providers outside of the [address] [place] [address] since your last visit? Include any pap smears or colon screening. no      Advance Directive:  1. Do you have an advance directive in place? Patient Reply:no    2. If not, would you like material regarding how to put one in place?  Patient Reply: no

## 2020-02-24 NOTE — LETTER
NOTIFICATION RETURN TO WORK / SCHOOL 
 
2/24/2020 11:08 AM 
 
Ms. Sven Lanza [address] To Whom It May Concern: 
 
Sven Lanza is currently under the care of Miguel King. She will return to work/school on: 2/24/2020 If there are questions or concerns please have the patient contact our office.  
 
 
 
Sincerely, 
 
 
Susan Mckeon NP

## 2020-02-24 NOTE — PROGRESS NOTES
HPI  Ky Montoya is a 63 y.o. female  Chief Complaint   Patient presents with    Cough     non productive, head congestion    Diarrhea     Reports her cough has not resolved. She denies fevers. Cough is nonproductive. Denies sore throat. She does have an intermittent headache. She reports she did have congestion and a fever last week but this has resolved. She states she also had some diarrhea last week and this has resolved as well. She denies chest pain and or shortness of breath. Denies ear pain or sinus pain. Past Medical History  Past Medical History:   Diagnosis Date    Echocardiogram 02/08/2011    Tech difficult. EF 60-65%. Mild LVH. RVSP 20-25 mmHg.  Essential hypertension     History of colon polyps     Hx of endometriosis     had hyst    Hyperlipidemia     Hypertension     MDS (myelodysplastic syndrome) (HCC)     Refractory anemia (HCC)        Surgical History  Past Surgical History:   Procedure Laterality Date    HX HYSTERECTOMY          Medications  Current Outpatient Medications   Medication Sig Dispense Refill    benzonatate (TESSALON) 200 mg capsule Take 1 Cap by mouth three (3) times daily as needed for Cough for up to 7 days. 21 Cap 0    simvastatin (ZOCOR) 20 mg tablet TAKE 1 TABLET BY MOUTH ONCE DAILY AT BEDTIME 30 Tab 12    meloxicam (MOBIC) 15 mg tablet Take 1 Tab by mouth daily.  losartan-hydroCHLOROthiazide (HYZAAR) 100-12.5 mg per tablet TAKE 1 TABLET BY MOUTH ONCE DAILY 30 Tab 11    carvedilol (COREG) 25 mg tablet TAKE ONE TABLET BY MOUTH TWICE DAILY WITH MEALS 180 Tab 3    VITAMIN D2 50,000 unit capsule TAKE 1 CAPSULE BY MOUTH ONCE A WEEK 4 Cap 12    diclofenac (VOLTAREN) 1 % gel Apply 4 g to affected area four (4) times daily.  400 g 12    valACYclovir (VALTREX) 500 mg tablet TAKE ONE TABLET BY MOUTH TWICE DAILY 30 Tab 12    polyethylene glycol (MIRALAX) 17 gram/dose powder MIX 17 GRAMS (1 CAPFUL) IN LIQUID AND DRINK BY MOUTH ONCE DAILY FOR CONSTIPATION 517 g 12    latanoprost (XALATAN) 0.005 % ophthalmic solution Administer 1 Drop to both eyes nightly.            Allergies  No Known Allergies    Family History  Family History   Problem Relation Age of Onset    Cancer Mother    Flakito Cords Arthritis-rheumatoid Sister     Diabetes Sister     Hypertension Sister     No Known Problems Father        Social History  Social History     Socioeconomic History    Marital status: SINGLE     Spouse name: Not on file    Number of children: Not on file    Years of education: Not on file    Highest education level: Not on file   Occupational History    Not on file   Social Needs    Financial resource strain: Not on file    Food insecurity:     Worry: Not on file     Inability: Not on file    Transportation needs:     Medical: Not on file     Non-medical: Not on file   Tobacco Use    Smoking status: Never Smoker    Smokeless tobacco: Never Used   Substance and Sexual Activity    Alcohol use: No    Drug use: No    Sexual activity: Not on file   Lifestyle    Physical activity:     Days per week: Not on file     Minutes per session: Not on file    Stress: Not on file   Relationships    Social connections:     Talks on phone: Not on file     Gets together: Not on file     Attends Muslim service: Not on file     Active member of club or organization: Not on file     Attends meetings of clubs or organizations: Not on file     Relationship status: Not on file    Intimate partner violence:     Fear of current or ex partner: Not on file     Emotionally abused: Not on file     Physically abused: Not on file     Forced sexual activity: Not on file   Other Topics Concern    Not on file   Social History Narrative    Not on file       Problem List  Patient Active Problem List   Diagnosis Code    Epistaxis R04.0    Arthritis M19.90    Hyperlipidemia E78.5    Elevated sed rate (elev SR) R70.0    Abdominal pain R10.9    FH: breast cancer Z80.3    Anemia D64.9  Breast lump N63.0    Genital herpes A60.00    CRP elevated R79.82    Glaucoma H40.9    Cataract H26.9    Vitreous degeneration H43.819    Hypokalemia E87.6    Degenerative disc disease EAS9591    Adrenal nodule (HCC) E27.9    HTN (hypertension) I10    Palpitations R00.2    MDS (myelodysplastic syndrome) ([facility]) D46.9    Refractory anemia ([facility]) D46.4    Neoplasm of uncertain behavior of connective and other soft tissue D48.1    Essential hypertension I10    DDD (degenerative disc disease), cervical M50.30    Facet arthritis of cervical region M47.812    Myofascial muscle pain M79.18    DDD (degenerative disc disease), lumbar M51.36    Lumbar facet arthropathy M47.816    Hyperlipidemia LDL goal <100 E78.5    Environmental and seasonal allergies J30.89       Review of Systems  Review of Systems   Constitutional: Fever: resolved. HENT: Positive for congestion. Negative for ear pain, sinus pain and sore throat. Respiratory: Positive for cough. Negative for sputum production and shortness of breath. Cardiovascular: Negative for chest pain. Gastrointestinal: Diarrhea: resolved. Neurological: Positive for headaches. Vital Signs  Vitals:    02/24/20 1014   BP: 107/74   Pulse: 67   Resp: 20   Temp: 98 °F (36.7 °C)   TempSrc: Oral   SpO2: 99%   Weight: 184 lb 12.8 oz (83.8 kg)   Height: 5' 3\" (1.6 m)   PainSc:   0 - No pain   LMP: 08/31/1998       Physical Exam  Physical Exam  Vitals signs reviewed. HENT:      Right Ear: Tympanic membrane normal.      Left Ear: Tympanic membrane normal.      Nose: Nose normal.      Mouth/Throat:      Mouth: Mucous membranes are moist.   Cardiovascular:      Rate and Rhythm: Normal rate and regular rhythm. Pulmonary:      Effort: Pulmonary effort is normal.      Breath sounds: Normal breath sounds. Neurological:      Mental Status: She is oriented to person, place, and time.          Diagnostics  Orders Placed This Encounter    benzonatate (TESSALON) 200 mg capsule     Sig: Take 1 Cap by mouth three (3) times daily as needed for Cough for up to 7 days. Dispense:  21 Cap     Refill:  0       Results  Results for orders placed or performed during the hospital encounter of 12/06/19   [facility] SPECIMEN COL   Result Value Ref Range    [facility]. Specimens collected/sent to [facility]. Please direct inquiries to ([phone]). Assessment and Plan  Diagnoses and all orders for this visit:    1. Cough  -     benzonatate (TESSALON) 200 mg capsule; Take 1 Cap by mouth three (3) times daily as needed for Cough for up to 7 days. 2. Acute nonintractable headache, unspecified headache type      Humidification at night. Tylenol for the headache. Increase fluids. After care summary printed and reviewed with patient. Plan reviewed with patient. Questions answered. Patient verbalized understanding of plan and is in agreement with plan. Patient to follow up with PCP or earlier if symptoms worsen. Follow-up and Dispositions    · Return if symptoms worsen or fail to improve.        ANNMARIE MirandaC

## 2020-03-06 ENCOUNTER — OFFICE VISIT (OUTPATIENT)
Dept: FAMILY MEDICINE CLINIC | Age: 65
End: 2020-03-06

## 2020-03-06 VITALS
TEMPERATURE: 98 F | BODY MASS INDEX: 33.59 KG/M2 | DIASTOLIC BLOOD PRESSURE: 61 MMHG | HEART RATE: 73 BPM | HEIGHT: 63 IN | WEIGHT: 189.6 LBS | RESPIRATION RATE: 18 BRPM | SYSTOLIC BLOOD PRESSURE: 122 MMHG

## 2020-03-06 DIAGNOSIS — E78.5 HYPERLIPIDEMIA, UNSPECIFIED HYPERLIPIDEMIA TYPE: ICD-10-CM

## 2020-03-06 DIAGNOSIS — I10 ESSENTIAL HYPERTENSION: ICD-10-CM

## 2020-03-06 DIAGNOSIS — R92.8 ABNORMAL MAMMOGRAM: ICD-10-CM

## 2020-03-06 DIAGNOSIS — J30.2 SEASONAL ALLERGIC RHINITIS, UNSPECIFIED TRIGGER: ICD-10-CM

## 2020-03-06 DIAGNOSIS — I10 ESSENTIAL HYPERTENSION: Primary | ICD-10-CM

## 2020-03-06 RX ORDER — LORATADINE 10 MG/1
10 TABLET ORAL DAILY
Qty: 30 TAB | Refills: 5 | Status: SHIPPED | OUTPATIENT
Start: 2020-03-06 | End: 2021-02-19 | Stop reason: ALTCHOICE

## 2020-03-06 NOTE — PROGRESS NOTES
Zelalem uQintanilla presents today for   Chief Complaint   Patient presents with    Hypertension     follow up    Foot Pain     right    Other     Hypokalemia    Labs     completed 12/06/19       Zelalem Quintanilla preferred language for health care discussion is english/other. Is someone accompanying this pt? no    Is the patient using any DME equipment during [address]? no    Depression Screening:  3 most recent PHQ Screens 2/24/2020   Little interest or pleasure in doing things Not at all   Feeling down, depressed, irritable, or hopeless Not at all   Total Score PHQ 2 0       Learning Assessment:  Learning Assessment 2/24/2020   PRIMARY LEARNER Patient   HIGHEST LEVEL OF EDUCATION - PRIMARY LEARNER  4 YEARS OF COLLEGE   BARRIERS PRIMARY LEARNER NONE   CO-LEARNER CAREGIVER No   PRIMARY LANGUAGE ENGLISH    NEED No   LEARNER PREFERENCE PRIMARY DEMONSTRATION     -     -     -     -   ANSWERED BY patient   RELATIONSHIP SELF       Abuse Screening:  Abuse Screening Questionnaire 2/24/2020   Do you ever feel afraid of your partner? N   Are you in a relationship with someone who physically or mentally threatens you? N   Is it safe for you to go home? Y       Generalized Anxiety  No flowsheet data found. Health Maintenance Due   Topic Date Due    Shingrix Vaccine Age 49> (1 of 2) 01/17/2007   . Health Maintenance reviewed and discussed and ordered per Provider. Zelalem Quintanilla is updated on all     Coordination of Care:  1. Have you been to the ER, urgent care clinic since your last visit? Hospitalized since your last visit? no    2. Have you seen or consulted any other health care providers outside of the [address] [place] [address] since your last visit? Include any pap smears or colon screening. no      Advance Directive:  1. Do you have an advance directive in place? Patient Reply:no    2. If not, would you like material regarding how to put one in place?  Patient Reply: no

## 2020-03-06 NOTE — PROGRESS NOTES
Chief Complaint   Patient presents with    Hypertension     follow up    Foot Pain     right    Other     Hypokalemia    Labs     completed 12/06/19         HPI    Jovanny Card is a 63 y.o. female presenting today for 3 months  follow up of htn, hypoK. Pt reports that her cough has not yet completely resolved. She was out of work for a week. She has some congestion at night. Patient had labs on 12/6/2020. Labs reviewed in detail with patient     Pt reports that her r heel pain is about the same. She is careful with what shoes she wears. Pt has had f/u imaging from her mammo. She will need 6 mo f/u imaging. Patient does not need medication refills today. Review of Systems   Constitutional: Negative. HENT: Negative. Respiratory: Positive for cough. Negative for sputum production and shortness of breath. Cardiovascular: Negative. All other systems reviewed and are negative. Physical Exam  Vitals signs and nursing note reviewed. Constitutional:       Appearance: Normal appearance. She is not ill-appearing. HENT:      Head: Normocephalic and atraumatic. Right Ear: Hearing, tympanic membrane, ear canal and external ear normal.      Left Ear: Hearing, tympanic membrane, ear canal and external ear normal.      Nose: Mucosal edema present. Right Turbinates: Enlarged and pale. Left Turbinates: Enlarged and pale. Right Sinus: No maxillary sinus tenderness or frontal sinus tenderness. Left Sinus: No maxillary sinus tenderness or frontal sinus tenderness. Mouth/Throat:      Lips: Pink. Mouth: Mucous membranes are moist.      Pharynx: Oropharynx is clear. No pharyngeal swelling, oropharyngeal exudate or posterior oropharyngeal erythema. Eyes:      Extraocular Movements: Extraocular movements intact. Conjunctiva/sclera: Conjunctivae normal.   Neck:      Musculoskeletal: Normal range of motion.    Cardiovascular:      Rate and Rhythm: Normal rate and regular rhythm. Heart sounds: No murmur. No friction rub. No gallop. Pulmonary:      Effort: Pulmonary effort is normal.      Breath sounds: Normal breath sounds. No wheezing, rhonchi or rales. Musculoskeletal: Normal range of motion. Skin:     General: Skin is warm. Neurological:      Mental Status: She is alert and oriented to person, place, and time. Coordination: Coordination normal.   Psychiatric:         Mood and Affect: Mood normal.         Behavior: Behavior normal.         Thought Content: Thought content normal.         Judgment: Judgment normal.         Diagnoses and all orders for this visit:    1. Essential hypertension  -     METABOLIC PANEL, COMPREHENSIVE; Future  -     LIPID PANEL; Future  Stable, cont pres tx plan. 2. Abnormal mammogram  -     [facility] MAMMO RT DX INCL CAD; Future  -     US BREAST RT LIMITED=<3 QUAD; Future    3. Seasonal allergic rhinitis, unspecified trigger  -    Trial:  loratadine (CLARITIN) 10 mg tablet; Take 1 Tab by mouth daily. 4. Hyperlipidemia, unspecified hyperlipidemia type  -     METABOLIC PANEL, COMPREHENSIVE; Future  -     LIPID PANEL; Future      Follow-up and Dispositions    · Return in about 3 months (around 6/6/2020) for high blood pressure, allergic rhinitis, high cholesterol.

## 2020-03-23 ENCOUNTER — OFFICE VISIT (OUTPATIENT)
Dept: ONCOLOGY | Age: 65
End: 2020-03-23

## 2020-03-23 VITALS
WEIGHT: 188 LBS | OXYGEN SATURATION: 99 % | SYSTOLIC BLOOD PRESSURE: 150 MMHG | RESPIRATION RATE: 16 BRPM | BODY MASS INDEX: 33.31 KG/M2 | DIASTOLIC BLOOD PRESSURE: 85 MMHG | HEART RATE: 65 BPM | TEMPERATURE: 98.2 F | HEIGHT: 63 IN

## 2020-03-23 DIAGNOSIS — D46.9 MDS (MYELODYSPLASTIC SYNDROME) (HCC): Primary | ICD-10-CM

## 2020-03-23 DIAGNOSIS — E55.9 VITAMIN D DEFICIENCY: ICD-10-CM

## 2020-03-23 DIAGNOSIS — D46.4 REFRACTORY ANEMIA (HCC): ICD-10-CM

## 2020-03-23 DIAGNOSIS — M19.90 ARTHRITIS: ICD-10-CM

## 2020-03-23 NOTE — PROGRESS NOTES
Hematology/Oncology  Progress Note    Name: Marlyce Opitz  Date: 2020  : 1955    PCP: Dr. Kita Snider  -+  9+*/*9-+  Ms. Adelaida Muñiz is a 63y.o. year old female who was seen for management of her myelodysplastic syndrome/refractory anemia  Current therapy: Iron supplement, Procrit or Aranesp is available whenever her hemoglobin is below 10g/dL and- hematocrit is below 30%. Subjective:     Ms. Adelaida Muñiz is a 47-year-old -American woman with myelodysplastic syndrome/refractory anemia. She was diagnosed more than 7 years ago. She states she has been doing well since her last office visit. She denies any ED visits. She reports her arthritis has been manageable using diclofenac. She denies fatigue, shortness of breath, and weakness. She denies chest pain or dizziness. She denies pain or any discomfort. She does not have any concerns or complaints to report at this time. Past medical history, family history, and social history were reviewed and remain unchanged. Past Medical History:   Diagnosis Date    Echocardiogram 2011    Tech difficult. EF 60-65%. Mild LVH. RVSP 20-25 mmHg.       Essential hypertension     History of colon polyps     Hx of endometriosis     had hyst    Hyperlipidemia     Hypertension     MDS (myelodysplastic syndrome) (HCC)     Refractory anemia (HCC)      Past Surgical History:   Procedure Laterality Date    HX HYSTERECTOMY       Social History     Socioeconomic History    Marital status: SINGLE     Spouse name: Not on file    Number of children: Not on file    Years of education: Not on file    Highest education level: Not on file   Occupational History    Not on file   Social Needs    Financial resource strain: Not on file    Food insecurity     Worry: Not on file     Inability: Not on file    Transportation needs     Medical: Not on file     Non-medical: Not on file   Tobacco Use    Smoking status: Never Smoker    Smokeless tobacco: Never Used Substance and Sexual Activity    Alcohol use: No    Drug use: No    Sexual activity: Not on file   Lifestyle    Physical activity     Days per week: Not on file     Minutes per session: Not on file    Stress: Not on file   Relationships    Social connections     Talks on phone: Not on file     Gets together: Not on file     Attends [employer]ism service: Not on file     Active member of club or organization: Not on file     Attends meetings of clubs or organizations: Not on file     Relationship status: Not on file    Intimate partner violence     Fear of current or ex partner: Not on file     Emotionally abused: Not on file     Physically abused: Not on file     Forced sexual activity: Not on file   Other Topics Concern    Not on file   Social History Narrative    Not on file     Family History   Problem Relation Age of Onset    Cancer Mother     Arthritis-rheumatoid Sister     Diabetes Sister     Hypertension Sister     No Known Problems Father      Current Outpatient Medications   Medication Sig Dispense Refill    loratadine (CLARITIN) 10 mg tablet Take 1 Tab by mouth daily. 30 Tab 5    simvastatin (ZOCOR) 20 mg tablet TAKE 1 TABLET BY MOUTH ONCE DAILY AT BEDTIME 30 Tab 12    meloxicam (MOBIC) 15 mg tablet Take 1 Tab by mouth daily.  losartan-hydroCHLOROthiazide (HYZAAR) 100-12.5 mg per tablet TAKE 1 TABLET BY MOUTH ONCE DAILY 30 Tab 11    carvedilol (COREG) 25 mg tablet TAKE ONE TABLET BY MOUTH TWICE DAILY WITH MEALS 180 Tab 3    VITAMIN D2 50,000 unit capsule TAKE 1 CAPSULE BY MOUTH ONCE A WEEK 4 Cap 12    diclofenac (VOLTAREN) 1 % gel Apply 4 g to affected area four (4) times daily.  400 g 12    valACYclovir (VALTREX) 500 mg tablet TAKE ONE TABLET BY MOUTH TWICE DAILY 30 Tab 12    polyethylene glycol (MIRALAX) 17 gram/dose powder MIX 17 GRAMS (1 CAPFUL) IN LIQUID AND DRINK BY MOUTH ONCE DAILY FOR CONSTIPATION 517 g 12    latanoprost (XALATAN) 0.005 % ophthalmic solution Administer 1 Drop to both eyes nightly. Review of Systems  Constitutional: The patient complains of occasional fatigue  HEENT: The patient denies recent head trauma, eye pain, blurred vision,  hearing deficit, oropharyngeal mucosal pain or lesions, and the patient denies throat pain or discomfort. Lymphatics: The patient denies palpable peripheral lymphadenopathy. Hematologic: The patient denies having bruising, bleeding, or progressive fatigue. Respiratory: Patient denies having shortness of breath, cough, sputum production, fever, or dyspnea on exertion. Cardiovascular: The patient denies having leg pain, leg swelling, heart palpitations, chest permit, chest pain, or lightheadedness. The patient denies having dyspnea on exertion. Gastrointestinal: The patient denies having nausea, emesis, or diarrhea. The patient denies having any hematemesis or blood in the stool. Genitourinary: Patient denies having urinary urgency, frequency, or dysuria. The patient denies having blood in the urine. Psychological: The patient denies having symptoms of nervousness, anxiety, depression, or thoughts of harming himself some of this. Skin: Patient denies having skin rashes, skin, ulcerations, or unexplained itching or pruritus. Musculoskeletal: The patient  is complaining of occasional musculoskeletal pain primarily in the lower extremities. Objective:     Visit Vitals  /85   Pulse 65   Temp 98.2 °F (36.8 °C) (Oral)   Resp 16   Ht 5' 3\" (1.6 m)   Wt 85.3 kg (188 lb)   LMP 08/31/1998   SpO2 99%   BMI 33.30 kg/m²     Pain Scale 2/10  Physical Exam:   ECOG=0  Gen. Appearance: The patient is in no acute distress. Skin: There is no bruise or rash. HEENT: The exam is unremarkable. Neck: Supple without lymphadenopathy or thyromegaly. Lungs: Clear to auscultation and percussion; there are no wheezes or rhonchi. Heart: Regular rate and rhythm; there are no murmurs, gallops, or rubs.  aanterior chest wall and breasts: Deferred. The axilla reveals no palpable axillary lymphadenopathy. Abdomen: Bowel sounds are present and normal.  There is no guarding, tenderness, or hepatosplenomegaly. Extremities: There is no clubbing, cyanosis, or edema. Neurologic: There are no focal neurologic deficits. Lymphatics: There is no palpable peripheral lymphadenopathy. Lab data:      Results for orders placed or performed during the hospital encounter of 12/06/19   [facility] SPECIMEN COL   Result Value Ref Range    [facility] SPEC[facility]. Specimens collected/sent to [facility]. Please direct inquiries to ([phone]). Assessment:     1. MDS (myelodysplastic syndrome) ([facility] [address]    2. Refractory anemia (HCC)    3. Arthritis    4. Vitamin D deficiency        Plan:   Myelodysplastic syndrome: I have explained to the patient that her CBC today is pending. Her CBC from 12/2019 showed her WBC was 5.3K/uL, her hemoglobin was 10.0g/dL with hematocrit of 31.1%. Therapeutic intervention with Procrit 60,000 units every 2 weeks will be provided if her hemoglobin declines below 10g/dL and hematocrit declines below 30%. We do not need to pursue a bone marrow biopsy at this time. However, if she experiences a rapid decline in the hemoglobin and hematocrit with an elevation in her WBC counts that would be an indication to do a bone marrow biopsy to rule out chronic myelomonocytic leukemia, a component of MDS versus converting to an acute leukemic process. Refractory anemia/MDS: We will continue to monitor her hemoglobin and hematocrit every 3 months and if she should have a decrease in her hemoglobin below 10g/dl and  hematocrit below 30% we will offer interventional therapy with either Procrit or Aranesp. I advised the patient to continue taking her iron supplementation once daily. Iron profile, Ferritin level, and CMP will be obtained at this time.     Arthritis/facet arthritis of the cervical region: The patient will continue to use Diclofenac provided to her for pain control by her PCP. Vitamin D Deficiency: Her most recent Vitamin D level from 12/2019 was normal at 51.9ng/mL. She is currently taking Vitamin D supplementation weekly. Follow up in 4 months or sooner if indicated. Orders Placed This Encounter    CBC WITH AUTOMATED DIFF     Standing Status:   Future     Standing Expiration Date:   3/24/2021    IRON PROFILE     Standing Status:   Future     Standing Expiration Date:   0/51/4958    METABOLIC PANEL, COMPREHENSIVE     Standing Status:   Future     Standing Expiration Date:   3/24/2021    FERRITIN     Standing Status:   Future     Standing Expiration Date:   3/24/2021         ZEHRA Solares  03/23/2020      I have assessed the patient independently and  agree with the full assessment as outlined.   Bianca Almanzar MD, [address]

## 2020-03-23 NOTE — PATIENT INSTRUCTIONS
Complete Blood Count (CBC): About This Test 
What is it? A complete blood count (CBC) is a blood test that gives important information about your blood cells, especially red blood cells, white blood cells, and platelets. Why is this test done? A CBC may be done as part of a regular physical exam. There are many other reasons that a doctor may want this blood test, including to: · Find the cause of symptoms such as fatigue, weakness, fever, bruising, or weight loss. · Find anemia or an infection. · See how much blood has been lost if there is bleeding. · Diagnose diseases of the blood, such as leukemia or polycythemia. How can you prepare for the test? 
You do not need to do anything before having this test. 
What happens during the test? 
The health professional taking a sample of your blood will: · Wrap an elastic band around your upper arm. This makes the veins below the band larger so it is easier to put a needle into the vein. · Clean the needle site with alcohol. · Put the needle into the vein. · Attach a tube to the needle to fill it with blood. · Remove the band from your arm when enough blood is collected. · Put a gauze pad or cotton ball over the needle site as the needle is removed. · Put pressure on the site and then put on a bandage. If this blood test is done on a baby, a heel stick may be done instead of a blood draw from a vein. What happens after the test? 
· You will probably be able to go home right away. · You can go back to your usual activities right away. Follow-up care is a key part of your treatment and safety. Be sure to make and go to all appointments, and call your doctor if you are having problems. It's also a good idea to keep a list of the medicines you take. Ask your doctor when you can expect to have your test results. Where can you learn more? Go to http://uday-bhavna.info/ Enter B179 in the search box to learn more about \"Complete Blood Count (CBC): About This Test.\" Current as of: December 8, 2019Content Version: 12.4 © 0703-2815 Healthwise, [employer]orporated. Care instructions adapted under license by [employer] (which disclaims liability or warranty for this information). If you have questions about a medical condition or this instruction, always ask your healthcare professional. [place] [address] any warranty or liability for your use of this information.

## 2020-03-24 LAB
ALBUMIN SERPL-MCNC: 4.3 G/DL (ref 3.8–4.8)
ALBUMIN/GLOB SERPL: 1.3 {RATIO} (ref 1.2–2.2)
ALP SERPL-CCNC: 86 IU/L (ref 39–117)
ALT SERPL-CCNC: 11 IU/L (ref 0–32)
AST SERPL-CCNC: 17 IU/L (ref 0–40)
BASOPHILS # BLD AUTO: 0.1 X10E3/UL (ref 0–0.2)
BASOPHILS NFR BLD AUTO: 2 %
BILIRUB SERPL-MCNC: 0.4 MG/DL (ref 0–1.2)
BUN SERPL-MCNC: 15 MG/DL (ref 8–27)
BUN/CREAT SERPL: 17 (ref 12–28)
CALCIUM SERPL-MCNC: 9.8 MG/DL (ref 8.7–10.3)
CHLORIDE SERPL-SCNC: 102 MMOL/L (ref 96–106)
CHOLEST SERPL-MCNC: 157 MG/DL (ref 100–199)
CO2 SERPL-SCNC: 27 MMOL/L (ref 20–29)
CREAT SERPL-MCNC: 0.88 MG/DL (ref 0.57–1)
EOSINOPHIL # BLD AUTO: 0.2 X10E3/UL (ref 0–0.4)
EOSINOPHIL NFR BLD AUTO: 3 %
ERYTHROCYTE [DISTWIDTH] IN BLOOD BY AUTOMATED COUNT: 14 % (ref 11.7–15.4)
FERRITIN SERPL-MCNC: 391 NG/ML (ref 15–150)
GLOBULIN SER CALC-MCNC: 3.4 G/DL (ref 1.5–4.5)
GLUCOSE SERPL-MCNC: 83 MG/DL (ref 65–99)
HCT VFR BLD AUTO: 29.1 % (ref 34–46.6)
HDLC SERPL-MCNC: 49 MG/DL
HGB BLD-MCNC: 9.6 G/DL (ref 11.1–15.9)
IMM GRANULOCYTES # BLD AUTO: 0 X10E3/UL (ref 0–0.1)
IMM GRANULOCYTES NFR BLD AUTO: 0 %
IRON SATN MFR SERPL: 20 % (ref 15–55)
IRON SERPL-MCNC: 51 UG/DL (ref 27–139)
LDLC SERPL CALC-MCNC: 90 MG/DL (ref 0–99)
LYMPHOCYTES # BLD AUTO: 1.4 X10E3/UL (ref 0.7–3.1)
LYMPHOCYTES NFR BLD AUTO: 24 %
MCH RBC QN AUTO: 29.6 PG (ref 26.6–33)
MCHC RBC AUTO-ENTMCNC: 33 G/DL (ref 31.5–35.7)
MCV RBC AUTO: 90 FL (ref 79–97)
MONOCYTES # BLD AUTO: 0.5 X10E3/UL (ref 0.1–0.9)
MONOCYTES NFR BLD AUTO: 8 %
NEUTROPHILS # BLD AUTO: 3.8 X10E3/UL (ref 1.4–7)
NEUTROPHILS NFR BLD AUTO: 63 %
PLATELET # BLD AUTO: 281 X10E3/UL (ref 150–450)
POTASSIUM SERPL-SCNC: 4 MMOL/L (ref 3.5–5.2)
PROT SERPL-MCNC: 7.7 G/DL (ref 6–8.5)
RBC # BLD AUTO: 3.24 X10E6/UL (ref 3.77–5.28)
SODIUM SERPL-SCNC: 145 MMOL/L (ref 134–144)
TIBC SERPL-MCNC: 259 UG/DL (ref 250–450)
TRIGL SERPL-MCNC: 91 MG/DL (ref 0–149)
UIBC SERPL-MCNC: 208 UG/DL (ref 118–369)
VLDLC SERPL CALC-MCNC: 18 MG/DL (ref 5–40)
WBC # BLD AUTO: 6 X10E3/UL (ref 3.4–10.8)

## 2020-04-01 NOTE — PROGRESS NOTES
Procrit or Aranesp is available whenever her hemoglobin is below 10g/dL and hematocrit is below 30%.

## 2020-04-03 DIAGNOSIS — I10 ESSENTIAL HYPERTENSION: ICD-10-CM

## 2020-04-06 RX ORDER — LOSARTAN POTASSIUM AND HYDROCHLOROTHIAZIDE 12.5; 1 MG/1; MG/1
TABLET ORAL
Qty: 30 TAB | Refills: 11 | Status: SHIPPED | OUTPATIENT
Start: 2020-04-06 | End: 2021-05-17

## 2020-05-28 NOTE — MR AVS SNAPSHOT
Chief Complaint   Patient presents with   • Injury     fell hit head on right side above eye, tripped        History Of Present Illness  Indira is a 88 year old female presenting with a laceration of the R eyebrow  Pt tripped over plants while planting, fell to her knees first, and then hit her head against a brick wall  The injury occurred 4 hrs ago.  No LOC, no HA, no neck pain, no blood thinners, no nausea/vomiting, no vision changes  Tetanus status is not up to date.    Past Medical History  Past Medical History:   Diagnosis Date   • Essential (primary) hypertension    • Hip pain    • Hyperlipemia    • Malignant neoplasm (CMS/HCC)     bladder   • Sciatica    • UTI (urinary tract infection)         Surgical History  Past Surgical History:   Procedure Laterality Date   • Hysterectomy          Social History   reports that she has never smoked. She has never used smokeless tobacco.    Family History    Family History   Problem Relation Age of Onset   • Cancer Mother         breast        Allergies  ALLERGIES:  Nitrofurantoin; Apresoline; Calmoseptine; Cephalexin; Cephalosporins; Ciprofloxacin; Clindamycin; Codeine; Fosfomycin tromethamine; Hydralazine; Keflex; Meperidine; Metronidazole; Opioid analgesics; Quinolones; and Sulfamethoxazole w/trimethoprim    Medications  Current Outpatient Medications   Medication Sig Dispense Refill   • diazePAM (VALIUM) 5 MG tablet      • HYDROcodone-acetaminophen (NORCO) 5-325 MG per tablet Take 1 tablet by mouth daily as needed.     • ondansetron (ZOFRAN ODT) 4 MG disintegrating tablet      • hydrochlorothiazide (MICROZIDE) 12.5 MG capsule      • rosuvastatin (CRESTOR) 10 MG tablet      • Probiotic Product (PROBIOTIC & ACIDOPHILUS EX ST) Cap Take  by mouth once per day.     • propranolol (INDERAL) 10 MG tablet Take 10 mg by mouth.     • meclizine (ANTIVERT) 25 MG tablet TAKE 1 TABLET BY MOUTH THREE TIMES PER DAY AS NEEDED.     • Cholecalciferol (D 1000) 25 mcg (1,000 units)  Visit Information Date & Time Provider Department Dept. Phone Encounter #  
 10/31/2017  9:45 AM Liz Su MD [facility] 77 [id number] Your Appointments 11/22/2017  9:00 AM  
Nurse Visit with DARSHANA RICO Medical Oncology ([facility]) Appt Note: CBC  
 [address] [place], [place] [address] [facility] [address] [facility], [address]  
  
    
 1/22/2018  9:30 AM  
Office Visit with Maurilio Arizmendi MD  
[address]  Oncology [facility]) Appt Note: 4 month  
 [facility] [place] [address] [facility] [address] [facility], [place]  
  
    
 1/31/2018 10:00 AM  
Follow Up with Liz Su MD  
[address] [place], [place] [address] (--) Appt Note: 3 month follow up [place] [address] [facility] [address] [address]  
  
    
 3/14/2018  8:00 AM  
Follow Up with Heide Bloom MD  
[facility] ([facility]) Appt Note: 1 year follow up [facility] [address] [address] [address] [phone] [address] [place] [address] [address] Upcoming Health Maintenance Date Due Pneumococcal 19-64 Highest Risk (2 of 3 - PCV13) 4/12/2018 BREAST CANCER SCRN MAMMOGRAM 4/30/2018 COLONOSCOPY 7/7/2022 DTaP/Tdap/Td series (2 - Td) 4/12/2027 Allergies as of 10/31/2017  Review Complete On: 10/31/2017 By: Liz Su MD  
 No Known Allergies Current Immunizations  Reviewed on 4/25/2016 Name Date Tdap 4/12/2017 Not reviewed this visit You Were Diagnosed With   
  
 Codes Comments Hyperlipidemia, unspecified hyperlipidemia type    -  Primary ICD-10-CM: E78.5 ICD-9-CM: 272.4 Hypokalemia     ICD-10-CM: E87.6 ICD-9-CM: 276.8 Essential hypertension     ICD-10-CM: I10 
ICD-9-CM: 401.9 Influenza vaccination declined     ICD-10-CM: O41.05 ICD-9-CM: V64.06 Vitals BP Pulse Temp Resp Height(growth percentile) Weight(growth percentile) 126/79 68 97.7 °F (36.5 °C) (Oral) 16 5' 3\" (1.6 m) 190 lb (86.2 kg) LMP SpO2 BMI OB Status Smoking Status 08/31/1998 98% 33.66 kg/m2 Hysterectomy Never Smoker BMI and BSA Data Body Mass Index Body Surface Area  
 33.66 kg/m 2 1.96 m 2 Preferred Pharmacy Pharmacy Name Phone [facility] [address], [address] [facility] Your Updated Medication List  
  
   
This list is accurate as of: 10/31/17 10:56 AM.  Always use your most recent med list.  
  
  
  
  
 albuterol 90 mcg/actuation inhaler Commonly known as:  PROVENTIL HFA, VENTOLIN HFA, PROAIR HFA Take 2 Puffs by inhalation every six (6) hours as needed for Wheezing. carvedilol 25 mg tablet Commonly known as:  COREG  
TAKE ONE TABLET BY MOUTH TWICE DAILY WITH MEALS  
  
 diclofenac EC 75 mg EC tablet Commonly known as:  VOLTAREN  
TAKE ONE TABLET BY MOUTH IN THE EVENING  
  
 ergocalciferol 50,000 unit capsule Commonly known as:  ERGOCALCIFEROL  
TAKE ONE CAPSULE BY MOUTH ONCE A WEEK  
  
 hydrocortisone 2.5 % topical cream  
Commonly known as:  HYTONE  
  
 latanoprost 0.005 % ophthalmic solution Commonly known as:  Tonye Ondina Administer 1 Drop to both eyes nightly. losartan-hydroCHLOROthiazide 100-12.5 mg per tablet Commonly known as:  HYZAAR  
TAKE ONE TABLET BY MOUTH ONCE DAILY POLY-IRON 150 FORTE capsule Generic drug:  iron polysacch complex-b12-fa TAKE ONE CAPSULE BY MOUTH ONCE DAILY polyethylene glycol 17 gram/dose powder Commonly known as:  Duane Pollock MIX 17 GRAMS IN LIQUID AND DRINK BY MOUTH ONCE DAILY FOR CONSTIPATION  
  
 simvastatin 20 mg tablet capsule Take 1,000 Units by mouth daily.     • cetirizine (ZYRTEC) 10 MG tablet Take 10 mg by mouth daily.     • Multiple Vitamin (MULTI-VITAMIN DAILY PO)      • amoxicillin-clavulanate (AUGMENTIN) 875-125 MG per tablet Take 1 tablet by mouth every 12 hours. Take with food. 20 tablet 0   • traMADol (ULTRAM) 50 MG tablet   0   • Loratadine (CLARITIN) 10 MG Cap        No current facility-administered medications for this visit.        Review of Systems      Eye Problem(s):negative  ENT Problem(s):negative  Cardiovascular problem(s):negative  Respiratory problem(s):negative  Gastro-intestinal problem(s):negative   Genito-urinary problem(s): negative  Musculoskeletal problem(s): negative  Integumentary problem(s):bleeding, pain  Neurological problem(s):negative  Psychiatric problem(s):negative  Endocrine problem(s):negative  Hematologic and/or Lymphatic problem(s):negative    Last Recorded Vitals  Visit Vitals  BP (!) 191/102   Pulse 91   Temp 98.5 °F (36.9 °C)   Ht 5' 1\" (1.549 m)   Wt 64.4 kg (142 lb)   SpO2 97%   BMI 26.83 kg/m²           Physical Exam    General:  Alert, cooperative, pleasant, conversive.  No acute distress.  Skin:  2cm laceration to R eyebrow.  Warm and dry without rash.   Head:  Normocephalic, no bony tenderness.  No scalp injury.   Neck:  FROM quickly and easily, no tenderness.  Trachea is midline.     Eyes:  Pupils equal, round, reactive to light., Extra-occular movements intact.  Normal conjunctivae and sclerae. No orbital tenderness  ENT:  TMs clr.  Mucous membranes are moist.  No maxillary nor orbital tenderness.  Cardiovascular:  Symmetrical pulses.   Respiratory:  Normal respiratory effort.   Gastrointestinal:  Non-distended.    Musculoskeletal:  Full range of motion.  Motor/sensorium 5/5.  Neurologic:  Oriented times 4.  CN 2-12 intact.  No focal deficits.      Radiology Reports    Pt declined imaging.      Lacerations Repair  Date/Time: 5/28/2020 6:38 PM  Performed by: Jose Rafael Castro,  Commonly known as:  ZOCOR  
TAKE ONE TABLET BY MOUTH ONCE DAILY AT BEDTIME  
  
 triamcinolone acetonide 0.1 % topical cream  
Commonly known as:  KENALOG Apply  to affected area two (2) times a day. valACYclovir 500 mg tablet Commonly known as:  VALTREX  
TAKE ONE TABLET BY MOUTH TWICE DAILY Patient Instructions Please contact our office if you have any questions about your visit today. Introducing [facility]! Amelia Hawkins introduces BESOS patient portal. Now you can access parts of your medical record, email your doctor's office, and request medication refills online. 1. In your internet browser, go to [email or web address] 2. Click on the First Time User? Click Here link in the Sign In box. You will see the New Member Sign Up page. 3. Enter your BESOS Access Code exactly as it appears below. You will not need to use this code after youve completed the sign-up process. If you do not sign up before the expiration date, you must request a new code. · BESOS Access Code: [email or web address] Expires: 1/21/2018  2:57 PM 
 
4. Enter the last four digits of your Social Security Number (xxxx) and Date of Birth (mm/dd/yyyy) as indicated and click Submit. You will be taken to the next sign-up page. 5. Create a BESOS ID. This will be your BESOS login ID and cannot be changed, so think of one that is secure and easy to remember. 6. Create a BESOS password. You can change your password at any time. 7. Enter your Password Reset Question and Answer. This can be used at a later time if you forget your password. 8. Enter your e-mail address. You will receive e-mail notification when new information is available in [address] 9. Click Sign Up. You can now view and download portions of your medical record. 10. Click the Download Summary menu link to download a portable copy of your medical information. MD  Authorized by: Jose Rafael Castro MD   Body area: head/neck  Location details: right eyebrow  Laceration length: 2 cm  Foreign bodies: no foreign bodies  Tendon involvement: none  Nerve involvement: none  Vascular damage: no  Anesthesia: local infiltration    Anesthesia:  Local Anesthetic: lidocaine 1% without epinephrine  Anesthetic total: 5 mL    Sedation:  Patient sedated: no    Irrigation solution: saline  Irrigation method: syringe  Amount of cleaning: standard  Debridement: none  Degree of undermining: none  Skin closure: 6-0 nylon and Ethilon  Number of sutures: 5  Technique: simple  Approximation: close  Approximation difficulty: simple  Dressing: 4x4 sterile gauze  Patient tolerance: Patient tolerated the procedure well with no immediate complications            Medical Decision Making    Multiple differential diagnoses were considered. The patient was apprised of diagnostic and treatment options including alternate modes of care, in addition to risks and benefits, for this medical condition. Based on this discussion the patient agrees with this chosen diagnostic and treatment plan.     PATIENT  REFUSES  EVALUATION/TREATMENT    Based on history and physical exam, I recommeded that Pt be transferred to the ER for more comprehensive workup and treatment.  Pt refuses further evaluation/treatment at this time.  She declined OP CT as well.  We discussed my concerns, as well as the risks associated with patients chosen course of action.      She feels her BP is elevated due to white coat syndrome, and it's always high at the doctor's office.    Patient understands and accepts all risks associated with his/her decision.      Patient appears to be in full control of his/her decision making capacities.    ,    Pandemic - [facility] State of Emergency    Pt was evaluated and treated during the officially declared [facility] of Emergency due to the CoVid-19 pandemic.    Pt was screened, triaged, evaluated and  If you have questions, please visit the Frequently Asked Questions section of the [employer] website. Remember, [employer] is NOT to be used for urgent needs. For medical emergencies, dial 911. Now available from your iPhone and Android! Please provide this summary of care documentation to your next provider. Your primary care clinician is listed as [place] Ese. If you have any questions after today's visit, please call [phone]. treated per current CDC guidelines as they existed on date of service.    Limited evaluation and/or treatment may have been necessary due to supply limitations or shortages, critical needs of Pt or other patients, difficulties imposed by necessary PPE, or out of concern for the greater good of the community as a whole.      Diagnosis    Facial laceration, initial encounter  (primary encounter diagnosis)      Plan       Summary of your Discharge Medications          Accurate as of May 28, 2020  6:44 PM. Always use your most recent med list.            Take these Medications      Details   amoxicillin-clavulanate 875-125 MG per tablet  Commonly known as:  Augmentin   Take 1 tablet by mouth every 12 hours. Take with food.  Comment:  Pt has taken augmentin before with no problems.     cetirizine 10 MG tablet  Commonly known as:  ZyrTEC   Take 10 mg by mouth daily.     Claritin 10 MG Cap   Generic drug:  Loratadine     D 1000 25 mcg (1,000 units) capsule   Generic drug:  Cholecalciferol  Take 1,000 Units by mouth daily.  Comment:  25 mcg = 1,000 units     diazePAM 5 MG tablet  Commonly known as:  VALIUM      hydrochlorothiazide 12.5 MG capsule  Commonly known as:  MICROZIDE      HYDROcodone-acetaminophen 5-325 MG per tablet  Commonly known as:  NORCO   Take 1 tablet by mouth daily as needed.     meclizine 25 MG tablet  Commonly known as:  ANTIVERT   TAKE 1 TABLET BY MOUTH THREE TIMES PER DAY AS NEEDED.     MULTI-VITAMIN DAILY PO      ondansetron 4 MG disintegrating tablet  Commonly known as:  ZOFRAN ODT      Probiotic & Acidophilus Ex St Cap   Take  by mouth once per day.     propranolol 10 MG tablet  Commonly known as:  INDERAL   Take 10 mg by mouth.     rosuvastatin 10 MG tablet  Commonly known as:  CRESTOR      traMADol 50 MG tablet  Commonly known as:  ULTRAM             Patient Instructions   Clean and dry x 48 hours  Recheck in 2 days  Sutures out in 5 days  Watch for redness, fever, discharge, pain    ER if any  vision change, headache, nausea/vomiting, dizziness, neck pain, worsening condition    Patient Education     Laceration: All Closures  A laceration is a cut through the skin. This will usually require stitches (sutures) or staples if it is deep. Minor cuts may be treated with a surgical tape closure or skin glue.    Home care  · Your healthcare provider may prescribe an antibiotic. This is to help prevent infection. Follow all instructions for taking this medicine. Take the medicine every day until it is gone or you are told to stop. You should not have any left over.  · The healthcare provider may prescribe medicines for pain. If no pain medicines were prescribed, you can use over-the-counter pain medicines. Follow instructions for taking any pain medicines. (Note: If you have chronic liver or kidney disease, or ever had a stomach ulcer or gastrointestinal bleeding, talk with your doctor before using these medicines.)  · Follow the healthcare provider’s instructions on how to care for the cut.  · Keep the wound clean and dry. Do not get the wound wet until you are told it is OK to do so. If the area gets wet, gently pat it dry with a clean cloth. Replace the wet bandage with a dry one.  · If a bandage was applied and it becomes wet or dirty, replace it. Otherwise, leave it in place for the first 24 hours.  · Caring for sutures or staples: Once you no longer need to keep them dry, clean the wound daily. First, remove the bandage. Then wash the area gently with soap and warm water, or as directed by the healthcare provider. Use a wet cotton swab to loosen and remove any blood or crust that forms. After cleaning, apply a thin layer of antibiotic ointment if advised. Then put on a new bandage unless you are told not to.  · Caring for skin glue: Don’t put apply liquid, ointment, or cream on the wound while the glue is in place. Avoid activities that cause heavy sweating. Protect the wound from sunlight. Do not scratch,  rub, or pick at the adhesive film. Do not place tape directly over the film. The glue should peel off within 5 to 10 days.   · Caring for surgical tape: Keep the area dry. If it gets wet, blot it dry with a clean towel. Surgical tape usually falls off within 7 to 10 days. If it has not fallen off after 10 days, you can take it off yourself. Put mineral oil or petroleum jelly on a cotton ball and gently rub the tape until it is removed.  · Once you can get the wound wet, you may shower as usual but do not soak the wound in water (no tub baths or swimming)  · Even with proper treatment, a wound infection may sometimes occur. Check the wound daily for signs of infection listed below.  Scalp wounds  During the first 2 days, you may carefully rinse your hair in the shower to remove blood, glass or dirt particles. After two days, you may shower and shampoo your hair normally. Do not soak your scalp in the tub or go swimming until the stitches or staples have been removed. Talk with your healthcare provider before applying any antibiotic ointment to the wound.  Mouth wounds  Eat soft foods to reduce pain. If the cut is inside of your mouth, clean by rinsing after each meal and at bedtime with a mixture of equal parts water and hydrogen peroxide (do not swallow!). Or, you can use a cotton swab to directly apply hydrogen peroxide onto the cut. You may also be prescribed a chlorhexidine solution to rise with. Mouth wounds can be painful when eating. You may use an over-the-counter local numbing solution for pain relief. If this is not available, you may use any numbing solution intended for teething babies. You may apply this directly to the sores with a cotton-tip swab or with your finger.  Follow-up care  Follow up with your healthcare provider as advised. Ask your healthcare provider how long sutures should be left in place. Be sure to return for suture removal as directed. If dissolving stitches were used in the mouth,  these should fall out or dissolve without the need for removal. If tape closures were used, remove them yourself when your provider recommends if they have not fallen off on their own. If skin glue was used, the film will wear off by itself. Generally, you should keep healing wounds out of direct sunlight for the first couple of months to try to lessen scarring.  When to seek medical advice  Call your healthcare provider right away if any of these occur:  · Signs of infection, including increasing pain in the wound, increasing wound redness or swelling, or pus or bad odor coming from the wound  · Fever of 100.4°F (38.ºC) or higher, or as directed by your healthcare provider  · Stitches or staples come apart or fall out or surgical tape falls off before 7 days  · Wound edges reopen  · Wound changes colors  · Numbness around the wound after any numbing medicine should have worn off  · Decreased movement around the injured area  Call 911  Call 911 if you can't control the wound bleeding with direct pressure.  Date Last Reviewed: 5/1/2017 © 2000-2018 [employer]. [address] [place] [address], [place] [address]. All rights reserved. This information is not intended as a substitute for professional medical care. Always follow your healthcare professional's instructions.                               Primary Care Physician  MD Jose Rafael Bonilla MD

## 2020-06-10 ENCOUNTER — OFFICE VISIT (OUTPATIENT)
Dept: CARDIOLOGY CLINIC | Age: 65
End: 2020-06-10

## 2020-06-10 VITALS
OXYGEN SATURATION: 98 % | DIASTOLIC BLOOD PRESSURE: 100 MMHG | HEIGHT: 63 IN | HEART RATE: 63 BPM | BODY MASS INDEX: 34.02 KG/M2 | WEIGHT: 192 LBS | SYSTOLIC BLOOD PRESSURE: 160 MMHG

## 2020-06-10 DIAGNOSIS — R00.2 PALPITATIONS: Primary | ICD-10-CM

## 2020-06-10 DIAGNOSIS — I10 ESSENTIAL HYPERTENSION: ICD-10-CM

## 2020-06-10 RX ORDER — CARVEDILOL 25 MG/1
TABLET ORAL
Qty: 180 TAB | Refills: 3 | Status: SHIPPED | OUTPATIENT
Start: 2020-06-10 | End: 2021-07-14

## 2020-06-10 NOTE — PROGRESS NOTES
Rosemary Ferreira presents today for   Chief Complaint   Patient presents with    Palpitations     with exertion        Rosemary Ferreira preferred language for health care discussion is english/other. Is someone accompanying this pt? no    Is the patient using any DME equipment during [address]? no    Depression Screening:  3 most recent PHQ Screens 3/23/2020   Little interest or pleasure in doing things Not at all   Feeling down, depressed, irritable, or hopeless Not at all   Total Score PHQ 2 0       Learning Assessment:  Learning Assessment 2/24/2020   PRIMARY LEARNER Patient   HIGHEST LEVEL OF EDUCATION - PRIMARY LEARNER  4 YEARS OF COLLEGE   BARRIERS PRIMARY LEARNER NONE   CO-LEARNER CAREGIVER No   PRIMARY LANGUAGE ENGLISH    NEED No   LEARNER PREFERENCE PRIMARY DEMONSTRATION     -     -     -     -   ANSWERED BY patient   RELATIONSHIP SELF       Abuse Screening:  Abuse Screening Questionnaire 2/24/2020   Do you ever feel afraid of your partner? N   Are you in a relationship with someone who physically or mentally threatens you? N   Is it safe for you to go home? Y       Fall Risk  Fall Risk Assessment, last 12 mths 3/23/2020   Able to walk? Yes   Fall in past 12 months? No       Pt currently taking Anticoagulant therapy? no    Coordination of Care:  1. Have you been to the ER, urgent care clinic since your last visit? Hospitalized since your last visit? no    2. Have you seen or consulted any other health care providers outside of the [address] [place] [address] since your last visit? Include any pap smears or colon screening.  no

## 2020-06-10 NOTE — PROGRESS NOTES
Miguel Casey    Chief Complaint   Patient presents with    Palpitations     with exertion    Palps, HTN    HPI    Miguel Casey is a 63 y.o. AAF with palps, HTN here for routine follow up. No change in symptoms though she admits not sleeping well. She lives alone but when falls asleep around her family they say she is snoring very loudly. She can wake up with a headache and does not feel rested even when she does sleep. She has complained of palpitations for quite some time as well as elevated blood pressure. She did take her Hyzaar this morning but did not take the Coreg yet because she has not eaten anything yet this morning. Admits to some dietary indiscretion due to the COVID-19 and is also not exercising the way she has that she believes again that she is gained weight. She had an echocardiogram back in 2011 that was essentially normal.  She had normal EF but there was mild LVH was no significant valvular pathology. Her LA was normal at that time. CV RFs; HTN, HL, obesity    Past Medical History:   Diagnosis Date    Echocardiogram 02/08/2011    Tech difficult. EF 60-65%. Mild LVH. RVSP 20-25 mmHg.  Essential hypertension     History of colon polyps     Hx of endometriosis     had hyst    Hyperlipidemia     Hypertension     MDS (myelodysplastic syndrome) (HCC)     Refractory anemia (HCC)        Past Surgical History:   Procedure Laterality Date    HX HYSTERECTOMY         Current Outpatient Medications   Medication Sig Dispense Refill    losartan-hydroCHLOROthiazide (HYZAAR) 100-12.5 mg per tablet TAKE 1 TABLET BY MOUTH ONCE DAILY 30 Tab 11    valACYclovir (VALTREX) 500 mg tablet Take 1 tablet by mouth twice daily 30 Tab 12    loratadine (CLARITIN) 10 mg tablet Take 1 Tab by mouth daily.  30 Tab 5    simvastatin (ZOCOR) 20 mg tablet TAKE 1 TABLET BY MOUTH ONCE DAILY AT BEDTIME 30 Tab 12    carvedilol (COREG) 25 mg tablet TAKE ONE TABLET BY MOUTH TWICE DAILY WITH MEALS 180 Tab 3  diclofenac (VOLTAREN) 1 % gel Apply 4 g to affected area four (4) times daily. 400 g 12    polyethylene glycol (MIRALAX) 17 gram/dose powder MIX 17 GRAMS (1 CAPFUL) IN LIQUID AND DRINK BY MOUTH ONCE DAILY FOR CONSTIPATION 517 g 12    latanoprost (XALATAN) 0.005 % ophthalmic solution Administer 1 Drop to both eyes nightly.  Vitamin D2 1,250 mcg (50,000 unit) capsule Take 1 capsule by mouth once a week 4 Cap 12    meloxicam (MOBIC) 15 mg tablet Take 1 Tab by mouth daily.          No Known Allergies    Social History     Socioeconomic History    Marital status: SINGLE     Spouse name: Not on file    Number of children: Not on file    Years of education: Not on file    Highest education level: Not on file   Occupational History    Not on file   Social Needs    Financial resource strain: Not on file    Food insecurity     Worry: Not on file     Inability: Not on file    Transportation needs     Medical: Not on file     Non-medical: Not on file   Tobacco Use    Smoking status: Never Smoker    Smokeless tobacco: Never Used   Substance and Sexual Activity    Alcohol use: No    Drug use: No    Sexual activity: Not on file   Lifestyle    Physical activity     Days per week: Not on file     Minutes per session: Not on file    Stress: Not on file   Relationships    Social connections     Talks on phone: Not on file     Gets together: Not on file     Attends Nondenominational service: Not on file     Active member of club or organization: Not on file     Attends meetings of clubs or organizations: Not on file     Relationship status: Not on file    Intimate partner violence     Fear of current or ex partner: Not on file     Emotionally abused: Not on file     Physically abused: Not on file     Forced sexual activity: Not on file   Other Topics Concern    Not on file   Social History Narrative    Not on file        The patient has a family history of    Review of Systems    14 pt Review of Systems is negative unless otherwise mentioned in the HPI. Wt Readings from Last 3 Encounters:   06/10/20 87.1 kg (192 lb)   03/23/20 85.3 kg (188 lb)   03/06/20 86 kg (189 lb 9.6 oz)     Temp Readings from Last 3 Encounters:   03/23/20 98.2 °F (36.8 °C) (Oral)   03/06/20 98 °F (36.7 °C) (Oral)   02/24/20 98 °F (36.7 °C) (Oral)     BP Readings from Last 3 Encounters:   06/10/20 (!) 160/100   03/23/20 150/85   03/06/20 122/61     Pulse Readings from Last 3 Encounters:   06/10/20 63   03/23/20 65   03/06/20 73     Physical Exam:    Visit Vitals  BP (!) 160/100   Pulse 63   Ht 5' 3\" (1.6 m)   Wt 87.1 kg (192 lb)   LMP 08/31/1998   SpO2 98%   BMI 34.01 kg/m²      Physical Exam  HENT:      Head: Normocephalic and atraumatic. Eyes:      Pupils: Pupils are equal, round, and reactive to light. Cardiovascular:      Rate and Rhythm: Normal rate and regular rhythm. Heart sounds: Normal heart sounds. No murmur. No friction rub. No gallop. Pulmonary:      Effort: Pulmonary effort is normal. No respiratory distress. Breath sounds: Normal breath sounds. No wheezing or rales. Chest:      Chest wall: No tenderness. Abdominal:      General: Bowel sounds are normal.      Palpations: Abdomen is soft. Musculoskeletal:         General: No tenderness. Skin:     General: Skin is warm and dry. Neurological:      Mental Status: She is alert and oriented to person, place, and time.          EKG today shows: NSR, normal axis and intervals, no ST segment abnormalities    Lab Results   Component Value Date/Time    Cholesterol, total 157 03/23/2020 09:20 AM    HDL Cholesterol 49 03/23/2020 09:20 AM    LDL, calculated 90 03/23/2020 09:20 AM    VLDL, calculated 18 03/23/2020 09:20 AM    Triglyceride 91 03/23/2020 09:20 AM       Impression and Plan:  Dilma Or is a 63 y.o. with:    1.) Signs and symptoms suggestive of sleep disordered breathing, likely contributes to below  2.) HTN, above goal   3.) Palpitations, likely benign ectopy  4.) Normal LV function    1.) Refer sleep study  2.) Continue Hyzaar and Coreg for now, may be able to titrate down/ off if JUAN addressed  3.) RTC 1 year    >20 mins spent education alone, incl weight loss/ low sodium diet    Thank you for allowing me to participate in the care of your patient, please do not hesitate to call with questions or concerns.     [address],    Shirin Hart, DO

## 2020-07-10 ENCOUNTER — HOSPITAL ENCOUNTER (OUTPATIENT)
Dept: MAMMOGRAPHY | Age: 65
Discharge: HOME | End: 2020-07-10
Attending: FAMILY MEDICINE
Payer: COMMERCIAL

## 2020-07-10 ENCOUNTER — HOSPITAL ENCOUNTER (OUTPATIENT)
Dept: LAB | Age: 65
Discharge: HOME | End: 2020-07-10

## 2020-07-10 ENCOUNTER — HOSPITAL ENCOUNTER (OUTPATIENT)
Dept: ULTRASOUND IMAGING | Age: 65
Discharge: HOME | End: 2020-07-10
Attending: FAMILY MEDICINE
Payer: COMMERCIAL

## 2020-07-10 DIAGNOSIS — N63.0 BREAST MASS: ICD-10-CM

## 2020-07-10 DIAGNOSIS — R92.8 ABNORMAL MAMMOGRAM: ICD-10-CM

## 2020-07-10 DIAGNOSIS — Z09 FOLLOW-UP EXAM: ICD-10-CM

## 2020-07-10 LAB — XX-LABCORP SPECIMEN COL,LCBCF: NORMAL

## 2020-07-10 PROCEDURE — 77061 BREAST TOMOSYNTHESIS UNI: CPT

## 2020-07-10 PROCEDURE — G0279 TOMOSYNTHESIS, MAMMO: HCPCS

## 2020-07-10 PROCEDURE — 99001 SPECIMEN HANDLING PT-LAB: CPT

## 2020-07-10 PROCEDURE — 76642 ULTRASOUND BREAST LIMITED: CPT

## 2020-07-11 LAB
ALBUMIN SERPL-MCNC: 4.5 G/DL (ref 3.8–4.8)
ALBUMIN/GLOB SERPL: 1.2 {RATIO} (ref 1.2–2.2)
ALP SERPL-CCNC: 99 IU/L (ref 39–117)
ALT SERPL-CCNC: 11 IU/L (ref 0–32)
AST SERPL-CCNC: 19 IU/L (ref 0–40)
BILIRUB SERPL-MCNC: 0.6 MG/DL (ref 0–1.2)
BUN SERPL-MCNC: 17 MG/DL (ref 8–27)
BUN/CREAT SERPL: 20 (ref 12–28)
CALCIUM SERPL-MCNC: 10 MG/DL (ref 8.7–10.3)
CHLORIDE SERPL-SCNC: 98 MMOL/L (ref 96–106)
CHOLEST SERPL-MCNC: 181 MG/DL (ref 100–199)
CO2 SERPL-SCNC: 26 MMOL/L (ref 20–29)
CREAT SERPL-MCNC: 0.84 MG/DL (ref 0.57–1)
GLOBULIN SER CALC-MCNC: 3.7 G/DL (ref 1.5–4.5)
GLUCOSE SERPL-MCNC: 78 MG/DL (ref 65–99)
HDLC SERPL-MCNC: 54 MG/DL
INTERPRETATION, 910389: NORMAL
LDLC SERPL CALC-MCNC: 110 MG/DL (ref 0–99)
POTASSIUM SERPL-SCNC: 3.6 MMOL/L (ref 3.5–5.2)
PROT SERPL-MCNC: 8.2 G/DL (ref 6–8.5)
SODIUM SERPL-SCNC: 140 MMOL/L (ref 134–144)
TRIGL SERPL-MCNC: 83 MG/DL (ref 0–149)
VLDLC SERPL CALC-MCNC: 17 MG/DL (ref 5–40)

## 2020-07-21 ENCOUNTER — VIRTUAL VISIT (OUTPATIENT)
Dept: FAMILY MEDICINE CLINIC | Age: 65
End: 2020-07-21

## 2020-07-21 DIAGNOSIS — E78.5 HYPERLIPIDEMIA, UNSPECIFIED HYPERLIPIDEMIA TYPE: ICD-10-CM

## 2020-07-21 DIAGNOSIS — Z12.31 ENCOUNTER FOR SCREENING MAMMOGRAM FOR MALIGNANT NEOPLASM OF BREAST: ICD-10-CM

## 2020-07-21 DIAGNOSIS — K59.00 CONSTIPATION, UNSPECIFIED CONSTIPATION TYPE: ICD-10-CM

## 2020-07-21 DIAGNOSIS — I10 ESSENTIAL HYPERTENSION: Primary | ICD-10-CM

## 2020-07-21 RX ORDER — DICLOFENAC SODIUM 75 MG/1
TABLET, DELAYED RELEASE ORAL
COMMUNITY
Start: 2020-05-18

## 2020-07-21 RX ORDER — POLYETHYLENE GLYCOL 3350 17 G/17G
17 POWDER, FOR SOLUTION ORAL DAILY
Qty: 517 G | Refills: 12 | Status: SHIPPED | OUTPATIENT
Start: 2020-07-21

## 2020-07-21 NOTE — PROGRESS NOTES
Anjum Fernandez presents today for   Chief Complaint   Patient presents with    Hypertension     3 month follow up    Allergic Rhinitis     3 month follow up    Cholesterol Problem     3 month follow up    Medication Refill     Pt request constipation med refill       Is someone accompanying this pt? No    Is the patient using any DME equipment during OV? No    Depression Screening:  3 most recent PHQ Screens 3/23/2020   Little interest or pleasure in doing things Not at all   Feeling down, depressed, irritable, or hopeless Not at all   Total Score PHQ 2 0       Learning Assessment:  Learning Assessment 2/24/2020   PRIMARY LEARNER Patient   HIGHEST LEVEL OF EDUCATION - PRIMARY LEARNER  4 YEARS OF COLLEGE   BARRIERS PRIMARY LEARNER NONE   CO-LEARNER CAREGIVER No   PRIMARY LANGUAGE ENGLISH    NEED No   LEARNER PREFERENCE PRIMARY DEMONSTRATION     -     -     -     -   ANSWERED BY patient   RELATIONSHIP SELF       Abuse Screening:  Abuse Screening Questionnaire 7/21/2020   Do you ever feel afraid of your partner? N   Are you in a relationship with someone who physically or mentally threatens you? N   Is it safe for you to go home? Y         Health Maintenance Due   Topic Date Due    Shingrix Vaccine Age 49> (1 of 2) 01/17/2007   . Health Maintenance reviewed and discussed and ordered per Provider. Coordination of Care  1. Have you been to the ER, urgent care clinic since your last visit? Hospitalized since your last visit? No    2. Have you seen or consulted any other health care providers outside of the [address] [place] [address] since your last visit? Include any pap smears or colon screening. No      Advance Directive:  1. Do you have an advance directive in place? Patient Reply: No    2. If not, would you like material regarding how to put one in place?  Patient Reply: No

## 2020-07-21 NOTE — PROGRESS NOTES
Kayla Posada is a 63 y.o. female who was seen by synchronous (real-time) audio-video technology on 7/21/2020 for Hypertension (3 month follow up); Allergic Rhinitis (3 month follow up); Cholesterol Problem (3 month follow up); and Medication Refill (Pt request constipation med refill)        Assessment & Plan:   Diagnoses and all orders for this visit:    1. Essential hypertension  Cont to work on diet and exercise    2. Hyperlipidemia, unspecified hyperlipidemia type  Stable, cont pres tx plan. Cont to work on diet    3. Constipation, unspecified constipation type  -     polyethylene glycol (MIRALAX) 17 gram packet; Take 1 Packet by mouth daily. 4. Encounter for screening mammogram for malignant neoplasm of breast  -     [facility] 3D ETIENNE W MAMMO BI SCREENING INCL CAD; Future      The complexity of medical decision making for this visit is moderate   Follow-up and Dispositions    · Return in about 3 months (around 10/21/2020) for high blood pressure, high cholesterol, constipation. 712  Subjective:     Patient contacted via [email or web address] me. Pt reports that she has more active lately, walking 1.5 miles 3 days per week. Pt has not been checking her bp lately. Pt was referred for a sleep study by cards; that ofc is booked up until December. No new concerns. Prior to Admission medications    Medication Sig Start Date End Date Taking? Authorizing Provider   diclofenac EC (VOLTAREN) 75 mg EC tablet TAKE 1 TABLET BY MOUTH ONCE DAILY AS NEEDED WITH FOOD 5/18/20  Yes Provider, Historical   polyethylene glycol (MIRALAX) 17 gram packet Take 1 Packet by mouth daily.  7/21/20  Yes Stefanie Stuart MD   carvediloL (COREG) 25 mg tablet TAKE ONE TABLET BY MOUTH TWICE DAILY WITH MEALS 6/10/20  Yes Prachi Schultz DO   losartan-hydroCHLOROthiazide (HYZAAR) 100-12.5 mg per tablet TAKE 1 TABLET BY MOUTH ONCE DAILY 4/6/20  Yes John Murray MD   valACYclovir (VALTREX) 500 mg tablet Take 1 tablet by mouth twice daily 4/2/20  Yes Marcell Murray MD   simvastatin (ZOCOR) 20 mg tablet TAKE 1 TABLET BY MOUTH ONCE DAILY AT BEDTIME 2/3/20  Yes Marcell Murray MD   diclofenac (VOLTAREN) 1 % gel Apply 4 g to affected area four (4) times daily. 1/30/19  Yes Marcell Murray MD   polyethylene glycol (MIRALAX) 17 gram/dose powder MIX 17 GRAMS (1 CAPFUL) IN LIQUID AND DRINK BY MOUTH ONCE DAILY FOR CONSTIPATION 1/9/18  Yes Marcell Murray MD   latanoprost (XALATAN) 0.005 % ophthalmic solution Administer 1 Drop to both eyes nightly. Yes Provider, Historical   Vitamin D2 1,250 mcg (50,000 unit) capsule Take 1 capsule by mouth once a week 4/20/20   Ibis Salinas MD   loratadine (CLARITIN) 10 mg tablet Take 1 Tab by mouth daily. 3/6/20   Ibis Salinas MD   meloxicam (MOBIC) 15 mg tablet Take 1 Tab by mouth daily.  12/6/19   Ibis Salinas MD     Patient Active Problem List   Diagnosis Code    Epistaxis R04.0    Arthritis M19.90    Hyperlipidemia E78.5    Elevated sed rate (elev SR) R70.0    Abdominal pain R10.9    FH: breast cancer Z80.3    Anemia D64.9    Breast lump N63.0    Genital herpes A60.00    CRP elevated R79.82    Glaucoma H40.9    Cataract H26.9    Vitreous degeneration H43.819    Hypokalemia E87.6    Degenerative disc disease [id number]    Adrenal nodule (HCC) E27.8    HTN (hypertension) I10    Palpitations R00.2    MDS (myelodysplastic syndrome) (HCC) D46.9    Refractory anemia (HCC) D46.4    Neoplasm of uncertain behavior of connective and other soft tissue D48.1    Essential hypertension I10    DDD (degenerative disc disease), cervical M50.30    Facet arthritis of cervical region M47.812    Myofascial muscle pain M79.18    DDD (degenerative disc disease), lumbar M51.36    Lumbar facet arthropathy M47.816    Hyperlipidemia LDL goal <100 E78.5    Environmental and seasonal allergies J30.89     Current Outpatient Medications   Medication Sig Dispense Refill    diclofenac EC (VOLTAREN) 75 mg EC tablet TAKE 1 TABLET BY MOUTH ONCE DAILY AS NEEDED WITH FOOD      polyethylene glycol (MIRALAX) 17 gram packet Take 1 Packet by mouth daily. 517 g 12    carvediloL (COREG) 25 mg tablet TAKE ONE TABLET BY MOUTH TWICE DAILY WITH MEALS 180 Tab 3    losartan-hydroCHLOROthiazide (HYZAAR) 100-12.5 mg per tablet TAKE 1 TABLET BY MOUTH ONCE DAILY 30 Tab 11    valACYclovir (VALTREX) 500 mg tablet Take 1 tablet by mouth twice daily 30 Tab 12    simvastatin (ZOCOR) 20 mg tablet TAKE 1 TABLET BY MOUTH ONCE DAILY AT BEDTIME 30 Tab 12    diclofenac (VOLTAREN) 1 % gel Apply 4 g to affected area four (4) times daily. 400 g 12    polyethylene glycol (MIRALAX) 17 gram/dose powder MIX 17 GRAMS (1 CAPFUL) IN LIQUID AND DRINK BY MOUTH ONCE DAILY FOR CONSTIPATION 517 g 12    latanoprost (XALATAN) 0.005 % ophthalmic solution Administer 1 Drop to both eyes nightly.  Vitamin D2 1,250 mcg (50,000 unit) capsule Take 1 capsule by mouth once a week 4 Cap 12    loratadine (CLARITIN) 10 mg tablet Take 1 Tab by mouth daily. 30 Tab 5    meloxicam (MOBIC) 15 mg tablet Take 1 Tab by mouth daily. No Known Allergies  Past Medical History:   Diagnosis Date    Echocardiogram 02/08/2011    Tech difficult. EF 60-65%. Mild LVH. RVSP 20-25 mmHg.  Essential hypertension     History of colon polyps     Hx of endometriosis     had hyst    Hyperlipidemia     Hypertension     MDS (myelodysplastic syndrome) (HCC)     Refractory anemia (HCC)      Past Surgical History:   Procedure Laterality Date    HX HYSTERECTOMY       Family History   Problem Relation Age of Onset    Cancer Mother    Joaquina Sit Arthritis-rheumatoid Sister     Diabetes Sister     Hypertension Sister     No Known Problems Father      Social History     Tobacco Use    Smoking status: Never Smoker    Smokeless tobacco: Never Used   Substance Use Topics    Alcohol use: No       Review of Systems   Constitutional: Negative. HENT: Negative. Respiratory: Negative. Cardiovascular: Negative. All other systems reviewed and are negative. Objective:   No flowsheet data found. General: alert, cooperative, no distress   Mental  status: normal mood, behavior, speech, dress, motor activity, and thought processes, able to follow commands   HENT: NCAT   Neck: no visualized mass   Resp: no respiratory distress   Neuro: no gross deficits   Skin: no discoloration or lesions of concern on visible areas   Psychiatric: normal affect, consistent with stated mood, no evidence of hallucinations     Additional exam findings: none      We discussed the expected course, resolution and complications of the diagnosis(es) in detail. Medication risks, benefits, costs, interactions, and alternatives were discussed as indicated. I advised her to contact the office if her condition worsens, changes or fails to improve as anticipated. She expressed understanding with the diagnosis(es) and plan. Valery Harris, who was evaluated through a patient-initiated, synchronous (real-time) audio-video encounter, and/or her healthcare decision maker, is aware that it is a billable service, with coverage as determined by her insurance carrier. She provided verbal consent to proceed: Yes, and patient identification was verified. It was conducted pursuant to the emergency declaration under the [address] [place] [address] authority and the [employer] General Act. A caregiver was present when appropriate. Ability to conduct physical exam was limited. I was in the office. The patient was at home.       Richa Negron MD

## 2020-07-22 ENCOUNTER — TELEPHONE (OUTPATIENT)
Dept: FAMILY MEDICINE CLINIC | Age: 65
End: 2020-07-22

## 2020-07-22 DIAGNOSIS — K59.00 CONSTIPATION, UNSPECIFIED CONSTIPATION TYPE: ICD-10-CM

## 2020-07-22 RX ORDER — POLYETHYLENE GLYCOL 3350 17 G/17G
POWDER, FOR SOLUTION ORAL
Qty: 517 G | Refills: 12 | Status: CANCELLED | OUTPATIENT
Start: 2020-07-22

## 2020-07-22 NOTE — TELEPHONE ENCOUNTER
PCP: Gregory Lee MD    Last appt: 7/21/2020  Future Appointments   Date Time Provider Bean Dixon   8/5/2020  9:30 AM Gabe Moody MD [facility]   6/25/2021  8:00 AM Rina Paige DO [address] [place] [address]       Requested Prescriptions     Pending Prescriptions Disp Refills    polyethylene glycol (MIRALAX) 17 gram/dose powder 517 g 12     Clarified that dose is 1 \"capful\" and not \"1 packet\" Pharmacy staff also states that container is 510g and not 517g.

## 2020-07-22 NOTE — TELEPHONE ENCOUNTER
Pharmacy called stating that threy would like clarification on RX for Miralax. Pharmacy stated that direction say use on pack, but the quantity that was ordered was 517g which is the big container. Please advise.

## 2020-08-05 ENCOUNTER — OFFICE VISIT (OUTPATIENT)
Dept: ONCOLOGY | Age: 65
End: 2020-08-05

## 2020-08-05 VITALS
DIASTOLIC BLOOD PRESSURE: 86 MMHG | BODY MASS INDEX: 34.19 KG/M2 | RESPIRATION RATE: 16 BRPM | SYSTOLIC BLOOD PRESSURE: 141 MMHG | OXYGEN SATURATION: 99 % | HEART RATE: 53 BPM | WEIGHT: 193 LBS

## 2020-08-05 DIAGNOSIS — D46.9 MDS (MYELODYSPLASTIC SYNDROME) (HCC): Primary | ICD-10-CM

## 2020-08-05 DIAGNOSIS — D64.9 ANEMIA, UNSPECIFIED TYPE: ICD-10-CM

## 2020-08-05 DIAGNOSIS — E55.9 VITAMIN D DEFICIENCY: ICD-10-CM

## 2020-08-05 DIAGNOSIS — M19.90 ARTHRITIS: ICD-10-CM

## 2020-08-05 NOTE — PROGRESS NOTES
Hematology/Oncology  Progress Note    Name: Alina Pickens  : 1955    PCP: Dr. Vamshi Medellin  Ms. Mayo Luna is a 63y.o. year old female who was seen for management of her presumably myelodysplastic syndrome/refractory anemia  Current therapy: Iron supplement, Procrit or Aranesp is available whenever her hemoglobin is below 10g/dL and- hematocrit is below 30%. Subjective:     Ms. Mayo Luna is a 47-year-old -American woman with presumably history of myelodysplastic syndrome/refractory anemia. Patient was being followed by Dr. Opal Loyola who retired recently. She was diagnosed more than 7 years ago. Today she states she has been doing well since her last office visit. She reports her arthritis has been manageable using diclofenac. She denies fatigue, shortness of breath, and weakness. The patient otherwise has no other complaints. Denied fever, chills, night sweat, unintentional weight loss, skin lumps or bumps, acute bleeding or bruising issues. Denied headache, acute vision change, dizziness, chest pain, worsen shortness of breath, palpitation, productive cough, nausea, vomiting, abdominal pain, altered bowel habits, dysuria, new bone pain or back pain, focal numbness or weakness. Past medical history, family history, and social history were reviewed and remain unchanged. Past Medical History:   Diagnosis Date    Echocardiogram 2011    Tech difficult. EF 60-65%. Mild LVH. RVSP 20-25 mmHg.       Essential hypertension     History of colon polyps     Hx of endometriosis     had hyst    Hyperlipidemia     Hypertension     MDS (myelodysplastic syndrome) (HCC)     Refractory anemia (HCC)      Past Surgical History:   Procedure Laterality Date    HX HYSTERECTOMY       Social History     Socioeconomic History    Marital status: SINGLE     Spouse name: Not on file    Number of children: Not on file    Years of education: Not on file    Highest education level: Not on file   Occupational History    Not on file   Social Needs    Financial resource strain: Not on file    Food insecurity     Worry: Not on file     Inability: Not on file    Transportation needs     Medical: Not on file     Non-medical: Not on file   Tobacco Use    Smoking status: Never Smoker    Smokeless tobacco: Never Used   Substance and Sexual Activity    Alcohol use: No    Drug use: No    Sexual activity: Not on file   Lifestyle    Physical activity     Days per week: Not on file     Minutes per session: Not on file    Stress: Not on file   Relationships    Social connections     Talks on phone: Not on file     Gets together: Not on file     Attends Congregation service: Not on file     Active member of club or organization: Not on file     Attends meetings of clubs or organizations: Not on file     Relationship status: Not on file    Intimate partner violence     Fear of current or ex partner: Not on file     Emotionally abused: Not on file     Physically abused: Not on file     Forced sexual activity: Not on file   Other Topics Concern    Not on file   Social History Narrative    Not on file     Family History   Problem Relation Age of Onset    Cancer Mother     Arthritis-rheumatoid Sister     Diabetes Sister     Hypertension Sister     No Known Problems Father      Current Outpatient Medications   Medication Sig Dispense Refill    diclofenac EC (VOLTAREN) 75 mg EC tablet TAKE 1 TABLET BY MOUTH ONCE DAILY AS NEEDED WITH FOOD      polyethylene glycol (MIRALAX) 17 gram packet Take 1 Packet by mouth daily.  517 g 12    carvediloL (COREG) 25 mg tablet TAKE ONE TABLET BY MOUTH TWICE DAILY WITH MEALS 180 Tab 3    Vitamin D2 1,250 mcg (50,000 unit) capsule Take 1 capsule by mouth once a week 4 Cap 12    losartan-hydroCHLOROthiazide (HYZAAR) 100-12.5 mg per tablet TAKE 1 TABLET BY MOUTH ONCE DAILY 30 Tab 11    valACYclovir (VALTREX) 500 mg tablet Take 1 tablet by mouth twice daily 30 Tab 12    loratadine (CLARITIN) 10 mg tablet Take 1 Tab by mouth daily. 30 Tab 5    simvastatin (ZOCOR) 20 mg tablet TAKE 1 TABLET BY MOUTH ONCE DAILY AT BEDTIME 30 Tab 12    meloxicam (MOBIC) 15 mg tablet Take 1 Tab by mouth daily.  diclofenac (VOLTAREN) 1 % gel Apply 4 g to affected area four (4) times daily. 400 g 12    polyethylene glycol (MIRALAX) 17 gram/dose powder MIX 17 GRAMS (1 CAPFUL) IN LIQUID AND DRINK BY MOUTH ONCE DAILY FOR CONSTIPATION 517 g 12    latanoprost (XALATAN) 0.005 % ophthalmic solution Administer 1 Drop to both eyes nightly. Review of Systems   Constitutional: Negative for chills, diaphoresis, fever, malaise/fatigue and weight loss. Respiratory: Negative for cough, hemoptysis, shortness of breath and wheezing. Cardiovascular: Negative for chest pain, palpitations and leg swelling. Gastrointestinal: Negative for abdominal pain, diarrhea, heartburn, nausea and vomiting. Genitourinary: Negative for dysuria, frequency, hematuria and urgency. Musculoskeletal: Negative for joint pain and myalgias. Skin: Negative for itching and rash. Neurological: Negative for dizziness, seizures, weakness and headaches. Psychiatric/Behavioral: Negative for depression. The patient does not have insomnia. Objective:     Visit Vitals  /86   Pulse (!) 53   Resp 16   Wt 87.5 kg (193 lb)   LMP 08/31/1998   SpO2 99%   BMI 34.19 kg/m²       ECOG Performance Status (grade): 0  0 - able to carry on all pre-disease activity w/out restriction  1 - restricted but able to carry out light work  2 - ambulatory and can self- care but unable to carry out work  3 - bed or chair >50% of waking hours  4 - completely disable, total care, confined to bed or chair    Physical Exam  Constitutional:       Appearance: Normal appearance. HENT:      Head: Normocephalic and atraumatic. Eyes:      Pupils: Pupils are equal, round, and reactive to light. Neck:      Musculoskeletal: Neck supple.    Cardiovascular: Rate and Rhythm: Normal rate and regular rhythm. Heart sounds: Normal heart sounds. Pulmonary:      Effort: Pulmonary effort is normal.      Breath sounds: Normal breath sounds. Abdominal:      General: Bowel sounds are normal.      Palpations: Abdomen is soft. Tenderness: There is no abdominal tenderness. There is no guarding. Musculoskeletal: Normal range of motion. Right lower leg: No edema. Left lower leg: No edema. Skin:     General: Skin is warm. Neurological:      General: No focal deficit present. Mental Status: She is alert and oriented to person, place, and time. Mental status is at baseline. Diagnostics:      No results found for this or any previous visit (from the past 96 hour(s)). Imaging:  No results found for this or any previous visit. Results for orders placed in visit on 01/23/18   XR CHEST PA LAT    Narrative Chest x-rays, PA and lateral views:        INDICATION:    Chest discomfort. History of hypertension, hyperlipidemia and anemia. COMPARISON STUDY: Chest x-ray on 9/9/2009. FINDINGS:    Lungs are mildly hypoventilated, likely to be secondary to patient body habitus. Cardiac size normal. Pulmonary vascularity is minimally prominent but not  obviously cephalized. There is no evidence of pneumonia, pulmonary atelectasis,  pleural effusion or pneumothorax. In thoracic spine there are mild-to-moderate  multilevel spondylolytic changes. Impression IMPRESSION:    Normal cardiac size. No evidence of CHF. Mild pulmonary hypoventilation. Otherwise lungs are clear. No evidence of pleural effusion or pneumothorax. Assessment:     1. MDS (myelodysplastic syndrome) ([facility] [address]    2. Anemia, unspecified type    3. Arthritis    4. Vitamin D deficiency        Plan:   # Myelodysplastic syndrome/ Chronic Anemia:   -- She has a presumably history of myelodysplastic syndrome/refractory anemia.  Patient was being followed by Dr. Merrill Lopez who retired recently. She was diagnosed more than 7 years ago. -- Her H/H appears doing stable for years without thrombocytopenia or leukopenia/leukocytosis. It was unclear if a true MDS in her cases. -- We will continue to monitor CBC periodically. # Arthritis/facet arthritis of the cervical region:  # Vitamin D deficiency  -- The patient will follow her PCP for further management. -- We will see the patient back in clinic in about 4 months. Always sooner if required. The patient can have lab done prior our next clinic visit. Orders Placed This Encounter    CBC WITH AUTOMATED DIFF     Standing Status:   Future     Number of Occurrences:   1     Standing Expiration Date:   5/3/6053    METABOLIC PANEL, COMPREHENSIVE     Standing Status:   Future     Number of Occurrences:   1     Standing Expiration Date:   8/6/2021           Ms. José Simpson has a reminder for a \"due or due soon\" health maintenance. I have asked that she contact her primary care provider for follow-up on this health maintenance. All of patient's questions answered to their apparent satisfaction. They verbally show understanding and agreement with aforementioned plan. Lexi Greer MD  8/4/2020          About 25 minutes were spent for this encounter with more than 50% of the time spent in face-to-face counseling, discussing on diagnosis and management plan going forward, and co-ordination of care. Parts of this document has been produced using Dragon dictation system. Unrecognized errors in transcription may be present. Please do not hesitate to reach out for any questions or clarifications.       CC: Cheli Ash MD

## 2020-08-06 LAB
ALBUMIN SERPL-MCNC: 4.7 G/DL (ref 3.8–4.8)
ALBUMIN/GLOB SERPL: 1.3 {RATIO} (ref 1.2–2.2)
ALP SERPL-CCNC: 97 IU/L (ref 39–117)
ALT SERPL-CCNC: 9 IU/L (ref 0–32)
AST SERPL-CCNC: 17 IU/L (ref 0–40)
BASOPHILS # BLD AUTO: 0.1 X10E3/UL (ref 0–0.2)
BASOPHILS NFR BLD AUTO: 2 %
BILIRUB SERPL-MCNC: 0.6 MG/DL (ref 0–1.2)
BUN SERPL-MCNC: 15 MG/DL (ref 8–27)
BUN/CREAT SERPL: 17 (ref 12–28)
CALCIUM SERPL-MCNC: 10.2 MG/DL (ref 8.7–10.3)
CHLORIDE SERPL-SCNC: 99 MMOL/L (ref 96–106)
CO2 SERPL-SCNC: 26 MMOL/L (ref 20–29)
CREAT SERPL-MCNC: 0.87 MG/DL (ref 0.57–1)
EOSINOPHIL # BLD AUTO: 0.1 X10E3/UL (ref 0–0.4)
EOSINOPHIL NFR BLD AUTO: 3 %
ERYTHROCYTE [DISTWIDTH] IN BLOOD BY AUTOMATED COUNT: 14 % (ref 11.7–15.4)
GLOBULIN SER CALC-MCNC: 3.5 G/DL (ref 1.5–4.5)
GLUCOSE SERPL-MCNC: 86 MG/DL (ref 65–99)
HCT VFR BLD AUTO: 32 % (ref 34–46.6)
HGB BLD-MCNC: 10.3 G/DL (ref 11.1–15.9)
IMM GRANULOCYTES # BLD AUTO: 0 X10E3/UL (ref 0–0.1)
IMM GRANULOCYTES NFR BLD AUTO: 0 %
LYMPHOCYTES # BLD AUTO: 1.4 X10E3/UL (ref 0.7–3.1)
LYMPHOCYTES NFR BLD AUTO: 32 %
MCH RBC QN AUTO: 29.5 PG (ref 26.6–33)
MCHC RBC AUTO-ENTMCNC: 32.2 G/DL (ref 31.5–35.7)
MCV RBC AUTO: 92 FL (ref 79–97)
MONOCYTES # BLD AUTO: 0.4 X10E3/UL (ref 0.1–0.9)
MONOCYTES NFR BLD AUTO: 9 %
NEUTROPHILS # BLD AUTO: 2.4 X10E3/UL (ref 1.4–7)
NEUTROPHILS NFR BLD AUTO: 54 %
PLATELET # BLD AUTO: 316 X10E3/UL (ref 150–450)
POTASSIUM SERPL-SCNC: 4.1 MMOL/L (ref 3.5–5.2)
PROT SERPL-MCNC: 8.2 G/DL (ref 6–8.5)
RBC # BLD AUTO: 3.49 X10E6/UL (ref 3.77–5.28)
SODIUM SERPL-SCNC: 140 MMOL/L (ref 134–144)
WBC # BLD AUTO: 4.4 X10E3/UL (ref 3.4–10.8)

## 2020-09-04 ENCOUNTER — TELEPHONE (OUTPATIENT)
Dept: FAMILY MEDICINE CLINIC | Age: 65
End: 2020-09-04

## 2020-09-04 DIAGNOSIS — R10.9 ABDOMINAL PAIN, UNSPECIFIED ABDOMINAL LOCATION: Primary | ICD-10-CM

## 2020-09-04 NOTE — TELEPHONE ENCOUNTER
Pt called and stated that she has been having some gastro issues (stomach cramps and pain) and would like to know if she can get a new referral to gastro. Please Advise.

## 2020-10-21 ENCOUNTER — VIRTUAL VISIT (OUTPATIENT)
Dept: FAMILY MEDICINE CLINIC | Age: 65
End: 2020-10-21
Payer: COMMERCIAL

## 2020-10-21 DIAGNOSIS — R06.83 SNORING: ICD-10-CM

## 2020-10-21 DIAGNOSIS — I10 ESSENTIAL HYPERTENSION: Primary | ICD-10-CM

## 2020-10-21 PROBLEM — M19.91 PRIMARY LOCALIZED OSTEOARTHROSIS OF MULTIPLE SITES: Status: ACTIVE | Noted: 2020-10-21

## 2020-10-21 PROBLEM — Z79.1 LONG TERM (CURRENT) USE OF NON-STEROIDAL ANTI-INFLAMMATORIES (NSAID): Status: ACTIVE | Noted: 2020-10-21

## 2020-10-21 PROBLEM — E55.9 VITAMIN D DEFICIENCY: Status: ACTIVE | Noted: 2020-10-21

## 2020-10-21 PROCEDURE — 99214 OFFICE O/P EST MOD 30 MIN: CPT | Performed by: FAMILY MEDICINE

## 2020-10-21 NOTE — PROGRESS NOTES
Ginger Diaz presents today for   Chief Complaint   Patient presents with    Hypertension     3 month follow up     Cholesterol Problem     3 month follow up    Constipation     3 month follow up       Is someone accompanying this pt? No    Is the patient using any DME equipment during OV? No    Depression Screening:  3 most recent PHQ Screens 3/23/2020   Little interest or pleasure in doing things Not at all   Feeling down, depressed, irritable, or hopeless Not at all   Total Score PHQ 2 0       Learning Assessment:  Learning Assessment 2/24/2020   PRIMARY LEARNER Patient   HIGHEST LEVEL OF EDUCATION - PRIMARY LEARNER  4 YEARS OF COLLEGE   BARRIERS PRIMARY LEARNER NONE   CO-LEARNER CAREGIVER No   PRIMARY LANGUAGE ENGLISH    NEED No   LEARNER PREFERENCE PRIMARY DEMONSTRATION     -     -     -     -   ANSWERED BY patient   RELATIONSHIP SELF       Abuse Screening:  Abuse Screening Questionnaire 10/21/2020   Do you ever feel afraid of your partner? N   Are you in a relationship with someone who physically or mentally threatens you? N   Is it safe for you to go home? Y         Health Maintenance Due   Topic Date Due    Shingrix Vaccine Age 49> (1 of 2) 01/17/2007    Flu Vaccine (1) 09/01/2020   . Health Maintenance reviewed and discussed and ordered per Provider. Coordination of Care  1. Have you been to the ER, urgent care clinic since your last visit? Hospitalized since your last visit? No    2. Have you seen or consulted any other health care providers outside of the [address] [place] [address] since your last visit? Include any pap smears or colon screening. No      Advance Directive:  1. Do you have an advance directive in place? Patient Reply:No    2. If not, would you like material regarding how to put one in place?  Patient Reply: No

## 2020-10-21 NOTE — PROGRESS NOTES
Damian Davis is a 63 y.o. female who was seen by synchronous (real-time) audio-video technology on 10/21/2020 for Hypertension (3 month follow up ); Cholesterol Problem (3 month follow up); and Constipation (3 month follow up)        Assessment & Plan:   Diagnoses and all orders for this visit:    1. Essential hypertension  Stable, cont pres tx plan. 2. Snoring  Sleep eval pending    3. BMI 34.0-34.9,adult  Increase exercise. The complexity of medical decision making for this visit is moderate   Follow-up and Dispositions    · Return in about 3 months (around 1/21/2021) for high blood pressure, high cholesterol, lab review. 712  Subjective:   Patient contacted via doxy. me. Pt has been doing well. Pt has not been walking much lately as she has resumed work 5 days per week. Pt is checking her bp at home. Home readings are usually 861-576I systolic, 44-68 diastolic. She is concerned that it is not low enough but does not take her med until mid-day. Pt will have a consultation with sleep med this week. Pt will have a bone density test next month with rheum. Prior to Admission medications    Medication Sig Start Date End Date Taking? Authorizing Provider   diclofenac EC (VOLTAREN) 75 mg EC tablet TAKE 1 TABLET BY MOUTH ONCE DAILY AS NEEDED WITH FOOD 5/18/20  Yes Provider, Historical   polyethylene glycol (MIRALAX) 17 gram packet Take 1 Packet by mouth daily.  7/21/20  Yes Dony Soto MD   carvediloL (COREG) 25 mg tablet TAKE ONE TABLET BY MOUTH TWICE DAILY WITH MEALS 6/10/20  Yes Prachi Toro DO   losartan-hydroCHLOROthiazide (HYZAAR) 100-12.5 mg per tablet TAKE 1 TABLET BY MOUTH ONCE DAILY 4/6/20  Yes Dony Soto MD   valACYclovir (VALTREX) 500 mg tablet Take 1 tablet by mouth twice daily 4/2/20  Yes Giselle Murray MD   simvastatin (ZOCOR) 20 mg tablet TAKE 1 TABLET BY MOUTH ONCE DAILY AT BEDTIME 2/3/20  Yes Giselle Murray MD   diclofenac (VOLTAREN) 1 % gel Apply 4 g to affected area four (4) times daily. 1/30/19  Yes Merline Murray MD   polyethylene glycol (MIRALAX) 17 gram/dose powder MIX 17 GRAMS (1 CAPFUL) IN LIQUID AND DRINK BY MOUTH ONCE DAILY FOR CONSTIPATION 1/9/18  Yes Merline Murray MD   latanoprost (XALATAN) 0.005 % ophthalmic solution Administer 1 Drop to both eyes nightly. Yes Provider, Historical   Vitamin D2 1,250 mcg (50,000 unit) capsule Take 1 capsule by mouth once a week 4/20/20   Spencer Davidson MD   loratadine (CLARITIN) 10 mg tablet Take 1 Tab by mouth daily. 3/6/20   Spencer Davidson MD   meloxicam (MOBIC) 15 mg tablet Take 1 Tab by mouth daily.  12/6/19   Spencer Davidson MD     Patient Active Problem List   Diagnosis Code    Epistaxis R04.0    Arthritis M19.90    Hyperlipidemia E78.5    Elevated sed rate (elev SR) R70.0    Abdominal pain R10.9    FH: breast cancer Z80.3    Anemia D64.9    Breast lump N63.0    Genital herpes A60.00    CRP elevated R79.82    Glaucoma H40.9    Cataract H26.9    Vitreous degeneration H43.819    Hypokalemia E87.6    Degenerative disc disease [id number]    Adrenal nodule (HCC) E27.8    HTN (hypertension) I10    Palpitations R00.2    MDS (myelodysplastic syndrome) (HCC) D46.9    Refractory anemia (HCC) D46.4    Neoplasm of uncertain behavior of connective and other soft tissue D48.1    Essential hypertension I10    DDD (degenerative disc disease), cervical M50.30    Facet arthritis of cervical region M47.812    Myofascial muscle pain M79.18    DDD (degenerative disc disease), lumbar M51.36    Lumbar facet arthropathy M47.816    Hyperlipidemia LDL goal <100 E78.5    Environmental and seasonal allergies J30.89    Primary localized osteoarthrosis of multiple sites M19.91    Vitamin D deficiency E55.9    Long term (current) use of non-steroidal anti-inflammatories (nsaid) Z79.1     Current Outpatient Medications   Medication Sig Dispense Refill    diclofenac EC (VOLTAREN) 75 mg EC tablet TAKE 1 TABLET BY MOUTH ONCE DAILY AS NEEDED WITH FOOD      polyethylene glycol (MIRALAX) 17 gram packet Take 1 Packet by mouth daily. 517 g 12    carvediloL (COREG) 25 mg tablet TAKE ONE TABLET BY MOUTH TWICE DAILY WITH MEALS 180 Tab 3    losartan-hydroCHLOROthiazide (HYZAAR) 100-12.5 mg per tablet TAKE 1 TABLET BY MOUTH ONCE DAILY 30 Tab 11    valACYclovir (VALTREX) 500 mg tablet Take 1 tablet by mouth twice daily 30 Tab 12    simvastatin (ZOCOR) 20 mg tablet TAKE 1 TABLET BY MOUTH ONCE DAILY AT BEDTIME 30 Tab 12    diclofenac (VOLTAREN) 1 % gel Apply 4 g to affected area four (4) times daily. 400 g 12    polyethylene glycol (MIRALAX) 17 gram/dose powder MIX 17 GRAMS (1 CAPFUL) IN LIQUID AND DRINK BY MOUTH ONCE DAILY FOR CONSTIPATION 517 g 12    latanoprost (XALATAN) 0.005 % ophthalmic solution Administer 1 Drop to both eyes nightly.  Vitamin D2 1,250 mcg (50,000 unit) capsule Take 1 capsule by mouth once a week 4 Cap 12    loratadine (CLARITIN) 10 mg tablet Take 1 Tab by mouth daily. 30 Tab 5    meloxicam (MOBIC) 15 mg tablet Take 1 Tab by mouth daily. No Known Allergies  Past Medical History:   Diagnosis Date    Echocardiogram 02/08/2011    Tech difficult. EF 60-65%. Mild LVH. RVSP 20-25 mmHg.  Essential hypertension     History of colon polyps     Hx of endometriosis     had hyst    Hyperlipidemia     Hypertension     MDS (myelodysplastic syndrome) (HCC)     Refractory anemia (HCC)      Past Surgical History:   Procedure Laterality Date    HX HYSTERECTOMY       Family History   Problem Relation Age of Onset    Cancer Mother    Romana Langton Arthritis-rheumatoid Sister     Diabetes Sister     Hypertension Sister     No Known Problems Father      Social History     Tobacco Use    Smoking status: Never Smoker    Smokeless tobacco: Never Used   Substance Use Topics    Alcohol use: No       Review of Systems   Constitutional: Negative. HENT: Negative.     Respiratory: Negative. Cardiovascular: Negative. All other systems reviewed and are negative. Objective:     Patient-Reported Vitals 10/21/2020   Patient-Reported Pulse 70   Patient-Reported Systolic  057   Patient-Reported Diastolic 80      General: alert, cooperative, no distress   Mental  status: normal mood, behavior, speech, dress, motor activity, and thought processes, able to follow commands   HENT: NCAT   Neck: no visualized mass   Resp: no respiratory distress   Neuro: no gross deficits   Skin: no discoloration or lesions of concern on visible areas   Psychiatric: normal affect, consistent with stated mood, no evidence of hallucinations     Additional exam findings: none      We discussed the expected course, resolution and complications of the diagnosis(es) in detail. Medication risks, benefits, costs, interactions, and alternatives were discussed as indicated. I advised her to contact the office if her condition worsens, changes or fails to improve as anticipated. She expressed understanding with the diagnosis(es) and plan. Christa Aviles, who was evaluated through a patient-initiated, synchronous (real-time) audio-video encounter, and/or her healthcare decision maker, is aware that it is a billable service, with coverage as determined by her insurance carrier. She provided verbal consent to proceed: n/a- consent obtained within past 12 months, and patient identification was verified. It was conducted pursuant to the emergency declaration under the [address] [place] [id number] authority and the [employer] General Act. A caregiver was present when appropriate. Ability to conduct physical exam was limited. I was in the office. The patient was at home.       Ynes Babb MD

## 2020-10-23 ENCOUNTER — OFFICE VISIT (OUTPATIENT)
Dept: PULMONOLOGY | Age: 65
End: 2020-10-23
Payer: COMMERCIAL

## 2020-10-23 VITALS
BODY MASS INDEX: 34.23 KG/M2 | TEMPERATURE: 97.7 F | DIASTOLIC BLOOD PRESSURE: 78 MMHG | HEIGHT: 63 IN | OXYGEN SATURATION: 98 % | SYSTOLIC BLOOD PRESSURE: 126 MMHG | RESPIRATION RATE: 16 BRPM | WEIGHT: 193.2 LBS | HEART RATE: 60 BPM

## 2020-10-23 DIAGNOSIS — Z23 NEEDS FLU SHOT: ICD-10-CM

## 2020-10-23 DIAGNOSIS — I10 ESSENTIAL HYPERTENSION: Chronic | ICD-10-CM

## 2020-10-23 DIAGNOSIS — R68.89 VIVID DREAM: ICD-10-CM

## 2020-10-23 DIAGNOSIS — R06.83 SNORING: Primary | ICD-10-CM

## 2020-10-23 DIAGNOSIS — E78.5 DYSLIPIDEMIA: ICD-10-CM

## 2020-10-23 DIAGNOSIS — D64.9 ANEMIA, UNSPECIFIED TYPE: ICD-10-CM

## 2020-10-23 DIAGNOSIS — G47.8 SLEEP PARALYSIS: ICD-10-CM

## 2020-10-23 PROCEDURE — 99213 OFFICE O/P EST LOW 20 MIN: CPT | Performed by: INTERNAL MEDICINE

## 2020-10-23 PROCEDURE — 90694 VACC AIIV4 NO PRSRV 0.5ML IM: CPT | Performed by: INTERNAL MEDICINE

## 2020-10-23 PROCEDURE — 90471 IMMUNIZATION ADMIN: CPT | Performed by: INTERNAL MEDICINE

## 2020-10-23 NOTE — PROGRESS NOTES
[facility]  Pulmonary, Critical Care, and Sleep Medicine    Office Progress Note- Initial Evaluation      Primary Care Physician: Zina Brown MD     Reason for Visit:  Evaluation for possible sleep disorder    Assessment:  1. Snoring: disruptive to patient and others: Patient has symptoms and exam findings highly suggestive of a sleep breathing disorder, such as JUAN. Given severity of symptoms and comorbidities additional  sleep testing is indicated. 2. Vivid Dream- may be related to JUAN- monitor for now  3. Sleep Paralysis with possible HH- monitor for now. No reports of cataplexy  4. Possible parasomnia- calling out in her sleep  5. Hypertension  6. Dyslipidemia  7. Allergic Rhinitis- not an issue at this time  8. Anemia- Chronic. Report of MDS- stable for years, followed by [facility]  9. Obesity: Body mass index is 34.22 kg/m². Plan:    · Schedule patient for home sleep study for further evaluation. · If positive for JUAN then will start PAP therapy and reassess sleep symptoms. · Potential consequences of untreated sleep apnea, and/or excessive daytime sleepiness were discussed with the patient. · Educational materials provided. · Healthy lifestyle changes to include weight loss and exercise discussed. · Healthy sleep habits were reviewed and encouraged. ·  and workplace safety reviewed and discussed as appropriate. Drowsy and/or inattentive driving should be avoided. · Follow-up after sleeptesting , sooner should new symptoms or problems arise. · Follow up with Primary Care Provider (PCP) as directed and for routine health care maintenance. History of Present Illness: Mrs. Rex Engel is a 63 y.o. female with a history of chronic anemia (possible MDS), hypertension, and dyslipidemia who presents for report of snoring. The history was provided by the patient.     Occupation:   [place]-   [employer]                      Work Schedule: 1760-2000 M-F  Shift work: Quit 7-11 [id number]- weekends- stopped March 2020    Driving:  yes  Drowsy Driving: is not reported. Motor vehicle accident(s) associated with drowsy driving have not occurred. Snoring: This is a Chronic problem which has been ongoing for years by her family. Witnessed apneas are not reported. Fatigue: This is a Chronic problem which has been ongoing for years but she attributes this to her anemia and not really being \"sleepy\"    Dental: Teeth clenching or grinding is not reported. Naps: are not reported. If she could she would but she tries not to nap. Leg Symptoms/Pain: She does not have unpleasant or crawling sensation in legs or strong urge to move when inactive. She does have some arthritis in her knees that can disturb her sleep. GERD: is reported but not typical.    Mood: Patient reports feeling happy. Sleep-Wake History:     Estimates sleeping approximately 7-8 hours per night/day. Reports sleeping in a Bed with 2 flat pillows under their head. She gets into bed at approximately 1900. Once in bed,she watches TV until 2200 and then she turns the TV and lights off. It usually takes up to 30 minutes to fall asleep after going to bed. Reports waking up to use the bathroom 0-1 times nightly. Pain, typically does disturb their sleep but not typical.    Vivid dreams are reported but mostly since March 2020. She states dreams are \"weird, crazy and scary\". She normally awakens without an alarm to start their day at 0600. She  typically gets out of bed 0630 . Waking up with a morning headache is not reported. Awakening with a dry mouth is not  reported. Symptom(s) suggestive of cataplexy is not  reported. Sleep paralysis is reported. The patient reports that this occurs when she naps and this is the really reason why she avoids naps. Hypnagogic and/or hypnopompic hallucinations is reported.  The patient is reluctant to tell what happen because she does not feel she should talk about these. She is not sure if these events were dreams or not    Sleep walking is not  reported. Sleep talking is reported. She has called out and screamed out in her sleep. Other unusual and/or parasomnia behaviors are not reported. Family Sleep History:  - Daughter: may have JUAN        Stop Sherwin Cooks 10/23/2020   Does the patient snore loudly (louder than talking or loud enough to be heard through closed doors)? 1   Does the patient often feel tired, fatigued, or sleepy during the daytime, even after a \"good\" night's sleep? 0   Has anyone ever observed the patient stop breathing during their sleep?  0   Does the patient have or are they being treated for high blood pressure? 1   Is the patient's BMI greater than 35? 0   Is your neck circumference greater than 17 inches (Male) or 16 inches (Female)?  0   Is the patient older than 48? 1   Is the patient male? 0   JUAN Score 3       3 most recent PHQ Screens 10/23/2020   Little interest or pleasure in doing things Not at all   Feeling down, depressed, irritable, or hopeless Not at all   Total Score PHQ 2 0       Cades Scale 10/23/2020   Sitting and Reading 1   Watching TV 1   Sitting, inactive in a public place (e.g. a movie theater or meeting) 1   As a passenger in a car for an hour, without a break 0   Lying down to rest in the afternoon, when circumstances permit 0   Sitting and talking to someone 0   Sitting quietly after lunch without alcohol 0   In a car, while stopped for a few minutes in traffic 0   Cades Sleepiness Score 3             Immunization History:  Immunization History   Administered Date(s) Administered    Influenza Vaccine (Madin Russell Canine Kidney) PF 10/05/2017    Influenza Vaccine (Quad) PF (>6 Mo Flulaval, Fluarix, and >3 Yrs [facility], [place] [address]) 01/31/2018, 10/29/2018, 12/06/2019    Influenza Vaccine (Trivalent) 10/26/2019    Tdap 04/12/2017       Past Medical History:  Past Medical History:   Diagnosis Date    Echocardiogram 02/08/2011    Tech difficult. EF 60-65%. Mild LVH. RVSP 20-25 mmHg.  Essential hypertension     History of colon polyps     Hx of endometriosis     had hyst    Hyperlipidemia     Hypertension     MDS (myelodysplastic syndrome) (HCC)     Refractory anemia (HCC)        Past Surgical History:  Past Surgical History:   Procedure Laterality Date    HX HYSTERECTOMY         Family History:  Family History   Problem Relation Age of Onset    Cancer Mother     Arthritis-rheumatoid Sister     Diabetes Sister     Hypertension Sister     No Known Problems Father        Social History:  Social History     Tobacco Use    Smoking status: Never Smoker    Smokeless tobacco: Never Used   Substance Use Topics    Alcohol use: No    Drug use: No        Caffeine Amount Time of last Intake Comments   Coffee 1C/am     Soda 20 oz/am  Dr. Sandy Lowe Rare     Energy Drinks None     Over- the - counter stimulant pills None     Other Substances      Alcohol None     Tobacco Never     Drugs Never     Other: None         Medications:  Current Outpatient Medications on File Prior to Visit   Medication Sig Dispense Refill    diclofenac EC (VOLTAREN) 75 mg EC tablet TAKE 1 TABLET BY MOUTH ONCE DAILY AS NEEDED WITH FOOD      polyethylene glycol (MIRALAX) 17 gram packet Take 1 Packet by mouth daily. 517 g 12    carvediloL (COREG) 25 mg tablet TAKE ONE TABLET BY MOUTH TWICE DAILY WITH MEALS 180 Tab 3    losartan-hydroCHLOROthiazide (HYZAAR) 100-12.5 mg per tablet TAKE 1 TABLET BY MOUTH ONCE DAILY 30 Tab 11    valACYclovir (VALTREX) 500 mg tablet Take 1 tablet by mouth twice daily 30 Tab 12    simvastatin (ZOCOR) 20 mg tablet TAKE 1 TABLET BY MOUTH ONCE DAILY AT BEDTIME 30 Tab 12    diclofenac (VOLTAREN) 1 % gel Apply 4 g to affected area four (4) times daily. 400 g 12    latanoprost (XALATAN) 0.005 % ophthalmic solution Administer 1 Drop to both eyes nightly.         Vitamin D2 1,250 mcg (50,000 unit) capsule Take 1 capsule by mouth once a week 4 Cap 12    loratadine (CLARITIN) 10 mg tablet Take 1 Tab by mouth daily. 30 Tab 5    meloxicam (MOBIC) 15 mg tablet Take 1 Tab by mouth daily.  polyethylene glycol (MIRALAX) 17 gram/dose powder MIX 17 GRAMS (1 CAPFUL) IN LIQUID AND DRINK BY MOUTH ONCE DAILY FOR CONSTIPATION 517 g 12     No current facility-administered medications on file prior to visit. Allergy:  No Known Allergies    Review of Systems  General ROS: positive for  - fatigue and sleep disturbance  negative for - chills, hot flashes, malaise or night sweats  ENT ROS: negative for - epistaxis, hearing change, nasal congestion, nasal discharge, nasal polyps, oral lesions, sinus pain, sneezing, sore throat, tinnitus, vertigo, visual changes or vocal changes  Hematological and Lymphatic ROS: negative for - bleeding problems, blood clots, bruising, jaundice, pallor or swollen lymph nodes  Endocrine ROS: negative for - polydipsia/polyuria, skin changes, temperature intolerance or unexpected weight changes  Respiratory ROS: no cough, shortness of breath, or wheezing  Cardiovascular ROS: no chest pain or dyspnea on exertion  Gastrointestinal ROS: no abdominal pain, change in bowel habits, or black or bloody stools, + constipation  Genito-Urinary ROS: no dysuria, trouble voiding, or hematuria  Musculoskeletal ROS: positive for - joint pain and joint stiffness  Neurological ROS: no TIA or stroke symptoms  Dermatological ROS: negative for - pruritus, rash or skin lesion changes   Psychological ROS: as above   Otherwise negative and per HPI      Physical Exam:  Blood pressure 126/78, pulse 60, temperature 97.7 °F (36.5 °C), temperature source Oral, resp. rate 16, height 5' 3\" (1.6 m), weight 87.6 kg (193 lb 3.2 oz), last menstrual period 08/31/1998, SpO2 98 %. on RA, Body mass index is 34.22 kg/m². Neck circ.  in \"inches\": 15.5    General: No distress, acyanotic, appears stated age, cooperative, pleasant  HEENT: PERRL, EOMI, throat without erythema or exudate, Tongue- dental indention on tongue, Mallampati's score 3+, Uvula- midline, Tonsils- not seen, widened posterior pharyngeal arch- crowed posterior oropharynx  Neck: Supple,  no abnormally enlarged lymph nodes, thyroid is not enlarged, non-tender, No JVD, No carotid bruits  Chest: Normal.  Lungs: Moderate air entry, clear to auscultation bilaterally,   Heart: Regular rate and rhythm, S1S2 present, without murmur. Abdomen: Protuberant, bowel sounds normoactive, abdomen is soft without significant tenderness, or guarding. Extremity: Negative for cyanosis, edema, or clubbing. Skin: Skin color, texture, turgor normal. No rashes or lesions. Neurological: CN 2-12 grossly intact, normal muscle tone.     Data Reviewed:  CBC:   Lab Results   Component Value Date/Time    WBC 4.4 08/05/2020 09:59 AM    HGB (POC) 10.3 (A) 04/16/2014 10:07 AM    HGB 10.3 (L) 08/05/2020 09:59 AM    HCT (POC) 34.8 (A) 04/16/2014 10:07 AM    HCT 32.0 (L) 08/05/2020 09:59 AM    PLATELET 347 70/27/6303 09:59 AM    MCV 92 08/05/2020 09:59 AM       BMP:   Lab Results   Component Value Date/Time    Sodium 140 08/05/2020 09:59 AM    Potassium 4.1 08/05/2020 09:59 AM    Chloride 99 08/05/2020 09:59 AM    CO2 26 08/05/2020 09:59 AM    Anion gap 5 11/30/2009 10:10 AM    Glucose 86 08/05/2020 09:59 AM    BUN 15 08/05/2020 09:59 AM    Creatinine 0.87 08/05/2020 09:59 AM    BUN/Creatinine ratio 17 08/05/2020 09:59 AM    GFR est AA 82 08/05/2020 09:59 AM    GFR est non-AA 71 08/05/2020 09:59 AM    Calcium 10.2 08/05/2020 09:59 AM        TSH:  Lab Results   Component Value Date/Time    TSH 0.747 07/17/2015 09:23 AM    TSH 1.010 10/15/2012 12:00 AM    TSH 0.645 02/20/2012 12:00 AM    TSH 0.585 02/11/2011 12:00 AM     No results found for: HBA1C, HGBE8, UDN9JRAH, JYH5RIAJ      Imaging:  [x]I have personally reviewed the patients radiographs section   Results from Appointment encounter on 01/23/18   XR CHEST PA LAT    Narrative Chest x-rays, PA and lateral views:        INDICATION:    Chest discomfort. History of hypertension, hyperlipidemia and anemia. COMPARISON STUDY: Chest x-ray on 9/9/2009. FINDINGS:    Lungs are mildly hypoventilated, likely to be secondary to patient body habitus. Cardiac size normal. Pulmonary vascularity is minimally prominent but not  obviously cephalized. There is no evidence of pneumonia, pulmonary atelectasis,  pleural effusion or pneumothorax. In thoracic spine there are mild-to-moderate  multilevel spondylolytic changes. Impression IMPRESSION:    Normal cardiac size. No evidence of CHF. Mild pulmonary hypoventilation. Otherwise lungs are clear. No evidence of pleural effusion or pneumothorax. Historical Sleep Testing Data: No Testing To Date.           Emilie Hernandez DO, [facility]  Pulmonary, Sleep and Critical Care Medicine

## 2020-10-23 NOTE — PATIENT INSTRUCTIONS
Please call our clinic back at [phone] if you have not received a follow up appointment within 30 days prior the recommended follow up time.

## 2020-10-23 NOTE — PROGRESS NOTES
Durward Alpers is a 63 y.o. female who presents for routine immunizations. She denies any symptoms , reactions or allergies that would exclude them from being immunized today. Risks and adverse reactions were discussed and the VIS was given to them. All questions were addressed. She was observed for 10 min post injection. There were no reactions observed.     Stephane Simons

## 2020-10-23 NOTE — PROGRESS NOTES
Ryan Dugan presents today for   Chief Complaint   Patient presents with    Snoring       Is someone accompanying this pt? No    Is the patient using any DME equipment during OV? No     -DME Company NA    Depression Screening:  3 most recent PHQ Screens 3/23/2020   Little interest or pleasure in doing things Not at all   Feeling down, depressed, irritable, or hopeless Not at all   Total Score PHQ 2 0       Learning Assessment:  Learning Assessment 2/24/2020   PRIMARY LEARNER Patient   HIGHEST LEVEL OF EDUCATION - PRIMARY LEARNER  4 YEARS OF COLLEGE   BARRIERS PRIMARY LEARNER NONE   CO-LEARNER CAREGIVER No   PRIMARY LANGUAGE ENGLISH    NEED No   LEARNER PREFERENCE PRIMARY DEMONSTRATION     -     -     -     -   ANSWERED BY patient   RELATIONSHIP SELF       Abuse Screening:  Abuse Screening Questionnaire 10/21/2020   Do you ever feel afraid of your partner? N   Are you in a relationship with someone who physically or mentally threatens you? N   Is it safe for you to go home? Y       Fall Risk  Fall Risk Assessment, last 12 mths 3/23/2020   Able to walk? Yes   Fall in past 12 months? No         Coordination of Care:  1. Have you been to the ER, urgent care clinic since your last visit? Hospitalized since your last visit? No    2. Have you seen or consulted any other health care providers outside of the [address] [place] [address] since your last visit? Include any pap smears or colon screening.  No

## 2020-10-23 NOTE — LETTER
10/23/20 Patient: Gamaliel Mancini YOB: 1955 Date of Visit: 10/23/2020 Tayla Cook MD 
[id number] [address] [address] VIA In Basket Dear Tayla Cook MD, Thank you for referring Ms. Rojas Back to [address] [place] [address] for evaluation. My notes for this consultation are attached. If you have questions, please do not hesitate to call me. I look forward to following your patient along with you.  
 
 
Sincerely, 
 
Josefina Villafuerte, DO

## 2020-11-05 ENCOUNTER — TELEPHONE (OUTPATIENT)
Dept: PULMONOLOGY | Age: 65
End: 2020-11-05

## 2020-11-05 NOTE — TELEPHONE ENCOUNTER
Pt called([phone]). Has still not heard anything regarding her home sleep study. Please check and call her back.

## 2020-11-18 NOTE — TELEPHONE ENCOUNTER
Called patient, no answer, unable to leave a message requesting a return call, due to patient's voicemail being full.

## 2020-11-19 ENCOUNTER — TRANSCRIBE ORDER (OUTPATIENT)
Dept: SLEEP MEDICINE | Age: 65
End: 2020-11-19

## 2020-11-19 DIAGNOSIS — G47.33 OSA (OBSTRUCTIVE SLEEP APNEA): Primary | ICD-10-CM

## 2020-11-25 ENCOUNTER — LAB ONLY (OUTPATIENT)
Dept: ONCOLOGY | Age: 65
End: 2020-11-25

## 2020-11-25 DIAGNOSIS — D46.9 MDS (MYELODYSPLASTIC SYNDROME) (HCC): Primary | ICD-10-CM

## 2020-11-25 DIAGNOSIS — D64.9 ANEMIA, UNSPECIFIED TYPE: ICD-10-CM

## 2020-11-25 DIAGNOSIS — D46.9 MYELODYSPLASTIC SYNDROME (HCC): ICD-10-CM

## 2020-11-25 DIAGNOSIS — D46.4 REFRACTORY ANEMIA (HCC): ICD-10-CM

## 2020-11-26 LAB
ALBUMIN SERPL-MCNC: 4.2 G/DL (ref 3.8–4.8)
ALBUMIN/GLOB SERPL: 1.2 {RATIO} (ref 1.2–2.2)
ALP SERPL-CCNC: 94 IU/L (ref 39–117)
ALT SERPL-CCNC: 10 IU/L (ref 0–32)
AST SERPL-CCNC: 14 IU/L (ref 0–40)
BASOPHILS # BLD AUTO: 0.1 X10E3/UL (ref 0–0.2)
BASOPHILS NFR BLD AUTO: 1 %
BILIRUB SERPL-MCNC: 0.4 MG/DL (ref 0–1.2)
BUN SERPL-MCNC: 19 MG/DL (ref 8–27)
BUN/CREAT SERPL: 21 (ref 12–28)
CALCIUM SERPL-MCNC: 9.8 MG/DL (ref 8.7–10.3)
CHLORIDE SERPL-SCNC: 100 MMOL/L (ref 96–106)
CO2 SERPL-SCNC: 27 MMOL/L (ref 20–29)
CREAT SERPL-MCNC: 0.92 MG/DL (ref 0.57–1)
EOSINOPHIL # BLD AUTO: 0.1 X10E3/UL (ref 0–0.4)
EOSINOPHIL NFR BLD AUTO: 2 %
ERYTHROCYTE [DISTWIDTH] IN BLOOD BY AUTOMATED COUNT: 13.4 % (ref 11.7–15.4)
FERRITIN SERPL-MCNC: 292 NG/ML (ref 15–150)
GLOBULIN SER CALC-MCNC: 3.6 G/DL (ref 1.5–4.5)
GLUCOSE SERPL-MCNC: 94 MG/DL (ref 65–99)
HCT VFR BLD AUTO: 28.9 % (ref 34–46.6)
HGB BLD-MCNC: 9.6 G/DL (ref 11.1–15.9)
IMM GRANULOCYTES # BLD AUTO: 0 X10E3/UL (ref 0–0.1)
IMM GRANULOCYTES NFR BLD AUTO: 0 %
IRON SATN MFR SERPL: 15 % (ref 15–55)
IRON SERPL-MCNC: 41 UG/DL (ref 27–139)
LYMPHOCYTES # BLD AUTO: 1.4 X10E3/UL (ref 0.7–3.1)
LYMPHOCYTES NFR BLD AUTO: 25 %
MCH RBC QN AUTO: 30 PG (ref 26.6–33)
MCHC RBC AUTO-ENTMCNC: 33.2 G/DL (ref 31.5–35.7)
MCV RBC AUTO: 90 FL (ref 79–97)
MONOCYTES # BLD AUTO: 0.5 X10E3/UL (ref 0.1–0.9)
MONOCYTES NFR BLD AUTO: 9 %
NEUTROPHILS # BLD AUTO: 3.6 X10E3/UL (ref 1.4–7)
NEUTROPHILS NFR BLD AUTO: 63 %
PLATELET # BLD AUTO: 288 X10E3/UL (ref 150–450)
POTASSIUM SERPL-SCNC: 4 MMOL/L (ref 3.5–5.2)
PROT SERPL-MCNC: 7.8 G/DL (ref 6–8.5)
RBC # BLD AUTO: 3.2 X10E6/UL (ref 3.77–5.28)
SODIUM SERPL-SCNC: 140 MMOL/L (ref 134–144)
TIBC SERPL-MCNC: 267 UG/DL (ref 250–450)
UIBC SERPL-MCNC: 226 UG/DL (ref 118–369)
WBC # BLD AUTO: 5.6 X10E3/UL (ref 3.4–10.8)

## 2020-12-07 ENCOUNTER — HOSPITAL ENCOUNTER (OUTPATIENT)
Dept: SLEEP MEDICINE | Age: 65
Discharge: HOME | End: 2020-12-07
Payer: COMMERCIAL

## 2020-12-07 DIAGNOSIS — G47.33 OSA (OBSTRUCTIVE SLEEP APNEA): ICD-10-CM

## 2020-12-07 PROCEDURE — 95806 SLEEP STUDY UNATT&RESP EFFT: CPT

## 2020-12-08 ENCOUNTER — HOSPITAL ENCOUNTER (OUTPATIENT)
Dept: SLEEP MEDICINE | Age: 65
Discharge: HOME | End: 2020-12-08
Payer: COMMERCIAL

## 2020-12-09 ENCOUNTER — OFFICE VISIT (OUTPATIENT)
Dept: ONCOLOGY | Age: 65
End: 2020-12-09
Payer: COMMERCIAL

## 2020-12-09 VITALS
HEART RATE: 72 BPM | WEIGHT: 193 LBS | OXYGEN SATURATION: 99 % | HEIGHT: 63 IN | SYSTOLIC BLOOD PRESSURE: 144 MMHG | DIASTOLIC BLOOD PRESSURE: 83 MMHG | BODY MASS INDEX: 34.2 KG/M2 | RESPIRATION RATE: 18 BRPM

## 2020-12-09 DIAGNOSIS — D64.9 ANEMIA, UNSPECIFIED TYPE: ICD-10-CM

## 2020-12-09 DIAGNOSIS — D46.9 MYELODYSPLASTIC SYNDROME (HCC): ICD-10-CM

## 2020-12-09 DIAGNOSIS — D46.9 MDS (MYELODYSPLASTIC SYNDROME) (HCC): Primary | ICD-10-CM

## 2020-12-09 DIAGNOSIS — E55.9 VITAMIN D DEFICIENCY: ICD-10-CM

## 2020-12-09 DIAGNOSIS — D46.4 REFRACTORY ANEMIA (HCC): ICD-10-CM

## 2020-12-09 PROCEDURE — 99214 OFFICE O/P EST MOD 30 MIN: CPT | Performed by: NURSE PRACTITIONER

## 2020-12-09 NOTE — PATIENT INSTRUCTIONS
Learning About High-Iron Foods  What foods are high in iron? The foods you eat contain nutrients, such as vitamins and minerals. Iron is a nutrient. Your body needs the right amount to stay healthy and work as it should. You can use the list below to help you make choices about which foods to eat. Here are some foods that contain iron. They have 1 to 2 milligrams of iron per serving. Fruits  · Figs (dried), 5 figs  Vegetables  · Asparagus (canned), 6 espinal  · Linh, beet, Swiss chard, or turnip greens, 1 cup  · Dried peas, cooked, ½ cup  · Seaweed, spirulina (dried), ¼ cup  · Spinach, (cooked) ½ cup or (raw) 1 cup  Grains  · Cereals, fortified with iron, 1 cup  · Grits (instant, cooked), fortified with iron, ½ cup  Meats and other protein foods  · Beans (kidney, lima, navy, white), canned or cooked, ½ cup  · Beef or lamb, 3 oz  · Chicken giblets, 3 oz  · Chickpeas (garbanzo beans), ½ cup  · Liver of beef, lamb, or pork, 3 oz  · Oysters (cooked), 3 oz  · Sardines (canned), 3 oz  · Soybeans (boiled), ½ cup  · Tofu (firm), ½ cup  Work with your doctor to find out how much of this nutrient you need. Depending on your health, you may need more or less of it in your diet. Current as of: August 22, 2019               Content Version: 12.6  © 6467-9546 [employer]. Care instructions adapted under license by [employer] (which disclaims liability or warranty for this information). If you have questions about a medical condition or this instruction, always ask your healthcare professional. [place] [address] any warranty or liability for your use of this information. Iron-Rich Diet: Care Instructions  Your Care Instructions     Your body needs iron to make hemoglobin. Hemoglobin is a substance in red blood cells that carries oxygen from the lungs to cells all through your body. If you do not get enough iron, your body makes fewer and smaller red blood cells.  As a result, your body's cells may not get enough oxygen. Adult men need 8 milligrams of iron a day; adult women need 18 milligrams of iron a day. After menopause, women need 8 milligrams of iron a day. A pregnant woman needs 27 milligrams of iron a day. Infants and young children have higher iron needs relative to their size than other age groups. People who have lost blood because of ulcers or heavy menstrual periods may become very low in iron and may develop anemia. Most people can get the iron their bodies need by eating enough of certain iron-rich foods. Your doctor may recommend that you take an iron supplement along with eating an iron-rich diet. Follow-up care is a key part of your treatment and safety. Be sure to make and go to all appointments, and call your doctor if you are having problems. It's also a good idea to know your test results and keep a list of the medicines you take. How can you care for yourself at home? · Make iron-rich foods a part of your daily diet. Iron-rich foods include:  ? All meats, such as chicken, beef, lamb, pork, fish, and shellfish. Liver is especially high in iron. ? Leafy green vegetables. ? Raisins, peas, beans, lentils, barley, and eggs. ? Iron-fortified breakfast cereals. · Eat foods with vitamin C along with iron-rich foods. Vitamin C helps you absorb more iron from food. Drink a glass of orange juice or another citrus juice with your food. · Eat meat and vegetables or grains together. The iron in meat helps your body absorb the iron in other foods. Where can you learn more? Go to http://www.gray.com/  Enter Z290 in the search box to learn more about \"Iron-Rich Diet: Care Instructions. \"  Current as of: August 22, 2019               Content Version: 12.6  © 7612-8981 [employer]. Care instructions adapted under license by [employer] (which disclaims liability or warranty for this information).  If you have questions about a medical condition or this instruction, always ask your healthcare professional. [address] any warranty or liability for your use of this information. Anemia: Care Instructions  Your Care Instructions     Anemia is a low level of red blood cells, which carry oxygen throughout your body. Many things can cause anemia. Lack of iron is one of the most common causes. Your body needs iron to make hemoglobin, a substance in red blood cells that carries oxygen from the lungs to your body's cells. Without enough iron, the body produces fewer and smaller red blood cells. As a result, your body's cells do not get enough oxygen, and you feel tired and weak. And you may have trouble concentrating. Bleeding is the most common cause of a lack of iron. You may have heavy menstrual bleeding or bleeding caused by conditions such as ulcers, hemorrhoids, or cancer. Regular use of aspirin or other anti-inflammatory medicines (such as ibuprofen) also can cause bleeding in some people. A lack of iron in your diet also can cause anemia, especially at times when the body needs more iron, such as during pregnancy, infancy, and the teen years. Your doctor may have prescribed iron pills. It may take several months of treatment for your iron levels to return to normal. Your doctor also may suggest that you eat foods that are rich in iron, such as meat and beans. There are many other causes of anemia. It is not always due to a lack of iron. Finding the specific cause of your anemia will help your doctor find the right treatment for you. Follow-up care is a key part of your treatment and safety. Be sure to make and go to all appointments, and call your doctor if you are having problems. It's also a good idea to know your test results and keep a list of the medicines you take. How can you care for yourself at home? · Take your medicines exactly as prescribed.  Call your doctor if you think you are having a problem with your medicine. · If your doctor recommends iron pills, take them as directed:  ? Try to take the pills on an empty stomach about 1 hour before or 2 hours after meals. But you may need to take iron with food to avoid an upset stomach. ? Do not take antacids or drink milk or caffeine drinks (such as coffee, tea, or cola) at the same time or within 2 hours of the time that you take your iron. They can make it hard for your body to absorb the iron. ? Vitamin C (from food or supplements) helps your body absorb iron. Try taking iron pills with a glass of orange juice or some other food that is high in vitamin C, such as citrus fruits. ? Iron pills may cause stomach problems, such as heartburn, nausea, diarrhea, constipation, and cramps. Be sure to drink plenty of fluids, and include fruits, vegetables, and fiber in your diet each day. Iron pills often make your bowel movements dark or green. ? If you forget to take an iron pill, do not take a double dose of iron the next time you take a pill. ? Keep iron pills out of the reach of small children. An overdose of iron can be very dangerous. · Follow your doctor's advice about eating iron-rich foods. These include red meat, shellfish, poultry, eggs, beans, raisins, whole-grain bread, and leafy green vegetables. · Steam vegetables to help them keep their iron content. When should you call for help? Call 911 anytime you think you may need emergency care. For example, call if:    · You have symptoms of a heart attack. These may include:  ? Chest pain or pressure, or a strange feeling in the chest.  ? Sweating. ? Shortness of breath. ? Nausea or vomiting. ? Pain, pressure, or a strange feeling in the back, neck, jaw, or upper belly or in one or both shoulders or arms. ? Lightheadedness or sudden weakness. ? A fast or irregular heartbeat. After you call 911, the  may tell you to chew 1 adult-strength or 2 to 4 low-dose aspirin. Wait for an ambulance. Do not try to drive yourself.     · You passed out (lost consciousness). Call your doctor now or seek immediate medical care if:    · You have new or increased shortness of breath.     · You are dizzy or lightheaded, or you feel like you may faint.     · Your fatigue and weakness continue or get worse.     · You have any abnormal bleeding, such as:  ? Nosebleeds. ? Vaginal bleeding that is different (heavier, more frequent, at a different time of the month) than what you are used to.  ? Bloody or black stools, or rectal bleeding. ? Bloody or pink urine. Watch closely for changes in your health, and be sure to contact your doctor if:    · You do not get better as expected. Where can you learn more? Go to http://uday-bhavna.info/  Enter R301 in the search box to learn more about \"Anemia: Care Instructions. \"  Current as of: November 8, 2019               Content Version: 12.6  © 1307-0423 [employer]. Care instructions adapted under license by [employer] (which disclaims liability or warranty for this information). If you have questions about a medical condition or this instruction, always ask your healthcare professional. [address] any warranty or liability for your use of this information.

## 2020-12-15 DIAGNOSIS — G47.33 OSA (OBSTRUCTIVE SLEEP APNEA): Primary | ICD-10-CM

## 2020-12-31 ENCOUNTER — TELEPHONE (OUTPATIENT)
Dept: PULMONOLOGY | Age: 65
End: 2020-12-31

## 2020-12-31 NOTE — PROGRESS NOTES
APAP order faxed to [facility], [phone]. Patient contacted and study results reviewed with her. DME info provided to patient and she is encouraged to contact our office with any additional questions or concerns she may have. Patient should hear from [facility] within 2 weeks. If not, I have requested they call the office to let me know.

## 2020-12-31 NOTE — TELEPHONE ENCOUNTER
Called patient, no answer, left message requesting return call. APAP order faxed to [facility], [phone]. Patient contacted and study results reviewed with her. DME info provided to patient and she is encouraged to contact our office with any additional questions or concerns she may have. Patient should hear from [facility] within 2 weeks. If not, I have requested they call the office to let me know.

## 2021-01-06 NOTE — PROGRESS NOTES
1. Have you been to the ER, urgent care clinic since your last visit? Hospitalized since your last visit? No    2. Have you seen or consulted any other health care providers outside of the [address] [place] [address] since your last visit? Include any pap smears or colon screening.  No Brit <---- Click to enter wet read

## 2021-02-19 ENCOUNTER — VIRTUAL VISIT (OUTPATIENT)
Dept: FAMILY MEDICINE CLINIC | Age: 66
End: 2021-02-19
Payer: COMMERCIAL

## 2021-02-19 DIAGNOSIS — E78.5 HYPERLIPIDEMIA, UNSPECIFIED HYPERLIPIDEMIA TYPE: ICD-10-CM

## 2021-02-19 DIAGNOSIS — D46.9 MYELODYSPLASTIC SYNDROME (HCC): ICD-10-CM

## 2021-02-19 DIAGNOSIS — Z99.89 OSA ON CPAP: ICD-10-CM

## 2021-02-19 DIAGNOSIS — L30.9 DERMATITIS: ICD-10-CM

## 2021-02-19 DIAGNOSIS — I10 ESSENTIAL HYPERTENSION: ICD-10-CM

## 2021-02-19 DIAGNOSIS — I10 ESSENTIAL HYPERTENSION: Primary | ICD-10-CM

## 2021-02-19 DIAGNOSIS — G47.33 OSA ON CPAP: ICD-10-CM

## 2021-02-19 PROCEDURE — 99214 OFFICE O/P EST MOD 30 MIN: CPT | Performed by: FAMILY MEDICINE

## 2021-02-19 RX ORDER — NYSTATIN 100000 U/G
CREAM TOPICAL
COMMUNITY
Start: 2021-02-17 | End: 2021-02-19 | Stop reason: ALTCHOICE

## 2021-02-19 NOTE — PROGRESS NOTES
Carlos Eduardo Shoulder presents today for   Chief Complaint   Patient presents with    Hypertension    Cholesterol Problem    Labs     Labs completed 11/22/2021. Virtual/telephone visit    Depression Screening:  3 most recent PHQ Screens 2/19/2021   Little interest or pleasure in doing things Not at all   Feeling down, depressed, irritable, or hopeless Not at all   Total Score PHQ 2 0       Learning Assessment:  Learning Assessment 2/24/2020   PRIMARY LEARNER Patient   HIGHEST LEVEL OF EDUCATION - PRIMARY LEARNER  4 YEARS OF COLLEGE   BARRIERS PRIMARY LEARNER NONE   CO-LEARNER CAREGIVER No   PRIMARY LANGUAGE ENGLISH    NEED No   LEARNER PREFERENCE PRIMARY DEMONSTRATION     -     -     -     -   ANSWERED BY patient   RELATIONSHIP SELF       Fall Risk  Fall Risk Assessment, last 12 mths 3/23/2020   Able to walk? Yes   Fall in past 12 months? No       Travel Screening:   Travel Screening     Question   Response    In the last month, have you been in contact with someone who was confirmed or suspected to have Coronavirus / COVID-19? No / Unsure    Have you had a COVID-19 viral test in the last 14 days? No    Do you have any of the following new or worsening symptoms? None of these    Have you traveled internationally in the last month? No      Travel History   Travel since 01/19/21     No documented travel since 01/19/21          Health Maintenance reviewed and discussed and ordered per Provider. Health Maintenance Due   Topic Date Due    COVID-19 Vaccine (1 of 2) 01/17/1973    Shingrix Vaccine Age 50> (1 of 2) 01/17/2007   . Coordination of Care:  1. Have you been to the ER, urgent care clinic since your last visit? Hospitalized since your last visit? No    2. Have you seen or consulted any other health care providers outside of the [address] [place] [address] since your last visit? Include any pap smears or colon screening. Rheumatology.

## 2021-02-19 NOTE — PROGRESS NOTES
Tobi Camarena is a 61 y.o. female who was seen by synchronous (real-time) audio-video technology on 2/19/2021 for Hypertension, Cholesterol Problem, and Labs (Labs completed 11/22/2021.)        Assessment & Plan:   Diagnoses and all orders for this visit:    1. Essential hypertension  -     METABOLIC PANEL, COMPREHENSIVE; Future  -     LIPID PANEL; Future  Stable, cont pres tx plan. 2. Hyperlipidemia, unspecified hyperlipidemia type  -     METABOLIC PANEL, COMPREHENSIVE; Future  -     LIPID PANEL; Future    3. Dermatitis  Ok to try zyrtec qhs prn itching. 4. JUAN on CPAP  Care as per sleep med    5. Myelodysplastic syndrome [facility])  Care as per hematology    The complexity of medical decision making for this visit is moderate   Follow-up and Dispositions    · Return in about 3 months (around 5/19/2021) for high blood pressure, high cholesterol, dermatitis. 712  Subjective:   Patient contacted via doxy. me. Pt has been doing well. Pt has had her first covid shot via her job. She will get the 2nd one on 2/23/21. Home bp readings are normotensive. Pt was dx with juan. She is on cpap now. Pt will f/u with Dr. Mehreen Hargrove on 3/29/21 for review. Recent labs reviewed with pt. Pt saw her gyn for breast itching yesterday. She was given a cream to try. It had happened over a year ago and she saw derm then. Prior to Admission medications    Medication Sig Start Date End Date Taking? Authorizing Provider   diclofenac EC (VOLTAREN) 75 mg EC tablet TAKE 1 TABLET BY MOUTH ONCE DAILY AS NEEDED WITH FOOD 5/18/20  Yes Provider, Historical   polyethylene glycol (MIRALAX) 17 gram packet Take 1 Packet by mouth daily.  7/21/20  Yes Breonna Pendleton MD   carvediloL (COREG) 25 mg tablet TAKE ONE TABLET BY MOUTH TWICE DAILY WITH MEALS 6/10/20  Yes Prachi Mendez,    losartan-hydroCHLOROthiazide (HYZAAR) 100-12.5 mg per tablet TAKE 1 TABLET BY MOUTH ONCE DAILY 4/6/20  Yes Concha Singleton MD ANNIA   valACYclovir (VALTREX) 500 mg tablet Take 1 tablet by mouth twice daily 4/2/20  Yes Jodi Calix MD   simvastatin (ZOCOR) 20 mg tablet TAKE 1 TABLET BY MOUTH ONCE DAILY AT BEDTIME 2/3/20  Yes Rohini Murray MD   polyethylene glycol (MIRALAX) 17 gram/dose powder MIX 17 GRAMS (1 CAPFUL) IN LIQUID AND DRINK BY MOUTH ONCE DAILY FOR CONSTIPATION 1/9/18  Yes Tasneem Murray MD   latanoprost (XALATAN) 0.005 % ophthalmic solution Administer 1 Drop to both eyes nightly. Yes Provider, Historical   Vitamin D2 1,250 mcg (50,000 unit) capsule Take 1 capsule by mouth once a week 4/20/20   Jodi Calix MD   diclofenac (VOLTAREN) 1 % gel Apply 4 g to affected area four (4) times daily.  1/30/19   Jodi Calix MD     Patient Active Problem List   Diagnosis Code    Epistaxis R04.0    Arthritis M19.90    Hyperlipidemia E78.5    Elevated sed rate (elev SR) R70.0    Abdominal pain R10.9    FH: breast cancer Z80.3    Anemia D64.9    Breast lump N63.0    Genital herpes A60.00    CRP elevated R79.82    Glaucoma H40.9    Cataract H26.9    Vitreous degeneration H43.819    Hypokalemia E87.6    Degenerative disc disease [id number]    Adrenal nodule (HCC) E27.8    HTN (hypertension) I10    Palpitations R00.2    MDS (myelodysplastic syndrome) (HCC) D46.9    Refractory anemia (HCC) D46.4    Neoplasm of uncertain behavior of connective and other soft tissue D48.1    Essential hypertension I10    DDD (degenerative disc disease), cervical M50.30    Facet arthritis of cervical region M47.812    Myofascial muscle pain M79.18    DDD (degenerative disc disease), lumbar M51.36    Lumbar facet arthropathy M47.816    Hyperlipidemia LDL goal <100 E78.5    Environmental and seasonal allergies J30.89    Primary localized osteoarthrosis of multiple sites M19.91    Vitamin D deficiency E55.9    Long term (current) use of non-steroidal anti-inflammatories (nsaid) Z79.1     Current Outpatient Medications   Medication Sig Dispense Refill   • diclofenac EC (VOLTAREN) 75 mg EC tablet TAKE 1 TABLET BY MOUTH ONCE DAILY AS NEEDED WITH FOOD     • polyethylene glycol (MIRALAX) 17 gram packet Take 1 Packet by mouth daily. 517 g 12   • carvediloL (COREG) 25 mg tablet TAKE ONE TABLET BY MOUTH TWICE DAILY WITH MEALS 180 Tab 3   • losartan-hydroCHLOROthiazide (HYZAAR) 100-12.5 mg per tablet TAKE 1 TABLET BY MOUTH ONCE DAILY 30 Tab 11   • valACYclovir (VALTREX) 500 mg tablet Take 1 tablet by mouth twice daily 30 Tab 12   • simvastatin (ZOCOR) 20 mg tablet TAKE 1 TABLET BY MOUTH ONCE DAILY AT BEDTIME 30 Tab 12   • polyethylene glycol (MIRALAX) 17 gram/dose powder MIX 17 GRAMS (1 CAPFUL) IN LIQUID AND DRINK BY MOUTH ONCE DAILY FOR CONSTIPATION 517 g 12   • latanoprost (XALATAN) 0.005 % ophthalmic solution Administer 1 Drop to both eyes nightly.       • Vitamin D2 1,250 mcg (50,000 unit) capsule Take 1 capsule by mouth once a week 4 Cap 12   • diclofenac (VOLTAREN) 1 % gel Apply 4 g to affected area four (4) times daily. 400 g 12     No Known Allergies  Past Medical History:   Diagnosis Date   • Echocardiogram 02/08/2011    Tech difficult.  EF 60-65%.  Mild LVH.  RVSP 20-25 mmHg.     • Essential hypertension    • History of colon polyps    • Hx of endometriosis     had hyst   • Hyperlipidemia    • Hypertension    • MDS (myelodysplastic syndrome) (HCC)    • Refractory anemia (HCC)      Past Surgical History:   Procedure Laterality Date   • HX HYSTERECTOMY       Family History   Problem Relation Age of Onset   • Cancer Mother    • Arthritis-rheumatoid Sister    • Diabetes Sister    • Hypertension Sister    • No Known Problems Father      Social History     Tobacco Use   • Smoking status: Never Smoker   • Smokeless tobacco: Never Used   Substance Use Topics   • Alcohol use: No       Review of Systems   Constitutional: Negative.    HENT: Negative.    Respiratory: Negative.    Cardiovascular: Negative.    Skin: Positive for itching.  All other systems reviewed and are negative. Objective:     Patient-Reported Vitals 2/19/2021   Patient-Reported Pulse -   Patient-Reported Systolic  256   Patient-Reported Diastolic 86      General: alert, cooperative, no distress   Mental  status: normal mood, behavior, speech, dress, motor activity, and thought processes, able to follow commands   HENT: NCAT   Neck: no visualized mass   Resp: no respiratory distress   Neuro: no gross deficits   Skin: no discoloration or lesions of concern on visible areas   Psychiatric: normal affect, consistent with stated mood, no evidence of hallucinations     Additional exam findings: None      We discussed the expected course, resolution and complications of the diagnosis(es) in detail. Medication risks, benefits, costs, interactions, and alternatives were discussed as indicated. I advised her to contact the office if her condition worsens, changes or fails to improve as anticipated. She expressed understanding with the diagnosis(es) and plan. Johnna Miramontes, who was evaluated through a patient-initiated, synchronous (real-time) audio-video encounter, and/or her healthcare decision maker, is aware that it is a billable service, with coverage as determined by her insurance carrier. She provided verbal consent to proceed: n/a- consent obtained within past 12 months, and patient identification was verified. It was conducted pursuant to the emergency declaration under the [address] [place] [address] authority and the [employer] General Act. A caregiver was present when appropriate. Ability to conduct physical exam was limited. I was in the office. The patient was at home.       Lakia Reyna MD

## 2021-03-03 ENCOUNTER — HOSPITAL ENCOUNTER (OUTPATIENT)
Dept: MAMMOGRAPHY | Age: 66
Discharge: HOME | End: 2021-03-03
Attending: FAMILY MEDICINE
Payer: COMMERCIAL

## 2021-03-03 DIAGNOSIS — Z12.31 ENCOUNTER FOR SCREENING MAMMOGRAM FOR MALIGNANT NEOPLASM OF BREAST: ICD-10-CM

## 2021-03-03 PROCEDURE — 77063 BREAST TOMOSYNTHESIS BI: CPT

## 2021-03-04 ENCOUNTER — TELEPHONE (OUTPATIENT)
Dept: FAMILY MEDICINE CLINIC | Age: 66
End: 2021-03-04

## 2021-03-04 DIAGNOSIS — N64.59 ABNORMAL BREAST FINDING: ICD-10-CM

## 2021-03-04 DIAGNOSIS — L29.9 PRURITUS: Primary | ICD-10-CM

## 2021-03-04 RX ORDER — HYDROXYZINE HYDROCHLORIDE 10 MG/1
10 TABLET, FILM COATED ORAL
Qty: 20 TAB | Refills: 0 | Status: SHIPPED | OUTPATIENT
Start: 2021-03-04 | End: 2021-03-14

## 2021-03-04 NOTE — TELEPHONE ENCOUNTER
Pt called and stated she is still having the same issue of the left breast itching. She was using the cream Dr Allyssa Smith was giving her she had been sent to a dermatologist. She stated she need to know what she needs to do now. Please advise.

## 2021-03-04 NOTE — TELEPHONE ENCOUNTER
Sending e-rx for itch. Please advise pt of likely drowsiness with this medication. Referring to Dr Jakob Mccarty for further eval to r/o underlying breast issue as etiology.

## 2021-03-11 NOTE — TELEPHONE ENCOUNTER
Pt has been informed. Pt states that she needs additional testing related to most recent mammogram. Provider informed.

## 2021-03-17 ENCOUNTER — HOSPITAL ENCOUNTER (OUTPATIENT)
Dept: ULTRASOUND IMAGING | Age: 66
Discharge: HOME | End: 2021-03-17
Attending: FAMILY MEDICINE
Payer: COMMERCIAL

## 2021-03-17 DIAGNOSIS — N64.59 ABNORMAL BREAST FINDING: ICD-10-CM

## 2021-03-17 PROCEDURE — 76642 ULTRASOUND BREAST LIMITED: CPT

## 2021-03-23 ENCOUNTER — OFFICE VISIT (OUTPATIENT)
Dept: SURGERY | Age: 66
End: 2021-03-23
Payer: COMMERCIAL

## 2021-03-23 VITALS
TEMPERATURE: 98.1 F | HEART RATE: 72 BPM | SYSTOLIC BLOOD PRESSURE: 144 MMHG | RESPIRATION RATE: 18 BRPM | DIASTOLIC BLOOD PRESSURE: 88 MMHG | OXYGEN SATURATION: 98 % | WEIGHT: 192 LBS | BODY MASS INDEX: 34.02 KG/M2 | HEIGHT: 63 IN

## 2021-03-23 DIAGNOSIS — R92.8 ABNORMAL ULTRASOUND OF BREAST: ICD-10-CM

## 2021-03-23 DIAGNOSIS — R92.8 ABNORMAL MAMMOGRAM OF RIGHT BREAST: Primary | ICD-10-CM

## 2021-03-23 PROCEDURE — 99204 OFFICE O/P NEW MOD 45 MIN: CPT | Performed by: SURGERY

## 2021-03-23 NOTE — PATIENT INSTRUCTIONS

## 2021-03-23 NOTE — PROGRESS NOTES
[facility]  General Surgery    Subjective:      HPI: Patient is a very pleasant 71-year-old female with a past medical history remarkable for severe obesity with BMI 34.01 kg/m², hypertension, hyperlipidemia, hypovitaminosis D, degenerative disc disease of the cervical and lumbar spine and family history of breast and ovarian cancer. [facility] and management of abnormal ultrasound and mammogram right breast.  Patient has a 4 mm mass which is slightly increased in size over a year in the 11 o'clock position 6 cm from the nipple. The masses complex cystic in structure. Patient denies any pain in the breast.  She denies any unintentional weight loss or nipple drainage or discharge. She has a family history of breast cancer in a sister who was diagnosed in her 46s and  of breast cancer.   She has a history of ovarian cancer in her mother    Patient Active Problem List    Diagnosis Date Noted    Primary localized osteoarthrosis of multiple sites 10/21/2020    Vitamin D deficiency 10/21/2020    Long term (current) use of non-steroidal anti-inflammatories (nsaid) 10/21/2020    Environmental and seasonal allergies 2017    Hyperlipidemia LDL goal <100 10/12/2016    DDD (degenerative disc disease), cervical 02/15/2016    Facet arthritis of cervical region 02/15/2016    Myofascial muscle pain 02/15/2016    DDD (degenerative disc disease), lumbar 02/15/2016    Lumbar facet arthropathy 02/15/2016    Essential hypertension 2015    Neoplasm of uncertain behavior of connective and other soft tissue 10/01/2013    MDS (myelodysplastic syndrome) (HCC)     Refractory anemia (HCC)     Palpitations     HTN (hypertension) 2010    Hyperlipidemia 2010    Elevated sed rate (elev SR) 2010    Abdominal pain 2010    FH: breast cancer 2010    Anemia 2010    Breast lump 2010    Genital herpes 07/30/2010    CRP elevated 07/30/2010    Glaucoma 07/30/2010    Cataract 07/30/2010    Vitreous degeneration 07/30/2010    Hypokalemia 07/30/2010    Degenerative disc disease 07/30/2010    Adrenal nodule ([address] 07/30/2010    Epistaxis 01/12/2010    Arthritis 01/12/2010     Past Medical History:   Diagnosis Date    Echocardiogram 02/08/2011    Tech difficult. EF 60-65%. Mild LVH. RVSP 20-25 mmHg.  Essential hypertension     History of colon polyps     Hx of endometriosis     had hyst    Hyperlipidemia     Hypertension     MDS (myelodysplastic syndrome) (HCC)     Refractory anemia (HCC)       Past Surgical History:   Procedure Laterality Date    HX HYSTERECTOMY        Family History   Problem Relation Age of Onset    Cancer Mother    [employer] Arthritis-rheumatoid Sister     Diabetes Sister     Hypertension Sister     No Known Problems Father       Social History     Tobacco Use    Smoking status: Never Smoker    Smokeless tobacco: Never Used   Substance Use Topics    Alcohol use: No      No Known Allergies    Prior to Admission medications    Medication Sig Start Date End Date Taking? Authorizing Provider   diclofenac EC (VOLTAREN) 75 mg EC tablet TAKE 1 TABLET BY MOUTH ONCE DAILY AS NEEDED WITH FOOD 5/18/20  Yes Provider, Historical   polyethylene glycol (MIRALAX) 17 gram packet Take 1 Packet by mouth daily. 7/21/20  Yes Lesli Rushing MD   carvediloL (COREG) 25 mg tablet TAKE ONE TABLET BY MOUTH TWICE DAILY WITH MEALS 6/10/20  Yes Prachi Mcgovern DO   losartan-hydroCHLOROthiazide (HYZAAR) 100-12.5 mg per tablet TAKE 1 TABLET BY MOUTH ONCE DAILY 4/6/20  Yes Lesli Rushing MD   valACYclovir (VALTREX) 500 mg tablet Take 1 tablet by mouth twice daily 4/2/20  Yes Bimal Murray MD   simvastatin (ZOCOR) 20 mg tablet TAKE 1 TABLET BY MOUTH ONCE DAILY AT BEDTIME 2/3/20  Yes Bimal Murray MD   diclofenac (VOLTAREN) 1 % gel Apply 4 g to affected area four (4) times daily.  1/30/19  Yes Tere Boo MD   polyethylene glycol (MIRALAX) 17 gram/dose powder MIX 17 GRAMS (1 CAPFUL) IN LIQUID AND DRINK BY MOUTH ONCE DAILY FOR CONSTIPATION 1/9/18  Yes Odette Murray MD   latanoprost (XALATAN) 0.005 % ophthalmic solution Administer 1 Drop to both eyes nightly. Yes Provider, Historical   Vitamin D2 1,250 mcg (50,000 unit) capsule Take 1 capsule by mouth once a week 4/20/20   Tere Boo MD       Review of Systems:    14 systems were reviewed. The results are as above in the HPI and otherwise negative. Objective:     Vitals:    03/23/21 0920   BP: (!) 144/88   Pulse: 72   Resp: 18   Temp: 98.1 °F (36.7 °C)   SpO2: 98%   Weight: 87.1 kg (192 lb)   Height: 5' 3\" (1.6 m)       Physical Exam:  GENERAL: alert, cooperative, no distress, appears stated age,   EYE: conjunctivae/corneas clear. PERRL, EOM's intact. THROAT & NECK: normal and no erythema or exudates noted. ,    LYMPHATIC: Cervical, supraclavicular, and axillary nodes normal. ,   LUNG: clear to auscultation bilaterally,   HEART: regular rate and rhythm, S1, S2 normal, no murmur, click, rub or gallop,   BREASTS:   Left:  No dimpling, discoloration, nipple inversion or retractions. No axillary or supraclavicular lymphadenopathy. No mass  Right:  No dimpling, discoloration, nipple inversion or retractions. No axillary or supraclavicular lymphadenopathy. No mass  ABDOMEN: soft, non-tender. Bowel sounds normal. No masses,  no organomegaly,   EXTREMITIES:  extremities normal, atraumatic, no cyanosis or edema,   SKIN: Normal.,   NEUROLOGIC: AOx3. Cranial nerves 2-12 and sensation grossly intact. ,     Data Review: Right breast ultrasound BI-RADS 3-I reviewed the ultrasound independently on the monitor. I agree with the findings of a 4 mm x 2 mm x 3 mm irregularly shaped complex cystic mass in the 11 o'clock position 6 cm from the nipple which is most likely benign.     Impression:     · Pt with BIRADS 3 mass of the right breast. Plan:     · Bilateral diagnostic mammography with right breast ultrasound in 1 year  · Continue monthly self breast exam  · Genetic testing was performed today. We will contact the patient with the results. · Harry Gaviria calculation lifetime risk of developing breast cancer for the patient is 12.8% which is higher than her cohort group is 7.7%.

## 2021-03-29 ENCOUNTER — OFFICE VISIT (OUTPATIENT)
Dept: PULMONOLOGY | Age: 66
End: 2021-03-29
Payer: COMMERCIAL

## 2021-03-29 VITALS
BODY MASS INDEX: 34.38 KG/M2 | RESPIRATION RATE: 18 BRPM | DIASTOLIC BLOOD PRESSURE: 92 MMHG | SYSTOLIC BLOOD PRESSURE: 161 MMHG | WEIGHT: 194 LBS | OXYGEN SATURATION: 99 % | TEMPERATURE: 97.8 F | HEIGHT: 63 IN | HEART RATE: 77 BPM

## 2021-03-29 DIAGNOSIS — G47.33 OSA (OBSTRUCTIVE SLEEP APNEA): Primary | ICD-10-CM

## 2021-03-29 DIAGNOSIS — E78.5 DYSLIPIDEMIA: ICD-10-CM

## 2021-03-29 DIAGNOSIS — I10 ESSENTIAL HYPERTENSION: ICD-10-CM

## 2021-03-29 DIAGNOSIS — G47.8 SLEEP PARALYSIS: ICD-10-CM

## 2021-03-29 DIAGNOSIS — R68.89 VIVID DREAM: ICD-10-CM

## 2021-03-29 DIAGNOSIS — D64.9 ANEMIA, UNSPECIFIED TYPE: ICD-10-CM

## 2021-03-29 PROCEDURE — 99214 OFFICE O/P EST MOD 30 MIN: CPT | Performed by: INTERNAL MEDICINE

## 2021-03-29 NOTE — PROGRESS NOTES
[facility]  Pulmonary, Critical Care, and Sleep Medicine    Office Progress Note- Follow Up      Primary Care Physician: Sanjuana Faith MD     Reason for Visit:  Follow Up: JUAN and PAP Therapy    Assessment:  1. Obstructive Sleep Apnea (JUAN): Mild, excellent compliance, some clinical benefit. Patient still working on getting use to therapy. She is hitting up against the high APAP setting. May benefit by increasing APAP pressure a bit. 2. Vivid Dream- may be related to JUAN- monitor for now  3. Sleep Paralysis with possible HH- monitor for now. No reports of cataplexy- no recurrent events thus far  4. Possible parasomnia- calling out in her sleep; no recurrent events thus far  5. Hypertension: BP elevated today in clinic- asymptomatic  6. Dyslipidemia  7. Allergic Rhinitis- not an issue at this time  8. Anemia- Chronic. Report of MDS- stable for years, followed by [facility]  9. Obesity: Body mass index is 34.37 kg/m². Plan:    · Increase APAP setting to 7/20 and monitor for response  · Patient to call clinic for any APAP intolerance issues  · Educational materials provided. · Healthy lifestyle changes to include weight loss and exercise discussed. · Healthy sleep habits were reviewed and encouraged. · COVID-19 precautions discussed to include: wearing mask in public, handwashing, social distancing and sleeping in a separate bedroom when using PAP therapy. ·  and workplace safety reviewed and discussed as appropriate. Drowsy and/or inattentive driving should be avoided. · Follow-up after 6 months , sooner should new symptoms or problems arise. · Follow up with Primary Care Provider (PCP) as directed and for routine health care maintenance. History of Present Illness: Mrs. Livier Lang is a 61 y.o. female with a history of chronic anemia (possible MDS), hypertension, and dyslipidemia who presents for report of snoring.  The history was provided by the patient. Since last visit, the patient underwent sleep testing and was noted to have mild JUAN. She was subsequently started on PAP therapy    Today the following is noted and/or reported:    · The patient reports that she has been using her device  · She is not sure if she sees a benefit at this time  · No xerostomia  · No bloating  · No skin issues  · No mask issues at this time  · No more episodes of screaming out from sleep  · No more sleep paralysis since starting PAP therapy  · No vivid dreams have decreased  · She is cleaning her device and supplies as directed    Positive Airway Pressure (PAP) Compliance  Report & Summary Trends  DME: [facility]    Date >4 hr use % Time  (Total Time%) AHI PAP Rx Pressure @ 95% Median Use Time Leak-Median  (95%) Notes   2/27/21-3/28/21 100 (100) 2.2 APAP 5/15 13.8 07:48 2.1 (16.2)                                        Occupation:   [address] [place] [address]                      Work Schedule: [id number] M-F  Shift work: Quit 7-11 0600-1400- weekends- stopped March 2020    Driving:  yes  Drowsy Driving: is not reported. Motor vehicle accident(s) associated with drowsy driving have not occurred. Stop Chrystine Grills 3/29/2021 10/23/2020   Does the patient snore loudly (louder than talking or loud enough to be heard through closed doors)? 1 1   Does the patient often feel tired, fatigued, or sleepy during the daytime, even after a \"good\" night's sleep? 0 0   Has anyone ever observed the patient stop breathing during their sleep?  0 0   Does the patient have or are they being treated for high blood pressure? 1 1   Is the patient's BMI greater than 35? 0 0   Is your neck circumference greater than 17 inches (Male) or 16 inches (Female)?  0 0   Is the patient older than 48? 1 1   Is the patient male? 0 0   JUAN Score 3 3       3 most recent PHQ Screens 3/29/2021   Little interest or pleasure in doing things Not at all   Feeling down, depressed, irritable, or hopeless Not at all   Total Score PHQ 2 0       Columbus Scale 3/29/2021 10/23/2020   Sitting and Reading 2 1   Watching TV 2 1   Sitting, inactive in a public place (e.g. a movie theater or meeting) 0 1   As a passenger in a car for an hour, without a break 0 0   Lying down to rest in the afternoon, when circumstances permit 0 0   Sitting and talking to someone 0 0   Sitting quietly after lunch without alcohol 0 0   In a car, while stopped for a few minutes in traffic 0 0   Columbus Sleepiness Score 4 3             Immunization History:  Immunization History   Administered Date(s) Administered    Influenza High Dose Vaccine PF 10/23/2020    Influenza Vaccine (Madin [place] Canine Kidney) PF 10/05/2017    Influenza Vaccine (Quad) PF (>6 Mo Flulaval, Fluarix, and >3 Yrs [address] [place] [address], [place] [id number]) 01/31/2018, 10/29/2018, 12/06/2019    Influenza Vaccine (Trivalent) 10/26/2019, 10/01/2020    Tdap 04/12/2017       Past Medical History:  Past Medical History:   Diagnosis Date    Echocardiogram 02/08/2011    Tech difficult. EF 60-65%. Mild LVH. RVSP 20-25 mmHg.       Essential hypertension     History of colon polyps     Hx of endometriosis     had hyst    Hyperlipidemia     Hypertension     MDS (myelodysplastic syndrome) (HCC)     Refractory anemia (HCC)        Past Surgical History:  Past Surgical History:   Procedure Laterality Date    HX HYSTERECTOMY         Family History:  Family History   Problem Relation Age of Onset    Cancer Mother    Lorenzo Martinez Arthritis-rheumatoid Sister     Diabetes Sister     Hypertension Sister     No Known Problems Father        Social History:  Social History     Tobacco Use    Smoking status: Never Smoker    Smokeless tobacco: Never Used   Substance Use Topics    Alcohol use: No    Drug use: No        Caffeine Amount Time of last Intake Comments   Coffee 1C/am     Soda 20 oz/am  Dr. Albert Cintron Rare     Energy Drinks None     Over- the - counter stimulant pills None     Other Substances Alcohol None     Tobacco Never     Drugs Never     Other: None         Medications:  Current Outpatient Medications on File Prior to Visit   Medication Sig Dispense Refill    diclofenac EC (VOLTAREN) 75 mg EC tablet TAKE 1 TABLET BY MOUTH ONCE DAILY AS NEEDED WITH FOOD      polyethylene glycol (MIRALAX) 17 gram packet Take 1 Packet by mouth daily. 517 g 12    carvediloL (COREG) 25 mg tablet TAKE ONE TABLET BY MOUTH TWICE DAILY WITH MEALS 180 Tab 3    losartan-hydroCHLOROthiazide (HYZAAR) 100-12.5 mg per tablet TAKE 1 TABLET BY MOUTH ONCE DAILY 30 Tab 11    valACYclovir (VALTREX) 500 mg tablet Take 1 tablet by mouth twice daily 30 Tab 12    simvastatin (ZOCOR) 20 mg tablet TAKE 1 TABLET BY MOUTH ONCE DAILY AT BEDTIME 30 Tab 12    diclofenac (VOLTAREN) 1 % gel Apply 4 g to affected area four (4) times daily. 400 g 12    polyethylene glycol (MIRALAX) 17 gram/dose powder MIX 17 GRAMS (1 CAPFUL) IN LIQUID AND DRINK BY MOUTH ONCE DAILY FOR CONSTIPATION 517 g 12    latanoprost (XALATAN) 0.005 % ophthalmic solution Administer 1 Drop to both eyes nightly.  Vitamin D2 1,250 mcg (50,000 unit) capsule Take 1 capsule by mouth once a week 4 Cap 12     No current facility-administered medications on file prior to visit.          Allergy:  No Known Allergies    Review of Systems  General ROS: negative for - chills, hot flashes, malaise or night sweats  ENT ROS: negative for - epistaxis, hearing change, nasal congestion, nasal discharge, nasal polyps, oral lesions, sinus pain, sneezing, sore throat, tinnitus, vertigo, visual changes or vocal changes  Hematological and Lymphatic ROS: negative for - bleeding problems, blood clots, bruising, jaundice, pallor or swollen lymph nodes  Endocrine ROS: negative for - polydipsia/polyuria, skin changes, temperature intolerance or unexpected weight changes  Respiratory ROS: no cough, shortness of breath, or wheezing  Cardiovascular ROS: no chest pain or dyspnea on exertion  Gastrointestinal ROS: no abdominal pain, change in bowel habits, or black or bloody stools, + constipation  Genito-Urinary ROS: no dysuria, trouble voiding, or hematuria  Musculoskeletal ROS: positive for - joint pain and joint stiffness  Neurological ROS: no TIA or stroke symptoms  Dermatological ROS: negative for - pruritus, rash or skin lesion changes   Psychological ROS: as above   Otherwise negative and per HPI      Physical Exam:  Blood pressure (!) 161/92, pulse 77, temperature 97.8 °F (36.6 °C), temperature source Oral, resp. rate 18, height 5' 3\" (1.6 m), weight 88 kg (194 lb), last menstrual period 08/31/1998, SpO2 99 %. on RA, Body mass index is 34.37 kg/m². General: No distress, acyanotic, appears stated age, cooperative, pleasant  HEENT: PERRL, EOMI, throat without erythema or exudate, Tongue- dental indention on tongue, Mallampati's score 3+, Uvula- midline, Tonsils- not seen, widened posterior pharyngeal arch- crowed posterior oropharynx  Neck: Supple,  no abnormally enlarged lymph nodes, thyroid is not enlarged, non-tender, No JVD, No carotid bruits  Chest: Normal.  Lungs: Moderate air entry, clear to auscultation bilaterally,   Heart: Regular rate and rhythm, S1S2 present, without murmur. Abdomen: Protuberant, abdomen is soft without significant tenderness, or guarding. Extremity: Negative for cyanosis, edema, or clubbing. Skin: Skin color, texture, turgor normal. No rashes or lesions. Neurological: CN 2-12 grossly intact, normal muscle tone.     Data Reviewed:  CBC:   Lab Results   Component Value Date/Time    WBC 5.6 11/25/2020 08:40 AM    HGB (POC) 10.3 (A) 04/16/2014 10:07 AM    HGB 9.6 (L) 11/25/2020 08:40 AM    HCT (POC) 34.8 (A) 04/16/2014 10:07 AM    HCT 28.9 (L) 11/25/2020 08:40 AM    PLATELET 151 23/82/3994 08:40 AM    MCV 90 11/25/2020 08:40 AM       BMP:   Lab Results   Component Value Date/Time    Sodium 140 11/25/2020 08:40 AM    Potassium 4.0 11/25/2020 08:40 AM Chloride 100 11/25/2020 08:40 AM    CO2 27 11/25/2020 08:40 AM    Anion gap 5 11/30/2009 10:10 AM    Glucose 94 11/25/2020 08:40 AM    BUN 19 11/25/2020 08:40 AM    Creatinine 0.92 11/25/2020 08:40 AM    BUN/Creatinine ratio 21 11/25/2020 08:40 AM    GFR est AA 77 11/25/2020 08:40 AM    GFR est non-AA 66 11/25/2020 08:40 AM    Calcium 9.8 11/25/2020 08:40 AM        TSH:  Lab Results   Component Value Date/Time    TSH 0.747 07/17/2015 09:23 AM    TSH 1.010 10/15/2012 12:00 AM    TSH 0.645 02/20/2012 12:00 AM    TSH 0.585 02/11/2011 12:00 AM     No results found for: HBA1C, HGBE8, KSM5FSLA, PKZ6UYLA, RKU0DPYD      Imaging:  [x]I have personally reviewed the patients radiographs section   Results from Appointment encounter on 01/23/18   XR CHEST PA LAT    Narrative Chest x-rays, PA and lateral views:        INDICATION:    Chest discomfort. History of hypertension, hyperlipidemia and anemia. COMPARISON STUDY: Chest x-ray on 9/9/2009. FINDINGS:    Lungs are mildly hypoventilated, likely to be secondary to patient body habitus. Cardiac size normal. Pulmonary vascularity is minimally prominent but not  obviously cephalized. There is no evidence of pneumonia, pulmonary atelectasis,  pleural effusion or pneumothorax. In thoracic spine there are mild-to-moderate  multilevel spondylolytic changes. Impression IMPRESSION:    Normal cardiac size. No evidence of CHF. Mild pulmonary hypoventilation. Otherwise lungs are clear. No evidence of pleural effusion or pneumothorax.                  Historical Sleep Testing Data:     · 12/7/20: AHI: 10.1, MERA: 10.5, SpO2 georgina: 65%, SpO2 average: 92%, Time with SpO2 less than 89%: 00:14:00          Neli Bell DO, FCCP  Pulmonary, Sleep and Critical Care Medicine

## 2021-03-29 NOTE — PROGRESS NOTES
Tereza Vanessa presents today for   Chief Complaint   Patient presents with    CPAP     HST 12/8/2020. Set up 1/12/2021 w [facility]. Is someone accompanying this pt? No    Is the patient using any DME equipment during OV? CPAP    -[facility]    Depression Screening:  3 most recent PHQ Screens 3/29/2021   Little interest or pleasure in doing things Not at all   Feeling down, depressed, irritable, or hopeless Not at all   Total Score PHQ 2 0       Learning Assessment:  Learning Assessment 2/24/2020   PRIMARY LEARNER Patient   HIGHEST LEVEL OF EDUCATION - PRIMARY LEARNER  4 YEARS OF COLLEGE   BARRIERS PRIMARY LEARNER NONE   CO-LEARNER CAREGIVER No   PRIMARY LANGUAGE ENGLISH    NEED No   LEARNER PREFERENCE PRIMARY DEMONSTRATION     -     -     -     -   ANSWERED BY patient   RELATIONSHIP SELF       Abuse Screening:  Abuse Screening Questionnaire 2/19/2021   Do you ever feel afraid of your partner? N   Are you in a relationship with someone who physically or mentally threatens you? N   Is it safe for you to go home? Y       Fall Risk  Fall Risk Assessment, last 12 mths 3/23/2020   Able to walk? Yes   Fall in past 12 months? No         Coordination of Care:  1. Have you been to the ER, urgent care clinic since your last visit? Hospitalized since your last visit? No    2. Have you seen or consulted any other health care providers outside of the [address] [place] [address] since your last visit? Include any pap smears or colon screening.  No.

## 2021-03-29 NOTE — PATIENT INSTRUCTIONS
Please call our clinic back at [phone] if you have not received a follow up appointment within 30 days prior the recommended follow up time.

## 2021-03-31 ENCOUNTER — LAB ONLY (OUTPATIENT)
Dept: ONCOLOGY | Age: 66
End: 2021-03-31

## 2021-03-31 DIAGNOSIS — D46.4 REFRACTORY ANEMIA (HCC): ICD-10-CM

## 2021-03-31 DIAGNOSIS — D46.9 MYELODYSPLASTIC SYNDROME (HCC): ICD-10-CM

## 2021-03-31 DIAGNOSIS — D64.9 ANEMIA, UNSPECIFIED TYPE: ICD-10-CM

## 2021-03-31 DIAGNOSIS — D46.9 MDS (MYELODYSPLASTIC SYNDROME) (HCC): Primary | ICD-10-CM

## 2021-04-01 LAB
ALBUMIN SERPL-MCNC: 4.4 G/DL (ref 3.8–4.8)
ALBUMIN/GLOB SERPL: 1.2 {RATIO} (ref 1.2–2.2)
ALP SERPL-CCNC: 90 IU/L (ref 39–117)
ALT SERPL-CCNC: 10 IU/L (ref 0–32)
AST SERPL-CCNC: 17 IU/L (ref 0–40)
BASOPHILS # BLD AUTO: 0.1 X10E3/UL (ref 0–0.2)
BASOPHILS NFR BLD AUTO: 1 %
BILIRUB SERPL-MCNC: 0.5 MG/DL (ref 0–1.2)
BUN SERPL-MCNC: 21 MG/DL (ref 8–27)
BUN/CREAT SERPL: 21 (ref 12–28)
CALCIUM SERPL-MCNC: 10 MG/DL (ref 8.7–10.3)
CHLORIDE SERPL-SCNC: 102 MMOL/L (ref 96–106)
CO2 SERPL-SCNC: 27 MMOL/L (ref 20–29)
CREAT SERPL-MCNC: 0.98 MG/DL (ref 0.57–1)
EOSINOPHIL # BLD AUTO: 0.1 X10E3/UL (ref 0–0.4)
EOSINOPHIL NFR BLD AUTO: 2 %
ERYTHROCYTE [DISTWIDTH] IN BLOOD BY AUTOMATED COUNT: 13.3 % (ref 11.7–15.4)
FERRITIN SERPL-MCNC: 276 NG/ML (ref 15–150)
GLOBULIN SER CALC-MCNC: 3.8 G/DL (ref 1.5–4.5)
GLUCOSE SERPL-MCNC: 93 MG/DL (ref 65–99)
HCT VFR BLD AUTO: 29 % (ref 34–46.6)
HGB BLD-MCNC: 9.7 G/DL (ref 11.1–15.9)
IMM GRANULOCYTES # BLD AUTO: 0 X10E3/UL (ref 0–0.1)
IMM GRANULOCYTES NFR BLD AUTO: 0 %
IRON SATN MFR SERPL: 16 % (ref 15–55)
IRON SERPL-MCNC: 46 UG/DL (ref 27–139)
LYMPHOCYTES # BLD AUTO: 1.3 X10E3/UL (ref 0.7–3.1)
LYMPHOCYTES NFR BLD AUTO: 24 %
MCH RBC QN AUTO: 30.4 PG (ref 26.6–33)
MCHC RBC AUTO-ENTMCNC: 33.4 G/DL (ref 31.5–35.7)
MCV RBC AUTO: 91 FL (ref 79–97)
MONOCYTES # BLD AUTO: 0.6 X10E3/UL (ref 0.1–0.9)
MONOCYTES NFR BLD AUTO: 11 %
NEUTROPHILS # BLD AUTO: 3.6 X10E3/UL (ref 1.4–7)
NEUTROPHILS NFR BLD AUTO: 62 %
PLATELET # BLD AUTO: 295 X10E3/UL (ref 150–450)
POTASSIUM SERPL-SCNC: 3.8 MMOL/L (ref 3.5–5.2)
PROT SERPL-MCNC: 8.2 G/DL (ref 6–8.5)
RBC # BLD AUTO: 3.19 X10E6/UL (ref 3.77–5.28)
SODIUM SERPL-SCNC: 145 MMOL/L (ref 134–144)
TIBC SERPL-MCNC: 284 UG/DL (ref 250–450)
UIBC SERPL-MCNC: 238 UG/DL (ref 118–369)
WBC # BLD AUTO: 5.7 X10E3/UL (ref 3.4–10.8)

## 2021-04-14 ENCOUNTER — OFFICE VISIT (OUTPATIENT)
Dept: ONCOLOGY | Age: 66
End: 2021-04-14
Payer: COMMERCIAL

## 2021-04-14 VITALS
HEART RATE: 73 BPM | DIASTOLIC BLOOD PRESSURE: 84 MMHG | HEIGHT: 63 IN | WEIGHT: 194 LBS | TEMPERATURE: 97.9 F | BODY MASS INDEX: 34.38 KG/M2 | RESPIRATION RATE: 18 BRPM | SYSTOLIC BLOOD PRESSURE: 139 MMHG | OXYGEN SATURATION: 100 %

## 2021-04-14 DIAGNOSIS — D46.4 REFRACTORY ANEMIA (HCC): ICD-10-CM

## 2021-04-14 DIAGNOSIS — D46.9 MYELODYSPLASTIC SYNDROME (HCC): ICD-10-CM

## 2021-04-14 DIAGNOSIS — E55.9 VITAMIN D DEFICIENCY: ICD-10-CM

## 2021-04-14 DIAGNOSIS — D64.9 ANEMIA, UNSPECIFIED TYPE: ICD-10-CM

## 2021-04-14 DIAGNOSIS — D46.9 MDS (MYELODYSPLASTIC SYNDROME) (HCC): Primary | ICD-10-CM

## 2021-04-14 PROCEDURE — 99213 OFFICE O/P EST LOW 20 MIN: CPT | Performed by: NURSE PRACTITIONER

## 2021-04-14 NOTE — PATIENT INSTRUCTIONS
Myelodysplastic Syndromes: Care Instructions Overview Myelodysplastic syndromes, also called MDS, are a group of cancers in which the bone marrow does not make enough healthy blood cells. Normally, the bone marrow makes red blood cells, white blood cells, and platelets. These cells carry oxygen in the blood, help the body fight infections, and help the blood clot. With MDS, you may feel weak and tired, get infections often, and bleed easily, although symptoms tend to vary. MDS is a form of blood cancer. In some cases, MDS can turn into acute myeloid leukemia (AML), another type of cancer. Some people develop MDS after treatment for cancer or exposure to pesticides or other chemicals. But in most cases, the cause of MDS is not known. Your doctor will use the results of tests, including blood tests, to guide your treatment. There are many types of MDS, with different treatment plans for each. If you have enough red blood cells and are feeling all right, you may not need active treatment, but you and your doctor will want to watch your condition carefully. If you start feeling lightheaded and have no energy, you may need a blood transfusion. Other treatments include medicines like chemotherapy and stem cell transplants. Follow-up care is a key part of your treatment and safety. Be sure to make and go to all appointments, and call your doctor if you are having problems. It's also a good idea to know your test results and keep a list of the medicines you take. How can you care for yourself at home? · Take your medicines exactly as prescribed. Call your doctor if you think you are having a problem with your medicine. You will get more details on the specific medicines your doctor prescribes. · If your doctor prescribed antibiotics, take them as directed. Do not stop taking them just because you feel better. You need to take the full course of antibiotics.  If you have side effects from antibiotics, tell your doctor. · Take steps to control your stress and workload. Learn relaxation techniques. ? Share your feelings. Stress and tension affect our emotions. By expressing your feelings to others, you may be able to understand and cope with them. ? Consider joining a support group. Talking about a problem with your spouse, a good friend, or other people with similar problems is a good way to reduce tension and stress. ? Express yourself with art. Try writing, crafts, dance, or art to relieve stress. ? Be kind to your body and mind. Getting enough sleep, eating a healthy diet, and taking time to do things you enjoy can contribute to an overall feeling of balance in your life and help reduce stress. ? Get help if you need it. Discuss your concerns with your doctor or counselor. · Do not smoke. Smoking can make blood problems worse. If you need help quitting, talk to your doctor about stop-smoking programs and medicines. These can increase your chances of quitting for good. · If you have not already done so, prepare a list of advance directives. Advance directives are instructions to your doctor and family members about what kind of care you want if you become unable to speak or express yourself. · Call the [employer] (1-828.304.5521) or visit its website at [address] for more information. When should you call for help? Call 911 anytime you think you may need emergency care. For example, call if: 
  · You passed out (lost consciousness). Call your doctor now or seek immediate medical care if: 
  · You have a fever.  
  · You have abnormal bleeding.  
  · You have new or worse pain.  
  · You think you have an infection.  
  · You have new symptoms, such as a cough, belly pain, vomiting, diarrhea, or a rash.  
  · You have signs of a blood clot, such as: 
? Pain in your calf, back of the knee, thigh, or groin. ? Redness and swelling in your leg or groin.   
Watch closely for changes in your health, and be sure to contact your doctor if: 
  · You are much more tired than usual.  
  · You have swollen glands in your armpits, groin, or neck.  
  · You do not get better as expected. Where can you learn more? Go to http://www.gray.com/ Enter Mariano Mi in the search box to learn more about \"Myelodysplastic Syndromes: Care Instructions. \" Current as of: December 17, 2020               Content Version: 12.8 © 2006-2021 [employer]. Care instructions adapted under license by [employer] (which disclaims liability or warranty for this information). If you have questions about a medical condition or this instruction, always ask your healthcare professional. [address] any warranty or liability for your use of this information. Iron Deficiency Anemia: Care Instructions Your Care Instructions Anemia means that you don't have enough red blood cells. Red blood cells carry oxygen around your body. When you have anemia, it can make you pale, weak, and tired. Many things can cause anemia. The most common cause is loss of blood. This can happen if you have heavy menstrual periods. It can also happen if you have bleeding in your stomach or bowel. You can also get anemia if you don't have enough iron in your diet or if it's hard for your body to absorb iron. In some cases, pregnancy causes anemia. That's because a pregnant woman needs more iron. Your doctor may do more tests to find the cause of your anemia. If a disease or other health problem is causing it, your doctor will treat that problem. It's important to follow up with your doctor to make sure that your iron level returns to normal. 
Follow-up care is a key part of your treatment and safety. Be sure to make and go to all appointments, and call your doctor if you are having problems. It's also a good idea to know your test results and keep a list of the medicines you take. How can you care for yourself at home? · If your doctor recommended iron pills, take them as directed. ? Try to take the pills on an empty stomach. You can do this about 1 hour before or 2 hours after meals. But you may need to take iron with food to avoid an upset stomach. ? Do not take antacids or drink milk or anything with caffeine within 2 hours of when you take your iron. They can keep your body from absorbing the iron well. ? Vitamin C helps your body absorb iron. You may want to take iron pills with a glass of orange juice or some other food high in vitamin C. 
? Iron pills may cause stomach problems. These include heartburn, nausea, diarrhea, constipation, and cramps. It can help to drink plenty of fluids and include fruits, vegetables, and fiber in your diet. ? It's normal for iron pills to make your stool a greenish or grayish black. But internal bleeding can also cause dark stool. So it's important to tell your doctor about any color changes. ? Call your doctor if you think you are having a problem with your iron pills. Even after you start to feel better, it will take several months for your body to build up its supply of iron. ? If you miss a pill, don't take a double dose. ? Keep iron pills out of the reach of small children. Too much iron can be very dangerous. · Eat foods with a lot of iron. These include red meat, shellfish, poultry, and eggs. They also include beans, raisins, whole-grain bread, and leafy green vegetables. · Steam your vegetables. This is the best way to prepare them if you want to get as much iron as possible. · Be safe with medicines. Do not take nonsteroidal anti-inflammatory pain relievers unless your doctor tells you to. These include aspirin, naproxen (Aleve), and ibuprofen (Advil, Motrin). · Liquid iron can stain your teeth. But you can mix it with water or juice and drink it with a straw. Then it won't get on your teeth. When should you call for help?  
 Call 911 anytime you think you may need emergency care. For example, call if: 
  · You passed out (lost consciousness). Call your doctor now or seek immediate medical care if: 
  · You are short of breath.  
  · You are dizzy or light-headed, or you feel like you may faint.  
  · You have new or worse bleeding. Watch closely for changes in your health, and be sure to contact your doctor if: 
  · You feel weaker or more tired than usual.  
  · You do not get better as expected. Where can you learn more? Go to http://www.gray.com/ Enter R719 in the search box to learn more about \"Iron Deficiency Anemia: Care Instructions. \" Current as of: September 23, 2020               Content Version: 12.8 © 3536-0537 [employer]. Care instructions adapted under license by [employer] (which disclaims liability or warranty for this information). If you have questions about a medical condition or this instruction, always ask your healthcare professional. [place] [address] any warranty or liability for your use of this information.

## 2021-04-14 NOTE — PROGRESS NOTES
Hematology/Oncology  Progress Note    Name: Koffi Michelle  Date: 2021  : 1955    Moises Katz MD     Ms. Tanisha Hurd is a 61y.o. year old female who seen for management of her myelodysplastic syndrome/refractory anemia    Current therapy: Iron supplement, Procrit or Aranesp in the past when her hemoglobin was below 10g/dL and- hematocrit is below 30%    Subjective:     Ms. Tanisha Hurd is a 72-year-old -American woman with history of myelodysplastic syndrome/refractory anemia. She was diagnosed more than 8 years ago. Today she states she has been doing well since her last office visit. She reports her arthritis has been manageable using diclofenac. She denies fatigue, shortness of breath, and weakness. The patient otherwise has no other complaints. Denied fever, chills, night sweat, unintentional weight loss, skin lumps or bumps, acute bleeding or bruising issues. Denied headache, acute vision change, dizziness, chest pain, worsen shortness of breath, palpitation, productive cough, nausea, vomiting, abdominal pain, altered bowel habits, dysuria, new bone pain or back pain, focal numbness or weakness. she report constipation however she is being followed by GI for this. Past medical history, family history, and social history: these were reviewed and remains unchanged. Past Medical History:   Diagnosis Date    Echocardiogram 2011    Tech difficult. EF 60-65%. Mild LVH. RVSP 20-25 mmHg.       Essential hypertension     History of colon polyps     Hx of endometriosis     had hyst    Hyperlipidemia     Hypertension     MDS (myelodysplastic syndrome) (HCC)     Refractory anemia (HCC)      Past Surgical History:   Procedure Laterality Date    HX HYSTERECTOMY       Social History     Socioeconomic History    Marital status: SINGLE     Spouse name: Not on file    Number of children: Not on file    Years of education: Not on file    Highest education level: Not on file Occupational History    Not on file   Social Needs    Financial resource strain: Not on file    Food insecurity     Worry: Not on file     Inability: Not on file    Transportation needs     Medical: Not on file     Non-medical: Not on file   Tobacco Use    Smoking status: Never Smoker    Smokeless tobacco: Never Used   Substance and Sexual Activity    Alcohol use: No    Drug use: No    Sexual activity: Not on file   Lifestyle    Physical activity     Days per week: Not on file     Minutes per session: Not on file    Stress: Not on file   Relationships    Social connections     Talks on phone: Not on file     Gets together: Not on file     Attends Sikh service: Not on file     Active member of club or organization: Not on file     Attends meetings of clubs or organizations: Not on file     Relationship status: Not on file    Intimate partner violence     Fear of current or ex partner: Not on file     Emotionally abused: Not on file     Physically abused: Not on file     Forced sexual activity: Not on file   Other Topics Concern    Not on file   Social History Narrative    Not on file     Family History   Problem Relation Age of Onset    Cancer Mother     Arthritis-rheumatoid Sister     Diabetes Sister     Hypertension Sister     No Known Problems Father      Current Outpatient Medications   Medication Sig Dispense Refill    diclofenac EC (VOLTAREN) 75 mg EC tablet TAKE 1 TABLET BY MOUTH ONCE DAILY AS NEEDED WITH FOOD      polyethylene glycol (MIRALAX) 17 gram packet Take 1 Packet by mouth daily.  517 g 12    carvediloL (COREG) 25 mg tablet TAKE ONE TABLET BY MOUTH TWICE DAILY WITH MEALS 180 Tab 3    Vitamin D2 1,250 mcg (50,000 unit) capsule Take 1 capsule by mouth once a week 4 Cap 12    losartan-hydroCHLOROthiazide (HYZAAR) 100-12.5 mg per tablet TAKE 1 TABLET BY MOUTH ONCE DAILY 30 Tab 11    valACYclovir (VALTREX) 500 mg tablet Take 1 tablet by mouth twice daily 30 Tab 12    simvastatin (ZOCOR) 20 mg tablet TAKE 1 TABLET BY MOUTH ONCE DAILY AT BEDTIME 30 Tab 12    diclofenac (VOLTAREN) 1 % gel Apply 4 g to affected area four (4) times daily. 400 g 12    polyethylene glycol (MIRALAX) 17 gram/dose powder MIX 17 GRAMS (1 CAPFUL) IN LIQUID AND DRINK BY MOUTH ONCE DAILY FOR CONSTIPATION 517 g 12    latanoprost (XALATAN) 0.005 % ophthalmic solution Administer 1 Drop to both eyes nightly. Review of Systems   Constitutional: Negative. HENT: Negative. Eyes: Negative. Respiratory: Negative. Cardiovascular: Negative. Gastrointestinal: Positive for constipation. Genitourinary: Negative. Musculoskeletal: Negative. Skin: Negative. Neurological: Negative. Endo/Heme/Allergies: Negative. Psychiatric/Behavioral: Negative. Objective:     Visit Vitals  /84 (BP 1 Location: Right arm, BP Patient Position: Sitting, BP Cuff Size: Adult)   Pulse 73   Temp 97.9 °F (36.6 °C) (Temporal)   Resp 18   Ht 5' 3\" (1.6 m)   Wt 88 kg (194 lb)   LMP 08/31/1998   SpO2 100%   BMI 34.37 kg/m²     ECOG PS0  Physical Exam  HENT:      Head: Normocephalic. Eyes:      Pupils: Pupils are equal, round, and reactive to light. Neck:      Musculoskeletal: Normal range of motion. Cardiovascular:      Rate and Rhythm: Normal rate. Pulmonary:      Effort: Pulmonary effort is normal.   Abdominal:      Palpations: Abdomen is soft. Musculoskeletal: Normal range of motion. Skin:     General: Skin is warm. Neurological:      Mental Status: She is alert and oriented to person, place, and time. Psychiatric:         Mood and Affect: Mood normal.         Behavior: Behavior normal.         Thought Content:  Thought content normal.         Judgment: Judgment normal.          Lab data:      Results for orders placed or performed during the hospital encounter of 12/02/19   CBC WITH 3 PART DIFF     Status: Abnormal   Result Value Ref Range Status    WBC 5.3 4.5 - 13.0 K/uL Final    RBC 3.41 (L) 4.10 - 5.10 M/uL Final    HGB 10.0 (L) 12.0 - 16 g/dL Final    HCT 31.1 (L) 36 - 48 % Final    MCV 91.2 78 - 102 FL Final    MCH 29.3 25.0 - 35.0 PG Final    MCHC 32.2 31 - 37 g/dL Final    RDW 12.9 11.5 - 14.5 % Final    PLATELET 999 084 - 925 K/uL Final    NEUTROPHILS 59 40 - 70 % Final    MIXED CELLS 7 0.1 - 17 % Final    LYMPHOCYTES 34 14 - 44 % Final    ABS. NEUTROPHILS 3.1 1.8 - 9.5 K/UL Final    ABS. MIXED CELLS 0.4 0.0 - 2.3 K/uL Final    ABS. LYMPHOCYTES 1.8 1.1 - 5.9 K/UL Final     Comment: Test performed at [address] [place] [address] or Outpatient Infusion Center Location. Reviewed by Medical Director. DF AUTOMATED   Final           Assessment:     1. MDS (myelodysplastic syndrome) ([facility] [address]    2. Anemia, unspecified type    3. Myelodysplastic syndrome ([facility] [address]    4. Refractory anemia (HCC)    5. Vitamin D deficiency          Plan:   Myelodysplastic syndrome/ Chronic Anemia:   -- She has a presumably history of myelodysplastic syndrome/refractory anemia. She was diagnosed more than 8 years ago. -- Her H/H appears doing stable for years without thrombocytopenia or leukopenia/leukocytosis. It was unclear if a true MDS in her cases. -- We will continue to monitor CBC periodically  --- CBC, CMP, iron and ferritin reviewed with patient today. .      Arthritis/facet arthritis of the cervical region:   Vitamin D deficiency  -- The patient will follow her PCP for further management.         -- We will see the patient back in clinic in about 4 months. Always sooner if required. The patient can have lab done prior our next clinic visit. No orders of the defined types were placed in this encounter. Wendy Escalante, NP  4/14/2021      Please note: This document has been produced using voice recognition software. Unrecognized errors in transcription may be present.

## 2021-04-19 ENCOUNTER — NURSE NAVIGATOR (OUTPATIENT)
Dept: SURGERY | Age: 66
End: 2021-04-19

## 2021-05-13 ENCOUNTER — TELEPHONE (OUTPATIENT)
Dept: FAMILY MEDICINE CLINIC | Age: 66
End: 2021-05-13

## 2021-05-13 DIAGNOSIS — H40.9 GLAUCOMA, UNSPECIFIED GLAUCOMA TYPE, UNSPECIFIED LATERALITY: Primary | ICD-10-CM

## 2021-05-14 ENCOUNTER — HOSPITAL ENCOUNTER (OUTPATIENT)
Dept: LAB | Age: 66
Discharge: HOME | End: 2021-05-14

## 2021-05-14 LAB — XX-LABCORP SPECIMEN COL,LCBCF: NORMAL

## 2021-05-14 PROCEDURE — 99001 SPECIMEN HANDLING PT-LAB: CPT

## 2021-05-15 LAB
ALBUMIN SERPL-MCNC: 4.6 G/DL (ref 3.8–4.8)
ALBUMIN/GLOB SERPL: 1.2 {RATIO} (ref 1.2–2.2)
ALP SERPL-CCNC: 97 IU/L (ref 39–117)
ALT SERPL-CCNC: 9 IU/L (ref 0–32)
AST SERPL-CCNC: 16 IU/L (ref 0–40)
BILIRUB SERPL-MCNC: 0.7 MG/DL (ref 0–1.2)
BUN SERPL-MCNC: 17 MG/DL (ref 8–27)
BUN/CREAT SERPL: 17 (ref 12–28)
CALCIUM SERPL-MCNC: 10.3 MG/DL (ref 8.7–10.3)
CHLORIDE SERPL-SCNC: 100 MMOL/L (ref 96–106)
CHOLEST SERPL-MCNC: 181 MG/DL (ref 100–199)
CO2 SERPL-SCNC: 27 MMOL/L (ref 20–29)
CREAT SERPL-MCNC: 0.99 MG/DL (ref 0.57–1)
GLOBULIN SER CALC-MCNC: 3.8 G/DL (ref 1.5–4.5)
GLUCOSE SERPL-MCNC: 83 MG/DL (ref 65–99)
HDLC SERPL-MCNC: 47 MG/DL
IMP & REVIEW OF LAB RESULTS: NORMAL
LDLC SERPL CALC-MCNC: 117 MG/DL (ref 0–99)
POTASSIUM SERPL-SCNC: 3.7 MMOL/L (ref 3.5–5.2)
PROT SERPL-MCNC: 8.4 G/DL (ref 6–8.5)
SODIUM SERPL-SCNC: 141 MMOL/L (ref 134–144)
TRIGL SERPL-MCNC: 90 MG/DL (ref 0–149)
VLDLC SERPL CALC-MCNC: 17 MG/DL (ref 5–40)

## 2021-05-21 ENCOUNTER — OFFICE VISIT (OUTPATIENT)
Dept: FAMILY MEDICINE CLINIC | Age: 66
End: 2021-05-21
Payer: COMMERCIAL

## 2021-05-21 VITALS
RESPIRATION RATE: 16 BRPM | SYSTOLIC BLOOD PRESSURE: 134 MMHG | BODY MASS INDEX: 34.91 KG/M2 | OXYGEN SATURATION: 100 % | HEIGHT: 63 IN | WEIGHT: 197 LBS | DIASTOLIC BLOOD PRESSURE: 82 MMHG | TEMPERATURE: 97.7 F | HEART RATE: 67 BPM

## 2021-05-21 DIAGNOSIS — I10 ESSENTIAL HYPERTENSION: Primary | ICD-10-CM

## 2021-05-21 DIAGNOSIS — E78.5 HYPERLIPIDEMIA, UNSPECIFIED HYPERLIPIDEMIA TYPE: ICD-10-CM

## 2021-05-21 DIAGNOSIS — Z71.1 CONCERN ABOUT BREAST CANCER IN FEMALE WITHOUT DIAGNOSIS: ICD-10-CM

## 2021-05-21 DIAGNOSIS — L29.9 PRURITUS: ICD-10-CM

## 2021-05-21 PROCEDURE — 99214 OFFICE O/P EST MOD 30 MIN: CPT | Performed by: FAMILY MEDICINE

## 2021-05-21 RX ORDER — TRIAMCINOLONE ACETONIDE 1 MG/G
CREAM TOPICAL 2 TIMES DAILY
Qty: 45 G | Refills: 0 | Status: SHIPPED | OUTPATIENT
Start: 2021-05-21

## 2021-05-21 NOTE — PROGRESS NOTES
Chief Complaint   Patient presents with    Hypertension    Cholesterol Problem    Labs     Completed 5/14/21    Other     Pt has question about her breast.          SANTIAGO Michelle is a 61 y.o. female presenting today for    3 months  follow up of htn, hld. Patient had labs on 5/14/21. Labs reviewed in detail with patient     Patient does not need medication refills today. New concerns today: pt has a question about her breast.  She cont to have L >R breast itching and this makes her worry about breast ca. She has had appropriate imaging studies. Review of Systems   Constitutional: Negative. HENT: Negative. Respiratory: Negative. Cardiovascular: Negative. Skin: Positive for itching. Negative for rash. All other systems reviewed and are negative. Physical Exam  Vitals and nursing note reviewed. Constitutional:       Appearance: Normal appearance. She is not ill-appearing. HENT:      Head: Normocephalic and atraumatic. Right Ear: External ear normal.      Left Ear: External ear normal.      Nose: Nose normal.      Mouth/Throat:      Mouth: Mucous membranes are moist.   Eyes:      Extraocular Movements: Extraocular movements intact. Conjunctiva/sclera: Conjunctivae normal.   Cardiovascular:      Rate and Rhythm: Normal rate and regular rhythm. Heart sounds: No murmur heard. No friction rub. No gallop. Pulmonary:      Effort: Pulmonary effort is normal.      Breath sounds: Normal breath sounds. No wheezing, rhonchi or rales. Musculoskeletal:         General: Normal range of motion. Cervical back: Normal range of motion. Skin:     General: Skin is warm and dry. Neurological:      Mental Status: She is alert and oriented to person, place, and time. Coordination: Coordination normal.   Psychiatric:         Mood and Affect: Mood normal.         Behavior: Behavior normal.         Thought Content:  Thought content normal.         Judgment: Judgment normal.         Diagnoses and all orders for this visit:    1. Essential hypertension  -     LIPID PANEL  Stable, cont pres tx plan. 2. Hyperlipidemia, unspecified hyperlipidemia type  -     LIPID PANEL  Work on diet and exercise. 3. Pruritus  -     REFERRAL TO BREAST SURGERY  -     triamcinolone acetonide (KENALOG) 0.1 % topical cream; Apply  to affected area two (2) times a day. 4. Concern about breast cancer in female without diagnosis  -     REFERRAL TO BREAST SURGERY      Follow-up and Dispositions    · Return in about 3 months (around 8/21/2021) for high blood pressure, high cholesterol.

## 2021-05-21 NOTE — PROGRESS NOTES
Koffi Michelle presents today for   Chief Complaint   Patient presents with    Hypertension    Cholesterol Problem    Labs     Completed 5/14/21    Other     Pt has question about her breast.        Is someone accompanying this pt? No    Is the patient using any DME equipment during OV? No    Depression Screening:  3 most recent PHQ Screens 5/21/2021   Little interest or pleasure in doing things Not at all   Feeling down, depressed, irritable, or hopeless Not at all   Total Score PHQ 2 0       Learning Assessment:  Learning Assessment 2/24/2020   PRIMARY LEARNER Patient   HIGHEST LEVEL OF EDUCATION - PRIMARY LEARNER  4 YEARS [employer]ong PRIMARY LEARNER NONE   CO-LEARNER CAREGIVER No   PRIMARY LANGUAGE ENGLISH    NEED No   LEARNER PREFERENCE PRIMARY DEMONSTRATION     -     -     -     -   ANSWERED BY patient   RELATIONSHIP SELF       Travel Screening:   Travel Screening      No screening recorded since 05/20/21 0000      Travel History   Travel since 04/21/21     No documented travel since 04/21/21          Health Maintenance Due   Topic Date Due    Shingrix Vaccine Age 50> (1 of 2) Never done   . Health Maintenance reviewed and discussed and ordered per Provider. Koffi Michelle is updated on all     Coordination of Care  1. Have you been to the ER, urgent care clinic since your last visit? Hospitalized since your last visit? No    2. Have you seen or consulted any other health care providers outside of the [address] [place] [address] since your last visit? Include any pap smears or colon screening.  No

## 2021-06-25 ENCOUNTER — OFFICE VISIT (OUTPATIENT)
Dept: CARDIOLOGY CLINIC | Age: 66
End: 2021-06-25
Payer: COMMERCIAL

## 2021-06-25 VITALS
WEIGHT: 191 LBS | OXYGEN SATURATION: 100 % | DIASTOLIC BLOOD PRESSURE: 82 MMHG | SYSTOLIC BLOOD PRESSURE: 132 MMHG | BODY MASS INDEX: 33.84 KG/M2 | HEIGHT: 63 IN | HEART RATE: 58 BPM

## 2021-06-25 DIAGNOSIS — R00.2 PALPITATIONS: Primary | ICD-10-CM

## 2021-06-25 DIAGNOSIS — I10 ESSENTIAL HYPERTENSION: ICD-10-CM

## 2021-06-25 PROCEDURE — 93000 ELECTROCARDIOGRAM COMPLETE: CPT | Performed by: INTERNAL MEDICINE

## 2021-06-25 PROCEDURE — 99214 OFFICE O/P EST MOD 30 MIN: CPT | Performed by: INTERNAL MEDICINE

## 2021-06-25 NOTE — PROGRESS NOTES
Cliff Alegria presents today for   Chief Complaint   Patient presents with    Follow-up     1 year f/u       Cliff Alegria preferred language for health care discussion is english/other. Is someone accompanying this pt? no    Is the patient using any DME equipment during [address]? no    Depression Screening:  3 most recent PHQ Screens 6/25/2021   Little interest or pleasure in doing things Not at all   Feeling down, depressed, irritable, or hopeless Not at all   Total Score PHQ 2 0       Learning Assessment:  Learning Assessment 2/24/2020   PRIMARY LEARNER Patient   HIGHEST LEVEL OF EDUCATION - PRIMARY LEARNER  4 YEARS OF COLLEGE   BARRIERS PRIMARY LEARNER NONE   CO-LEARNER CAREGIVER No   PRIMARY LANGUAGE ENGLISH    NEED No   LEARNER PREFERENCE PRIMARY DEMONSTRATION     -     -     -     -   ANSWERED BY patient   RELATIONSHIP SELF       Abuse Screening:  Abuse Screening Questionnaire 6/25/2021   Do you ever feel afraid of your partner? N   Are you in a relationship with someone who physically or mentally threatens you? N   Is it safe for you to go home? Y       Fall Risk  Fall Risk Assessment, last 12 mths 3/23/2020   Able to walk? Yes   Fall in past 12 months? No       Pt currently taking Anticoagulant therapy? no    Coordination of Care:  1. Have you been to the ER, urgent care clinic since your last visit? Hospitalized since your last visit? no    2. Have you seen or consulted any other health care providers outside of the [address] [place] [address] since your last visit? Include any pap smears or colon screening.  no

## 2021-06-25 NOTE — PROGRESS NOTES
Arianna Perez    Chief Complaint   Patient presents with    Follow-up     1 year f/u   Palps, HTN    HPI    Arianna Perez is a 61 y.o. AAF with palps, HTN here for routine follow up. Was c/o palps, fatigue, etc and sent her for sleep study, now with CPAP. Symptoms have resolved. SBP at goal but with mild sinus siria. She had an echocardiogram back in 2011 that was essentially normal.  She had normal EF but there was mild LVH was no significant valvular pathology. Her LA was normal at that time. CV RFs; HTN, HL, obesity    Past Medical History:   Diagnosis Date    Echocardiogram 02/08/2011    Tech difficult. EF 60-65%. Mild LVH. RVSP 20-25 mmHg.  Essential hypertension     History of colon polyps     Hx of endometriosis     had hyst    Hyperlipidemia     Hypertension     MDS (myelodysplastic syndrome) (HCC)     Refractory anemia (HCC)        Past Surgical History:   Procedure Laterality Date    HX HYSTERECTOMY         Current Outpatient Medications   Medication Sig Dispense Refill    triamcinolone acetonide (KENALOG) 0.1 % topical cream Apply  to affected area two (2) times a day. 45 g 0    simvastatin (ZOCOR) 20 mg tablet TAKE 1 TABLET BY MOUTH ONCE DAILY AT BEDTIME 30 Tab 12    losartan-hydroCHLOROthiazide (HYZAAR) 100-12.5 mg per tablet Take 1 tablet by mouth once daily 90 Tab 4    diclofenac EC (VOLTAREN) 75 mg EC tablet TAKE 1 TABLET BY MOUTH ONCE DAILY AS NEEDED WITH FOOD      polyethylene glycol (MIRALAX) 17 gram packet Take 1 Packet by mouth daily. 517 g 12    carvediloL (COREG) 25 mg tablet TAKE ONE TABLET BY MOUTH TWICE DAILY WITH MEALS (Patient taking differently: 12.5 mg two (2) times daily (with meals).  TAKE ONE TABLET BY MOUTH TWICE DAILY WITH MEALS) 180 Tab 3    Vitamin D2 1,250 mcg (50,000 unit) capsule Take 1 capsule by mouth once a week 4 Cap 12    valACYclovir (VALTREX) 500 mg tablet Take 1 tablet by mouth twice daily 30 Tab 12    diclofenac (VOLTAREN) 1 % gel Apply 4 g to affected area four (4) times daily. 400 g 12    polyethylene glycol (MIRALAX) 17 gram/dose powder MIX 17 GRAMS (1 CAPFUL) IN LIQUID AND DRINK BY MOUTH ONCE DAILY FOR CONSTIPATION 517 g 12    latanoprost (XALATAN) 0.005 % ophthalmic solution Administer 1 Drop to both eyes nightly. No Known Allergies    Social History     Socioeconomic History    Marital status: SINGLE     Spouse name: Not on file    Number of children: Not on file    Years of education: Not on file    Highest education level: Not on file   Occupational History    Not on file   Tobacco Use    Smoking status: Never Smoker    Smokeless tobacco: Never Used   Substance and Sexual Activity    Alcohol use: No    Drug use: No    Sexual activity: Not on file   Other Topics Concern    Not on file   Social History Narrative    Not on file     Social Determinants of Health     Financial Resource Strain:     Difficulty of Paying Living Expenses:    Food Insecurity:     Worried About Running Out of Food in the Last Year:     [address] in the Last Year:    Transportation Needs:     Lack of Transportation (Medical):  Lack of Transportation (Non-Medical):    Physical Activity:     Days of Exercise per Week:     Minutes of Exercise per Session:    Stress:     Feeling of Stress :    Social Connections:     Frequency of Communication with Friends and Family:     Frequency of Social Gatherings with Friends and Family:     Attends Congregational Services:     Active Member of Clubs or Organizations:     Attends Club or Organization Meetings:     Marital Status:    Intimate Partner Violence:     Fear of Current or Ex-Partner:     Emotionally Abused:     Physically Abused:     Sexually Abused:         FH:n/a    Review of Systems    14 pt Review of Systems is negative unless otherwise mentioned in the HPI.     Wt Readings from Last 3 Encounters:   06/25/21 86.6 kg (191 lb)   05/21/21 89.4 kg (197 lb)   04/14/21 88 kg (194 lb)     Temp Readings from Last 3 Encounters:   05/21/21 97.7 °F (36.5 °C) (Oral)   04/14/21 97.9 °F (36.6 °C) (Temporal)   03/29/21 97.8 °F (36.6 °C) (Oral)     BP Readings from Last 3 Encounters:   06/25/21 132/82   05/21/21 134/82   04/14/21 139/84     Pulse Readings from Last 3 Encounters:   06/25/21 (!) 58   05/21/21 67   04/14/21 73     Physical Exam:    Visit Vitals  /82 (BP 1 Location: Left upper arm, BP Patient Position: Sitting, BP Cuff Size: Adult)   Pulse (!) 58   Ht 5' 3\" (1.6 m)   Wt 86.6 kg (191 lb)   LMP 08/31/1998   SpO2 100%   BMI 33.83 kg/m²      Physical Exam  HENT:      Head: Normocephalic and atraumatic. Eyes:      Pupils: Pupils are equal, round, and reactive to light. Cardiovascular:      Rate and Rhythm: Normal rate and regular rhythm. Heart sounds: Normal heart sounds. No murmur heard. No friction rub. No gallop. Pulmonary:      Effort: Pulmonary effort is normal. No respiratory distress. Breath sounds: Normal breath sounds. No wheezing or rales. Chest:      Chest wall: No tenderness. Abdominal:      General: Bowel sounds are normal.      Palpations: Abdomen is soft. Musculoskeletal:         General: No tenderness. Skin:     General: Skin is warm and dry. Neurological:      Mental Status: She is alert and oriented to person, place, and time.          EKG today shows: sinus siria, normal axis and intervals, no ST segment abnormalities    Lab Results   Component Value Date/Time    Cholesterol, total 181 05/14/2021 12:00 AM    HDL Cholesterol 47 05/14/2021 12:00 AM    LDL, calculated 117 (H) 05/14/2021 12:00 AM    LDL, calculated 110 (H) 07/10/2020 12:00 AM    VLDL, calculated 17 05/14/2021 12:00 AM    VLDL, calculated 17 07/10/2020 12:00 AM    Triglyceride 90 05/14/2021 12:00 AM       Impression and Plan:  Ramila Pruett is a 61 y.o. with:    1.) Signs and symptoms suggestive of sleep disordered breathing, likely contributes to below  2.) HTN, above goal   3.) Palpitations, likely benign ectopy  4.) Normal LV function  5.) JUAN, now on CPAP    1.) Pt now has CPAP, no more palps and SBP 130s today  2.) As promised, will try to wean her Coreg- asked her to cut in half so 12.5 mg BID and come back for BP check, if SBP <140 would just stop Coreg  3.) Cont Hyzaar and RTC yearly    Thank you for allowing me to participate in the care of your patient, please do not hesitate to call with questions or concerns. Follow-up and Dispositions    · Return in about 1 year (around 6/25/2022).      [address] [facility],    Deuce Garcia, DO

## 2021-07-02 ENCOUNTER — LAB ONLY (OUTPATIENT)
Dept: ONCOLOGY | Age: 66
End: 2021-07-02

## 2021-07-02 DIAGNOSIS — D64.9 ANEMIA, UNSPECIFIED TYPE: ICD-10-CM

## 2021-07-02 DIAGNOSIS — D46.9 MDS (MYELODYSPLASTIC SYNDROME) (HCC): Primary | ICD-10-CM

## 2021-07-03 LAB
ALBUMIN SERPL-MCNC: 4.2 G/DL (ref 3.8–4.8)
ALBUMIN/GLOB SERPL: 1.2 {RATIO} (ref 1.2–2.2)
ALP SERPL-CCNC: 89 IU/L (ref 48–121)
ALT SERPL-CCNC: 9 IU/L (ref 0–32)
AST SERPL-CCNC: 14 IU/L (ref 0–40)
BASOPHILS # BLD AUTO: 0.1 X10E3/UL (ref 0–0.2)
BASOPHILS NFR BLD AUTO: 2 %
BILIRUB SERPL-MCNC: 0.6 MG/DL (ref 0–1.2)
BUN SERPL-MCNC: 20 MG/DL (ref 8–27)
BUN/CREAT SERPL: 16 (ref 12–28)
CALCIUM SERPL-MCNC: 9.6 MG/DL (ref 8.7–10.3)
CHLORIDE SERPL-SCNC: 100 MMOL/L (ref 96–106)
CO2 SERPL-SCNC: 27 MMOL/L (ref 20–29)
CREAT SERPL-MCNC: 1.29 MG/DL (ref 0.57–1)
EOSINOPHIL # BLD AUTO: 0.1 X10E3/UL (ref 0–0.4)
EOSINOPHIL NFR BLD AUTO: 2 %
ERYTHROCYTE [DISTWIDTH] IN BLOOD BY AUTOMATED COUNT: 13.6 % (ref 11.7–15.4)
FERRITIN SERPL-MCNC: 272 NG/ML (ref 15–150)
GLOBULIN SER CALC-MCNC: 3.4 G/DL (ref 1.5–4.5)
GLUCOSE SERPL-MCNC: 84 MG/DL (ref 65–99)
HCT VFR BLD AUTO: 27.1 % (ref 34–46.6)
HGB BLD-MCNC: 9 G/DL (ref 11.1–15.9)
IMM GRANULOCYTES # BLD AUTO: 0 X10E3/UL (ref 0–0.1)
IMM GRANULOCYTES NFR BLD AUTO: 0 %
IRON SATN MFR SERPL: 18 % (ref 15–55)
IRON SERPL-MCNC: 47 UG/DL (ref 27–139)
LYMPHOCYTES # BLD AUTO: 1.5 X10E3/UL (ref 0.7–3.1)
LYMPHOCYTES NFR BLD AUTO: 28 %
MCH RBC QN AUTO: 29.4 PG (ref 26.6–33)
MCHC RBC AUTO-ENTMCNC: 33.2 G/DL (ref 31.5–35.7)
MCV RBC AUTO: 89 FL (ref 79–97)
MONOCYTES # BLD AUTO: 0.4 X10E3/UL (ref 0.1–0.9)
MONOCYTES NFR BLD AUTO: 7 %
NEUTROPHILS # BLD AUTO: 3.3 X10E3/UL (ref 1.4–7)
NEUTROPHILS NFR BLD AUTO: 61 %
PLATELET # BLD AUTO: 280 X10E3/UL (ref 150–450)
POTASSIUM SERPL-SCNC: 3.7 MMOL/L (ref 3.5–5.2)
PROT SERPL-MCNC: 7.6 G/DL (ref 6–8.5)
RBC # BLD AUTO: 3.06 X10E6/UL (ref 3.77–5.28)
SODIUM SERPL-SCNC: 141 MMOL/L (ref 134–144)
TIBC SERPL-MCNC: 259 UG/DL (ref 250–450)
UIBC SERPL-MCNC: 212 UG/DL (ref 118–369)
WBC # BLD AUTO: 5.4 X10E3/UL (ref 3.4–10.8)

## 2021-07-08 NOTE — PROGRESS NOTES
Ms. Tobi Claire is a 61year old woman with breast pruritis. She reports left greater than right. It was previously bilateral, but now only left. She has tried multiple creams with some improvement. She denies mass. She has met with dermatologist. She remains concerned. There is family history of breast cancer in her sister in her 46s. She denies mass or nipple discharge. She reports some darkening of her skin with one of the creams. Breast/GYN history:    OB History    No obstetric history on file. Past Medical History:   Diagnosis Date    Echocardiogram 02/08/2011    Tech difficult. EF 60-65%. Mild LVH. RVSP 20-25 mmHg.  Essential hypertension     History of colon polyps     Hx of endometriosis     had hyst    Hyperlipidemia     Hypertension     MDS (myelodysplastic syndrome) (HCC)     Refractory anemia (HCC)        Past Surgical History:   Procedure Laterality Date    HX HYSTERECTOMY         Current Outpatient Medications on File Prior to Visit   Medication Sig Dispense Refill    valACYclovir (VALTREX) 500 mg tablet Take 1 tablet by mouth twice daily 30 Tablet 12    triamcinolone acetonide (KENALOG) 0.1 % topical cream Apply  to affected area two (2) times a day. 45 g 0    simvastatin (ZOCOR) 20 mg tablet TAKE 1 TABLET BY MOUTH ONCE DAILY AT BEDTIME 30 Tab 12    losartan-hydroCHLOROthiazide (HYZAAR) 100-12.5 mg per tablet Take 1 tablet by mouth once daily 90 Tab 4    diclofenac EC (VOLTAREN) 75 mg EC tablet TAKE 1 TABLET BY MOUTH ONCE DAILY AS NEEDED WITH FOOD      polyethylene glycol (MIRALAX) 17 gram packet Take 1 Packet by mouth daily. 517 g 12    carvediloL (COREG) 25 mg tablet TAKE ONE TABLET BY MOUTH TWICE DAILY WITH MEALS (Patient taking differently: 12.5 mg two (2) times daily (with meals). TAKE ONE TABLET BY MOUTH TWICE DAILY WITH MEALS) 180 Tab 3    diclofenac (VOLTAREN) 1 % gel Apply 4 g to affected area four (4) times daily.  400 g 12    latanoprost (XALATAN) 0.005 % ophthalmic solution Administer 1 Drop to both eyes nightly.  Vitamin D2 1,250 mcg (50,000 unit) capsule Take 1 capsule by mouth once a week (Patient not taking: Reported on 7/12/2021) 4 Cap 12    polyethylene glycol (MIRALAX) 17 gram/dose powder MIX 17 GRAMS (1 CAPFUL) IN LIQUID AND DRINK BY MOUTH ONCE DAILY FOR CONSTIPATION (Patient not taking: Reported on 7/12/2021) 517 g 12     No current facility-administered medications on file prior to visit.        No Known Allergies    Social History     Tobacco Use    Smoking status: Never Smoker    Smokeless tobacco: Never Used   Substance Use Topics    Alcohol use: No    Drug use: No       Family History   Problem Relation Age of Onset    Cancer Mother    Gail Galvan Arthritis-rheumatoid Sister     Diabetes Sister     Hypertension Sister     No Known Problems Father          ROS: positives are bolded  General: fevers, chills, night sweats, fatigue, weight loss, weight gain  GI: nausea, vomiting, abdominal pain, change in bowel habits, hematochezia, melena, GERD  Integ: dermatitis, abnormal moles  HEENT: visual changes, vertigo, epistaxis, dysphagia, hoarseness  Cardiac: chest pain, palpitations, HTN, edema, varicosities  Resp: cough, shortness of breath, wheezing, hemoptysis, snoring, reactive airway disease  : hematuria, dysuria, frequency, urgency, nocturia, stress urinary incontinence   MSK: weakness, joint pain, arthritis  Endocrine:  thyroid disease, polyuria, polydipsia, polyphagia, poor wound healing, heat intolerance, cold intolerance  Lymph/Heme: anemia, bruising, history of blood transfusions  Neuro: dizziness, headache, fainting, seizures, stroke  Psych: anxiety, depression    Physical Exam:  Visit Vitals  /80   Temp 97.2 °F (36.2 °C) (Skin)   Resp 18   Ht 5' 3\" (1.6 m)   Wt 84.8 kg (187 lb)   LMP 08/31/1998   BMI 33.13 kg/m²       Gen:  No distress  Head: normocephalic, atraumatic  Mouth: Clear, no overt lesions, oral mucosa pink and moist  Neck: supple, no masses, no adenopathy, trachea midline  Resp: clear bilaterally  Cardio: Regular rate and rhythm  Abdomen: soft, nontender, nondistended  Extremeties: warm, well-perfused  Neuro: sensation and strength grossly intact and symmetrical  Psych: alert and oriented to person, place and time  Breasts:   Right: Examined in both the supine and upright positions. There was no supraclavicular, infraclavicular, or axillary lympadenopathy. There were no dominant masses, no skin changes, no asymmetry identified   Left: Examined in both the supine and upright positions. There was no supraclavicular, infraclavicular, or axillary lympadenopathy. There were no dominant masses, no skin changes, no asymmetry identified       Imaging:  3/17/21 right ultrasound  No significant change in a probably benign complicated cyst at the 92:61  position right breast 6 cm from the nipple.     BiRADS BIRADS 3: Probably benign finding  Management recommendation: Additional one-year follow-up bilateral diagnostic  mammogram with right breast ultrasound recommended. The findings and  recommendations were discussed with the patient at the time of the exam who is  in agreement.     3/3/21 bilateral mammogram  Diagnostic ultrasound is recommended in order to document one-year stability of  a probably benign right upper outer breast mass, which was previously  recommended for interval follow-up on 1/16/2020.     BIRADS 0 : Incomplete - Need Additional Imaging Evaluation and/or Prior  Mammograms for Comparison     The breasts are heterogenously dense, which may obscure small masses.     Thank you for this referral.            Impression:  Patient Active Problem List   Diagnosis Code    Epistaxis R04.0    Arthritis M19.90    Hyperlipidemia E78.5    Elevated sed rate (elev SR) R70.0    Abdominal pain R10.9    FH: breast cancer Z80.3    Anemia D64.9    Breast lump N63.0    Genital herpes A60.00    CRP elevated R79.82    Glaucoma H40.9    Cataract H26.9    Vitreous degeneration H43.819    Hypokalemia E87.6    Degenerative disc disease [id number]    HTN (hypertension) I10    Palpitations R00.2    MDS (myelodysplastic syndrome) (HCC) D46.9    Refractory anemia (HCC) D46.4    Neoplasm of uncertain behavior of connective and other soft tissue D48.1    Essential hypertension I10    DDD (degenerative disc disease), cervical M50.30    Facet arthritis of cervical region M47.812    Myofascial muscle pain M79.18    DDD (degenerative disc disease), lumbar M51.36    Lumbar facet arthropathy M47.816    Hyperlipidemia LDL goal <100 E78.5    Environmental and seasonal allergies J30.89    Primary localized osteoarthrosis of multiple sites M19.91    Vitamin D deficiency E55.9    Long term (current) use of non-steroidal anti-inflammatories (nsaid) Z79.1    Pruritus L29.9         61year old woman with left nipple pruritis. Her imaging was reviewed. We discussed there is no evidence of malignancy and itching is an uncommon sign of malignancy. I have recommended Elocon cream as she has self discontinued the Kenalog. She is due for bilateral mammogram, right ultrasound March 2022. Follow up after imaging. All questions were answered. She was asked to call with any additional questions or concerns.

## 2021-07-12 ENCOUNTER — OFFICE VISIT (OUTPATIENT)
Dept: SURGERY | Age: 66
End: 2021-07-12
Payer: COMMERCIAL

## 2021-07-12 VITALS
RESPIRATION RATE: 18 BRPM | WEIGHT: 187 LBS | BODY MASS INDEX: 33.13 KG/M2 | HEIGHT: 63 IN | DIASTOLIC BLOOD PRESSURE: 80 MMHG | TEMPERATURE: 97.2 F | SYSTOLIC BLOOD PRESSURE: 136 MMHG

## 2021-07-12 DIAGNOSIS — L29.9 PRURITUS: Primary | ICD-10-CM

## 2021-07-12 PROCEDURE — 99214 OFFICE O/P EST MOD 30 MIN: CPT | Performed by: SURGERY

## 2021-07-12 RX ORDER — MOMETASONE FUROATE 1 MG/G
CREAM TOPICAL DAILY
Qty: 15 G | Refills: 0 | Status: SHIPPED | OUTPATIENT
Start: 2021-07-12

## 2021-07-12 NOTE — LETTER
7/12/2021 8:40 AM    Patient:  Livier Crowe   YOB: 1955  Date of Visit: 7/12/2021      Becca Gomez MD  [id number] [facility] [id number]  [place] Yang    Dear Becca Gomez MD,      I had the pleasure of seeing Ms. Selin Parisi in my office today for her nipple itching. I am including a copy of my office visit today. If you have questions, please do not hesitate to call me. I look forward to following Ms. Hernandez along with you and will keep you updated as to her progress.            Sincerely,      Alekasnder Obregon MD

## 2021-07-14 ENCOUNTER — TELEPHONE (OUTPATIENT)
Dept: CARDIOLOGY CLINIC | Age: 66
End: 2021-07-14

## 2021-07-14 NOTE — TELEPHONE ENCOUNTER
Patient called to report blood pressure readings:    104/77  107/63  133/79  118/78  117/71  121/77  112/72  127/77  137/89  93/60  89/63    Verbal order and read back per Mateo Burch, DO  Wean off coreg take half of dose for 3 days then stop. Lm on vm.

## 2021-07-16 ENCOUNTER — VIRTUAL VISIT (OUTPATIENT)
Dept: ONCOLOGY | Age: 66
End: 2021-07-16
Payer: COMMERCIAL

## 2021-07-16 DIAGNOSIS — D46.9 MDS (MYELODYSPLASTIC SYNDROME) (HCC): Primary | ICD-10-CM

## 2021-07-16 DIAGNOSIS — D46.9 MDS (MYELODYSPLASTIC SYNDROME) (HCC): ICD-10-CM

## 2021-07-16 PROCEDURE — 99442 PR PHYS/QHP TELEPHONE EVALUATION 11-20 MIN: CPT | Performed by: NURSE PRACTITIONER

## 2021-07-16 RX ORDER — DIPHENHYDRAMINE HYDROCHLORIDE 50 MG/ML
25 INJECTION, SOLUTION INTRAMUSCULAR; INTRAVENOUS AS NEEDED
Start: 2021-07-16

## 2021-07-16 RX ORDER — EPINEPHRINE 1 MG/ML
0.3 INJECTION, SOLUTION, CONCENTRATE INTRAVENOUS AS NEEDED
OUTPATIENT
Start: 2021-07-16

## 2021-07-16 RX ORDER — ONDANSETRON 2 MG/ML
8 INJECTION INTRAMUSCULAR; INTRAVENOUS AS NEEDED
OUTPATIENT
Start: 2021-07-16

## 2021-07-16 RX ORDER — HYDROCORTISONE SODIUM SUCCINATE 100 MG/2ML
100 INJECTION, POWDER, FOR SOLUTION INTRAMUSCULAR; INTRAVENOUS AS NEEDED
OUTPATIENT
Start: 2021-07-16

## 2021-07-16 RX ORDER — HEPARIN 100 UNIT/ML
300-500 SYRINGE INTRAVENOUS AS NEEDED
Start: 2021-07-16

## 2021-07-16 RX ORDER — SODIUM CHLORIDE 0.9 % (FLUSH) 0.9 %
10 SYRINGE (ML) INJECTION AS NEEDED
OUTPATIENT
Start: 2021-07-16

## 2021-07-16 RX ORDER — ALBUTEROL SULFATE 0.83 MG/ML
2.5 SOLUTION RESPIRATORY (INHALATION) AS NEEDED
Start: 2021-07-16

## 2021-07-16 RX ORDER — ACETAMINOPHEN 325 MG/1
650 TABLET ORAL AS NEEDED
Start: 2021-07-16

## 2021-07-16 RX ORDER — SODIUM CHLORIDE 9 MG/ML
10 INJECTION INTRAMUSCULAR; INTRAVENOUS; SUBCUTANEOUS AS NEEDED
OUTPATIENT
Start: 2021-07-16

## 2021-07-16 RX ORDER — DIPHENHYDRAMINE HYDROCHLORIDE 50 MG/ML
50 INJECTION, SOLUTION INTRAMUSCULAR; INTRAVENOUS AS NEEDED
Start: 2021-07-16

## 2021-07-16 NOTE — PROGRESS NOTES
Kyle Murcia is a 61 y.o. female, evaluated via audio-only technology on 7/16/2021 for Follow-up  . Assessment & Plan:   Myelodysplastic syndrome/ Chronic Anemia:   --Eboni has a presumably history of myelodysplastic syndrome/refractory anemia.  She was diagnosed more than 8 years ago. -- Her H/H appears to decline however her for years she has had stable thrombocytopenia or leukopenia/leukocytosis. It was unclear if a true MDS in her cases. ----Therapeutic intervention with Retacrit will be provided for her if the hematocrit declines below 30% with a hemoglobin below 10 g/dL. the dose of Retacrit will be 60,000 units given subcutaneously every 4 weeks  -- We will continue to monitor CBC periodically  --- CBC, CMP, iron and ferritin reviewed with patient today. .      Arthritis/facet arthritis of the cervical region:   Vitamin D deficiency  -- The patient will follow her PCP for further management.         -- We will see the patient back in clinic in about 3-4 months. Always sooner if required. 12  Subjective:   Ms. Sunny Stafford is a 30-year-old -American woman with history of myelodysplastic syndrome/refractory anemia. Eboni was diagnosed more than 8 years ago. Today she states she has been doing well since her last office visit. She denies fatigue, shortness of breath, and weakness. Denied fever, chills, night sweat, unintentional weight loss, skin lumps or bumps, acute bleeding or bruising issues. Denied headache, acute vision change, dizziness, chest pain, worsen shortness of breath, palpitation, productive cough, nausea, vomiting, abdominal pain, altered bowel habits, dysuria, new bone pain or back pain, focal numbness or weakness. she report constipation however she is being followed by GI for this.        Prior to Admission medications    Medication Sig Start Date End Date Taking? Authorizing Provider   mometasone (ELOCON) 0.1 % topical cream Apply  to affected area daily.  7/12/21   Repole, Izabel Soto MD   valACYclovir (VALTREX) 500 mg tablet Take 1 tablet by mouth twice daily 6/29/21   Lawrence Tejada MD   triamcinolone acetonide (KENALOG) 0.1 % topical cream Apply  to affected area two (2) times a day. 5/21/21   Lawrence Tejada MD   simvastatin (ZOCOR) 20 mg tablet TAKE 1 TABLET BY MOUTH ONCE DAILY AT BEDTIME 5/17/21   Lawrence Tejada MD   losartan-hydroCHLOROthiazide [facility]) 100-12.5 mg per tablet Take 1 tablet by mouth once daily 5/17/21   Lawrence Tejada MD   diclofenac EC (VOLTAREN) 75 mg EC tablet TAKE 1 TABLET BY MOUTH ONCE DAILY AS NEEDED WITH FOOD 5/18/20   Provider, Historical   polyethylene glycol (MIRALAX) 17 gram packet Take 1 Packet by mouth daily. 7/21/20   Lawrence Tejada MD   Vitamin D2 1,250 mcg (50,000 unit) capsule Take 1 capsule by mouth once a week  Patient not taking: Reported on 7/12/2021 4/20/20   Lawrence Tejada MD   diclofenac (VOLTAREN) 1 % gel Apply 4 g to affected area four (4) times daily. 1/30/19   Lawrence Tejada MD   polyethylene glycol (MIRALAX) 17 gram/dose powder MIX 17 GRAMS (1 CAPFUL) IN LIQUID AND DRINK BY MOUTH ONCE DAILY FOR CONSTIPATION  Patient not taking: Reported on 7/12/2021 1/9/18   Lawrence Tejada MD   latanoprost (XALATAN) 0.005 % ophthalmic solution Administer 1 Drop to both eyes nightly. Provider, Historical         Review of Systems   Constitutional: Negative. HENT: Negative. Eyes: Negative. Respiratory: Negative. Cardiovascular: Negative. Gastrointestinal: Negative. Genitourinary: Negative. Musculoskeletal: Negative. Skin: Negative. Neurological: Negative. Endo/Heme/Allergies: Negative. Psychiatric/Behavioral: Negative.         Patient-Reported Vitals 7/16/2021   Patient-Reported Weight 185lbs   Patient-Reported Pulse 66   Patient-Reported Systolic  209   Patient-Reported Diastolic 84        Tarry Skains, who was evaluated through a patient-initiated, synchronous (real-time) audio only encounter, and/or her healthcare decision maker, is aware that it is a billable service, with coverage as determined by her insurance carrier. She provided verbal consent to proceed: YES-Consent obtained within past 12 months Yes. She has not had a related appointment within my department in the past 7 days or scheduled within the next 24 hours.       Total Time: 11-20 minutes    Jeny Jeter NP

## 2021-08-14 DIAGNOSIS — D46.9 MDS (MYELODYSPLASTIC SYNDROME) (HCC): ICD-10-CM

## 2021-08-16 ENCOUNTER — HOSPITAL ENCOUNTER (OUTPATIENT)
Dept: LAB | Age: 66
Discharge: HOME | End: 2021-08-16

## 2021-08-16 LAB — XX-LABCORP SPECIMEN COL,LCBCF: NORMAL

## 2021-08-16 PROCEDURE — 99001 SPECIMEN HANDLING PT-LAB: CPT

## 2021-08-17 LAB
CHOLEST SERPL-MCNC: 166 MG/DL (ref 100–199)
HDLC SERPL-MCNC: 45 MG/DL
IMP & REVIEW OF LAB RESULTS: NORMAL
LDLC SERPL CALC-MCNC: 106 MG/DL (ref 0–99)
TRIGL SERPL-MCNC: 81 MG/DL (ref 0–149)
VLDLC SERPL CALC-MCNC: 15 MG/DL (ref 5–40)

## 2021-08-20 ENCOUNTER — OFFICE VISIT (OUTPATIENT)
Dept: FAMILY MEDICINE CLINIC | Age: 66
End: 2021-08-20
Payer: COMMERCIAL

## 2021-08-20 VITALS
DIASTOLIC BLOOD PRESSURE: 92 MMHG | TEMPERATURE: 97.3 F | OXYGEN SATURATION: 96 % | HEIGHT: 63 IN | SYSTOLIC BLOOD PRESSURE: 138 MMHG | HEART RATE: 71 BPM | WEIGHT: 186 LBS | BODY MASS INDEX: 32.96 KG/M2 | RESPIRATION RATE: 18 BRPM

## 2021-08-20 DIAGNOSIS — L30.9 DERMATITIS: ICD-10-CM

## 2021-08-20 DIAGNOSIS — I10 ESSENTIAL HYPERTENSION: Primary | ICD-10-CM

## 2021-08-20 DIAGNOSIS — E78.5 HYPERLIPIDEMIA, UNSPECIFIED HYPERLIPIDEMIA TYPE: ICD-10-CM

## 2021-08-20 DIAGNOSIS — L29.9 PRURITUS: ICD-10-CM

## 2021-08-20 PROCEDURE — 99214 OFFICE O/P EST MOD 30 MIN: CPT | Performed by: FAMILY MEDICINE

## 2021-08-20 NOTE — PROGRESS NOTES
Arianna Chowdhury presents today for   Chief Complaint   Patient presents with    Hypertension    Cholesterol Problem       Is someone accompanying this pt? No    Is the patient using any DME equipment during OV? No    Depression Screening:  3 most recent PHQ Screens 6/25/2021   Little interest or pleasure in doing things Not at all   Feeling down, depressed, irritable, or hopeless Not at all   Total Score PHQ 2 0       Learning Assessment:  Learning Assessment 2/24/2020   PRIMARY LEARNER Patient   HIGHEST LEVEL OF EDUCATION - PRIMARY LEARNER  4 YEARS OF COLLEGE   BARRIERS PRIMARY LEARNER NONE   CO-LEARNER CAREGIVER No   PRIMARY LANGUAGE ENGLISH    NEED No   LEARNER PREFERENCE PRIMARY DEMONSTRATION     -     -     -     -   ANSWERED BY patient   RELATIONSHIP SELF       Travel Screening:   Travel Screening     Question   Response    In the last month, have you been in contact with someone who was confirmed or suspected to have [address] / COVID-19? No / Unsure    Have you had a COVID-19 viral test in the last 14 days? No    Do you have any of the following new or worsening symptoms? None of these    Have you traveled internationally or domestically in the last month? No      Travel History   Travel since 07/20/21     No documented travel since 07/20/21          Health Maintenance Due   Topic Date Due    Shingrix Vaccine Age 50> (1 of 2) Never done   . Health Maintenance reviewed and discussed and ordered per Provider. Arianna Chowdhury is updated on all     Coordination of Care  1. Have you been to the ER, urgent care clinic since your last visit? Hospitalized since your last visit? No    2. Have you seen or consulted any other health care providers outside of the [address] [place] [address] since your last visit? Include any pap smears or colon screening.  Dr. Minerva Matute ( Rheumatology)

## 2021-08-20 NOTE — PROGRESS NOTES
Chief Complaint   Patient presents with    Hypertension    Cholesterol Problem         HPI    Mita Serrano is a 61 y.o. female presenting today for 3 months  follow up of htn, hld. Pt has seen cards. She is now off of coreg now. She has not checked home bp readings recently. Pt saw Dr. Charlie Diego (breast surg) for her l nipple pruritis. She was rx'ed elocon cream.   It helped some. She does still have some itching    Patient had labs on 7/2/21 and 8/16/21. Labs reviewed in detail with patient     Patient does not need medication refills today. New concerns today: pt had an outbreak of between her legs and under her stomach. She tried taking the valtrex to see if that would help. She had not had an hsv outbreak in years. Pt cont to have pain related to her arthritis. Review of Systems   Constitutional: Negative. HENT: Negative. Respiratory: Negative. Cardiovascular: Negative. All other systems reviewed and are negative. Physical Exam  Vitals and nursing note reviewed. Constitutional:       Appearance: Normal appearance. She is not ill-appearing. HENT:      Head: Normocephalic and atraumatic. Right Ear: External ear normal.      Left Ear: External ear normal.      Nose: Nose normal.      Mouth/Throat:      Mouth: Mucous membranes are moist.   Eyes:      Extraocular Movements: Extraocular movements intact. Conjunctiva/sclera: Conjunctivae normal.   Cardiovascular:      Rate and Rhythm: Normal rate and regular rhythm. Heart sounds: No murmur heard. No friction rub. No gallop. Pulmonary:      Effort: Pulmonary effort is normal.      Breath sounds: Normal breath sounds. No wheezing, rhonchi or rales. Musculoskeletal:         General: Normal range of motion. Cervical back: Normal range of motion. Skin:     General: Skin is warm and dry. Neurological:      Mental Status: She is alert and oriented to person, place, and time.       Coordination: Coordination normal.   Psychiatric:         Mood and Affect: Mood normal.         Behavior: Behavior normal.         Thought Content: Thought content normal.         Judgment: Judgment normal.         Diagnoses and all orders for this visit:    1. Essential hypertension  Stable, cont pres tx plan. Cont home bp monitoring. Call if diastolic readings consistently above 90.    2. Hyperlipidemia, unspecified hyperlipidemia type  Stable, cont pres tx plan. 3. Pruritus  Improved. Cont pres tx plan. 4.  Dermatitis  Improved at this point. Doubt hsv as etiology. Pt asked to take photos if the rash recurs. Follow-up and Dispositions    · Return in about 3 months (around 11/20/2021) for high blood pressure, high cholesterol.

## 2021-09-11 DIAGNOSIS — D46.9 MDS (MYELODYSPLASTIC SYNDROME) (HCC): ICD-10-CM

## 2021-10-05 ENCOUNTER — LAB ONLY (OUTPATIENT)
Dept: ONCOLOGY | Age: 66
End: 2021-10-05

## 2021-10-05 DIAGNOSIS — D46.9 MDS (MYELODYSPLASTIC SYNDROME) (HCC): Primary | ICD-10-CM

## 2021-10-05 DIAGNOSIS — D64.9 ANEMIA, UNSPECIFIED TYPE: ICD-10-CM

## 2021-10-05 DIAGNOSIS — D46.4 REFRACTORY ANEMIA (HCC): ICD-10-CM

## 2021-10-06 LAB
ALBUMIN SERPL-MCNC: 4.3 G/DL (ref 3.8–4.8)
ALBUMIN/GLOB SERPL: 1.1 {RATIO} (ref 1.2–2.2)
ALP SERPL-CCNC: 94 IU/L (ref 44–121)
ALT SERPL-CCNC: 9 IU/L (ref 0–32)
AST SERPL-CCNC: 17 IU/L (ref 0–40)
BASOPHILS # BLD AUTO: 0.1 X10E3/UL (ref 0–0.2)
BASOPHILS NFR BLD AUTO: 1 %
BILIRUB SERPL-MCNC: 0.5 MG/DL (ref 0–1.2)
BUN SERPL-MCNC: 12 MG/DL (ref 8–27)
BUN/CREAT SERPL: 14 (ref 12–28)
CALCIUM SERPL-MCNC: 10 MG/DL (ref 8.7–10.3)
CHLORIDE SERPL-SCNC: 98 MMOL/L (ref 96–106)
CO2 SERPL-SCNC: 28 MMOL/L (ref 20–29)
CREAT SERPL-MCNC: 0.86 MG/DL (ref 0.57–1)
EOSINOPHIL # BLD AUTO: 0.1 X10E3/UL (ref 0–0.4)
EOSINOPHIL NFR BLD AUTO: 2 %
ERYTHROCYTE [DISTWIDTH] IN BLOOD BY AUTOMATED COUNT: 13.6 % (ref 11.7–15.4)
FERRITIN SERPL-MCNC: 251 NG/ML (ref 15–150)
GLOBULIN SER CALC-MCNC: 3.9 G/DL (ref 1.5–4.5)
GLUCOSE SERPL-MCNC: 87 MG/DL (ref 65–99)
HCT VFR BLD AUTO: 31.6 % (ref 34–46.6)
HGB BLD-MCNC: 10.1 G/DL (ref 11.1–15.9)
IMM GRANULOCYTES # BLD AUTO: 0 X10E3/UL (ref 0–0.1)
IMM GRANULOCYTES NFR BLD AUTO: 0 %
IRON SATN MFR SERPL: 19 % (ref 15–55)
IRON SERPL-MCNC: 48 UG/DL (ref 27–139)
LYMPHOCYTES # BLD AUTO: 1.8 X10E3/UL (ref 0.7–3.1)
LYMPHOCYTES NFR BLD AUTO: 27 %
MCH RBC QN AUTO: 29.6 PG (ref 26.6–33)
MCHC RBC AUTO-ENTMCNC: 32 G/DL (ref 31.5–35.7)
MCV RBC AUTO: 93 FL (ref 79–97)
MONOCYTES # BLD AUTO: 0.5 X10E3/UL (ref 0.1–0.9)
MONOCYTES NFR BLD AUTO: 8 %
NEUTROPHILS # BLD AUTO: 4.1 X10E3/UL (ref 1.4–7)
NEUTROPHILS NFR BLD AUTO: 62 %
PLATELET # BLD AUTO: 327 X10E3/UL (ref 150–450)
POTASSIUM SERPL-SCNC: 4 MMOL/L (ref 3.5–5.2)
PROT SERPL-MCNC: 8.2 G/DL (ref 6–8.5)
RBC # BLD AUTO: 3.41 X10E6/UL (ref 3.77–5.28)
SODIUM SERPL-SCNC: 144 MMOL/L (ref 134–144)
TIBC SERPL-MCNC: 255 UG/DL (ref 250–450)
UIBC SERPL-MCNC: 207 UG/DL (ref 118–369)
WBC # BLD AUTO: 6.7 X10E3/UL (ref 3.4–10.8)

## 2021-10-09 DIAGNOSIS — D46.9 MDS (MYELODYSPLASTIC SYNDROME) (HCC): ICD-10-CM

## 2021-10-19 ENCOUNTER — OFFICE VISIT (OUTPATIENT)
Dept: ONCOLOGY | Age: 66
End: 2021-10-19
Payer: COMMERCIAL

## 2021-10-19 VITALS
BODY MASS INDEX: 32.31 KG/M2 | RESPIRATION RATE: 18 BRPM | OXYGEN SATURATION: 98 % | DIASTOLIC BLOOD PRESSURE: 65 MMHG | TEMPERATURE: 97.6 F | SYSTOLIC BLOOD PRESSURE: 108 MMHG | WEIGHT: 182.4 LBS | HEART RATE: 65 BPM

## 2021-10-19 DIAGNOSIS — D64.9 ANEMIA, UNSPECIFIED TYPE: ICD-10-CM

## 2021-10-19 DIAGNOSIS — D46.4 REFRACTORY ANEMIA (HCC): Primary | ICD-10-CM

## 2021-10-19 DIAGNOSIS — E55.9 VITAMIN D DEFICIENCY: ICD-10-CM

## 2021-10-19 DIAGNOSIS — D46.9 MDS (MYELODYSPLASTIC SYNDROME) (HCC): ICD-10-CM

## 2021-10-19 PROCEDURE — 99213 OFFICE O/P EST LOW 20 MIN: CPT | Performed by: INTERNAL MEDICINE

## 2021-10-19 NOTE — PROGRESS NOTES
Hematology/Oncology  Progress Note    Name: Milagro King  Date: 10/19/2021  : 1955    PCP: Erwin Urrutia MD     Ms. Durga Tierney is a 61y.o. year old female who seen for management of her myelodysplastic syndrome/refractory anemia    Current therapy: Iron supplement, Procrit or Aranesp in the past when her hemoglobin was below 10g/dL and hematocrit is below 30%    Subjective:     Ms. Milagro King is a 61 y.o. female who was evaluated via audio-only technology. She has a history of Myelodysplastic Syndrome/Refractory Anemia. She was diagnosed more than 8 years ago. Today she states she has been doing well since her last office visit. She denies fatigue, shortness of breath, and weakness. She denies fevers, chills, night sweats, unintentional weight loss, skin lumps or bumps, acute bleeding or bruising issues. She denies headaches, acute vision changes, dizziness, chest pains, shortness of breath, palpitation, productive cough, nausea, vomiting, abdominal pain, altered bowel habits, dysuria, new bone pain or back pain, focal numbness or weakness. She reports she is being followed by GI and she reports she is scheduled for Colonoscopy on Oct 29, 2021. She does not have any concerns or complaints to report at this time. Past medical history, family history, and social history: these were reviewed and remains unchanged. Past Medical History:   Diagnosis Date    Echocardiogram 2011    Tech difficult. EF 60-65%. Mild LVH. RVSP 20-25 mmHg.       Essential hypertension     History of colon polyps     Hx of endometriosis     had hyst    Hyperlipidemia     Hypertension     MDS (myelodysplastic syndrome) (HCC)     Refractory anemia (HCC)      Past Surgical History:   Procedure Laterality Date    HX HYSTERECTOMY       Social History     Socioeconomic History    Marital status: SINGLE     Spouse name: Not on file    Number of children: Not on file    Years of education: Not on file    Highest education level: Not on file   Occupational History    Not on file   Tobacco Use    Smoking status: Never Smoker    Smokeless tobacco: Never Used   Substance and Sexual Activity    Alcohol use: No    Drug use: No    Sexual activity: Not on file   Other Topics Concern    Not on file   Social History Narrative    Not on file     Social Determinants of Health     Financial Resource Strain:     Difficulty of Paying Living Expenses:    Food Insecurity:     Worried About Running Out of Food in the Last Year:     [address] in the Last Year:    Transportation Needs:     Lack of Transportation (Medical):  Lack of Transportation (Non-Medical):    Physical Activity:     Days of Exercise per Week:     Minutes of Exercise per Session:    Stress:     Feeling of Stress :    Social Connections:     Frequency of Communication with Friends and Family:     Frequency of Social Gatherings with Friends and Family:     Attends [employer] Services:     Active Member of Clubs or Organizations:     Attends Club or Organization Meetings:     Marital Status:    Intimate Partner Violence:     Fear of Current or Ex-Partner:     Emotionally Abused:     Physically Abused:     Sexually Abused:      Family History   Problem Relation Age of Onset    Cancer Mother     Arthritis-rheumatoid Sister     Diabetes Sister     Hypertension Sister     No Known Problems Father      Current Outpatient Medications   Medication Sig Dispense Refill    mometasone (ELOCON) 0.1 % topical cream Apply  to affected area daily. 15 g 0    valACYclovir (VALTREX) 500 mg tablet Take 1 tablet by mouth twice daily 30 Tablet 12    triamcinolone acetonide (KENALOG) 0.1 % topical cream Apply  to affected area two (2) times a day.  45 g 0    simvastatin (ZOCOR) 20 mg tablet TAKE 1 TABLET BY MOUTH ONCE DAILY AT BEDTIME 30 Tab 12    losartan-hydroCHLOROthiazide (HYZAAR) 100-12.5 mg per tablet Take 1 tablet by mouth once daily 90 Tab 4    diclofenac EC (VOLTAREN) 75 mg EC tablet TAKE 1 TABLET BY MOUTH ONCE DAILY AS NEEDED WITH FOOD      polyethylene glycol (MIRALAX) 17 gram packet Take 1 Packet by mouth daily. 517 g 12    diclofenac (VOLTAREN) 1 % gel Apply 4 g to affected area four (4) times daily. 400 g 12    latanoprost (XALATAN) 0.005 % ophthalmic solution Administer 1 Drop to both eyes nightly. Review of Systems   Constitutional: Negative. HENT: Negative. Eyes: Negative. Respiratory: Negative. Cardiovascular: Negative. Gastrointestinal: Positive for constipation. Genitourinary: Negative. Musculoskeletal: Negative. Skin: Negative. Neurological: Negative. Endo/Heme/Allergies: Negative. Psychiatric/Behavioral: Negative. Objective:     Visit Vitals  /65 (BP 1 Location: Right upper arm, BP Patient Position: Sitting)   Pulse 65   Temp 97.6 °F (36.4 °C)   Resp 18   Wt 82.7 kg (182 lb 6.4 oz)   LMP 08/31/1998   SpO2 98%   BMI 32.31 kg/m²     ECOG PS0  Physical Exam  HENT:      Head: Normocephalic. Eyes:      Pupils: Pupils are equal, round, and reactive to light. Cardiovascular:      Rate and Rhythm: Normal rate. Pulmonary:      Effort: Pulmonary effort is normal.   Abdominal:      Palpations: Abdomen is soft. Musculoskeletal:         General: Normal range of motion. Cervical back: Normal range of motion. Skin:     General: Skin is warm. Neurological:      Mental Status: She is alert and oriented to person, place, and time. Psychiatric:         Mood and Affect: Mood normal.         Behavior: Behavior normal.         Thought Content:  Thought content normal.         Judgment: Judgment normal.         Lab data:      Results for orders placed or performed during the hospital encounter of 12/02/19   CBC WITH 3 PART DIFF     Status: Abnormal   Result Value Ref Range Status    WBC 5.3 4.5 - 13.0 K/uL Final    RBC 3.41 (L) 4.10 - 5.10 M/uL Final    HGB 10.0 (L) 12.0 - 16 g/dL Final HCT 31.1 (L) 36 - 48 % Final    MCV 91.2 78 - 102 FL Final    MCH 29.3 25.0 - 35.0 PG Final    MCHC 32.2 31 - 37 g/dL Final    RDW 12.9 11.5 - 14.5 % Final    PLATELET 752 000 - 547 K/uL Final    NEUTROPHILS 59 40 - 70 % Final    MIXED CELLS 7 0.1 - 17 % Final    LYMPHOCYTES 34 14 - 44 % Final    ABS. NEUTROPHILS 3.1 1.8 - 9.5 K/UL Final    ABS. MIXED CELLS 0.4 0.0 - 2.3 K/uL Final    ABS. LYMPHOCYTES 1.8 1.1 - 5.9 K/UL Final     Comment: Test performed at [address] [place] [address] or Outpatient Infusion Center Location. Reviewed by Medical Director. DF AUTOMATED   Final     Lab Results   Component Value Date/Time    Sodium 144 10/05/2021 08:46 AM    Potassium 4.0 10/05/2021 08:46 AM    Chloride 98 10/05/2021 08:46 AM    CO2 28 10/05/2021 08:46 AM    Anion gap 5 11/30/2009 10:10 AM    Glucose 87 10/05/2021 08:46 AM    BUN 12 10/05/2021 08:46 AM    Creatinine 0.86 10/05/2021 08:46 AM    BUN/Creatinine ratio 14 10/05/2021 08:46 AM    GFR est AA 83 10/05/2021 08:46 AM    GFR est non-AA 72 10/05/2021 08:46 AM    Calcium 10.0 10/05/2021 08:46 AM    Bilirubin, total 0.5 10/05/2021 08:46 AM    Alk. phosphatase 94 10/05/2021 08:46 AM    Protein, total 8.2 10/05/2021 08:46 AM    Albumin 4.3 10/05/2021 08:46 AM    A-G Ratio 1.1 (L) 10/05/2021 08:46 AM    ALT (SGPT) 9 10/05/2021 08:46 AM    AST (SGOT) 17 10/05/2021 08:46 AM     Lab Results   Component Value Date/Time    Iron 48 10/05/2021 08:46 AM    TIBC 255 10/05/2021 08:46 AM    Iron % saturation 19 10/05/2021 08:46 AM    Ferritin 251 (H) 10/05/2021 08:46 AM         Assessment:   Myelodysplastic syndrome/ Chronic Anemia  Arthritis/facet arthritis of the cervical region  Vitamin D deficiency    Plan:   Myelodysplastic syndrome/ Chronic Anemia  -- She has a presumably history of myelodysplastic syndrome/refractory anemia. She was diagnosed more than 8 years ago.   --On 10/05/2021: CBC showed WBC 6.7K/uL, hemoglobin 10.1g/dL with hematocrit of 31.6%. Platelet 173. Ferritin 251. Transferrin Sat 19%. BUN and creatinine were normal.  --Her H/H appears to be low for years however she has stable WBC, platelets and ANC. It was unclear if this is a true MDS in her case. --Therapeutic intervention with Retacrit will be provided if her hematocrit declines below 30% with a hemoglobin below 10g/dL. The dose of Retacrit will be 60,000 units given subcutaneously every 4 weeks  --We will continue to monitor CBC periodically  --CBC, CMP, Iron and Ferritin reviewed with patient today. .      Arthritis/facet arthritis of the cervical region   Vitamin D deficiency  --The patient will continue to follow up with her PCP     We will see the patient back in clinic in 4 months or sooner if indicated    I spent at least 15 minutes on this visit with this established patient    Orders Placed This Encounter    CBC WITH AUTOMATED DIFF     Standing Status:   Future     Standing Expiration Date:   76/30/8512    METABOLIC PANEL, COMPREHENSIVE     Standing Status:   Future     Standing Expiration Date:   10/20/2022    IRON PROFILE     Standing Status:   Future     Standing Expiration Date:   10/20/2022    FERRITIN     Standing Status:   Future     Standing Expiration Date:   10/20/2022         Joselyn Christine, [place], [facility]  10/19/21           I saw and evaluated the patient, performing the key elements of the service. I have assessed the patient independently and agreed with the full assessment as outlined. Sotero NUNEZ Do, M.D.    [address] [place] [address]: Eleno Turner MD

## 2021-11-24 ENCOUNTER — OFFICE VISIT (OUTPATIENT)
Dept: FAMILY MEDICINE CLINIC | Age: 66
End: 2021-11-24
Payer: COMMERCIAL

## 2021-11-24 VITALS
RESPIRATION RATE: 20 BRPM | DIASTOLIC BLOOD PRESSURE: 71 MMHG | BODY MASS INDEX: 31.86 KG/M2 | WEIGHT: 179.8 LBS | TEMPERATURE: 97.7 F | HEART RATE: 73 BPM | HEIGHT: 63 IN | SYSTOLIC BLOOD PRESSURE: 138 MMHG | OXYGEN SATURATION: 94 %

## 2021-11-24 DIAGNOSIS — E78.5 HYPERLIPIDEMIA, UNSPECIFIED HYPERLIPIDEMIA TYPE: ICD-10-CM

## 2021-11-24 DIAGNOSIS — I10 ESSENTIAL HYPERTENSION: Primary | ICD-10-CM

## 2021-11-24 DIAGNOSIS — Z23 NEEDS FLU SHOT: ICD-10-CM

## 2021-11-24 PROCEDURE — 90471 IMMUNIZATION ADMIN: CPT | Performed by: FAMILY MEDICINE

## 2021-11-24 PROCEDURE — 90686 IIV4 VACC NO PRSV 0.5 ML IM: CPT | Performed by: FAMILY MEDICINE

## 2021-11-24 PROCEDURE — 99214 OFFICE O/P EST MOD 30 MIN: CPT | Performed by: FAMILY MEDICINE

## 2021-11-24 NOTE — PROGRESS NOTES
Ildefonso Arcos 1955 female who presents for routine immunizations. Patient denies any symptoms , reactions or allergies that would exclude them from being immunized today. Risks and adverse reactions were discussed and the VIS was given to them. All questions were addressed. Order placed for flu vaccine,  per Sign Order from Dr Natalia Hermosillo with read back. Patient was observed for 10 min post injection. There were no reactions observed.     Shruti Baker

## 2021-11-24 NOTE — PROGRESS NOTES
Kim Rivera presents today for   Chief Complaint   Patient presents with    Hypertension    Cholesterol Problem     high chol       Kim Rivera preferred language for health care discussion is english/other. Is someone accompanying this pt? no    Is the patient using any DME equipment during [address]? no    Depression Screening:  3 most recent PHQ Screens 11/24/2021   Little interest or pleasure in doing things Not at all   Feeling down, depressed, irritable, or hopeless Not at all   Total Score PHQ 2 0       Learning Assessment:  Learning Assessment 2/24/2020   PRIMARY LEARNER Patient   HIGHEST LEVEL OF EDUCATION - PRIMARY LEARNER  4 YEARS OF COLLEGE   BARRIERS PRIMARY LEARNER NONE   CO-LEARNER CAREGIVER No   PRIMARY LANGUAGE ENGLISH    NEED No   LEARNER PREFERENCE PRIMARY DEMONSTRATION     -     -     -     -   ANSWERED BY patient   RELATIONSHIP SELF       Abuse Screening:  Abuse Screening Questionnaire 8/20/2021   Do you ever feel afraid of your partner? N   Are you in a relationship with someone who physically or mentally threatens you? N   Is it safe for you to go home? Y       Generalized Anxiety  No flowsheet data found. Health Maintenance Due   Topic Date Due    Shingrix Vaccine Age 49> (1 of 2) Never done    Flu Vaccine (1) 09/01/2021   . Health Maintenance reviewed and discussed and ordered per Provider. Coordination of Care:  1. Have you been to the ER, urgent care clinic since your last visit? Hospitalized since your last visit? no    2. Have you seen or consulted any other health care providers outside of the [address] [place] [address] since your last visit? Include any pap smears or colon screening.  Yes, GI

## 2021-11-24 NOTE — PROGRESS NOTES
Chief Complaint   Patient presents with    Hypertension    Cholesterol Problem     high chol         HPI    Rachael Pritchard is a 61 y.o. female presenting today for 3 months  follow up of htn, hld. Pt has been doing well overall. Patient does not need medication refills today. New concerns today: Pt would like to have a flu shot today. Pt has had her colo. Her next colo is in 5 yrs. Review of Systems   Constitutional: Negative. HENT: Negative. Respiratory: Negative. Cardiovascular: Negative. All other systems reviewed and are negative. Physical Exam  Vitals and nursing note reviewed. Constitutional:       Appearance: Normal appearance. She is not ill-appearing. HENT:      Head: Normocephalic and atraumatic. Right Ear: External ear normal.      Left Ear: External ear normal.      Nose: Nose normal.      Mouth/Throat:      Mouth: Mucous membranes are moist.   Eyes:      Extraocular Movements: Extraocular movements intact. Conjunctiva/sclera: Conjunctivae normal.   Cardiovascular:      Rate and Rhythm: Normal rate and regular rhythm. Heart sounds: No murmur heard. No friction rub. No gallop. Pulmonary:      Effort: Pulmonary effort is normal.      Breath sounds: Normal breath sounds. No wheezing, rhonchi or rales. Musculoskeletal:         General: Normal range of motion. Cervical back: Normal range of motion. Skin:     General: Skin is warm and dry. Neurological:      Mental Status: She is alert and oriented to person, place, and time. Coordination: Coordination normal.   Psychiatric:         Mood and Affect: Mood normal.         Behavior: Behavior normal.         Thought Content: Thought content normal.         Judgment: Judgment normal.         Diagnoses and all orders for this visit:    1. Essential hypertension  -     LIPID PANEL; Future  Stable, cont pres tx plan.      2. Hyperlipidemia, unspecified hyperlipidemia type  -     LIPID PANEL; Future  Stable, cont pres tx plan. 3. Needs flu shot  -     INFLUENZA VIRUS VAC QUAD,SPLIT,PRESV FREE SYRINGE IM      Follow-up and Dispositions    · Return in about 3 months (around 2/24/2022) for high blood pressure, high cholesterol, lab review.

## 2022-01-19 DIAGNOSIS — D46.9 MDS (MYELODYSPLASTIC SYNDROME) (HCC): ICD-10-CM

## 2022-02-06 LAB
ALBUMIN SERPL-MCNC: 4.5 G/DL (ref 3.8–4.8)
ALBUMIN/GLOB SERPL: 1.2 {RATIO} (ref 1.2–2.2)
ALP SERPL-CCNC: 95 IU/L (ref 44–121)
ALT SERPL-CCNC: 10 IU/L (ref 0–32)
AST SERPL-CCNC: 17 IU/L (ref 0–40)
BASOPHILS # BLD AUTO: 0.1 X10E3/UL (ref 0–0.2)
BASOPHILS NFR BLD AUTO: 1 %
BILIRUB SERPL-MCNC: 0.6 MG/DL (ref 0–1.2)
BUN SERPL-MCNC: 13 MG/DL (ref 8–27)
BUN/CREAT SERPL: 14 (ref 12–28)
CALCIUM SERPL-MCNC: 10 MG/DL (ref 8.7–10.3)
CHLORIDE SERPL-SCNC: 99 MMOL/L (ref 96–106)
CHOLEST SERPL-MCNC: 192 MG/DL (ref 100–199)
CO2 SERPL-SCNC: 26 MMOL/L (ref 20–29)
CREAT SERPL-MCNC: 0.93 MG/DL (ref 0.57–1)
EOSINOPHIL # BLD AUTO: 0.1 X10E3/UL (ref 0–0.4)
EOSINOPHIL NFR BLD AUTO: 2 %
ERYTHROCYTE [DISTWIDTH] IN BLOOD BY AUTOMATED COUNT: 13.4 % (ref 11.7–15.4)
FERRITIN SERPL-MCNC: 223 NG/ML (ref 15–150)
GLOBULIN SER CALC-MCNC: 3.8 G/DL (ref 1.5–4.5)
GLUCOSE SERPL-MCNC: 74 MG/DL (ref 65–99)
HCT VFR BLD AUTO: 32.1 % (ref 34–46.6)
HDLC SERPL-MCNC: 52 MG/DL
HGB BLD-MCNC: 10.4 G/DL (ref 11.1–15.9)
IMM GRANULOCYTES # BLD AUTO: 0 X10E3/UL (ref 0–0.1)
IMM GRANULOCYTES NFR BLD AUTO: 0 %
IMP & REVIEW OF LAB RESULTS: NORMAL
IRON SATN MFR SERPL: 19 % (ref 15–55)
IRON SERPL-MCNC: 50 UG/DL (ref 27–139)
LDLC SERPL CALC-MCNC: 126 MG/DL (ref 0–99)
LYMPHOCYTES # BLD AUTO: 1.5 X10E3/UL (ref 0.7–3.1)
LYMPHOCYTES NFR BLD AUTO: 23 %
MCH RBC QN AUTO: 28.9 PG (ref 26.6–33)
MCHC RBC AUTO-ENTMCNC: 32.4 G/DL (ref 31.5–35.7)
MCV RBC AUTO: 89 FL (ref 79–97)
MONOCYTES # BLD AUTO: 0.5 X10E3/UL (ref 0.1–0.9)
MONOCYTES NFR BLD AUTO: 8 %
NEUTROPHILS # BLD AUTO: 4.3 X10E3/UL (ref 1.4–7)
NEUTROPHILS NFR BLD AUTO: 66 %
PLATELET # BLD AUTO: 329 X10E3/UL (ref 150–450)
POTASSIUM SERPL-SCNC: 4 MMOL/L (ref 3.5–5.2)
PROT SERPL-MCNC: 8.3 G/DL (ref 6–8.5)
RBC # BLD AUTO: 3.6 X10E6/UL (ref 3.77–5.28)
SODIUM SERPL-SCNC: 141 MMOL/L (ref 134–144)
TIBC SERPL-MCNC: 267 UG/DL (ref 250–450)
TRIGL SERPL-MCNC: 74 MG/DL (ref 0–149)
UIBC SERPL-MCNC: 217 UG/DL (ref 118–369)
VLDLC SERPL CALC-MCNC: 14 MG/DL (ref 5–40)
WBC # BLD AUTO: 6.5 X10E3/UL (ref 3.4–10.8)

## 2022-02-21 ENCOUNTER — VIRTUAL VISIT (OUTPATIENT)
Dept: ONCOLOGY | Age: 67
End: 2022-02-21
Payer: COMMERCIAL

## 2022-02-21 DIAGNOSIS — D64.9 ANEMIA, UNSPECIFIED TYPE: ICD-10-CM

## 2022-02-21 DIAGNOSIS — D46.4 REFRACTORY ANEMIA (HCC): Primary | ICD-10-CM

## 2022-02-21 PROCEDURE — 99442 PR PHYS/QHP TELEPHONE EVALUATION 11-20 MIN: CPT | Performed by: INTERNAL MEDICINE

## 2022-02-21 NOTE — PROGRESS NOTES
Isaamr De Leon is a 74 y.o. female, evaluated via audio-only technology on 2/21/2022 for Follow-up  . Assessment & Plan:   Diagnoses and all orders for this visit:    1. Refractory anemia (HCC)    2. Anemia, unspecified type      The complexity of medical decision making for this visit is moderate. Refractory Anemia/ Chronic Anemia  -- Patient was being followed by Dr. Tonya Dalton who retired. She was diagnosed presumably myelodysplastic syndrome/refractory anemia. She was diagnosed more than 8 years ago. 2/4/2009 bone marrow biopsy reported normocellular bone marrow (40%) with trilineage hematopoiesis. No abnormal plasma cells. Slightly increased bone marrow storage iron. No morphology and phenotyping evidence of dysplasia or increase of blasts. --Therapeutic intervention with Retacrit was recommeded if her hematocrit declines below 30% with a hemoglobin below 10g/dL. The dose of Retacrit will be 60,000 units given subcutaneously every 4 weeks  -- 10/05/2021: CBC showed WBC 6.7K/uL, hemoglobin 10.1g/dL with hematocrit of 31.6%. Platelet 601. Ferritin 251. Transferrin Sat 19%. BUN and creatinine were normal.  -- Her H/H appears to be low for years however she has had a stable WBC, platelets and ANC levels. She most likely has anemia of chronic diseases, less likely MDS. -- Clinical stable. Today I have reviewed with the patient about recent lab reports. 2/5/2022 CBC reported hemoglobin 10.4, hematocrit 32.1%, MCV 89, WBC 6.2, platelet 282,370. Iron saturation 19%, ferritin 223  -- Continue lab check CBC, CMP, Iron and Ferritin. Arthritis/facet arthritis of the cervical region   Vitamin D deficiency  --The patient will continue to follow up with her PCP     We will see the patient back in clinic in 6 months or sooner if indicated          12  Subjective:   Ms. Isamar De Leon is a 74 y.o. female who was evaluated via audio-only technology.  She has a history of Myelodysplastic Syndrome/Refractory Anemia. She was diagnosed more than 8 years ago. She reports she is being followed by GI and she reports she was scheduled for Colonoscopy on Oct 29, 2021. Today she states she has been doing well since her last office visit. She denies fatigue, shortness of breath, and weakness. She denies fevers, chills, night sweats, unintentional weight loss, skin lumps or bumps, acute bleeding or bruising issues. She denies headaches, acute vision changes, dizziness, chest pains, shortness of breath, palpitation, productive cough, nausea, vomiting, abdominal pain, altered bowel habits, dysuria, new bone pain or back pain, focal numbness or weakness. She does not have any concerns or complaints to report at this time. Prior to Admission medications    Medication Sig Start Date End Date Taking? Authorizing Provider   mometasone (ELOCON) 0.1 % topical cream Apply  to affected area daily. 7/12/21   Goran Dubon MD   valACYclovir (VALTREX) 500 mg tablet Take 1 tablet by mouth twice daily 6/29/21   Santhosh Barrera MD   triamcinolone acetonide (KENALOG) 0.1 % topical cream Apply  to affected area two (2) times a day. 5/21/21   Santhosh Barrera MD   simvastatin (ZOCOR) 20 mg tablet TAKE 1 TABLET BY MOUTH ONCE DAILY AT BEDTIME 5/17/21   Santhosh Barrera MD   losartan-hydroCHLOROthiazide [facility]) 100-12.5 mg per tablet Take 1 tablet by mouth once daily 5/17/21   Santhosh Barrera MD   diclofenac EC (VOLTAREN) 75 mg EC tablet TAKE 1 TABLET BY MOUTH ONCE DAILY AS NEEDED WITH FOOD 5/18/20   Provider, Historical   polyethylene glycol (MIRALAX) 17 gram packet Take 1 Packet by mouth daily. 7/21/20   Santhosh Barrera MD   diclofenac (VOLTAREN) 1 % gel Apply 4 g to affected area four (4) times daily. 1/30/19   Santhosh Barrera MD   latanoprost (XALATAN) 0.005 % ophthalmic solution Administer 1 Drop to both eyes nightly.       Provider, Historical     Patient Active Problem List   Diagnosis Code    Epistaxis R04.0    Arthritis M19.90    Elevated sed rate (elev SR) R70.0    Abdominal pain R10.9    FH: breast cancer Z80.3    Anemia D64.9    Breast lump N63.0    Genital herpes A60.00    CRP elevated R79.82    Glaucoma H40.9    Cataract H26.9    Vitreous degeneration H43.819    Hypokalemia E87.6    Degenerative disc disease [id number]    Palpitations R00.2    MDS (myelodysplastic syndrome) (HCC) D46.9    Refractory anemia (HCC) D46.4    Neoplasm of uncertain behavior of connective and other soft tissue D48.1    Essential hypertension I10    DDD (degenerative disc disease), cervical M50.30    Facet arthritis of cervical region M47.812    Myofascial muscle pain M79.18    DDD (degenerative disc disease), lumbar M51.36    Lumbar facet arthropathy M47.816    Hyperlipidemia LDL goal <100 E78.5    Environmental and seasonal allergies J30.89    Primary localized osteoarthrosis of multiple sites M19.91    Vitamin D deficiency E55.9    Long term (current) use of non-steroidal anti-inflammatories (nsaid) Z79.1    Pruritus L29.9       Review of Systems   Constitutional: Negative for chills, diaphoresis, fever, malaise/fatigue and weight loss. Respiratory: Negative for cough, hemoptysis, shortness of breath and wheezing. Cardiovascular: Negative for chest pain, palpitations and leg swelling. Gastrointestinal: Negative for abdominal pain, diarrhea, heartburn, nausea and vomiting. Genitourinary: Negative for dysuria, frequency, hematuria and urgency. Musculoskeletal: Negative for joint pain and myalgias. Skin: Negative for itching and rash. Neurological: Negative for dizziness, seizures, weakness and headaches. Psychiatric/Behavioral: Negative for depression. The patient does not have insomnia.         Patient-Reported Vitals 7/16/2021   Patient-Reported Weight 185lbs   Patient-Reported Pulse 66   Patient-Reported Systolic  693   Patient-Reported Diastolic 84         The patient was advised that our priority at this time is to keep the patients safe, and therefore we are reaching out to patients virtually to prevent them coming into the office unless necessarily. Patient verbalized understanding and was agreeable for virtual visit. Mamie Luis, who was evaluated through a patient-initiated, synchronous (real-time) audio only encounter, and/or her healthcare decision maker, is aware that it is a billable service, with coverage as determined by her insurance carrier. She provided verbal consent to proceed: Yes. She has not had a related appointment within my department in the past 7 days or scheduled within the next 24 hours. I have spent 20 minutes reviewing previous notes, test results and discussing the diagnosis and importance of compliance with the treatment plan as well as documenting on the day of the visit.     Leana Chatman MD        CC: Merlinda Rote, MD

## 2022-02-21 NOTE — PROGRESS NOTES
Rosalinda Aguirre presents today for   Chief Complaint   Patient presents with    Follow-up       Virtual/telephone visit    Depression Screening:  3 most recent PHQ Screens 11/24/2021   Little interest or pleasure in doing things Not at all   Feeling down, depressed, irritable, or hopeless Not at all   Total Score PHQ 2 0       Learning Assessment:  Learning Assessment 2/24/2020   PRIMARY LEARNER Patient   HIGHEST LEVEL OF EDUCATION - PRIMARY LEARNER  4 YEARS [employer] PRIMARY LEARNER NONE   CO-LEARNER CAREGIVER No   PRIMARY LANGUAGE ENGLISH    NEED No   LEARNER PREFERENCE PRIMARY DEMONSTRATION     -     -     -     -   ANSWERED BY patient   RELATIONSHIP SELF       Fall Risk  Fall Risk Assessment, last 12 mths 3/23/2020   Able to walk? Yes   Fall in past 12 months? No       ADL  No flowsheet data found. Travel Screening:    Travel Screening     No screening recorded since 02/20/22 0000     Travel History   Travel since 01/21/22    No documented travel since 01/21/22         Health Maintenance reviewed and discussed and ordered per Provider. Health Maintenance Due   Topic Date Due    Shingrix Vaccine Age 49> (1 of 2) Never done    Pneumococcal 65+ years (1 of 1 - PPSV23) Never done   . Coordination of Care:  1. Have you been to the ER, urgent care clinic since your last visit? Hospitalized since your last visit? no    2. Have you seen or consulted any other health care providers outside of the [address] [place] [address] since your last visit? Include any pap smears or colon screening.  no

## 2022-02-24 ENCOUNTER — OFFICE VISIT (OUTPATIENT)
Dept: FAMILY MEDICINE CLINIC | Age: 67
End: 2022-02-24
Payer: COMMERCIAL

## 2022-02-24 VITALS
SYSTOLIC BLOOD PRESSURE: 149 MMHG | OXYGEN SATURATION: 98 % | WEIGHT: 183.2 LBS | TEMPERATURE: 97.4 F | BODY MASS INDEX: 32.45 KG/M2 | RESPIRATION RATE: 16 BRPM | DIASTOLIC BLOOD PRESSURE: 94 MMHG | HEART RATE: 66 BPM

## 2022-02-24 DIAGNOSIS — L73.9 FOLLICULITIS: ICD-10-CM

## 2022-02-24 DIAGNOSIS — E78.5 HYPERLIPIDEMIA, UNSPECIFIED HYPERLIPIDEMIA TYPE: ICD-10-CM

## 2022-02-24 DIAGNOSIS — I10 ESSENTIAL HYPERTENSION: Primary | ICD-10-CM

## 2022-02-24 PROCEDURE — 99214 OFFICE O/P EST MOD 30 MIN: CPT | Performed by: FAMILY MEDICINE

## 2022-02-24 RX ORDER — CLINDAMYCIN PHOSPHATE 10 MG/G
GEL TOPICAL 2 TIMES DAILY
Qty: 30 G | Refills: 2 | Status: SHIPPED | OUTPATIENT
Start: 2022-02-24 | End: 2022-03-06

## 2022-02-24 NOTE — PROGRESS NOTES
Joyce Ramirez presents today for   Chief Complaint   Patient presents with    Hypertension    Cholesterol Problem    Labs      labs done 2/5 /22        Is someone accompanying this pt? No     Is the patient using any DME equipment during OV? No     Depression Screening:  3 most recent PHQ Screens 2/24/2022   Little interest or pleasure in doing things Not at all   Feeling down, depressed, irritable, or hopeless Not at all   Total Score PHQ 2 0       Learning Assessment:  Learning Assessment 2/24/2022   PRIMARY LEARNER Patient   HIGHEST LEVEL OF EDUCATION - PRIMARY LEARNER  -   BARRIERS PRIMARY LEARNER -   [address] CAREGIVER -   PRIMARY LANGUAGE ENGLISH    NEED -   LEARNER PREFERENCE PRIMARY OTHER (COMMENT)     -     -     -     -   ANSWERED BY patient    RELATIONSHIP SELF       Abuse Screening:  Abuse Screening Questionnaire 2/24/2022   Do you ever feel afraid of your partner? N   Are you in a relationship with someone who physically or mentally threatens you? N   Is it safe for you to go home? Y       Fall Screening  Fall Risk Assessment, last 12 mths 2/24/2022   Able to walk? Yes   Fall in past 12 months? 0   Do you feel unsteady? 0   Are you worried about falling 0       Generalized Anxiety  No flowsheet data found. Health Maintenance Due   Topic Date Due    Shingrix Vaccine Age 49> (1 of 2) Never done    Pneumococcal 65+ years (1 of 1 - PPSV23) Never done   . Health Maintenance reviewed and discussed and ordered per Provider. Vaccines Due   Screenings Due       Joyce Ramirez is updated on all     1. \"Have you been to the ER, urgent care clinic since your last visit? Hospitalized since your last visit? \" No    2. \"Have you seen or consulted any other health care providers outside of the [address] [place] [address] since your last visit? \" Yes When: 2/22  Reason for visit: Rheumatology      3. For patients aged 39-70: Has the patient had a colonoscopy / FIT/ Cologuard?  Yes - no Care Gap present     If the patient is female:    4. For patients aged 41-77: Has the patient had a mammogram within the past 2 years? Yes - no Care Gap present    5. For patients aged 21-65: Has the patient had a pap smear?  NA - based on age

## 2022-02-24 NOTE — PROGRESS NOTES
Chief Complaint   Patient presents with    Hypertension    Cholesterol Problem    Labs      labs done 2/5 /22     Other      patient has concerns that she only wished to speak to you about . SANTIAGO Goldman is a 74 y.o. female presenting today for 3 months follow up of htn, hld. Pt has not yet taken her medication today. Patient had labs on 2/5/22. Labs reviewed in detail with patient     Patient does not need medication refills today. New concerns today: pt reports that she cont to get large, painful bumps on the inner thigh. Review of Systems   Constitutional: Negative. HENT: Negative. Respiratory: Negative. Cardiovascular: Negative. Skin:        Bumps in groin area   All other systems reviewed and are negative. Physical Exam  Vitals and nursing note reviewed. Constitutional:       Appearance: Normal appearance. She is not ill-appearing. HENT:      Head: Normocephalic and atraumatic. Right Ear: External ear normal.      Left Ear: External ear normal.      Nose: Nose normal.      Mouth/Throat:      Mouth: Mucous membranes are moist.   Eyes:      Extraocular Movements: Extraocular movements intact. Conjunctiva/sclera: Conjunctivae normal.   Cardiovascular:      Rate and Rhythm: Normal rate and regular rhythm. Heart sounds: No murmur heard. No friction rub. No gallop. Pulmonary:      Effort: Pulmonary effort is normal.      Breath sounds: Normal breath sounds. No wheezing, rhonchi or rales. Musculoskeletal:         General: Normal range of motion. Cervical back: Normal range of motion. Skin:     General: Skin is warm and dry. Comments: Scattered pustules in groin area and under pannus   Neurological:      Mental Status: She is alert and oriented to person, place, and time.       Coordination: Coordination normal.   Psychiatric:         Mood and Affect: Mood normal.         Behavior: Behavior normal.         Thought Content: Thought content normal.         Judgment: Judgment normal.         Diagnoses and all orders for this visit:    1. Essential hypertension  Increase exercise as tolerated    2. Hyperlipidemia, unspecified hyperlipidemia type  Work on diet and exercise. Cont pres tx plan. 3. Folliculitis  -     clindamycin (CLINDAGEL) 1 % topical gel; Apply  to affected area two (2) times a day for 10 days. use thin film on affected area      Follow-up and Dispositions    · Return in about 3 months (around 5/24/2022) for high blood pressure, high cholesterol.

## 2022-03-07 ENCOUNTER — HOSPITAL ENCOUNTER (OUTPATIENT)
Dept: MAMMOGRAPHY | Age: 67
Discharge: HOME | End: 2022-03-07
Attending: SURGERY
Payer: COMMERCIAL

## 2022-03-07 ENCOUNTER — HOSPITAL ENCOUNTER (OUTPATIENT)
Dept: ULTRASOUND IMAGING | Age: 67
Discharge: HOME | End: 2022-03-07
Attending: SURGERY
Payer: COMMERCIAL

## 2022-03-07 DIAGNOSIS — R92.8 ABNORMAL ULTRASOUND OF BREAST: ICD-10-CM

## 2022-03-07 DIAGNOSIS — R92.8 ABNORMAL MAMMOGRAM OF RIGHT BREAST: ICD-10-CM

## 2022-03-07 PROCEDURE — 76642 ULTRASOUND BREAST LIMITED: CPT

## 2022-03-07 PROCEDURE — 77062 BREAST TOMOSYNTHESIS BI: CPT

## 2022-03-18 PROBLEM — M19.91 PRIMARY LOCALIZED OSTEOARTHROSIS OF MULTIPLE SITES: Status: ACTIVE | Noted: 2020-10-21

## 2022-03-18 PROBLEM — J30.89 ENVIRONMENTAL AND SEASONAL ALLERGIES: Status: ACTIVE | Noted: 2017-04-17

## 2022-03-18 PROBLEM — E55.9 VITAMIN D DEFICIENCY: Status: ACTIVE | Noted: 2020-10-21

## 2022-03-19 PROBLEM — L29.9 PRURITUS: Status: ACTIVE | Noted: 2021-07-12

## 2022-03-19 PROBLEM — Z79.1 LONG TERM (CURRENT) USE OF NON-STEROIDAL ANTI-INFLAMMATORIES (NSAID): Status: ACTIVE | Noted: 2020-10-21

## 2022-04-22 ENCOUNTER — TELEPHONE (OUTPATIENT)
Dept: PULMONOLOGY | Age: 67
End: 2022-04-22

## 2022-04-22 NOTE — TELEPHONE ENCOUNTER
Pt has questions about her cpap supplies. She stated that [facility] no longer is in network with her insurance since they changed to [employer]. Pt would like to know where she can get her supplies.  Please advise [phone]

## 2022-05-02 ENCOUNTER — OFFICE VISIT (OUTPATIENT)
Dept: PULMONOLOGY | Age: 67
End: 2022-05-02
Payer: COMMERCIAL

## 2022-05-02 VITALS
HEIGHT: 63 IN | HEART RATE: 67 BPM | BODY MASS INDEX: 32.32 KG/M2 | DIASTOLIC BLOOD PRESSURE: 81 MMHG | WEIGHT: 182.4 LBS | OXYGEN SATURATION: 100 % | RESPIRATION RATE: 18 BRPM | SYSTOLIC BLOOD PRESSURE: 154 MMHG | TEMPERATURE: 97.4 F

## 2022-05-02 DIAGNOSIS — D64.9 ANEMIA, UNSPECIFIED TYPE: ICD-10-CM

## 2022-05-02 DIAGNOSIS — I10 ESSENTIAL HYPERTENSION: ICD-10-CM

## 2022-05-02 DIAGNOSIS — E78.5 DYSLIPIDEMIA: ICD-10-CM

## 2022-05-02 DIAGNOSIS — E66.9 OBESITY (BMI 30-39.9): ICD-10-CM

## 2022-05-02 DIAGNOSIS — R68.89 VIVID DREAM: ICD-10-CM

## 2022-05-02 DIAGNOSIS — Z99.89 OSA ON CPAP: Primary | ICD-10-CM

## 2022-05-02 DIAGNOSIS — G47.8 SLEEP PARALYSIS: ICD-10-CM

## 2022-05-02 DIAGNOSIS — G47.33 OSA ON CPAP: Primary | ICD-10-CM

## 2022-05-02 PROCEDURE — 99213 OFFICE O/P EST LOW 20 MIN: CPT | Performed by: INTERNAL MEDICINE

## 2022-05-02 NOTE — PROGRESS NOTES
[facility]  Pulmonary, Critical Care, and Sleep Medicine    Office Progress Note- Follow Up      Primary Care Physician: Elva Paige MD     Reason for Visit:  Follow Up: JUAN and PAP Therapy    Assessment:  1. Obstructive Sleep Apnea (JUAN): Mild, excellent compliance and receiving clinical benefit  2. Vivid Dream- Reduced since starting PAP therapy  3. Sleep Paralysis with possible HH- No reports of cataplexy- SP has decreased and will rarely occur. Continue to monitor  4. Possible Parasomnia- calling out in her sleep; episodes have decreased but not resolved  5. Hypertension: BP elevated today in clinic- asymptomatic- Follow up with PCP/ Cardiology  6. Dyslipidemia  7. Allergic Rhinitis- not an issue at this time  8. Anemia- Chronic. Report of MDS- stable for years, followed by [address] [place] [address]  9. Obesity: Body mass index is 32.31 kg/m². Plan:    · Switch DME to [facility] due to insurance issues. Renew PAP supplies  · Continue  APAP settings 7/20  · Healthy lifestyle changes to include weight loss and exercise discussed. · Healthy sleep habits were reviewed and encouraged. ·  and workplace safety reviewed and discussed as appropriate. Drowsy and/or inattentive driving should be avoided. · Follow-up after 12 months , sooner should new symptoms or problems arise. · Follow up with Primary Care Provider (PCP) as directed and for routine health care maintenance. History of Present Illness: Mrs. Curt Meyers is a 74 y.o. female with a history of chronic anemia (possible MDS), hypertension, and dyslipidemia who presents for report of snoring. The history was provided by the patient. Since last visit, the patient underwent sleep testing and was noted to have mild JUAN. She was subsequently started on PAP therapy    Today the following is noted and/or reported: Last Clinic Visit: 3/29/21    · BP elevated today in clinic. Patient is asymptomatic.   She states that she initially drove to wrong providers office and was stressed about being late for this appointments  · Patient states that her current insurance does not cover her current DME- [facility]. She will need a new DME  · She asked about traveling with PAP equipment - we reviewed this  · The patient reports that she has been using her device. We increased her APAP pressure settings at last visit. She is tolerating them well  · She is not sure if she sees a benefit at this time  · No xerostomia  · No bloating  · No skin issues  · No mask issues at this time. She is using a nasal mask  · She still experiences some vivid dreams- mostly happy. No more scary dreams. She will still call out on rare occasion. Dreams overall have  in occurance  · Reports one episode of sleep paralysis    · She is cleaning her device and supplies as directed    Positive Airway Pressure (PAP) Compliance  Report & Summary Trends  DME: [facility]  Device: ResMed 10  Date >4 hr use % Time  (Total Time%) AHI PAP Rx Pressure @ 95% Median Use Time Leak-Median  (95%) Notes   21-3/28/21 100 (100) 2.2 APAP 5/15 13.8 07:48:00 2.1 (16.2)    22-22 91 (98) 0.9 APAP 14.3 06:24:00 8.9 (29.4)                              Occupation:   -   [place] [place] [id number]                      Work Schedule: [id number] M-F  Shift work: Quit  2722-0209- weekends- stopped 2020    Driving:  yes  Drowsy Driving: is not reported. Motor vehicle accident(s) associated with drowsy driving have not occurred. Stop Tyler Dear 3/29/2021 10/23/2020   Does the patient snore loudly (louder than talking or loud enough to be heard through closed doors)? 1 1   Does the patient often feel tired, fatigued, or sleepy during the daytime, even after a \"good\" night's sleep? 0 0   Has anyone ever observed the patient stop breathing during their sleep?  0 0   Does the patient have or are they being treated for high blood pressure?  1 1   Is the patient's BMI greater than 35? 0 0   Is your neck circumference greater than 17 inches (Male) or 16 inches (Female)? 0 0   Is the patient older than 48? 1 1   Is the patient male? 0 0   JUAN Score 3 3       3 most recent PHQ Screens 2/24/2022   Little interest or pleasure in doing things Not at all   Feeling down, depressed, irritable, or hopeless Not at all   Total Score PHQ 2 0       [place] Scale 3/29/2021 10/23/2020   Sitting and Reading 2 1   Watching TV 2 1   Sitting, inactive in a public place (e.g. a movie theater or meeting) 0 1   As a passenger in a car for an hour, without a break 0 0   Lying down to rest in the afternoon, when circumstances permit 0 0   Sitting and talking to someone 0 0   Sitting quietly after lunch without alcohol 0 0   In a car, while stopped for a few minutes in traffic 0 0   [place] Sleepiness Score 4 3             Immunization History:  Immunization History   Administered Date(s) Administered    COVID-19, Moderna Booster, PF, 0.25mL Dose 11/16/2021    COVID-19, Nemesio Robersonmins, Primary or Immunocompromised Series, MRNA, PF, 100mcg/0.5mL 01/26/2021, 03/04/2021    COVID-19, Pfizer Purple top, DILUTE for use, 12+ yrs, 30mcg/0.3mL dose 03/04/2021    Influenza High Dose Vaccine PF 10/23/2020    Influenza Vaccine (Madin Kusum Canine Kidney) PF 10/05/2017    Influenza Vaccine (Quad) PF (>6 Mo Flulaval, Fluarix, and >3 Yrs [place], Fluzone [address]) 01/31/2018, 10/29/2018, 12/06/2019, 11/24/2021    Influenza Vaccine (Trivalent) 10/26/2019, 10/01/2020    Tdap 04/12/2017       Past Medical History:  Past Medical History:   Diagnosis Date    Echocardiogram 02/08/2011    Tech difficult. EF 60-65%. Mild LVH. RVSP 20-25 mmHg.       Essential hypertension     History of colon polyps     Hx of endometriosis     had hyst    Hyperlipidemia     Hypertension     MDS (myelodysplastic syndrome) (HCC)     Refractory anemia (HCC)        Past Surgical History:  Past Surgical History:   Procedure Laterality Date    HX HYSTERECTOMY         Family History:  Family History   Problem Relation Age of Onset    Cancer Mother    David Tamayo Arthritis-rheumatoid Sister     Diabetes Sister     Hypertension Sister     No Known Problems Father        Social History:  Social History     Tobacco Use    Smoking status: Never Smoker    Smokeless tobacco: Never Used   Vaping Use    Vaping Use: Never used   Substance Use Topics    Alcohol use: No    Drug use: No        Caffeine Amount Time of last Intake Comments   Coffee 1C/am     Soda 20 oz/am  Dr. Lisa Flores Rare     Energy Drinks None     Over- the - counter stimulant pills None     Other Substances      Alcohol None     Tobacco Never     Drugs Never     Other: None         Medications:  Current Outpatient Medications on File Prior to Visit   Medication Sig Dispense Refill    mometasone (ELOCON) 0.1 % topical cream Apply  to affected area daily. 15 g 0    valACYclovir (VALTREX) 500 mg tablet Take 1 tablet by mouth twice daily 30 Tablet 12    triamcinolone acetonide (KENALOG) 0.1 % topical cream Apply  to affected area two (2) times a day. 45 g 0    simvastatin (ZOCOR) 20 mg tablet TAKE 1 TABLET BY MOUTH ONCE DAILY AT BEDTIME 30 Tab 12    losartan-hydroCHLOROthiazide (HYZAAR) 100-12.5 mg per tablet Take 1 tablet by mouth once daily 90 Tab 4    diclofenac EC (VOLTAREN) 75 mg EC tablet TAKE 1 TABLET BY MOUTH ONCE DAILY AS NEEDED WITH FOOD      polyethylene glycol (MIRALAX) 17 gram packet Take 1 Packet by mouth daily. 517 g 12    diclofenac (VOLTAREN) 1 % gel Apply 4 g to affected area four (4) times daily. 400 g 12    latanoprost (XALATAN) 0.005 % ophthalmic solution Administer 1 Drop to both eyes nightly. No current facility-administered medications on file prior to visit.         Allergy:  No Known Allergies    Review of Systems  General ROS: negative for - chills, hot flashes, malaise or night sweats  ENT ROS: negative for - epistaxis, hearing change, nasal congestion, nasal discharge, nasal polyps, oral lesions, sinus pain, sneezing, sore throat, tinnitus, vertigo, visual changes or vocal changes  Hematological and Lymphatic ROS: negative for - bleeding problems, blood clots, bruising, jaundice, pallor or swollen lymph nodes  Endocrine ROS: negative for - polydipsia/polyuria, skin changes, temperature intolerance or unexpected weight changes  Respiratory ROS: no cough, shortness of breath, or wheezing  Cardiovascular ROS: no chest pain or dyspnea on exertion  Gastrointestinal ROS: no abdominal pain, change in bowel habits, or black or bloody stools, + constipation  Genito-Urinary ROS: no dysuria, trouble voiding, or hematuria  Musculoskeletal ROS: positive for - joint pain and joint stiffness  Neurological ROS: no TIA or stroke symptoms  Dermatological ROS: negative for - pruritus, rash or skin lesion changes   Psychological ROS: as above   Otherwise negative and per HPI      Physical Exam:  Blood pressure (!) 147/92, pulse 67, temperature 97.4 °F (36.3 °C), temperature source Temporal, resp. rate 18, height 5' 3\" (1.6 m), weight 82.7 kg (182 lb 6.4 oz), last menstrual period 08/31/1998, SpO2 100 %. on RA, Body mass index is 32.31 kg/m². General: No distress, acyanotic, appears stated age, cooperative, pleasant  HEENT: PERRL, EOMI, throat without erythema or exudate, Tongue- with dental indention on tongue, Mallampati's score 3+, Uvula- midline, Tonsils- not seen, widened posterior pharyngeal arch- crowed posterior oropharynx  Neck: Supple,  no abnormally enlarged lymph nodes, thyroid is not enlarged, non-tender, No JVD   Chest: Grossly normal.  Lungs: Moderate air entry, clear to auscultation bilaterally. Heart: Regular rate and rhythm, S1S2 present, without murmur. Abdomen: Protuberant, abdomen is soft without significant tenderness, or guarding. Extremity: Negative for cyanosis, edema, or clubbing.   Skin: Skin color, texture, turgor normal. No rashes or lesions. Neurological: CN 2-12 grossly intact, normal muscle tone. Data Reviewed:  CBC:   Lab Results   Component Value Date/Time    WBC 6.5 02/05/2022 12:00 AM    HGB (POC) 10.3 (A) 04/16/2014 10:07 AM    HGB 10.4 (L) 02/05/2022 12:00 AM    HCT (POC) 34.8 (A) 04/16/2014 10:07 AM    HCT 32.1 (L) 02/05/2022 12:00 AM    PLATELET 155 03/89/3273 12:00 AM    MCV 89 02/05/2022 12:00 AM       BMP:   Lab Results   Component Value Date/Time    Sodium 141 02/05/2022 12:00 AM    Potassium 4.0 02/05/2022 12:00 AM    Chloride 99 02/05/2022 12:00 AM    CO2 26 02/05/2022 12:00 AM    Anion gap 5 11/30/2009 10:10 AM    Glucose 74 02/05/2022 12:00 AM    BUN 13 02/05/2022 12:00 AM    Creatinine 0.93 02/05/2022 12:00 AM    BUN/Creatinine ratio 14 02/05/2022 12:00 AM    GFR est AA 75 02/05/2022 12:00 AM    GFR est non-AA 65 02/05/2022 12:00 AM    Calcium 10.0 02/05/2022 12:00 AM        TSH:  Lab Results   Component Value Date/Time    TSH 0.747 07/17/2015 09:23 AM    TSH 1.010 10/15/2012 12:00 AM    TSH 0.645 02/20/2012 12:00 AM    TSH 0.585 02/11/2011 12:00 AM     No results found for: HBA1C, ORA7IUBX, YOL5HUOF, FVI7XYXM      Imaging:  [x]I have personally reviewed the patients radiographs section   Results from Appointment encounter on 01/23/18    XR CHEST PA LAT    Narrative  Chest x-rays, PA and lateral views:        INDICATION:    Chest discomfort. History of hypertension, hyperlipidemia and anemia. COMPARISON STUDY: Chest x-ray on 9/9/2009. FINDINGS:    Lungs are mildly hypoventilated, likely to be secondary to patient body habitus. Cardiac size normal. Pulmonary vascularity is minimally prominent but not  obviously cephalized. There is no evidence of pneumonia, pulmonary atelectasis,  pleural effusion or pneumothorax. In thoracic spine there are mild-to-moderate  multilevel spondylolytic changes. Impression  IMPRESSION:    Normal cardiac size. No evidence of CHF. Mild pulmonary hypoventilation. Otherwise lungs are clear. No evidence of pleural effusion or pneumothorax.                 Historical Sleep Testing Data:     · 12/7/20: AHI: 10.1, MERA: 10.5, SpO2 georgina: 65%, SpO2 average: 92%, Time with SpO2 less than 89%: 00:14:00          Richie Gitelman, DO, [facility]  Pulmonary, Sleep and Critical Care Medicine

## 2022-05-02 NOTE — LETTER
5/2/2022    Patient: Richelle Deleon   YOB: 1955   Date of Visit: 5/2/2022     Nicolas Yarbrough MD  [id number]  [phone]  [facility]    Dear Nicolas Yarbrough MD,      Thank you for referring Ms. Mark Horvath to [address] [place] [address] for evaluation. My notes for this consultation are attached. If you have questions, please do not hesitate to call me. I look forward to following your patient along with you.       Sincerely,    Kait Andrews, DO

## 2022-05-02 NOTE — PROGRESS NOTES
Charmaine De León presents today for   Chief Complaint   Patient presents with    Sleep Problem     Parasomnia, Sleep Paralysis     Sleep Apnea    CPAP       Is someone accompanying this pt? No    Is the patient using any DME equipment during OV? Cpap    -DME Company [employer]    Depression Screening:  3 most recent PHQ Screens 2/24/2022   Little interest or pleasure in doing things Not at all   Feeling down, depressed, irritable, or hopeless Not at all   Total Score PHQ 2 0       Learning Assessment:  Learning Assessment 2/24/2022   PRIMARY LEARNER Patient   HIGHEST LEVEL OF EDUCATION - PRIMARY LEARNER  -   BARRIERS PRIMARY LEARNER -   [address]  CAREGIVER -   PRIMARY LANGUAGE ENGLISH    NEED -   LEARNER PREFERENCE PRIMARY OTHER (COMMENT)     -     -     -     -   ANSWERED BY patient    RELATIONSHIP SELF       Abuse Screening:  Abuse Screening Questionnaire 2/24/2022   Do you ever feel afraid of your partner? N   Are you in a relationship with someone who physically or mentally threatens you? N   Is it safe for you to go home? Y       Fall Risk  Fall Risk Assessment, last 12 mths 2/24/2022   Able to walk? Yes   Fall in past 12 months? 0   Do you feel unsteady? 0   Are you worried about falling 0         Coordination of Care:  1. Have you been to the ER, urgent care clinic since your last visit? Hospitalized since your last visit? No    2. Have you seen or consulted any other health care providers outside of the [address] [place] [address] since your last visit? Include any pap smears or colon screening.  Dr Maribel Zee, [facility]

## 2022-05-02 NOTE — PATIENT INSTRUCTIONS
Please call our clinic back at [phone] or send a message on [employer] if you have any questions or concerns or if you are experiencing any of the following:     You and not received a follow up appointment within 30 days prior the recommended follow up time.  If you are not tolerating treatment plan   if you are experiencing any difficulties with the Durable Medical Equipment  (DME) Company you may be using or is assigned to you.  Two weeks have passed and you have not received an appointment for a scheduled procedure   Two weeks have passed since you underwent a test and/or procedure and you have not received your results. If you are using a CPAP/BIPAP, or Home Ventilator Device- Please note the following  [id number] ) company is supposed to provide you with replacement filters, tubing and masks. You can either call your DME when you need new supplies or you can arrange for an automatic shipment schedule.  Your need to be seen by our office at lat minimum of every 12 months in order to renew the prescription for these supplies.  Please make note of who your DME company is and their phone number.  Please make sure that you clean your mask and hosing on a regular basis.   Your DME can provide you with additional information regarding proper care and cleaning of your device    Thank you

## 2022-05-24 ENCOUNTER — OFFICE VISIT (OUTPATIENT)
Dept: FAMILY MEDICINE CLINIC | Age: 67
End: 2022-05-24
Payer: COMMERCIAL

## 2022-05-24 VITALS
BODY MASS INDEX: 32.45 KG/M2 | SYSTOLIC BLOOD PRESSURE: 132 MMHG | HEART RATE: 65 BPM | DIASTOLIC BLOOD PRESSURE: 83 MMHG | TEMPERATURE: 97.9 F | OXYGEN SATURATION: 99 % | WEIGHT: 183.2 LBS | RESPIRATION RATE: 18 BRPM

## 2022-05-24 DIAGNOSIS — I10 ESSENTIAL HYPERTENSION: Primary | ICD-10-CM

## 2022-05-24 DIAGNOSIS — E78.5 HYPERLIPIDEMIA, UNSPECIFIED HYPERLIPIDEMIA TYPE: ICD-10-CM

## 2022-05-24 DIAGNOSIS — L73.9 FOLLICULITIS: ICD-10-CM

## 2022-05-24 PROCEDURE — 99214 OFFICE O/P EST MOD 30 MIN: CPT | Performed by: FAMILY MEDICINE

## 2022-05-24 RX ORDER — PANCRELIPASE 36000; 180000; 114000 [USP'U]/1; [USP'U]/1; [USP'U]/1
CAPSULE, DELAYED RELEASE PELLETS ORAL
COMMUNITY
Start: 2022-04-14

## 2022-05-24 RX ORDER — CLINDAMYCIN PHOSPHATE 10 MG/G
GEL TOPICAL
COMMUNITY
Start: 2022-04-22

## 2022-05-24 NOTE — PROGRESS NOTES
Chief Complaint   Patient presents with    Hypertension    Cholesterol Problem     high cholesterol         HPI    Malinda Ryan is a 74 y.o. female presenting today for 3 months  follow up of htn, hld. Pt had recent f/u with sleep med. She is doing well with her cpap. Pt has seen gi. She was told that her pancreas does not make an adequate amount of an enzyme so she is getting an enzyme supplement. Pt is not sure yet if it is helping. The diarrhea she had that led to the testing had already resolved prior to starting the new med. Pt was told to cont miralax for constipation. Patient does not need medication refills today. The rash pt had on her legs cleared up with the medication. Review of Systems   Constitutional: Negative. HENT: Negative. Respiratory: Negative. Cardiovascular: Negative. All other systems reviewed and are negative. Physical Exam  Vitals and nursing note reviewed. Constitutional:       Appearance: Normal appearance. She is not ill-appearing. HENT:      Head: Normocephalic and atraumatic. Right Ear: External ear normal.      Left Ear: External ear normal.      Nose: Nose normal.      Mouth/Throat:      Mouth: Mucous membranes are moist.   Eyes:      Extraocular Movements: Extraocular movements intact. Conjunctiva/sclera: Conjunctivae normal.   Cardiovascular:      Rate and Rhythm: Normal rate and regular rhythm. Heart sounds: No murmur heard. No friction rub. No gallop. Pulmonary:      Effort: Pulmonary effort is normal.      Breath sounds: Normal breath sounds. No wheezing, rhonchi or rales. Musculoskeletal:         General: Normal range of motion. Cervical back: Normal range of motion. Skin:     General: Skin is warm and dry. Neurological:      Mental Status: She is alert and oriented to person, place, and time.       Coordination: Coordination normal.   Psychiatric:         Mood and Affect: Mood normal. Behavior: Behavior normal.         Thought Content: Thought content normal.         Judgment: Judgment normal.         Diagnoses and all orders for this visit:    1. Essential hypertension  -     METABOLIC PANEL, COMPREHENSIVE; Future  -     LIPID PANEL; Future  Stable, cont pres tx plan. 2. Hyperlipidemia, unspecified hyperlipidemia type  -     METABOLIC PANEL, COMPREHENSIVE; Future  -     LIPID PANEL; Future    3. Folliculitis  Resolved. Follow-up and Dispositions    · Return in about 3 months (around 8/24/2022) for high blood pressure, high cholesterol.

## 2022-05-24 NOTE — PROGRESS NOTES
Izabella Corcoran presents today for   Chief Complaint   Patient presents with    Hypertension    Cholesterol Problem     high cholesterol       Is someone accompanying this pt? No    Is the patient using any DME equipment during OV? No    Depression Screening:  3 most recent PHQ Screens 2/24/2022   Little interest or pleasure in doing things Not at all   Feeling down, depressed, irritable, or hopeless Not at all   Total Score PHQ 2 0       Learning Assessment:  Learning Assessment 2/24/2022   PRIMARY LEARNER Patient   HIGHEST LEVEL OF EDUCATION - PRIMARY LEARNER  -   BARRIERS PRIMARY LEARNER -   [address]  CAREGIVER -   PRIMARY LANGUAGE ENGLISH    NEED -   LEARNER PREFERENCE PRIMARY OTHER (COMMENT)     -     -     -     -   ANSWERED BY patient    RELATIONSHIP SELF       Abuse Screening:  Abuse Screening Questionnaire 2/24/2022   Do you ever feel afraid of your partner? N   Are you in a relationship with someone who physically or mentally threatens you? N   Is it safe for you to go home? Y       Fall Screening  Fall Risk Assessment, last 12 mths 2/24/2022   Able to walk? Yes   Fall in past 12 months? 0   Do you feel unsteady? 0   Are you worried about falling 0       Generalized Anxiety  No flowsheet data found. Health Maintenance Due   Topic Date Due    Shingrix Vaccine Age 49> (1 of 2) Never done    Pneumococcal 65+ years (1 - PCV) Never done   . Health Maintenance reviewed and discussed and ordered per Provider. Vaccines Due   Screenings Due       Izabella Corcoran is updated on all     1. \"Have you been to the ER, urgent care clinic since your last visit? Hospitalized since your last visit? \" No    2. \"Have you seen or consulted any other health care providers outside of the [address] [place] [address] since your last visit? \" No     3. For patients aged 39-70: Has the patient had a colonoscopy / FIT/ Cologuard? Yes - no Care Gap present     If the patient is female:    4.  For patients aged 40-74: Has the patient had a mammogram within the past 2 years? Yes - no Care Gap present    5. For patients aged 21-65: Has the patient had a pap smear?  Yes - no Care Gap present

## 2022-07-29 ENCOUNTER — OFFICE VISIT (OUTPATIENT)
Dept: CARDIOLOGY CLINIC | Age: 67
End: 2022-07-29
Payer: COMMERCIAL

## 2022-07-29 VITALS
HEART RATE: 61 BPM | WEIGHT: 181 LBS | HEIGHT: 63 IN | SYSTOLIC BLOOD PRESSURE: 122 MMHG | BODY MASS INDEX: 32.07 KG/M2 | DIASTOLIC BLOOD PRESSURE: 88 MMHG | OXYGEN SATURATION: 99 %

## 2022-07-29 DIAGNOSIS — R00.2 PALPITATIONS: Primary | ICD-10-CM

## 2022-07-29 DIAGNOSIS — I10 ESSENTIAL HYPERTENSION: ICD-10-CM

## 2022-07-29 PROCEDURE — 99214 OFFICE O/P EST MOD 30 MIN: CPT | Performed by: INTERNAL MEDICINE

## 2022-07-29 PROCEDURE — 93000 ELECTROCARDIOGRAM COMPLETE: CPT | Performed by: INTERNAL MEDICINE

## 2022-07-29 PROCEDURE — 1123F ACP DISCUSS/DSCN MKR DOCD: CPT | Performed by: INTERNAL MEDICINE

## 2022-07-29 NOTE — PROGRESS NOTES
Javier Álvarez    Chief Complaint   Patient presents with    Follow-up     1 year follow up   Palps, HTN    HPI    Javier Álvarez is a 74 y.o. AAF with palps, HTN here for routine follow up. Was c/o palps, fatigue, etc and sent her for sleep study, now with CPAP. Symptoms have resolved. SBP at goal but with mild sinus siria. She had an echocardiogram back in 2011 that was essentially normal.  She had normal EF but there was mild LVH was no significant valvular pathology. Her LA was normal at that time. CV RFs; HTN, HL, obesity    Past Medical History:   Diagnosis Date    Echocardiogram 02/08/2011    Tech difficult. EF 60-65%. Mild LVH. RVSP 20-25 mmHg. Essential hypertension     History of colon polyps     Hx of endometriosis     had hyst    Hyperlipidemia     Hypertension     MDS (myelodysplastic syndrome) (HCC)     Refractory anemia (HCC)        Past Surgical History:   Procedure Laterality Date    HX HYSTERECTOMY         Current Outpatient Medications   Medication Sig Dispense Refill    losartan-hydroCHLOROthiazide (HYZAAR) 100-12.5 mg per tablet Take 1 tablet by mouth once daily 90 Tablet 4    simvastatin (ZOCOR) 20 mg tablet TAKE 1 TABLET BY MOUTH ONCE DAILY AT BEDTIME 90 Tablet 4    Creon 36,000-114,000- 180,000 unit cpDR capsule TAKE TWO CAPSULES BY MOUTH THREE TIMES DAILY WITH MEALS. (TAKE ONE CAPSULE AFTER TWO BITES AND ONE IN THE MIDDLE OF THE MEAL) AND ONE CAPSULE WITH SNACK (IN THE MIDDLE OF THE SNACK)      clindamycin (CLINDAGEL) 1 % topical gel APPLY TO AFFECTED AREA TWICE DAILY FOR 10 DAYS - USE THIN FILM ON AFFECTED AREA      mometasone (ELOCON) 0.1 % topical cream Apply  to affected area daily. 15 g 0    valACYclovir (VALTREX) 500 mg tablet Take 1 tablet by mouth twice daily 30 Tablet 12    triamcinolone acetonide (KENALOG) 0.1 % topical cream Apply  to affected area two (2) times a day.  45 g 0    diclofenac EC (VOLTAREN) 75 mg EC tablet TAKE 1 TABLET BY MOUTH ONCE DAILY AS NEEDED WITH FOOD      polyethylene glycol (MIRALAX) 17 gram packet Take 1 Packet by mouth daily. 517 g 12    diclofenac (VOLTAREN) 1 % gel Apply 4 g to affected area four (4) times daily. 400 g 12    latanoprost (XALATAN) 0.005 % ophthalmic solution Administer 1 Drop to both eyes nightly. No Known Allergies    Social History     Socioeconomic History    Marital status: SINGLE     Spouse name: Not on file    Number of children: Not on file    Years of education: Not on file    Highest education level: Not on file   Occupational History    Not on file   Tobacco Use    Smoking status: Never    Smokeless tobacco: Never   Vaping Use    Vaping Use: Never used   Substance and Sexual Activity    Alcohol use: No    Drug use: No    Sexual activity: Yes   Other Topics Concern    Not on file   Social History Narrative    Not on file     Social Determinants of Health     Financial Resource Strain: Not on file   Food Insecurity: Not on file   Transportation Needs: Not on file   Physical Activity: Not on file   Stress: Not on file   Social Connections: Not on file   Intimate Partner Violence: Not on file   Housing Stability: Not on file        FH:n/a    Review of Systems    14 pt Review of Systems is negative unless otherwise mentioned in the HPI.     Wt Readings from Last 3 Encounters:   07/29/22 82.1 kg (181 lb)   05/24/22 83.1 kg (183 lb 3.2 oz)   05/02/22 82.7 kg (182 lb 6.4 oz)     Temp Readings from Last 3 Encounters:   05/24/22 97.9 °F (36.6 °C) (Oral)   05/02/22 97.4 °F (36.3 °C) (Temporal)   02/24/22 97.4 °F (36.3 °C) (Oral)     BP Readings from Last 3 Encounters:   07/29/22 122/88   05/24/22 132/83   05/02/22 (!) 154/81     Pulse Readings from Last 3 Encounters:   07/29/22 61   05/24/22 65   05/02/22 67     Physical Exam:    Visit Vitals  /88 (BP 1 Location: Left upper arm, BP Patient Position: Sitting, BP Cuff Size: Small adult)   Pulse 61   Ht 5' 3\" (1.6 m)   Wt 82.1 kg (181 lb)   LMP 08/31/1998   SpO2 99% BMI 32.06 kg/m²      Physical Exam  HENT:      Head: Normocephalic and atraumatic. Eyes:      Pupils: Pupils are equal, round, and reactive to light. Cardiovascular:      Rate and Rhythm: Normal rate and regular rhythm. Heart sounds: Normal heart sounds. No murmur heard. No friction rub. No gallop. Pulmonary:      Effort: Pulmonary effort is normal. No respiratory distress. Breath sounds: Normal breath sounds. No wheezing or rales. Chest:      Chest wall: No tenderness. Abdominal:      General: Bowel sounds are normal.      Palpations: Abdomen is soft. Musculoskeletal:         General: No tenderness. Skin:     General: Skin is warm and dry. Neurological:      Mental Status: She is alert and oriented to person, place, and time. EKG today shows: sinus siria, normal axis and intervals, no ST segment abnormalities    Lab Results   Component Value Date/Time    Cholesterol, total 192 02/05/2022 12:00 AM    HDL Cholesterol 52 02/05/2022 12:00 AM    LDL, calculated 126 (H) 02/05/2022 12:00 AM    LDL, calculated 110 (H) 07/10/2020 12:00 AM    VLDL, calculated 14 02/05/2022 12:00 AM    VLDL, calculated 17 07/10/2020 12:00 AM    Triglyceride 74 02/05/2022 12:00 AM       Impression and Plan:  Karine Martin is a 74 y.o. with:    1.) Signs and symptoms suggestive of sleep disordered breathing, likely contributes to below  2.) HTN, above goal   3.) Palpitations, likely benign ectopy  4.) Normal LV function  5.) JUAN, now on CPAP    1.) Pt now has CPAP and lost some wt so her SOB/ palps have resolved  2.) Otherwise BP well controlled on one combo pill so really can see me as needed    Thank you for allowing me to participate in the care of your patient, please do not hesitate to call with questions or concerns.       [address],    Jorje Donald, DO

## 2022-07-29 NOTE — PROGRESS NOTES
Rosemary Ferreira presents today for   Chief Complaint   Patient presents with    Follow-up     1 year follow up       Rosemary Ferreira preferred language for health care discussion is english/other. Is someone accompanying this pt? no    Is the patient using any DME equipment during [address]? no    Depression Screening:  3 most recent PHQ Screens 7/29/2022   Little interest or pleasure in doing things Not at all   Feeling down, depressed, irritable, or hopeless Not at all   Total Score PHQ 2 0       Learning Assessment:  Learning Assessment 7/29/2022   PRIMARY LEARNER Patient   HIGHEST LEVEL OF EDUCATION - PRIMARY LEARNER  -   BARRIERS PRIMARY LEARNER -   [facility]    NEED -   LEARNER PREFERENCE PRIMARY DEMONSTRATION     -     -     -     -   ANSWERED BY patient   RELATIONSHIP SELF       Abuse Screening:  Abuse Screening Questionnaire 7/29/2022   Do you ever feel afraid of your partner? N   Are you in a relationship with someone who physically or mentally threatens you? N   Is it safe for you to go home? Y       Fall Risk  Fall Risk Assessment, last 12 mths 7/29/2022   Able to walk? Yes   Fall in past 12 months? 0   Do you feel unsteady? 0   Are you worried about falling 0           Pt currently taking Anticoagulant therapy? no    Pt currently taking Antiplatelet therapy ? no      Coordination of Care:  1. Have you been to the ER, urgent care clinic since your last visit? Hospitalized since your last visit? no    2. Have you seen or consulted any other health care providers outside of the [address] [place] [address] since your last visit? Include any pap smears or colon screening.  no

## 2022-07-29 NOTE — LETTER
7/29/2022    Patient: Pao Lerner   YOB: 1955   Date of Visit: 7/29/2022     Cele Allen MD  [id number]  [phone]  Lexy Sourav    Dear Cele Allen MD,      Thank you for referring Ms. Ac Somers to [facility] for evaluation. My notes for this consultation are attached. If you have questions, please do not hesitate to call me. I look forward to following your patient along with you.       Sincerely,    Ana Cortes, DO

## 2022-08-09 DIAGNOSIS — D64.9 ANEMIA, UNSPECIFIED TYPE: ICD-10-CM

## 2022-08-09 DIAGNOSIS — D46.9 MYELODYSPLASTIC SYNDROME (HCC): ICD-10-CM

## 2022-08-09 DIAGNOSIS — D46.4 REFRACTORY ANEMIA (HCC): ICD-10-CM

## 2022-08-09 DIAGNOSIS — D46.9 MDS (MYELODYSPLASTIC SYNDROME) (HCC): ICD-10-CM

## 2022-08-10 ENCOUNTER — LAB ONLY (OUTPATIENT)
Dept: ONCOLOGY | Age: 67
End: 2022-08-10

## 2022-08-10 DIAGNOSIS — D64.9 ANEMIA, UNSPECIFIED TYPE: ICD-10-CM

## 2022-08-10 DIAGNOSIS — D46.9 MYELODYSPLASTIC SYNDROME (HCC): ICD-10-CM

## 2022-08-10 DIAGNOSIS — D46.4 REFRACTORY ANEMIA (HCC): Primary | ICD-10-CM

## 2022-08-10 DIAGNOSIS — D46.9 MDS (MYELODYSPLASTIC SYNDROME) (HCC): ICD-10-CM

## 2022-08-11 LAB
ALBUMIN SERPL-MCNC: 4.4 G/DL (ref 3.8–4.8)
ALBUMIN/GLOB SERPL: 1.3 {RATIO} (ref 1.2–2.2)
ALP SERPL-CCNC: 88 IU/L (ref 44–121)
ALT SERPL-CCNC: 17 IU/L (ref 0–32)
AST SERPL-CCNC: 23 IU/L (ref 0–40)
BASOPHILS # BLD AUTO: 0.1 X10E3/UL (ref 0–0.2)
BASOPHILS NFR BLD AUTO: 2 %
BILIRUB SERPL-MCNC: 0.5 MG/DL (ref 0–1.2)
BUN SERPL-MCNC: 15 MG/DL (ref 8–27)
BUN/CREAT SERPL: 18 (ref 12–28)
CALCIUM SERPL-MCNC: 9.5 MG/DL (ref 8.7–10.3)
CHLORIDE SERPL-SCNC: 102 MMOL/L (ref 96–106)
CO2 SERPL-SCNC: 26 MMOL/L (ref 20–29)
CREAT SERPL-MCNC: 0.84 MG/DL (ref 0.57–1)
EGFR: 77 ML/MIN/1.73
EOSINOPHIL # BLD AUTO: 0.2 X10E3/UL (ref 0–0.4)
EOSINOPHIL NFR BLD AUTO: 3 %
ERYTHROCYTE [DISTWIDTH] IN BLOOD BY AUTOMATED COUNT: 13.8 % (ref 11.7–15.4)
FERRITIN SERPL-MCNC: 178 NG/ML (ref 15–150)
GLOBULIN SER CALC-MCNC: 3.3 G/DL (ref 1.5–4.5)
GLUCOSE SERPL-MCNC: 82 MG/DL (ref 65–99)
HCT VFR BLD AUTO: 30.3 % (ref 34–46.6)
HGB BLD-MCNC: 9.8 G/DL (ref 11.1–15.9)
IMM GRANULOCYTES # BLD AUTO: 0 X10E3/UL (ref 0–0.1)
IMM GRANULOCYTES NFR BLD AUTO: 0 %
IRON SATN MFR SERPL: 14 % (ref 15–55)
IRON SERPL-MCNC: 36 UG/DL (ref 27–139)
LYMPHOCYTES # BLD AUTO: 1.6 X10E3/UL (ref 0.7–3.1)
LYMPHOCYTES NFR BLD AUTO: 27 %
MCH RBC QN AUTO: 29.9 PG (ref 26.6–33)
MCHC RBC AUTO-ENTMCNC: 32.3 G/DL (ref 31.5–35.7)
MCV RBC AUTO: 92 FL (ref 79–97)
MONOCYTES # BLD AUTO: 0.5 X10E3/UL (ref 0.1–0.9)
MONOCYTES NFR BLD AUTO: 8 %
NEUTROPHILS # BLD AUTO: 3.5 X10E3/UL (ref 1.4–7)
NEUTROPHILS NFR BLD AUTO: 60 %
PLATELET # BLD AUTO: 298 X10E3/UL (ref 150–450)
POTASSIUM SERPL-SCNC: 3.9 MMOL/L (ref 3.5–5.2)
PROT SERPL-MCNC: 7.7 G/DL (ref 6–8.5)
RBC # BLD AUTO: 3.28 X10E6/UL (ref 3.77–5.28)
SODIUM SERPL-SCNC: 143 MMOL/L (ref 134–144)
TIBC SERPL-MCNC: 254 UG/DL (ref 250–450)
UIBC SERPL-MCNC: 218 UG/DL (ref 118–369)
WBC # BLD AUTO: 5.8 X10E3/UL (ref 3.4–10.8)

## 2022-08-25 ENCOUNTER — HOSPITAL ENCOUNTER (OUTPATIENT)
Dept: LAB | Age: 67
Discharge: HOME | End: 2022-08-25

## 2022-08-25 ENCOUNTER — OFFICE VISIT (OUTPATIENT)
Dept: ONCOLOGY | Age: 67
End: 2022-08-25
Payer: COMMERCIAL

## 2022-08-25 VITALS
HEIGHT: 63 IN | DIASTOLIC BLOOD PRESSURE: 78 MMHG | RESPIRATION RATE: 18 BRPM | WEIGHT: 181 LBS | BODY MASS INDEX: 32.07 KG/M2 | SYSTOLIC BLOOD PRESSURE: 126 MMHG | HEART RATE: 62 BPM

## 2022-08-25 DIAGNOSIS — D46.4 REFRACTORY ANEMIA (HCC): Primary | ICD-10-CM

## 2022-08-25 DIAGNOSIS — D64.9 ANEMIA, UNSPECIFIED TYPE: ICD-10-CM

## 2022-08-25 LAB — XX-LABCORP SPECIMEN COL,LCBCF: NORMAL

## 2022-08-25 PROCEDURE — 99001 SPECIMEN HANDLING PT-LAB: CPT

## 2022-08-25 PROCEDURE — 99213 OFFICE O/P EST LOW 20 MIN: CPT | Performed by: INTERNAL MEDICINE

## 2022-08-25 PROCEDURE — 1123F ACP DISCUSS/DSCN MKR DOCD: CPT | Performed by: INTERNAL MEDICINE

## 2022-08-25 RX ORDER — LANOLIN ALCOHOL/MO/W.PET/CERES
325 CREAM (GRAM) TOPICAL 2 TIMES DAILY
Qty: 90 TABLET | Refills: 3 | Status: SHIPPED | OUTPATIENT
Start: 2022-08-25

## 2022-08-25 NOTE — PROGRESS NOTES
Hematology/Oncology  Progress Note    Name: Kelly Cross  Date: 2022  : 1955    PCP: Emily Quintana MD     Ms. Marta Cabrera is a 74y.o. year old female who seen for management of her refractory anemia    Current therapy: Iron supplement, Procrit or Aranesp in the past when her hemoglobin was below 10g/dL and hematocrit is below 30%    Subjective:     Ms. Kelly Cross is a 74 y.o. female who has a history of Myelodysplastic Syndrome/Refractory Anemia. She was diagnosed more than 8 years ago. Today she states she has been doing well since her last office visit. She denies fatigue, shortness of breath, and weakness. She denies fevers, chills, night sweats, unintentional weight loss, skin lumps or bumps, acute bleeding or bruising issues. She denies headaches, acute vision changes, dizziness, chest pains, shortness of breath, palpitation, productive cough, nausea, vomiting, abdominal pain, altered bowel habits, dysuria, new bone pain or back pain, focal numbness or weakness. She reports she is being followed by GI and she reports she is scheduled for Colonoscopy on Oct 29, 2021. She does not have any concerns or complaints to report at this time. Past medical history, family history, and social history: these were reviewed and remains unchanged. Past Medical History:   Diagnosis Date    Echocardiogram 2011    Tech difficult. EF 60-65%. Mild LVH. RVSP 20-25 mmHg.       Essential hypertension     History of colon polyps     Hx of endometriosis     had hyst    Hyperlipidemia     Hypertension     MDS (myelodysplastic syndrome) (HCC)     Refractory anemia (HCC)      Past Surgical History:   Procedure Laterality Date    HX HYSTERECTOMY       Social History     Socioeconomic History    Marital status: SINGLE     Spouse name: Not on file    Number of children: Not on file    Years of education: Not on file    Highest education level: Not on file   Occupational History    Not on file   Tobacco Use Smoking status: Never    Smokeless tobacco: Never   Vaping Use    Vaping Use: Never used   Substance and Sexual Activity    Alcohol use: No    Drug use: No    Sexual activity: Yes   Other Topics Concern    Not on file   Social History Narrative    Not on file     Social Determinants of Health     Financial Resource Strain: Not on file   Food Insecurity: Not on file   Transportation Needs: Not on file   Physical Activity: Not on file   Stress: Not on file   Social Connections: Not on file   Intimate Partner Violence: Not on file   Housing Stability: Not on file     Family History   Problem Relation Age of Onset    Cancer Mother     Arthritis-rheumatoid Sister     Diabetes Sister     Hypertension Sister     No Known Problems Father      Current Outpatient Medications   Medication Sig Dispense Refill    ferrous sulfate 325 mg (65 mg iron) tablet Take 1 Tablet by mouth two (2) times a day. 90 Tablet 3    valACYclovir (VALTREX) 500 mg tablet Take 1 tablet by mouth twice daily 30 Tablet 12    losartan-hydroCHLOROthiazide (HYZAAR) 100-12.5 mg per tablet Take 1 tablet by mouth once daily 90 Tablet 4    simvastatin (ZOCOR) 20 mg tablet TAKE 1 TABLET BY MOUTH ONCE DAILY AT BEDTIME 90 Tablet 4    Creon 36,000-114,000- 180,000 unit cpDR capsule TAKE TWO CAPSULES BY MOUTH THREE TIMES DAILY WITH MEALS. (TAKE ONE CAPSULE AFTER TWO BITES AND ONE IN THE MIDDLE OF THE MEAL) AND ONE CAPSULE WITH SNACK (IN THE MIDDLE OF THE SNACK)      clindamycin (CLINDAGEL) 1 % topical gel APPLY TO AFFECTED AREA TWICE DAILY FOR 10 DAYS - USE THIN FILM ON AFFECTED AREA      mometasone (ELOCON) 0.1 % topical cream Apply  to affected area daily. 15 g 0    triamcinolone acetonide (KENALOG) 0.1 % topical cream Apply  to affected area two (2) times a day. 45 g 0    diclofenac EC (VOLTAREN) 75 mg EC tablet TAKE 1 TABLET BY MOUTH ONCE DAILY AS NEEDED WITH FOOD      polyethylene glycol (MIRALAX) 17 gram packet Take 1 Packet by mouth daily.  517 g 12 diclofenac (VOLTAREN) 1 % gel Apply 4 g to affected area four (4) times daily. 400 g 12    latanoprost (XALATAN) 0.005 % ophthalmic solution Administer 1 Drop to both eyes nightly. Review of Systems   Constitutional: Negative. HENT: Negative. Eyes: Negative. Respiratory: Negative. Cardiovascular: Negative. Gastrointestinal:  Positive for constipation. Genitourinary: Negative. Musculoskeletal: Negative. Skin: Negative. Neurological: Negative. Endo/Heme/Allergies: Negative. Psychiatric/Behavioral: Negative. Objective:     Visit Vitals  /78   Pulse 62   Resp 18   Ht 5' 3\" (1.6 m)   Wt 82.1 kg (181 lb)   LMP 08/31/1998   BMI 32.06 kg/m²     ECOG PS0  Physical Exam  HENT:      Head: Normocephalic. Eyes:      Pupils: Pupils are equal, round, and reactive to light. Cardiovascular:      Rate and Rhythm: Normal rate. Pulmonary:      Effort: Pulmonary effort is normal.   Abdominal:      Palpations: Abdomen is soft. Musculoskeletal:         General: Normal range of motion. Cervical back: Normal range of motion. Skin:     General: Skin is warm. Neurological:      Mental Status: She is alert and oriented to person, place, and time. Psychiatric:         Mood and Affect: Mood normal.         Behavior: Behavior normal.         Thought Content:  Thought content normal.         Judgment: Judgment normal.       Lab data:      Results for orders placed or performed during the hospital encounter of 12/02/19   CBC WITH 3 PART DIFF     Status: Abnormal   Result Value Ref Range Status    WBC 5.3 4.5 - 13.0 K/uL Final    RBC 3.41 (L) 4.10 - 5.10 M/uL Final    HGB 10.0 (L) 12.0 - 16 g/dL Final    HCT 31.1 (L) 36 - 48 % Final    MCV 91.2 78 - 102 FL Final    MCH 29.3 25.0 - 35.0 PG Final    MCHC 32.2 31 - 37 g/dL Final    RDW 12.9 11.5 - 14.5 % Final    PLATELET 434 314 - 476 K/uL Final    NEUTROPHILS 59 40 - 70 % Final    Mixed cells 7 0.1 - 17 % Final    LYMPHOCYTES 34 14 - 44 % Final    ABS. NEUTROPHILS 3.1 1.8 - 9.5 K/UL Final    ABS. MIXED CELLS 0.4 0.0 - 2.3 K/uL Final    ABS. LYMPHOCYTES 1.8 1.1 - 5.9 K/UL Final     Comment: Test performed at [address] [place] [address] or Outpatient Infusion Center Location. Reviewed by Medical Director. DF AUTOMATED   Final     Lab Results   Component Value Date/Time    Sodium 143 08/10/2022 08:59 AM    Potassium 3.9 08/10/2022 08:59 AM    Chloride 102 08/10/2022 08:59 AM    CO2 26 08/10/2022 08:59 AM    Anion gap 5 11/30/2009 10:10 AM    Glucose 82 08/10/2022 08:59 AM    BUN 15 08/10/2022 08:59 AM    Creatinine 0.84 08/10/2022 08:59 AM    BUN/Creatinine ratio 18 08/10/2022 08:59 AM    GFR est AA 75 02/05/2022 12:00 AM    GFR est non-AA 65 02/05/2022 12:00 AM    Calcium 9.5 08/10/2022 08:59 AM    Bilirubin, total 0.5 08/10/2022 08:59 AM    Alk. phosphatase 88 08/10/2022 08:59 AM    Protein, total 7.7 08/10/2022 08:59 AM    Albumin 4.4 08/10/2022 08:59 AM    A-G Ratio 1.3 08/10/2022 08:59 AM    ALT (SGPT) 17 08/10/2022 08:59 AM    AST (SGOT) 23 08/10/2022 08:59 AM     Lab Results   Component Value Date/Time    Iron 36 08/10/2022 08:59 AM    TIBC 254 08/10/2022 08:59 AM    Iron % saturation 14 (L) 08/10/2022 08:59 AM    Ferritin 178 (H) 08/10/2022 08:59 AM         Assessment:   Myelodysplastic syndrome/ Chronic Anemia  Iron deficiency    Plan:   Refractory Anemia/ Chronic Anemia  Iron deficiency  -- Patient was being followed previously by Dr. Venkata Nuno who retired. She was diagnosed presumably myelodysplastic syndrome/refractory anemia. She was diagnosed more than 8 years ago. 2/4/2009 bone marrow biopsy reported normocellular bone marrow (40%) with trilineage hematopoiesis. No abnormal plasma cells. Slightly increased bone marrow storage iron. No morphology and phenotyping evidence of dysplasia or increase of blasts.   --Therapeutic intervention with Retacrit was recommeded if her hematocrit declines below 30% with a hemoglobin below 10g/dL. The dose of Retacrit will be 60,000 units given subcutaneously every 4 weeks  -- 10/05/2021: CBC showed WBC 6.7K/uL, hemoglobin 10.1g/dL with hematocrit of 31.6%. Platelet 902. Ferritin 251. Transferrin Sat 19%. BUN and creatinine were normal.  -- Her H/H appears to be low for years however she has had a stable WBC, platelets and ANC levels. She most likely has anemia of chronic diseases, less likely MDS. -- 2/5/2022 CBC reported hemoglobin 10.4, hematocrit 32.1%, MCV 89, WBC 6.2, platelet 270,178. Iron saturation 19%, ferritin 223  -- Today I have reviewed with the patient about recent lab reports. 8/10/2022 H/H 9.8/30.3, PLTs 298,000, WBC 5.8K. Iron sat 14%, Iron 36  -- She will resume iron pills. New prescriptions will be provided. -- Continue lab check CBC, CMP, Iron and Ferritin. -- We will see the patient back in clinic in 4 months or sooner if indicated          Orders Placed This Encounter    ferrous sulfate 325 mg (65 mg iron) tablet     Sig: Take 1 Tablet by mouth two (2) times a day. Dispense:  90 Tablet     Refill:  3           Ms. Sandeep Moreno has a reminder for a \"due or due soon\" health maintenance. I have asked that she contact her primary care provider for follow-up on this health maintenance. All of patient's questions answered to their apparent satisfaction. They verbally show understanding and agreement with aforementioned plan. Gilmar Sidhu MD  8/25/2022          Above mentioned total time spent for this encounter with more than 50% of the time spent in face-to-face counseling, discussing on diagnosis and management plan going forward, and co-ordination of care. Parts of this document has been produced using Dragon dictation system. Unrecognized errors in transcription may be present. Please do not hesitate to reach out for any questions or clarifications.       CC: Lesli Rushing MD

## 2022-08-26 LAB
ALBUMIN SERPL-MCNC: 4.5 G/DL (ref 3.8–4.8)
ALBUMIN/GLOB SERPL: 1.2 {RATIO} (ref 1.2–2.2)
ALP SERPL-CCNC: 101 IU/L (ref 44–121)
ALT SERPL-CCNC: 15 IU/L (ref 0–32)
AST SERPL-CCNC: 25 IU/L (ref 0–40)
BILIRUB SERPL-MCNC: 0.5 MG/DL (ref 0–1.2)
BUN SERPL-MCNC: 21 MG/DL (ref 8–27)
BUN/CREAT SERPL: 20 (ref 12–28)
CALCIUM SERPL-MCNC: 10 MG/DL (ref 8.7–10.3)
CHLORIDE SERPL-SCNC: 103 MMOL/L (ref 96–106)
CHOLEST SERPL-MCNC: 198 MG/DL (ref 100–199)
CO2 SERPL-SCNC: 23 MMOL/L (ref 20–29)
CREAT SERPL-MCNC: 1.05 MG/DL (ref 0.57–1)
EGFR: 59 ML/MIN/1.73
GLOBULIN SER CALC-MCNC: 3.8 G/DL (ref 1.5–4.5)
GLUCOSE SERPL-MCNC: 85 MG/DL (ref 65–99)
HDLC SERPL-MCNC: 51 MG/DL
IMP & REVIEW OF LAB RESULTS: NORMAL
INTERPRETATION: NORMAL
LDLC SERPL CALC-MCNC: 129 MG/DL (ref 0–99)
POTASSIUM SERPL-SCNC: 4.2 MMOL/L (ref 3.5–5.2)
PROT SERPL-MCNC: 8.3 G/DL (ref 6–8.5)
SODIUM SERPL-SCNC: 143 MMOL/L (ref 134–144)
TRIGL SERPL-MCNC: 99 MG/DL (ref 0–149)
VLDLC SERPL CALC-MCNC: 18 MG/DL (ref 5–40)

## 2022-08-31 ENCOUNTER — OFFICE VISIT (OUTPATIENT)
Dept: FAMILY MEDICINE CLINIC | Age: 67
End: 2022-08-31
Payer: COMMERCIAL

## 2022-08-31 VITALS
RESPIRATION RATE: 16 BRPM | OXYGEN SATURATION: 97 % | DIASTOLIC BLOOD PRESSURE: 84 MMHG | WEIGHT: 182.4 LBS | BODY MASS INDEX: 32.32 KG/M2 | HEART RATE: 80 BPM | HEIGHT: 63 IN | SYSTOLIC BLOOD PRESSURE: 135 MMHG | TEMPERATURE: 98.1 F

## 2022-08-31 DIAGNOSIS — I10 ESSENTIAL HYPERTENSION: Primary | ICD-10-CM

## 2022-08-31 DIAGNOSIS — M25.511 CHRONIC RIGHT SHOULDER PAIN: ICD-10-CM

## 2022-08-31 DIAGNOSIS — R10.2 PELVIC PAIN: ICD-10-CM

## 2022-08-31 DIAGNOSIS — E78.5 HYPERLIPIDEMIA, UNSPECIFIED HYPERLIPIDEMIA TYPE: ICD-10-CM

## 2022-08-31 DIAGNOSIS — M25.552 LEFT HIP PAIN: ICD-10-CM

## 2022-08-31 DIAGNOSIS — N18.31 STAGE 3A CHRONIC KIDNEY DISEASE (HCC): ICD-10-CM

## 2022-08-31 DIAGNOSIS — G89.29 CHRONIC RIGHT SHOULDER PAIN: ICD-10-CM

## 2022-08-31 PROBLEM — N18.30 CHRONIC RENAL DISEASE, STAGE III (HCC): Status: ACTIVE | Noted: 2022-08-31

## 2022-08-31 PROCEDURE — 99214 OFFICE O/P EST MOD 30 MIN: CPT | Performed by: FAMILY MEDICINE

## 2022-08-31 PROCEDURE — 1123F ACP DISCUSS/DSCN MKR DOCD: CPT | Performed by: FAMILY MEDICINE

## 2022-08-31 NOTE — PROGRESS NOTES
Chief Complaint   Patient presents with    Hypertension    Cholesterol Problem     High chol         Hypertension     Cholesterol Problem      Powell Gaucher is a 74 y.o. female presenting today for    3 months  follow up of hld, htn. Patient had labs on 8/25/22. Labs reviewed in detail with patient     Pt has had f/up appts with cards and hematology. Patient does not need medication refills today. New concerns today: pt c/o R shoulder pain. She has had pain in that shoulder in the past.        Pt c/o L pelvic pain. There is a fam hx of ovarian cancer. She is concerned about this. Pt will have oral surgery next month for a broken tooth. Review of Systems   Constitutional: Negative. HENT: Negative. Respiratory: Negative. Cardiovascular: Negative. All other systems reviewed and are negative. Physical Exam  Vitals and nursing note reviewed. Constitutional:       Appearance: Normal appearance. She is not ill-appearing. HENT:      Head: Normocephalic and atraumatic. Right Ear: External ear normal.      Left Ear: External ear normal.      Nose: Nose normal.      Mouth/Throat:      Mouth: Mucous membranes are moist.   Eyes:      Extraocular Movements: Extraocular movements intact. Conjunctiva/sclera: Conjunctivae normal.   Cardiovascular:      Rate and Rhythm: Normal rate and regular rhythm. Heart sounds: No murmur heard. No friction rub. No gallop. Pulmonary:      Effort: Pulmonary effort is normal.      Breath sounds: Normal breath sounds. No wheezing, rhonchi or rales. Musculoskeletal:         General: Normal range of motion. Cervical back: Normal range of motion. Skin:     General: Skin is warm and dry. Neurological:      Mental Status: She is alert and oriented to person, place, and time.       Coordination: Coordination normal.   Psychiatric:         Mood and Affect: Mood normal.         Behavior: Behavior normal.         Thought Content: Thought content normal.         Judgment: Judgment normal.       Diagnoses and all orders for this visit:    1. Essential hypertension  -     METABOLIC PANEL, COMPREHENSIVE; Future  -     LIPID PANEL; Future  Stable, cont pres tx plan. 2. Hyperlipidemia, unspecified hyperlipidemia type  -     METABOLIC PANEL, COMPREHENSIVE; Future  -     LIPID PANEL; Future    3. Pelvic pain  -     US PELV NON OB W TV; Future    4. Stage 3a chronic kidney disease (HCC)  -     METABOLIC PANEL, COMPREHENSIVE; Future  Ensure good hydration    5. Chronic right shoulder pain  -     REFERRAL TO ORTHOPEDIC SURGERY    6. Left hip pain  -     XR HIP LT W OR WO PELV 2-3 VWS; Future    Follow-up and Dispositions    Return in about 3 months (around 11/30/2022) for high blood pressure, high cholesterol, lab review.

## 2022-08-31 NOTE — PROGRESS NOTES
Riky Daily presents today for   Chief Complaint   Patient presents with    Hypertension    Cholesterol Problem     High chol       Is someone accompanying this pt? no    Is the patient using any DME equipment during OV? no    Depression Screening:  3 most recent PHQ Screens 8/31/2022   Little interest or pleasure in doing things Not at all   Feeling down, depressed, irritable, or hopeless Not at all   Total Score PHQ 2 0       Learning Assessment:  Learning Assessment 7/29/2022   PRIMARY LEARNER Patient   HIGHEST LEVEL OF EDUCATION - PRIMARY LEARNER  -   BARRIERS PRIMARY LEARNER -   [facility]    NEED -   LEARNER PREFERENCE PRIMARY DEMONSTRATION     -     -     -     -   ANSWERED BY patient   RELATIONSHIP SELF       Abuse Screening:  Abuse Screening Questionnaire 7/29/2022   Do you ever feel afraid of your partner? N   Are you in a relationship with someone who physically or mentally threatens you? N   Is it safe for you to go home? Y       Fall Screening  Fall Risk Assessment, last 12 mths 7/29/2022   Able to walk? Yes   Fall in past 12 months? 0   Do you feel unsteady? 0   Are you worried about falling 0       Generalized Anxiety  No flowsheet data found. Health Maintenance Due   Topic Date Due    Shingrix Vaccine Age 49> (1 of 2) Never done    Pneumococcal 65+ years (1 - PCV) Never done    COVID-19 Vaccine (4 - Booster for Moderna series) 03/16/2022   . Health Maintenance reviewed and discussed and ordered per Provider. Coordination of Care  1. Have you been to the ER, urgent care clinic since your last visit? Hospitalized since your last visit? Yes, urgent care    2. Have you seen or consulted any other health care providers outside of the [address] [place] [address] since your last visit? Include any pap smears or colon screening.  no

## 2022-10-13 ENCOUNTER — OFFICE VISIT (OUTPATIENT)
Dept: ORTHOPEDIC SURGERY | Age: 67
End: 2022-10-13
Payer: COMMERCIAL

## 2022-10-13 VITALS — HEIGHT: 63 IN | WEIGHT: 184 LBS | BODY MASS INDEX: 32.6 KG/M2 | TEMPERATURE: 97.3 F

## 2022-10-13 DIAGNOSIS — G89.29 CHRONIC RIGHT SHOULDER PAIN: ICD-10-CM

## 2022-10-13 DIAGNOSIS — M25.511 CHRONIC RIGHT SHOULDER PAIN: ICD-10-CM

## 2022-10-13 DIAGNOSIS — M12.811 ROTATOR CUFF ARTHROPATHY, RIGHT: Primary | ICD-10-CM

## 2022-10-13 PROCEDURE — 99214 OFFICE O/P EST MOD 30 MIN: CPT | Performed by: ORTHOPAEDIC SURGERY

## 2022-10-13 PROCEDURE — 73030 X-RAY EXAM OF SHOULDER: CPT | Performed by: ORTHOPAEDIC SURGERY

## 2022-10-13 PROCEDURE — 20611 DRAIN/INJ JOINT/BURSA W/US: CPT | Performed by: ORTHOPAEDIC SURGERY

## 2022-10-13 PROCEDURE — 1123F ACP DISCUSS/DSCN MKR DOCD: CPT | Performed by: ORTHOPAEDIC SURGERY

## 2022-10-13 RX ORDER — HYDROCODONE BITARTRATE AND ACETAMINOPHEN 5; 325 MG/1; MG/1
TABLET ORAL
COMMUNITY
Start: 2022-09-14

## 2022-10-13 RX ORDER — IBUPROFEN 800 MG/1
TABLET ORAL
COMMUNITY
Start: 2022-09-13

## 2022-10-13 RX ORDER — TRIAMCINOLONE ACETONIDE 40 MG/ML
40 INJECTION, SUSPENSION INTRA-ARTICULAR; INTRAMUSCULAR ONCE
Status: COMPLETED | OUTPATIENT
Start: 2022-10-13 | End: 2022-10-13

## 2022-10-13 RX ADMIN — TRIAMCINOLONE ACETONIDE 40 MG: 40 INJECTION, SUSPENSION INTRA-ARTICULAR; INTRAMUSCULAR at 08:46

## 2022-10-13 NOTE — PROGRESS NOTES
Camilla Moreau  1955   Chief Complaint   Patient presents with    Shoulder Pain     New eval - Right shoulder pain         HISTORY OF PRESENT ILLNESS  Camilla Moreau is a 74 y.o. female who presents today for evaluation of right shoulder pain. She rates her pain 5/10 today. Pain has been present for 6 months. No injuries. She was seen for the right shoulder by myself in 2018 at which time she presented with MRI-documented right rotator cuff tear. She endorses night pain. Has pain wit certain movements. She is currently taking NSAID. Has tried following treatments: Injections:NO; Brace:NO; Therapy:NO; Cane/Crutch:NO       No Known Allergies     Past Medical History:   Diagnosis Date    Echocardiogram 02/08/2011    Tech difficult. EF 60-65%. Mild LVH. RVSP 20-25 mmHg.       Essential hypertension     History of colon polyps     Hx of endometriosis     had hyst    Hyperlipidemia     Hypertension     MDS (myelodysplastic syndrome) (HCC)     Refractory anemia (HCC)       Social History     Socioeconomic History    Marital status: SINGLE     Spouse name: Not on file    Number of children: Not on file    Years of education: Not on file    Highest education level: Not on file   Occupational History    Not on file   Tobacco Use    Smoking status: Never    Smokeless tobacco: Never   Vaping Use    Vaping Use: Never used   Substance and Sexual Activity    Alcohol use: No    Drug use: No    Sexual activity: Yes   Other Topics Concern    Not on file   Social History Narrative    Not on file     Social Determinants of Health     Financial Resource Strain: Not on file   Food Insecurity: Not on file   Transportation Needs: Not on file   Physical Activity: Not on file   Stress: Not on file   Social Connections: Not on file   Intimate Partner Violence: Not on file   Housing Stability: Not on file      Past Surgical History:   Procedure Laterality Date    HX HYSTERECTOMY        Family History   Problem Relation Age of Onset    Cancer Mother     Arthritis-rheumatoid Sister     Diabetes Sister     Hypertension Sister     No Known Problems Father       Current Outpatient Medications   Medication Sig    HYDROcodone-acetaminophen (NORCO) 5-325 mg per tablet     ibuprofen (MOTRIN) 800 mg tablet     ferrous sulfate 325 mg (65 mg iron) tablet Take 1 Tablet by mouth two (2) times a day. valACYclovir (VALTREX) 500 mg tablet Take 1 tablet by mouth twice daily    losartan-hydroCHLOROthiazide (HYZAAR) 100-12.5 mg per tablet Take 1 tablet by mouth once daily    simvastatin (ZOCOR) 20 mg tablet TAKE 1 TABLET BY MOUTH ONCE DAILY AT BEDTIME    Creon 36,000-114,000- 180,000 unit cpDR capsule TAKE TWO CAPSULES BY MOUTH THREE TIMES DAILY WITH MEALS. (TAKE ONE CAPSULE AFTER TWO BITES AND ONE IN THE MIDDLE OF THE MEAL) AND ONE CAPSULE WITH SNACK (IN THE MIDDLE OF THE SNACK)    clindamycin (CLINDAGEL) 1 % topical gel APPLY TO AFFECTED AREA TWICE DAILY FOR 10 DAYS - USE THIN FILM ON AFFECTED AREA    mometasone (ELOCON) 0.1 % topical cream Apply  to affected area daily. triamcinolone acetonide (KENALOG) 0.1 % topical cream Apply  to affected area two (2) times a day. diclofenac EC (VOLTAREN) 75 mg EC tablet TAKE 1 TABLET BY MOUTH ONCE DAILY AS NEEDED WITH FOOD    polyethylene glycol (MIRALAX) 17 gram packet Take 1 Packet by mouth daily. diclofenac (VOLTAREN) 1 % gel Apply 4 g to affected area four (4) times daily. latanoprost (XALATAN) 0.005 % ophthalmic solution Administer 1 Drop to both eyes nightly. No current facility-administered medications for this visit. REVIEW OF SYSTEM   Patient denies: Weight loss, Fever/Chills, HA, Visual changes, Fatigue, Chest pain, SOB, Abdominal pain, N/V/D/C, Blood in stool or urine, Edema. Pertinent positive as above in HPI.  All others were negative    PHYSICAL EXAM:   Visit Vitals  Temp 97.3 °F (36.3 °C) (Temporal)   Ht 5' 3\" (1.6 m)   Wt 184 lb (83.5 kg)   LMP 08/31/1998   BMI 32.59 kg/m² The patient is a well-developed, well-nourished female   in no acute distress. The patient is alert and oriented times three. The patient is alert and oriented times three. Mood and affect are normal.  LYMPHATIC: lymph nodes are not enlarged and are within normal limits  SKIN: normal in color and non tender to palpation. There are no bruises or abrasions noted. NEUROLOGICAL: Motor sensory exam is within normal limits. Reflexes are equal bilaterally. There is normal sensation to pinprick and light touch  MUSCULOSKELETAL:  Examination Right shoulder   Skin Intact   AC joint tenderness -   Biceps tenderness -   Forward flexion/Elevation    Active abduction    Glenohumeral abduction 90   External rotation ROM 90   Internal rotation ROM 70   Apprehension -   Khalifs Relocation -   Jerk -   Load and Shift -   Obriens -   Speeds -   Impingement sign -   Supraspinatus/Empty Can -, 5/5   External Rotation Strength -, 5/5   Lift Off/Belly Press -, 5/5   Neurovascular Intact        PROCEDURE:   Right Shoulder Injection with Ultrasound Guidance    Indication:Right Shoulder pain/swelling    After sterile prep, 6 cc of Xylocaine and 1 cc of Kenalog were injected into the right Shoulder. Intra-bursal Ultrasound images captured using [address] [place] [address] Loop Ultrasound machine using a frequency of 10 MHz with a linear transducer and scanned into patient's chart.            [facility] - [facility]  OFFICE PROCEDURE PROGRESS NOTE        Chart reviewed for the following:  Preet Liang M.D, have reviewed the History, Physical and updated the Allergic reactions for [address] performed immediately prior to start of procedure:  Preet Liang M.D, have performed the following reviews on Deuce Duval prior to the start of the procedure:            * Patient was identified by name and date of birth   * Agreement on procedure being performed was verified  * Risks and Benefits explained to the patient  * Procedure site verified and marked as necessary  * Patient was positioned for comfort  * Needle placement confirmed by ultrasound  * Consent was signed and verified     Time: 8:35 AM     Date of procedure: 10/13/2022    Procedure performed by:  Marysol Desir M.D    Provider assisted by: (see medication administration)    How tolerated by patient: tolerated the procedure well with no complications    Comments: none      IMAGING: XR of the right shoulder with 3 views obtained in the office dated 10/13/2022 was reviewed and read by Dr. Shea Butcher: Proximal migration of the humeral head with degenerative changes of the glenohumeral joint. Type III acromion. MRI of the right shoulder dated 3/22/18 was reviewed and read:   IMPRESSION:  1. Abnormal rotator cuff findings. - Full thickness tear of the anterior portion of the supraspinatus. Underlying supraspinatus tendinosis. - Moderate infraspinatus tendinosis with questionable small partial tear. - Moderate subscapularis tendinosis. 2.  Biceps long head with tendinosis. Superior labral tear. Paralabral cyst  adjacent to the superior labrum. No definite neuropathic changes at this time. 3.  AC joint osteoarthritis. Early developing stages of AC joint  osteoarthrosis. Subacromial spur and AC joint spur. IMPRESSION:      ICD-10-CM ICD-9-CM    1. Rotator cuff arthropathy, right  M12.811 716.81       2. Chronic right shoulder pain  M25.511 719.41 AMB POC XRAY, SHOULDER; COMPLETE, 2+    G89.29 338.29            PLAN:  1. Pt presents today with right shoulder pain with hx of rotator cuff tear demonstrated on MRI from 2018. Today, XR taken in office demonstrates findings c/w cuff tear arthropathy. I would like to try a cortisone injection in the office today. Continue with taking NSAID. Risk factors include: htn  2. No ultrasound exam indicated today  3.  Yes cortisone injection indicated today R SHOULDER   4. No Physical/Occupational Therapy indicated today  5. No diagnostic test indicated today:   6. No durable medical equipment indicated today  7. No referral indicated today   8. No medications indicated today:   9. No Narcotic indicated today      RTC 3 weeks if pain continues      Scribed by Ok Bruno Torres) as dictated by Myriam Schultz MD    I, Dr. Myriam Schultz, confirm that all documentation is accurate.     Myriam Schultz M.D.   [facility]

## 2022-10-13 NOTE — LETTER
NOTIFICATION RETURN TO WORK / SCHOOL    10/13/2022 8:53 AM    Ms[address] [id number]      To Whom It May Concern:    Joyce Ramirez is currently under the care of [address] [place], [place] [address]drea Maier. Patient was treated and evaluated in our office today and can return to work. If there are questions or concerns please have the patient contact our office.         Sincerely,             Avtar Nagel MD

## 2022-12-01 ENCOUNTER — OFFICE VISIT (OUTPATIENT)
Dept: ORTHOPEDIC SURGERY | Age: 67
End: 2022-12-01
Payer: COMMERCIAL

## 2022-12-01 VITALS — WEIGHT: 186 LBS | HEIGHT: 63 IN | BODY MASS INDEX: 32.96 KG/M2 | RESPIRATION RATE: 16 BRPM | TEMPERATURE: 97.8 F

## 2022-12-01 DIAGNOSIS — M12.811 ROTATOR CUFF ARTHROPATHY, RIGHT: Primary | ICD-10-CM

## 2022-12-01 NOTE — PROGRESS NOTES
Maribell Anderson  1955   Chief Complaint   Patient presents with    Shoulder Pain     Rt           HISTORY OF PRESENT ILLNESS  Maribell Anderson is a 74 y.o. female who presents today for reevaluation of right shoulder. Patient rates pain as 0/10 today. She was seen for the right shoulder by myself in 2018 at which time she presented with MRI-documented right rotator cuff tear. She is currently taking NSAID. At last OV on 10/13/2022, patient had a right shoulder cortisone injection which provided some relief. She states her shooting pain at nighttime has improved after the injection. Still notes some stiffness/limited ROM and weakness after the injection but overall pain has improved. Patient denies any fever, chills, chest pain, shortness of breath or calf pain. The remainder of the review of systems is negative. There are no new illness or injuries to report since last seen in the office. There are no changes to medications, allergies, family or social history. PHYSICAL EXAM:   Visit Vitals  Temp 97.8 °F (36.6 °C) (Temporal)   Resp 16   Ht 5' 3\" (1.6 m)   Wt 186 lb (84.4 kg)   LMP 08/31/1998   BMI 32.95 kg/m²     The patient is a well-developed, well-nourished female   in no acute distress. The patient is alert and oriented times three. The patient is alert and oriented times three. Mood and affect are normal.  LYMPHATIC: lymph nodes are not enlarged and are within normal limits  SKIN: normal in color and non tender to palpation. There are no bruises or abrasions noted. NEUROLOGICAL: Motor sensory exam is within normal limits. Reflexes are equal bilaterally.  There is normal sensation to pinprick and light touch  MUSCULOSKELETAL:  Examination Right shoulder   Skin Intact   AC joint tenderness -   Biceps tenderness -   Forward flexion/Elevation ROM 80   Active abduction ROM 80   Glenohumeral abduction 45   External rotation ROM 45   Internal rotation ROM 45   Apprehension -   Khalifs Relocation - Jerk -   Load and Shift -   Obriens -   Speeds -   Impingement sign +   Supraspinatus/Empty Can +   External Rotation Strength -, 5/5   Lift Off/Belly Press -, 5/5   Neurovascular Intact       IMAGING: XR of the right shoulder with 3 views obtained in the office dated 10/13/2022 was reviewed and read by Dr. Norberto Cameron: Proximal migration of the humeral head with degenerative changes of the glenohumeral joint. Type III acromion. MRI of the right shoulder dated 3/22/18 was reviewed and read:   IMPRESSION:  1. Abnormal rotator cuff findings. - Full thickness tear of the anterior portion of the supraspinatus. Underlying supraspinatus tendinosis. - Moderate infraspinatus tendinosis with questionable small partial tear. - Moderate subscapularis tendinosis. 2.  Biceps long head with tendinosis. Superior labral tear. Paralabral cyst  adjacent to the superior labrum. No definite neuropathic changes at this time. 3.  AC joint osteoarthritis. Early developing stages of AC joint  osteoarthrosis. Subacromial spur and AC joint spur. IMPRESSION:      ICD-10-CM ICD-9-CM    1. Rotator cuff arthropathy, right  M12.811 716.81            PLAN:   1. Pt presents today with right shoulder pain with hx of rotator cuff tear demonstrated on MRI from 2018. She has symptoms c/w cuff tear arthropathy and her pain has overall improved with the cortisone injection given at last OV. She can return as needed for repeat injection. We did discuss surgical option of reverse TSA down the line if her symptoms persist or worsen. Risk factors include: htn  2. No ultrasound exam indicated today  3. No cortisone injection indicated today   4. No Physical/Occupational Therapy indicated today  5. No diagnostic test indicated today:   6. No durable medical equipment indicated today  7. No referral indicated today   8. No medications indicated today:   9.  No Narcotic indicated today       RTC prn      Scribed by Galilea Parra ([address]) as dictated by MD XAVI Wright, Dr. Josh Nava, confirm that all documentation is accurate.     Josh Nava M.D.   [facility]

## 2022-12-01 NOTE — LETTER
NOTIFICATION RETURN TO WORK / SCHOOL    12/1/2022 11:24 AM    Ms. [address] [facility] [id number]      To Whom It May Concern:    Xander Irizarry is currently under the care of [address] [place], [place] [address]drea Maier. She was seen in the office today, 12/01/2022. If there are questions or concerns please have the patient contact our office.         Sincerely,      Alicia Schaefer MD

## 2022-12-03 ENCOUNTER — HOSPITAL ENCOUNTER (OUTPATIENT)
Dept: LAB | Age: 67
Discharge: HOME | End: 2022-12-03

## 2022-12-03 LAB — XX-LABCORP SPECIMEN COL,LCBCF: NORMAL

## 2022-12-03 PROCEDURE — 99001 SPECIMEN HANDLING PT-LAB: CPT

## 2022-12-07 ENCOUNTER — OFFICE VISIT (OUTPATIENT)
Dept: FAMILY MEDICINE CLINIC | Age: 67
End: 2022-12-07
Payer: COMMERCIAL

## 2022-12-07 VITALS
SYSTOLIC BLOOD PRESSURE: 130 MMHG | RESPIRATION RATE: 16 BRPM | HEART RATE: 76 BPM | BODY MASS INDEX: 32.6 KG/M2 | TEMPERATURE: 97.9 F | OXYGEN SATURATION: 97 % | WEIGHT: 184 LBS | HEIGHT: 63 IN | DIASTOLIC BLOOD PRESSURE: 85 MMHG

## 2022-12-07 DIAGNOSIS — G89.29 CHRONIC RIGHT SHOULDER PAIN: ICD-10-CM

## 2022-12-07 DIAGNOSIS — I10 ESSENTIAL HYPERTENSION: Primary | ICD-10-CM

## 2022-12-07 DIAGNOSIS — M25.511 CHRONIC RIGHT SHOULDER PAIN: ICD-10-CM

## 2022-12-07 DIAGNOSIS — E78.5 HYPERLIPIDEMIA, UNSPECIFIED HYPERLIPIDEMIA TYPE: ICD-10-CM

## 2022-12-07 DIAGNOSIS — Z23 NEEDS FLU SHOT: ICD-10-CM

## 2022-12-07 PROCEDURE — 1123F ACP DISCUSS/DSCN MKR DOCD: CPT | Performed by: FAMILY MEDICINE

## 2022-12-07 PROCEDURE — 99214 OFFICE O/P EST MOD 30 MIN: CPT | Performed by: FAMILY MEDICINE

## 2022-12-07 PROCEDURE — 3078F DIAST BP <80 MM HG: CPT | Performed by: FAMILY MEDICINE

## 2022-12-07 PROCEDURE — 3074F SYST BP LT 130 MM HG: CPT | Performed by: FAMILY MEDICINE

## 2022-12-07 PROCEDURE — 90694 VACC AIIV4 NO PRSRV 0.5ML IM: CPT | Performed by: FAMILY MEDICINE

## 2022-12-07 NOTE — PROGRESS NOTES
Chief Complaint   Patient presents with    Cholesterol Problem    Hypertension    Labs         HPI    Dakotah Sherman is a 74 y.o. female presenting today for 3 months  follow up of htn, hld. Pt was seen by ortho and dx with rotator cuff arthropathy. She had a steroid injection and notes it did improve somewhat but she still has some discomfort. She prefers to not pursue surgery until after she retires. Patient had labs on 12/3/22. Labs reviewed in detail with patient     Pt has not yet done the hip xr. Patient does not need medication refills today. New concerns today: none      Review of Systems   Constitutional: Negative. HENT: Negative. Respiratory: Negative. Cardiovascular: Negative. All other systems reviewed and are negative. Physical Exam  Vitals and nursing note reviewed. Constitutional:       Appearance: Normal appearance. She is not ill-appearing. HENT:      Head: Normocephalic and atraumatic. Right Ear: External ear normal.      Left Ear: External ear normal.      Nose: Nose normal.      Mouth/Throat:      Mouth: Mucous membranes are moist.   Eyes:      Extraocular Movements: Extraocular movements intact. Conjunctiva/sclera: Conjunctivae normal.   Cardiovascular:      Rate and Rhythm: Normal rate and regular rhythm. Heart sounds: No murmur heard. No friction rub. No gallop. Pulmonary:      Effort: Pulmonary effort is normal.      Breath sounds: Normal breath sounds. No wheezing, rhonchi or rales. Musculoskeletal:         General: Normal range of motion. Cervical back: Normal range of motion. Skin:     General: Skin is warm and dry. Neurological:      Mental Status: She is alert and oriented to person, place, and time. Coordination: Coordination normal.   Psychiatric:         Mood and Affect: Mood normal.         Behavior: Behavior normal.         Thought Content:  Thought content normal.         Judgment: Judgment normal. Diagnoses and all orders for this visit:    1. Essential hypertension  Stable, cont pres tx plan. 2. Hyperlipidemia, unspecified hyperlipidemia type  Stable, cont pres tx plan. 3. Chronic right shoulder pain  Care as per ortho    4. Needs flu shot  -     INFLUENZA, FLUAD, (AGE 72 Y+), IM, PF, 0.5 ML    Follow-up and Dispositions    Return in about 3 months (around 3/7/2023) for high blood pressure, high cholesterol.

## 2022-12-07 NOTE — PROGRESS NOTES
Castillo Rogers presents today for   Chief Complaint   Patient presents with    Cholesterol Problem    Hypertension    Labs       Is someone accompanying this pt? no    Is the patient using any DME equipment during OV? no    Depression Screening:  3 most recent PHQ Screens 12/7/2022   Little interest or pleasure in doing things Not at all   Feeling down, depressed, irritable, or hopeless Not at all   Total Score PHQ 2 0       Learning Assessment:  Learning Assessment 7/29/2022   PRIMARY LEARNER Patient   HIGHEST LEVEL OF EDUCATION - PRIMARY LEARNER  -   BARRIERS PRIMARY LEARNER -   [facility]    NEED -   LEARNER PREFERENCE PRIMARY DEMONSTRATION     -     -     -     -   ANSWERED BY patient   RELATIONSHIP SELF       Fall Risk  Fall Risk Assessment, last 12 mths 7/29/2022   Able to walk? Yes   Fall in past 12 months? 0   Do you feel unsteady? 0   Are you worried about falling 0       ADL  ADL Assessment 2/24/2022   Feeding yourself No Help Needed   Getting from bed to chair No Help Needed   Getting dressed No Help Needed   Bathing or showering No Help Needed   Walk across the room (includes cane/walker) No Help Needed   Using the telphone No Help Needed   Taking your medications No Help Needed   Preparing meals No Help Needed   Managing money (expenses/bills) No Help Needed   Moderately strenuous housework (laundry) No Help Needed   Shopping for personal items (toiletries/medicines) No Help Needed   Shopping for groceries No Help Needed   Driving No Help Needed   Climbing a flight of stairs No Help Needed   Getting to places beyond walking distances No Help Needed       Travel Screening:    Travel Screening       Question Response    In the last 10 days, have you been in contact with someone who was confirmed or suspected to have Coronavirus/COVID-19? No / Unsure    Have you had a COVID-19 viral test in the last 10 days?  No    Do you have any of the following new or worsening symptoms? None of these    Have you traveled internationally or domestically in the last month? No          Travel History   Travel since 11/07/22    No documented travel since 11/07/22         Health Maintenance reviewed and discussed and ordered per Provider. Health Maintenance Due   Topic Date Due    Shingrix Vaccine Age 49> (1 of 2) Never done    COVID-19 Vaccine (4 - Booster for Moderna series) 01/11/2022    Pneumococcal 65+ years (1 - PCV) Never done    Flu Vaccine (1) 08/01/2022   . Coordination of Care:  1. Have you been to the ER, urgent care clinic since your last visit? Hospitalized since your last visit? no    2. Have you seen or consulted any other health care providers outside of the [address] [place] [address] since your last visit? Include any pap smears or colon screening.  no

## 2022-12-07 NOTE — PROGRESS NOTES
Obtained consent from patient. Per verbal order from Dr. Donald Mo Injection of FLU high Dose  administered. Verified by me  that this is the correct immunization/injection. Patient observed for 15 minutes with no adverse reaction.

## 2022-12-16 DIAGNOSIS — D46.4 REFRACTORY ANEMIA (HCC): ICD-10-CM

## 2022-12-16 DIAGNOSIS — D46.4 REFRACTORY ANEMIA (HCC): Primary | ICD-10-CM

## 2022-12-17 DIAGNOSIS — D46.4 REFRACTORY ANEMIA (HCC): ICD-10-CM

## 2022-12-21 ENCOUNTER — APPOINTMENT (OUTPATIENT)
Dept: ONCOLOGY | Age: 67
End: 2022-12-21
Payer: COMMERCIAL

## 2022-12-21 ENCOUNTER — HOSPITAL ENCOUNTER (OUTPATIENT)
Dept: GENERAL RADIOLOGY | Age: 67
Discharge: HOME | End: 2022-12-21
Payer: COMMERCIAL

## 2022-12-21 DIAGNOSIS — M25.552 LEFT HIP PAIN: ICD-10-CM

## 2022-12-21 PROCEDURE — 73502 X-RAY EXAM HIP UNI 2-3 VIEWS: CPT

## 2022-12-22 LAB
BASOPHILS # BLD AUTO: 0.1 X10E3/UL (ref 0–0.2)
BASOPHILS NFR BLD AUTO: 2 %
EOSINOPHIL # BLD AUTO: 0.2 X10E3/UL (ref 0–0.4)
EOSINOPHIL NFR BLD AUTO: 3 %
ERYTHROCYTE [DISTWIDTH] IN BLOOD BY AUTOMATED COUNT: 13.4 % (ref 11.7–15.4)
FERRITIN SERPL-MCNC: 199 NG/ML (ref 15–150)
HCT VFR BLD AUTO: 31.1 % (ref 34–46.6)
HGB BLD-MCNC: 10.5 G/DL (ref 11.1–15.9)
IMM GRANULOCYTES # BLD AUTO: 0 X10E3/UL (ref 0–0.1)
IMM GRANULOCYTES NFR BLD AUTO: 0 %
IRON SATN MFR SERPL: 20 % (ref 15–55)
IRON SERPL-MCNC: 55 UG/DL (ref 27–139)
LYMPHOCYTES # BLD AUTO: 1.9 X10E3/UL (ref 0.7–3.1)
LYMPHOCYTES NFR BLD AUTO: 28 %
MCH RBC QN AUTO: 30.6 PG (ref 26.6–33)
MCHC RBC AUTO-ENTMCNC: 33.8 G/DL (ref 31.5–35.7)
MCV RBC AUTO: 91 FL (ref 79–97)
MONOCYTES # BLD AUTO: 0.5 X10E3/UL (ref 0.1–0.9)
MONOCYTES NFR BLD AUTO: 8 %
NEUTROPHILS # BLD AUTO: 4.1 X10E3/UL (ref 1.4–7)
NEUTROPHILS NFR BLD AUTO: 59 %
PLATELET # BLD AUTO: 319 X10E3/UL (ref 150–450)
RBC # BLD AUTO: 3.43 X10E6/UL (ref 3.77–5.28)
TIBC SERPL-MCNC: 281 UG/DL (ref 250–450)
UIBC SERPL-MCNC: 226 UG/DL (ref 118–369)
WBC # BLD AUTO: 6.8 X10E3/UL (ref 3.4–10.8)

## 2022-12-28 ENCOUNTER — VIRTUAL VISIT (OUTPATIENT)
Dept: ONCOLOGY | Age: 67
End: 2022-12-28
Payer: COMMERCIAL

## 2022-12-28 DIAGNOSIS — D64.9 ANEMIA, UNSPECIFIED TYPE: ICD-10-CM

## 2022-12-28 DIAGNOSIS — E55.9 VITAMIN D DEFICIENCY: ICD-10-CM

## 2022-12-28 DIAGNOSIS — D46.4 REFRACTORY ANEMIA (HCC): ICD-10-CM

## 2022-12-28 DIAGNOSIS — D46.9 MDS (MYELODYSPLASTIC SYNDROME) (HCC): ICD-10-CM

## 2022-12-28 DIAGNOSIS — D46.4 REFRACTORY ANEMIA (HCC): Primary | ICD-10-CM

## 2022-12-28 DIAGNOSIS — D46.9 MYELODYSPLASTIC SYNDROME (HCC): ICD-10-CM

## 2022-12-28 PROCEDURE — 99442 PR PHYS/QHP TELEPHONE EVALUATION 11-20 MIN: CPT | Performed by: NURSE PRACTITIONER

## 2022-12-28 NOTE — PROGRESS NOTES
Mary Alice Guzman is a 74 y.o. female, evaluated via audio-only technology on 12/28/2022 for No chief complaint on file. .    Assessment & Plan:   Refractory Anemia/ Chronic Anemia  Iron deficiency  -- Patient was being followed previously by Dr. Gibran Ying who retired. She was diagnosed presumably myelodysplastic syndrome/refractory anemia. She was diagnosed more than 8 years ago. 2/4/2009 bone marrow biopsy reported normocellular bone marrow (40%) with trilineage hematopoiesis. No abnormal plasma cells. Slightly increased bone marrow storage iron. No morphology and phenotyping evidence of dysplasia or increase of blasts. --Therapeutic intervention with Retacrit was recommeded if her hematocrit declines below 30% with a hemoglobin below 10g/dL. The dose of Retacrit will be 60,000 units given subcutaneously every 4 weeks  -- 10/05/2021: CBC showed WBC 6.7K/uL, hemoglobin 10.1g/dL with hematocrit of 31.6%. Platelet 624. Ferritin 251. Transferrin Sat 19%. BUN and creatinine were normal.  -- Her H/H appears to be low for years however she has had a stable WBC, platelets and ANC levels. She most likely has anemia of chronic diseases, less likely MDS. --  Today I have reviewed with the patient about recent lab reports. 12/21/2022  H/H 10.5/31.1, PLTs 319,000, WBC 6.8K. Iron sat 20%, Iron 55  -- She has been advise to continue oral iron pills daily   -- Continue lab check CBC, CMP, Iron and Ferritin. -- We will see the patient back in clinic in 4 months or sooner if indicated      Subjective:   Ms. Mary Alice Guzman is a 74 y.o. female who has a history of Myelodysplastic Syndrome/Refractory Anemia. She was diagnosed more than 8 years ago. Today denies fatigue, shortness of breath, and weakness. She denies fevers, chills, night sweats, unintentional weight loss, skin lumps or bumps, acute bleeding or bruising issues.  She denies headaches, acute vision changes, dizziness, chest pains, shortness of breath, palpitation, productive cough, nausea, vomiting, abdominal pain, altered bowel habits, dysuria, new bone pain or back pain, focal numbness or weakness. She reports she is being followed by GI. She does not have any concerns or complaints to report at this time. Prior to Admission medications    Medication Sig Start Date End Date Taking? Authorizing Provider   HYDROcodone-acetaminophen [facility]) 5-325 mg per tablet  9/14/22   Provider, Historical   ibuprofen (MOTRIN) 800 mg tablet  9/13/22   Provider, Historical   ferrous sulfate 325 mg (65 mg iron) tablet Take 1 Tablet by mouth two (2) times a day. 8/25/22   Arianna Moody MD   valACYclovir (VALTREX) 500 mg tablet Take 1 tablet by mouth twice daily 7/29/22   tSefani Mitchell MD   losartan-hydroCHLOROthiazide [facility]) 100-12.5 mg per tablet Take 1 tablet by mouth once daily 5/25/22   Stefani Mitchell MD   simvastatin (ZOCOR) 20 mg tablet TAKE 1 TABLET BY MOUTH ONCE DAILY AT BEDTIME 5/25/22   Stefani Mitchell MD   Creon 36,000-114,000- 180,000 unit cpDR capsule TAKE TWO CAPSULES BY MOUTH THREE TIMES DAILY WITH MEALS. (TAKE ONE CAPSULE AFTER TWO BITES AND ONE IN THE MIDDLE OF THE MEAL) AND ONE CAPSULE WITH SNACK (IN THE MIDDLE OF THE SNACK) 4/14/22   Provider, Historical   clindamycin (CLINDAGEL) 1 % topical gel APPLY TO AFFECTED AREA TWICE DAILY FOR 10 DAYS - USE THIN FILM ON AFFECTED AREA 4/22/22   Provider, Historical   mometasone (ELOCON) 0.1 % topical cream Apply  to affected area daily. 7/12/21   Kerry Dubon MD   triamcinolone acetonide (KENALOG) 0.1 % topical cream Apply  to affected area two (2) times a day. 5/21/21   Stefani Mitchell MD   diclofenac EC (VOLTAREN) 75 mg EC tablet TAKE 1 TABLET BY MOUTH ONCE DAILY AS NEEDED WITH FOOD 5/18/20   Provider, Historical   polyethylene glycol (MIRALAX) 17 gram packet Take 1 Packet by mouth daily. 7/21/20   Stefani Mitchell MD   diclofenac (VOLTAREN) 1 % gel Apply 4 g to affected area four (4) times daily.  1/30/19   Trevor Ninoska Tyler MD   latanoprost (XALATAN) 0.005 % ophthalmic solution Administer 1 Drop to both eyes nightly. Provider, Historical         Review of Systems   Constitutional: Negative. HENT: Negative. Eyes: Negative. Respiratory: Negative. Cardiovascular: Negative. Gastrointestinal: Negative. Genitourinary: Negative. Musculoskeletal: Negative. Skin: Negative. Neurological: Negative. Endo/Heme/Allergies: Negative. Psychiatric/Behavioral: Negative. Patient-Reported Systolic (Top): 773  Patient-Reported Diastolic (Bottom): 87  Patient-Reported Weight: 183lb       Adin Gaucher was evaluated through a patient-initiated, synchronous (real-time) audio only encounter. She (or guardian if applicable) is aware that it is a billable service, which includes applicable co-pays, with coverage as determined by her insurance carrier. This visit was conducted with the patient's (and/or Molly Elliott guardian's) verbal consent. She has not had a related appointment within my department in the past 7 days or scheduled within the next 24 hours. The patient was located in a state where the provider was licensed to provide care.       Total Time: minutes: 11-20 minutes    Gianfranco Kowalski DNP

## 2022-12-29 DIAGNOSIS — D46.9 MDS (MYELODYSPLASTIC SYNDROME) (HCC): ICD-10-CM

## 2022-12-29 DIAGNOSIS — D46.4 REFRACTORY ANEMIA (HCC): ICD-10-CM

## 2022-12-29 DIAGNOSIS — D64.9 ANEMIA, UNSPECIFIED TYPE: ICD-10-CM

## 2022-12-29 DIAGNOSIS — E55.9 VITAMIN D DEFICIENCY: ICD-10-CM

## 2022-12-29 DIAGNOSIS — D46.9 MYELODYSPLASTIC SYNDROME (HCC): ICD-10-CM

## 2023-02-04 DIAGNOSIS — D46.9 MDS (MYELODYSPLASTIC SYNDROME) (HCC): ICD-10-CM

## 2023-02-04 DIAGNOSIS — D64.9 ANEMIA, UNSPECIFIED TYPE: ICD-10-CM

## 2023-02-04 DIAGNOSIS — D46.4 REFRACTORY ANEMIA (HCC): Primary | ICD-10-CM

## 2023-03-06 ENCOUNTER — TRANSCRIBE ORDERS (OUTPATIENT)
Facility: HOSPITAL | Age: 68
End: 2023-03-06

## 2023-03-06 DIAGNOSIS — Z12.31 VISIT FOR SCREENING MAMMOGRAM: Primary | ICD-10-CM

## 2023-03-06 ASSESSMENT — ENCOUNTER SYMPTOMS: RESPIRATORY NEGATIVE: 1

## 2023-03-07 ENCOUNTER — OFFICE VISIT (OUTPATIENT)
Age: 68
End: 2023-03-07
Payer: COMMERCIAL

## 2023-03-07 ENCOUNTER — HOSPITAL ENCOUNTER (OUTPATIENT)
Facility: HOSPITAL | Age: 68
Setting detail: SPECIMEN
Discharge: HOME | End: 2023-03-10
Payer: COMMERCIAL

## 2023-03-07 VITALS
OXYGEN SATURATION: 99 % | TEMPERATURE: 98.2 F | DIASTOLIC BLOOD PRESSURE: 84 MMHG | WEIGHT: 187.2 LBS | SYSTOLIC BLOOD PRESSURE: 121 MMHG | BODY MASS INDEX: 33.16 KG/M2 | HEART RATE: 76 BPM

## 2023-03-07 DIAGNOSIS — D46.9 MYELODYSPLASTIC SYNDROME, UNSPECIFIED (HCC): ICD-10-CM

## 2023-03-07 DIAGNOSIS — E78.5 HYPERLIPIDEMIA, UNSPECIFIED HYPERLIPIDEMIA TYPE: ICD-10-CM

## 2023-03-07 DIAGNOSIS — N30.01 ACUTE CYSTITIS WITH HEMATURIA: ICD-10-CM

## 2023-03-07 DIAGNOSIS — I10 ESSENTIAL (PRIMARY) HYPERTENSION: Primary | ICD-10-CM

## 2023-03-07 DIAGNOSIS — R35.0 URINARY FREQUENCY: ICD-10-CM

## 2023-03-07 DIAGNOSIS — N18.30 STAGE 3 CHRONIC KIDNEY DISEASE, UNSPECIFIED WHETHER STAGE 3A OR 3B CKD (HCC): ICD-10-CM

## 2023-03-07 LAB
BILIRUBIN, URINE, POC: NEGATIVE
BLOOD URINE, POC: NORMAL
GLUCOSE URINE, POC: NEGATIVE
KETONES, URINE, POC: NEGATIVE
LEUKOCYTE ESTERASE, URINE, POC: NORMAL
NITRITE, URINE, POC: NEGATIVE
PH, URINE, POC: 6.5 (ref 4.6–8)
PROTEIN,URINE, POC: NEGATIVE
SPECIFIC GRAVITY, URINE, POC: 1.01 (ref 1–1.03)
URINALYSIS CLARITY, POC: CLEAR
URINALYSIS COLOR, POC: YELLOW
UROBILINOGEN, POC: NORMAL

## 2023-03-07 PROCEDURE — 81003 URINALYSIS AUTO W/O SCOPE: CPT | Performed by: FAMILY MEDICINE

## 2023-03-07 PROCEDURE — 3079F DIAST BP 80-89 MM HG: CPT | Performed by: FAMILY MEDICINE

## 2023-03-07 PROCEDURE — 99214 OFFICE O/P EST MOD 30 MIN: CPT | Performed by: FAMILY MEDICINE

## 2023-03-07 PROCEDURE — 1123F ACP DISCUSS/DSCN MKR DOCD: CPT | Performed by: FAMILY MEDICINE

## 2023-03-07 PROCEDURE — 87086 URINE CULTURE/COLONY COUNT: CPT

## 2023-03-07 PROCEDURE — 3074F SYST BP LT 130 MM HG: CPT | Performed by: FAMILY MEDICINE

## 2023-03-07 RX ORDER — SULFAMETHOXAZOLE AND TRIMETHOPRIM 800; 160 MG/1; MG/1
1 TABLET ORAL 2 TIMES DAILY
Qty: 6 TABLET | Refills: 0 | Status: SHIPPED | OUTPATIENT
Start: 2023-03-07 | End: 2023-03-10

## 2023-03-07 SDOH — ECONOMIC STABILITY: HOUSING INSECURITY
IN THE LAST 12 MONTHS, WAS THERE A TIME WHEN YOU DID NOT HAVE A STEADY PLACE TO SLEEP OR SLEPT IN A SHELTER (INCLUDING NOW)?: PATIENT REFUSED

## 2023-03-07 SDOH — ECONOMIC STABILITY: FOOD INSECURITY: WITHIN THE PAST 12 MONTHS, THE FOOD YOU BOUGHT JUST DIDN'T LAST AND YOU DIDN'T HAVE MONEY TO GET MORE.: PATIENT DECLINED

## 2023-03-07 SDOH — ECONOMIC STABILITY: INCOME INSECURITY: HOW HARD IS IT FOR YOU TO PAY FOR THE VERY BASICS LIKE FOOD, HOUSING, MEDICAL CARE, AND HEATING?: PATIENT DECLINED

## 2023-03-07 SDOH — ECONOMIC STABILITY: FOOD INSECURITY: WITHIN THE PAST 12 MONTHS, YOU WORRIED THAT YOUR FOOD WOULD RUN OUT BEFORE YOU GOT MONEY TO BUY MORE.: PATIENT DECLINED

## 2023-03-07 ASSESSMENT — PATIENT HEALTH QUESTIONNAIRE - PHQ9
SUM OF ALL RESPONSES TO PHQ QUESTIONS 1-9: 0
2. FEELING DOWN, DEPRESSED OR HOPELESS: 0
SUM OF ALL RESPONSES TO PHQ QUESTIONS 1-9: 0
SUM OF ALL RESPONSES TO PHQ9 QUESTIONS 1 & 2: 0
1. LITTLE INTEREST OR PLEASURE IN DOING THINGS: 0

## 2023-03-07 NOTE — PROGRESS NOTES
Pt here for 3 mo routine for HTN and high cholesterol. Pt reports urinary frequency but no pain. 1. \"Have you been to the ER, urgent care clinic since your last visit? Hospitalized since your last visit? \" No    2. \"Have you seen or consulted any other health care providers outside of the [address] [place] [address] since your last visit? \" No     3. For patients aged 39-70: Has the patient had a colonoscopy / FIT/ Cologuard? Yes - no Care Gap present      If the patient is female:    4. For patients aged 41-77: Has the patient had a mammogram within the past 2 years? Yes - no Care Gap present      5. For patients aged 21-65: Has the patient had a pap smear?  NA - based on age or sex

## 2023-03-07 NOTE — PROGRESS NOTES
ASSESSMENT/PLAN:  1. Essential (primary) hypertension  -     Lipid Panel; Future  -     Comprehensive Metabolic Panel; Future  Stable, cont pres tx plan. 2. Hyperlipidemia, unspecified hyperlipidemia type  -     Lipid Panel; Future  -     Comprehensive Metabolic Panel; Future    3. Urinary frequency  -     AMB POC URINALYSIS DIP STICK AUTO W/O MICRO  4. Acute cystitis with hematuria  -     Culture, Urine; Future  -     sulfamethoxazole-trimethoprim (BACTRIM DS;SEPTRA DS) 800-160 MG per tablet; Take 1 tablet by mouth 2 times daily for 3 days, Disp-6 tablet, R-0Normal    5. Myelodysplastic syndrome, unspecified ([facility] [address]  Monitored by hematology    6. Stage 3 chronic kidney disease, unspecified whether stage 3a or 3b CKD ([facility] [address]  Assessment & Plan:   Well-controlled, continue current treatment plan          Return in about 3 months (around 6/7/2023) for HTN, HLD, lab review. SUBJECTIVE/OBJECTIVE:    Chief Complaint   Patient presents with    Hypertension    Cholesterol Problem           KEMAR Vaughanbindu Keen is a 79 y.o. female presenting today for    3 months  follow up of htn, hld. No recent labs. Hip xr results reviewed. Patient does not need medication refills today. New concerns today: pt c/o increased urinary frequency for over 1 month. She does not get up at all during the night but goes more often during the day than previously. No dysuria, no increased urgency. Review of Systems   Constitutional: Negative. HENT: Negative. Respiratory: Negative. Cardiovascular: Negative. All other systems reviewed and are negative. Physical Exam  Vitals and nursing note reviewed. Constitutional:       General: She is not in acute distress. Appearance: Normal appearance. HENT:      Head: Normocephalic and atraumatic.       Right Ear: External ear normal.      Left Ear: External ear normal.      Nose: Nose normal.      Mouth/Throat:      Mouth: Mucous membranes are moist. Eyes:      Extraocular Movements: Extraocular movements intact. Conjunctiva/sclera: Conjunctivae normal.   Cardiovascular:      Rate and Rhythm: Normal rate and regular rhythm. Heart sounds: No murmur heard. No friction rub. No gallop. Pulmonary:      Effort: Pulmonary effort is normal.      Breath sounds: Normal breath sounds. No wheezing, rhonchi or rales. Musculoskeletal:         General: Normal range of motion. Cervical back: Normal range of motion. Skin:     General: Skin is warm and dry. Neurological:      Mental Status: She is alert and oriented to person, place, and time. Coordination: Coordination normal.   Psychiatric:         Mood and Affect: Mood normal.         Behavior: Behavior normal.         Thought Content: Thought content normal.         Judgment: Judgment normal.                   An electronic signature was used to authenticate this note.     --Jorden Powers MD

## 2023-03-07 NOTE — LETTER
March 7, 2023       Mu Mendez YOB: 1955   [address] Date of Visit:  3/7/2023       To Whom It May Concern:     Kvng Deluna was seen in the office today. She may return to work on 3/8/23. If you have any questions or concerns, please don't hesitate to call.     Sincerely,        Brielle Reese MD

## 2023-03-08 LAB
BACTERIA SPEC CULT: NORMAL
SERVICE CMNT-IMP: NORMAL

## 2023-03-28 DIAGNOSIS — D46.9 MDS (MYELODYSPLASTIC SYNDROME) (HCC): ICD-10-CM

## 2023-03-28 DIAGNOSIS — D46.9 MYELODYSPLASTIC SYNDROME (HCC): ICD-10-CM

## 2023-03-28 DIAGNOSIS — D46.4 REFRACTORY ANEMIA (HCC): ICD-10-CM

## 2023-03-28 DIAGNOSIS — D64.9 ANEMIA, UNSPECIFIED TYPE: ICD-10-CM

## 2023-03-28 DIAGNOSIS — E55.9 VITAMIN D DEFICIENCY: ICD-10-CM

## 2023-03-30 ENCOUNTER — OFFICE VISIT (OUTPATIENT)
Facility: CLINIC | Age: 68
End: 2023-03-30
Payer: COMMERCIAL

## 2023-03-30 VITALS
OXYGEN SATURATION: 98 % | TEMPERATURE: 98 F | WEIGHT: 185 LBS | HEART RATE: 70 BPM | SYSTOLIC BLOOD PRESSURE: 117 MMHG | DIASTOLIC BLOOD PRESSURE: 77 MMHG | BODY MASS INDEX: 32.77 KG/M2

## 2023-03-30 DIAGNOSIS — H11.422 CHEMOSIS OF LEFT CONJUNCTIVA: ICD-10-CM

## 2023-03-30 DIAGNOSIS — H05.20 PROPTOSIS: Primary | ICD-10-CM

## 2023-03-30 PROCEDURE — 3074F SYST BP LT 130 MM HG: CPT | Performed by: FAMILY MEDICINE

## 2023-03-30 PROCEDURE — 3078F DIAST BP <80 MM HG: CPT | Performed by: FAMILY MEDICINE

## 2023-03-30 PROCEDURE — 99213 OFFICE O/P EST LOW 20 MIN: CPT | Performed by: FAMILY MEDICINE

## 2023-03-30 PROCEDURE — 1123F ACP DISCUSS/DSCN MKR DOCD: CPT | Performed by: FAMILY MEDICINE

## 2023-03-30 ASSESSMENT — PATIENT HEALTH QUESTIONNAIRE - PHQ9
SUM OF ALL RESPONSES TO PHQ QUESTIONS 1-9: 0
1. LITTLE INTEREST OR PLEASURE IN DOING THINGS: 0
2. FEELING DOWN, DEPRESSED OR HOPELESS: 0
SUM OF ALL RESPONSES TO PHQ9 QUESTIONS 1 & 2: 0

## 2023-03-30 NOTE — PROGRESS NOTES
Latoya Jain presents today for   Chief Complaint   Patient presents with    Follow-Up from Hospital       Is someone accompanying this pt? no    Is the patient using any DME equipment during OV? no    Depression Screening:  PHQ-9 Questionaire 3/30/2023 3/7/2023 12/7/2022 8/31/2022 7/29/2022 2/24/2022   Little interest or pleasure in doing things 0 0 0 0 0 0   Feeling down, depressed, or hopeless 0 0 0 0 0 0   PHQ-9 Total Score 0 0 0 0 0 0        NETTE 7-Anxiety   No flowsheet data found. Learning Assessment:  No question data found. Fall Risk  No flowsheet data found. Travel Screening:    Travel Screening       Question Response    In the last 10 days, have you been in contact with someone who was confirmed or suspected to have Coronavirus/COVID-19? No / Unsure    Have you had a COVID-19 viral test in the last 10 days? No    Do you have any of the following new or worsening symptoms? None of these    Have you traveled internationally or domestically in the last month? No          Travel History   Travel since 02/28/23    No documented travel since 02/28/23          Health Maintenance reviewed and discussed and ordered per Provider. Social Determinants of Health     Tobacco Use: Low Risk     Smoking Tobacco Use: Never    Smokeless Tobacco Use: Never    Passive Exposure: Not on file   Alcohol Use: Not on file   Financial Resource Strain: Unknown    Difficulty of Paying Living Expenses: Patient refused   Food Insecurity: Unknown    Worried About Running Out of Food in the Last Year: Patient refused    [address] in the Last Year: Patient refused   Transportation Needs: Unknown    Lack of Transportation (Medical):  Not on file    Lack of Transportation (Non-Medical): Patient refused   Physical Activity: Not on file   Stress: Not on file   Social Connections: Not on file   Intimate Partner Violence: Not on file   Depression: Not at risk    PHQ-2 Score: 0   Housing Stability: Unknown    Unable to Pay
An electronic signature was used to authenticate this note.     --Juli Libman, MD

## 2023-03-30 NOTE — LETTER
March 30, 2023       Chantal Torres YOB: 1955 [address] Date of Visit:  3/30/2023       To Whom It May Concern: It is my medical opinion that Ghislaine Becker may return to work on 3/31/23. She was seen by me today, 3/30/23. If you have any questions or concerns, please don't hesitate to call.     Sincerely,        Peggy Angelo MD

## 2023-03-31 ASSESSMENT — ENCOUNTER SYMPTOMS: RESPIRATORY NEGATIVE: 1

## 2023-04-14 ENCOUNTER — NURSE ONLY (OUTPATIENT)
Age: 68
End: 2023-04-14
Payer: COMMERCIAL

## 2023-04-14 ENCOUNTER — HOSPITAL ENCOUNTER (OUTPATIENT)
Facility: HOSPITAL | Age: 68
Setting detail: SPECIMEN
Discharge: HOME | End: 2023-04-17
Payer: COMMERCIAL

## 2023-04-14 DIAGNOSIS — D64.9 ANEMIA, UNSPECIFIED TYPE: ICD-10-CM

## 2023-04-14 DIAGNOSIS — D46.4 REFRACTORY ANEMIA, UNSPECIFIED (HCC): ICD-10-CM

## 2023-04-14 DIAGNOSIS — D46.4 REFRACTORY ANEMIA, UNSPECIFIED (HCC): Primary | ICD-10-CM

## 2023-04-14 LAB
ALBUMIN SERPL-MCNC: 3.9 G/DL (ref 3.4–5)
ALBUMIN/GLOB SERPL: 1 (ref 0.8–1.7)
ALP SERPL-CCNC: 78 U/L (ref 45–117)
ALT SERPL-CCNC: 18 U/L (ref 13–56)
ANION GAP SERPL CALC-SCNC: 5 MMOL/L (ref 3–18)
AST SERPL-CCNC: 20 U/L (ref 10–38)
BASOPHILS # BLD: 0.1 K/UL (ref 0–0.1)
BASOPHILS NFR BLD: 2 % (ref 0–2)
BILIRUB SERPL-MCNC: 0.5 MG/DL (ref 0.2–1)
BUN SERPL-MCNC: 18 MG/DL (ref 7–18)
BUN/CREAT SERPL: 18 (ref 12–20)
CALCIUM SERPL-MCNC: 10.2 MG/DL (ref 8.5–10.1)
CHLORIDE SERPL-SCNC: 106 MMOL/L (ref 100–111)
CO2 SERPL-SCNC: 28 MMOL/L (ref 21–32)
CREAT SERPL-MCNC: 1 MG/DL (ref 0.6–1.3)
DIFFERENTIAL METHOD BLD: ABNORMAL
EOSINOPHIL # BLD: 0.3 K/UL (ref 0–0.4)
EOSINOPHIL NFR BLD: 5 % (ref 0–5)
ERYTHROCYTE [DISTWIDTH] IN BLOOD BY AUTOMATED COUNT: 14.5 % (ref 11.6–14.5)
FERRITIN SERPL-MCNC: 179 NG/ML (ref 8–388)
GLOBULIN SER CALC-MCNC: 3.8 G/DL (ref 2–4)
GLUCOSE SERPL-MCNC: 96 MG/DL (ref 74–99)
HCT VFR BLD AUTO: 30.7 % (ref 35–45)
HGB BLD-MCNC: 9.5 G/DL (ref 12–16)
IMM GRANULOCYTES # BLD AUTO: 0 K/UL (ref 0–0.04)
IMM GRANULOCYTES NFR BLD AUTO: 0 % (ref 0–0.5)
IRON SATN MFR SERPL: 17 % (ref 20–50)
IRON SERPL-MCNC: 49 UG/DL (ref 50–175)
LYMPHOCYTES # BLD: 1.4 K/UL (ref 0.9–3.6)
LYMPHOCYTES NFR BLD: 26 % (ref 21–52)
MCH RBC QN AUTO: 29.9 PG (ref 24–34)
MCHC RBC AUTO-ENTMCNC: 30.9 G/DL (ref 31–37)
MCV RBC AUTO: 96.5 FL (ref 78–100)
MONOCYTES # BLD: 0.6 K/UL (ref 0.05–1.2)
MONOCYTES NFR BLD: 11 % (ref 3–10)
NEUTS SEG # BLD: 3.1 K/UL (ref 1.8–8)
NEUTS SEG NFR BLD: 56 % (ref 40–73)
NRBC # BLD: 0 K/UL (ref 0–0.01)
NRBC BLD-RTO: 0 PER 100 WBC
PLATELET # BLD AUTO: 375 K/UL (ref 135–420)
PMV BLD AUTO: 10.8 FL (ref 9.2–11.8)
POTASSIUM SERPL-SCNC: 3.7 MMOL/L (ref 3.5–5.5)
PROT SERPL-MCNC: 7.7 G/DL (ref 6.4–8.2)
RBC # BLD AUTO: 3.18 M/UL (ref 4.2–5.3)
SODIUM SERPL-SCNC: 139 MMOL/L (ref 136–145)
TIBC SERPL-MCNC: 284 UG/DL (ref 250–450)
WBC # BLD AUTO: 5.5 K/UL (ref 4.6–13.2)

## 2023-04-14 PROCEDURE — 80053 COMPREHEN METABOLIC PANEL: CPT

## 2023-04-14 PROCEDURE — 82728 ASSAY OF FERRITIN: CPT

## 2023-04-14 PROCEDURE — 85025 COMPLETE CBC W/AUTO DIFF WBC: CPT

## 2023-04-14 PROCEDURE — 83540 ASSAY OF IRON: CPT

## 2023-04-14 PROCEDURE — 36415 COLL VENOUS BLD VENIPUNCTURE: CPT

## 2023-04-17 ENCOUNTER — CARE COORDINATION (OUTPATIENT)
Facility: CLINIC | Age: 68
End: 2023-04-17

## 2023-04-17 NOTE — CARE COORDINATION
4/17/2023         3:24 PM    ACM reviewed patient chart and then attempted to call for complex care management. Patient was not available at the time of the call. Left a voice mail message for patient introducing myself, explaining the reason for my call and providing my contact information. Requested that patient return my call at her earliest convenience. Will follow up with patient at a later time.

## 2023-04-19 ENCOUNTER — CARE COORDINATION (OUTPATIENT)
Facility: CLINIC | Age: 68
End: 2023-04-19

## 2023-04-19 NOTE — CARE COORDINATION
4/19/2023         8:54 AM    ACM reviewed patient chart and then attempted to call for complex care management. Patient was not available at the time of the call. Left a voice mail message for patient on home and cell phones introducing myself, explaining the reason for my call and providing my contact information. Requested that patient return my call at her earliest convenience. Letter sent to patient. Will follow up with patient at a later time.

## 2023-04-21 ENCOUNTER — OFFICE VISIT (OUTPATIENT)
Age: 68
End: 2023-04-21

## 2023-04-21 VITALS
SYSTOLIC BLOOD PRESSURE: 134 MMHG | HEIGHT: 63 IN | BODY MASS INDEX: 32.96 KG/M2 | HEART RATE: 66 BPM | RESPIRATION RATE: 16 BRPM | DIASTOLIC BLOOD PRESSURE: 80 MMHG | WEIGHT: 186 LBS | OXYGEN SATURATION: 99 %

## 2023-04-21 DIAGNOSIS — H05.20 PROPTOSIS: Primary | ICD-10-CM

## 2023-04-21 DIAGNOSIS — E55.9 VITAMIN D DEFICIENCY, UNSPECIFIED: ICD-10-CM

## 2023-04-21 DIAGNOSIS — D64.9 ANEMIA, UNSPECIFIED TYPE: ICD-10-CM

## 2023-04-21 DIAGNOSIS — D46.4 REFRACTORY ANEMIA, UNSPECIFIED (HCC): ICD-10-CM

## 2023-04-21 DIAGNOSIS — D46.9 MYELODYSPLASTIC SYNDROME, UNSPECIFIED (HCC): ICD-10-CM

## 2023-04-21 ASSESSMENT — ENCOUNTER SYMPTOMS
GASTROINTESTINAL NEGATIVE: 1
RESPIRATORY NEGATIVE: 1

## 2023-04-21 NOTE — PROGRESS NOTES
Hematology/Oncology  Progress Note    Name: Chanelle Rizzo  Date: 2023  : 1955    Anu Schwartz MD     Ms. Alonso Fink is a 79y.o. year old female who was seen for management of refractory anemia . Current therapy: Iron supplement, Procrit or Aranesp in the past when her hemoglobin was below 10g/dL and hematocrit is below 30%  Subjective:     Ms. Chanelle Rizzo is a 79 y.o. female who has a history of Myelodysplastic Syndrome/Refractory Anemia. She was diagnosed more than 9 years ago. Today she report that  was recently admitted at [facility] from 3/15 to 3/19/203 for left orbital proptosis and chemosis. Today she report fatigue. She denies shortness of breath, and weakness. She denies fevers, chills, night sweats, unintentional weight loss, skin lumps or bumps, acute bleeding or bruising issues. She denies headaches, acute vision changes, dizziness, chest pains, shortness of breath, palpitation, productive cough, nausea, vomiting,  She reports she is being followed by GI and she reports she is scheduled for Colonoscopy on Oct 29, 2021. Past medical history, family history, and social history: these were reviewed and remains unchanged. Past Medical History:   Diagnosis Date    Echocardiogram abnormal 2011    Tech difficult. EF 60-65%. Mild LVH. RVSP 20-25 mmHg.       Essential hypertension     History of colon polyps     Hx of endometriosis     had hyst    Hyperlipidemia     Hypertension     MDS (myelodysplastic syndrome) (HCC)     Refractory anemia (HCC)      Past Surgical History:   Procedure Laterality Date    HYSTERECTOMY (CERVIX STATUS UNKNOWN)       Social History     Socioeconomic History    Marital status: Single     Spouse name: Not on file    Number of children: Not on file    Years of education: Not on file    Highest education level: Not on file   Occupational History    Not on file   Tobacco Use    Smoking status: Never    Smokeless tobacco: Never   Vaping Use    Vaping

## 2023-04-26 ENCOUNTER — CARE COORDINATION (OUTPATIENT)
Facility: CLINIC | Age: 68
End: 2023-04-26

## 2023-04-26 NOTE — CARE COORDINATION
4/26/2023         1:29 PM    ACM reviewed patient chart and then attempted to call for complex care management. Patient was not available at the time of the call. Left a voice mail message for patient on home and cell phones introducing myself, explaining the reason for my call and providing my contact information. Requested that patient return my call at her earliest convenience. Letter sent to patient last week. Will resolve episode as have had no contact with patient.

## 2023-05-17 ENCOUNTER — HOSPITAL ENCOUNTER (OUTPATIENT)
Facility: HOSPITAL | Age: 68
Discharge: HOME | End: 2023-05-20
Payer: COMMERCIAL

## 2023-05-17 DIAGNOSIS — Z12.31 VISIT FOR SCREENING MAMMOGRAM: ICD-10-CM

## 2023-05-17 PROCEDURE — 77063 BREAST TOMOSYNTHESIS BI: CPT

## 2023-06-11 LAB
ALBUMIN SERPL-MCNC: 4.6 G/DL (ref 3.8–4.8)
ALBUMIN/GLOB SERPL: 1.4 {RATIO} (ref 1.2–2.2)
ALP SERPL-CCNC: 88 IU/L (ref 44–121)
ALT SERPL-CCNC: 13 IU/L (ref 0–32)
AST SERPL-CCNC: 19 IU/L (ref 0–40)
BILIRUB SERPL-MCNC: 0.6 MG/DL (ref 0–1.2)
BUN SERPL-MCNC: 14 MG/DL (ref 8–27)
BUN/CREAT SERPL: 16 (ref 12–28)
CALCIUM SERPL-MCNC: 10.1 MG/DL (ref 8.7–10.3)
CHLORIDE SERPL-SCNC: 99 MMOL/L (ref 96–106)
CHOLEST SERPL-MCNC: 185 MG/DL (ref 100–199)
CO2 SERPL-SCNC: 27 MMOL/L (ref 20–29)
CREAT SERPL-MCNC: 0.85 MG/DL (ref 0.57–1)
EGFRCR SERPLBLD CKD-EPI 2021: 76 ML/MIN/1.73
GLOBULIN SER CALC-MCNC: 3.4 G/DL (ref 1.5–4.5)
GLUCOSE SERPL-MCNC: 76 MG/DL (ref 70–99)
HDLC SERPL-MCNC: 51 MG/DL
IMP & REVIEW OF LAB RESULTS: NORMAL
LDLC SERPL CALC-MCNC: 119 MG/DL (ref 0–99)
POTASSIUM SERPL-SCNC: 3.9 MMOL/L (ref 3.5–5.2)
PROT SERPL-MCNC: 8 G/DL (ref 6–8.5)
SODIUM SERPL-SCNC: 141 MMOL/L (ref 134–144)
SPECIMEN STATUS REPORT: NORMAL
TRIGL SERPL-MCNC: 80 MG/DL (ref 0–149)
VLDLC SERPL CALC-MCNC: 15 MG/DL (ref 5–40)

## 2023-06-21 ASSESSMENT — ENCOUNTER SYMPTOMS: RESPIRATORY NEGATIVE: 1

## 2023-06-22 ENCOUNTER — OFFICE VISIT (OUTPATIENT)
Facility: CLINIC | Age: 68
End: 2023-06-22
Payer: COMMERCIAL

## 2023-06-22 VITALS
HEART RATE: 74 BPM | SYSTOLIC BLOOD PRESSURE: 132 MMHG | WEIGHT: 185.4 LBS | RESPIRATION RATE: 16 BRPM | BODY MASS INDEX: 32.84 KG/M2 | DIASTOLIC BLOOD PRESSURE: 86 MMHG | OXYGEN SATURATION: 97 %

## 2023-06-22 DIAGNOSIS — N18.30 STAGE 3 CHRONIC KIDNEY DISEASE, UNSPECIFIED WHETHER STAGE 3A OR 3B CKD (HCC): ICD-10-CM

## 2023-06-22 DIAGNOSIS — M25.511 CHRONIC RIGHT SHOULDER PAIN: ICD-10-CM

## 2023-06-22 DIAGNOSIS — I10 ESSENTIAL (PRIMARY) HYPERTENSION: Primary | ICD-10-CM

## 2023-06-22 DIAGNOSIS — E78.5 HYPERLIPIDEMIA, UNSPECIFIED HYPERLIPIDEMIA TYPE: ICD-10-CM

## 2023-06-22 DIAGNOSIS — G89.29 CHRONIC RIGHT SHOULDER PAIN: ICD-10-CM

## 2023-06-22 PROCEDURE — 1123F ACP DISCUSS/DSCN MKR DOCD: CPT | Performed by: FAMILY MEDICINE

## 2023-06-22 PROCEDURE — 3074F SYST BP LT 130 MM HG: CPT | Performed by: FAMILY MEDICINE

## 2023-06-22 PROCEDURE — 3078F DIAST BP <80 MM HG: CPT | Performed by: FAMILY MEDICINE

## 2023-06-22 PROCEDURE — 99214 OFFICE O/P EST MOD 30 MIN: CPT | Performed by: FAMILY MEDICINE

## 2023-06-22 RX ORDER — LINACLOTIDE 72 UG/1
CAPSULE, GELATIN COATED ORAL
COMMUNITY
Start: 2023-05-22

## 2023-06-22 NOTE — PROGRESS NOTES
ASSESSMENT/PLAN:  1. Essential (primary) hypertension  Stable, cont pres tx plan. 2. Hyperlipidemia, unspecified hyperlipidemia type  Improved. Cont pres tx plan. 3. Stage 3 chronic kidney disease, unspecified whether stage 3a or 3b CKD (HCC)  Stable, cont pres tx plan. 4. Chronic right shoulder pain  F/u with ortho. Return in about 3 months (around 9/22/2023) for HTN, HLD, CKD. SUBJECTIVE/OBJECTIVE:    Chief Complaint   Patient presents with    Hypertension    Cholesterol Problem    Chronic Kidney Disease         HPI    Chanelle Rizzo is a 79 y.o. female presenting today for    3 months  follow up of htn, hld, ckd, . Patient had labs on 6/10/23. Labs reviewed in detail with patient     Pt cont to f/u with ophthalmology and hematology. Pt will have a repeat MRI orbit in 4-6 months. Patient does not need medication refills today. New concerns today:   pt cont to have R shoulder pain. She has seen ortho for this in the past.  She has pain with trying to sleep on that shoulder. Now she has neck pain as well. She is not sleeping well. Last ortho note reviewed; pt advised that she could return to ortho for another steroid injection. Review of Systems   Constitutional: Negative. HENT: Negative. Respiratory: Negative. Cardiovascular: Negative. All other systems reviewed and are negative. Physical Exam  Vitals and nursing note reviewed. Constitutional:       General: She is not in acute distress. Appearance: Normal appearance. HENT:      Head: Normocephalic and atraumatic. Right Ear: External ear normal.      Left Ear: External ear normal.      Nose: Nose normal.      Mouth/Throat:      Mouth: Mucous membranes are moist.   Eyes:      Extraocular Movements: Extraocular movements intact. Conjunctiva/sclera: Conjunctivae normal.   Cardiovascular:      Rate and Rhythm: Normal rate and regular rhythm. Heart sounds: No murmur heard.     No

## 2023-06-22 NOTE — PROGRESS NOTES
Chief Complaint   Patient presents with    Hypertension    Cholesterol Problem    Chronic Kidney Disease        Vitals:    06/22/23 0826   BP: 132/86   Pulse: 74   Resp: 16   SpO2: 97%        Depression: Not at risk    PHQ-2 Score: 0        No flowsheet data found. Brodie Vincent \"Have you been to the ER, urgent care clinic since your last visit? Hospitalized since your last visit? \" No     2. \"Have you seen or consulted any other health care providers outside of the [address] [place] [address] since your last visit? \" No      3. For patients aged 39-70: Has the patient had a colonoscopy / FIT/ Cologuard? Yes     If the patient is female:    4. For patients aged 41-77: Has the patient had a mammogram within the past 2 years? Yes    5. For patients aged 21-65: Has the patient had a pap smear?  Yes

## 2023-07-12 ENCOUNTER — CARE COORDINATION (OUTPATIENT)
Facility: CLINIC | Age: 68
End: 2023-07-12

## 2023-07-12 NOTE — CARE COORDINATION
7/12/2023        10:18 AM    TRAV reviewed patient chart and noted that she was contacted within 6 months without success for CCM. Will resolve at present time.

## 2023-07-18 DIAGNOSIS — E55.9 VITAMIN D DEFICIENCY, UNSPECIFIED: ICD-10-CM

## 2023-07-18 DIAGNOSIS — D46.4 REFRACTORY ANEMIA, UNSPECIFIED (HCC): ICD-10-CM

## 2023-07-18 DIAGNOSIS — D46.9 MYELODYSPLASTIC SYNDROME, UNSPECIFIED (HCC): Primary | ICD-10-CM

## 2023-08-04 ENCOUNTER — HOSPITAL ENCOUNTER (OUTPATIENT)
Facility: HOSPITAL | Age: 68
Discharge: HOME | End: 2023-08-04
Attending: FAMILY MEDICINE
Payer: MEDICARE

## 2023-08-04 DIAGNOSIS — R10.2 PELVIC PAIN: ICD-10-CM

## 2023-08-04 PROCEDURE — 76856 US EXAM PELVIC COMPLETE: CPT

## 2023-08-18 ENCOUNTER — TELEPHONE (OUTPATIENT)
Facility: CLINIC | Age: 68
End: 2023-08-18

## 2023-08-22 RX ORDER — LOSARTAN POTASSIUM AND HYDROCHLOROTHIAZIDE 12.5; 1 MG/1; MG/1
TABLET ORAL
Qty: 90 TABLET | Refills: 0 | Status: SHIPPED | OUTPATIENT
Start: 2023-08-22 | End: 2023-08-22 | Stop reason: SDUPTHER

## 2023-08-22 RX ORDER — LOSARTAN POTASSIUM AND HYDROCHLOROTHIAZIDE 12.5; 1 MG/1; MG/1
1 TABLET ORAL DAILY
Qty: 90 TABLET | Refills: 4 | Status: SHIPPED | OUTPATIENT
Start: 2023-08-22

## 2023-08-22 RX ORDER — SIMVASTATIN 20 MG
20 TABLET ORAL NIGHTLY
Qty: 90 TABLET | Refills: 4 | Status: SHIPPED | OUTPATIENT
Start: 2023-08-22

## 2023-08-22 RX ORDER — SIMVASTATIN 20 MG
TABLET ORAL
Qty: 90 TABLET | Refills: 0 | Status: SHIPPED | OUTPATIENT
Start: 2023-08-22 | End: 2023-08-22 | Stop reason: SDUPTHER

## 2023-10-25 ASSESSMENT — ENCOUNTER SYMPTOMS: RESPIRATORY NEGATIVE: 1

## 2023-10-25 NOTE — PROGRESS NOTES
ASSESSMENT/PLAN:  1. Essential (primary) hypertension  Assessment & Plan:   Well-controlled, continue current medications  Orders:  -     Lipid Panel; Future  -     Comprehensive Metabolic Panel; Future  2. Hyperlipidemia, unspecified hyperlipidemia type  -     Lipid Panel; Future  -     Comprehensive Metabolic Panel; Future  3. Stage 3 chronic kidney disease, unspecified whether stage 3a or 3b CKD ([address])  -     Lipid Panel; Future  -     Comprehensive Metabolic Panel; Future  4. Needs flu shot  -     Influenza, FLUAD, (age 72 y+), IM, Preservative Free, 0.5 mL  5. Need for prophylactic vaccination against Streptococcus pneumoniae (pneumococcus)  -     Pneumococcal, PCV20, PREVNAR 21, (age 25 yrs+), IM, PF  6. Chronic right shoulder pain  Assessment & Plan:   to ortho when ready to f/u        Return in about 3 months (around 1/26/2024) for HTN, HLD, lab review. SUBJECTIVE/OBJECTIVE:    Chief Complaint   Patient presents with    Hypertension    Cholesterol Problem    Chronic Kidney Disease         HPI    Carmella Reed is a 79 y.o. female presenting today for 4 months  follow up of htn, hld, ckd. Pt is doing well overall. Pt had f/u with ophtho for orbital inflammation. She had imaging 10/4/23. They will cont to monitor closely. Pt cont to have shoulder pain. She has not seen anyone for this recently. She thinks she will go get seen after the first of the new year. Her pain does improve with tylenol or diclofenac. Patient does not need medication refills today. New concerns today: Pt would like to have a pcv-20 and a flu shot today. Review of Systems   Constitutional: Negative. HENT: Negative. Respiratory: Negative. Cardiovascular: Negative. All other systems reviewed and are negative. Physical Exam  Vitals and nursing note reviewed. Constitutional:       General: She is not in acute distress. Appearance: Normal appearance.    HENT:      Head:

## 2023-10-26 ENCOUNTER — OFFICE VISIT (OUTPATIENT)
Facility: CLINIC | Age: 68
End: 2023-10-26
Payer: COMMERCIAL

## 2023-10-26 VITALS
OXYGEN SATURATION: 98 % | BODY MASS INDEX: 32.1 KG/M2 | HEART RATE: 69 BPM | RESPIRATION RATE: 16 BRPM | WEIGHT: 181.2 LBS | DIASTOLIC BLOOD PRESSURE: 79 MMHG | SYSTOLIC BLOOD PRESSURE: 122 MMHG

## 2023-10-26 DIAGNOSIS — N18.30 STAGE 3 CHRONIC KIDNEY DISEASE, UNSPECIFIED WHETHER STAGE 3A OR 3B CKD (HCC): ICD-10-CM

## 2023-10-26 DIAGNOSIS — I10 ESSENTIAL (PRIMARY) HYPERTENSION: Primary | ICD-10-CM

## 2023-10-26 DIAGNOSIS — E78.5 HYPERLIPIDEMIA, UNSPECIFIED HYPERLIPIDEMIA TYPE: ICD-10-CM

## 2023-10-26 DIAGNOSIS — I10 ESSENTIAL (PRIMARY) HYPERTENSION: ICD-10-CM

## 2023-10-26 DIAGNOSIS — Z23 NEEDS FLU SHOT: ICD-10-CM

## 2023-10-26 DIAGNOSIS — Z23 NEED FOR PROPHYLACTIC VACCINATION AGAINST STREPTOCOCCUS PNEUMONIAE (PNEUMOCOCCUS): ICD-10-CM

## 2023-10-26 DIAGNOSIS — G89.29 CHRONIC RIGHT SHOULDER PAIN: ICD-10-CM

## 2023-10-26 DIAGNOSIS — M25.511 CHRONIC RIGHT SHOULDER PAIN: ICD-10-CM

## 2023-10-26 PROCEDURE — 90472 IMMUNIZATION ADMIN EACH ADD: CPT | Performed by: FAMILY MEDICINE

## 2023-10-26 PROCEDURE — 3074F SYST BP LT 130 MM HG: CPT | Performed by: FAMILY MEDICINE

## 2023-10-26 PROCEDURE — 3078F DIAST BP <80 MM HG: CPT | Performed by: FAMILY MEDICINE

## 2023-10-26 PROCEDURE — 90471 IMMUNIZATION ADMIN: CPT | Performed by: FAMILY MEDICINE

## 2023-10-26 PROCEDURE — 1123F ACP DISCUSS/DSCN MKR DOCD: CPT | Performed by: FAMILY MEDICINE

## 2023-10-26 PROCEDURE — 90694 VACC AIIV4 NO PRSRV 0.5ML IM: CPT | Performed by: FAMILY MEDICINE

## 2023-10-26 PROCEDURE — 90677 PCV20 VACCINE IM: CPT | Performed by: FAMILY MEDICINE

## 2023-10-26 PROCEDURE — 99214 OFFICE O/P EST MOD 30 MIN: CPT | Performed by: FAMILY MEDICINE

## 2023-10-26 NOTE — PROGRESS NOTES
Chief Complaint   Patient presents with    Hypertension    Cholesterol Problem    Chronic Kidney Disease        Vitals:    10/26/23 0903   BP: 122/79   Pulse: 69   Resp: 16   SpO2: 98%        Depression: Not at risk (3/30/2023)    PHQ-2     PHQ-2 Score: 0             No data to display                   . Patient is due for the following exams and vaccines   Pneumonia - consents will receive today   Flu vacine - consent will receive today   Covid booster -  will request from pharmacy     . \"Have you been to the ER, urgent care clinic since your last visit? Hospitalized since your last visit? \" No     2. \"Have you seen or consulted any other health care providers outside of the [address] [place] [address] Avenue since your last visit? \"  Yes rheumatology ,  heme onc and [facility] eye [place]      3. For patients aged 43-73: Has the patient had a colonoscopy / FIT/ Cologuard? Yes     If the patient is female:    4. For patients aged 43-66: Has the patient had a mammogram within the past 2 years? Yes    5. For patients aged 21-65: Has the patient had a pap smear?  Yes

## 2024-01-15 ENCOUNTER — HOSPITAL ENCOUNTER (OUTPATIENT)
Facility: HOSPITAL | Age: 69
Discharge: HOME | End: 2024-01-18

## 2024-01-15 LAB — LABCORP SPECIMEN COLLECTION: NORMAL

## 2024-01-16 LAB
ALBUMIN SERPL-MCNC: 4.6 G/DL (ref 3.9–4.9)
ALBUMIN/GLOB SERPL: 1.4 {RATIO} (ref 1.2–2.2)
ALP SERPL-CCNC: 98 IU/L (ref 44–121)
ALT SERPL-CCNC: 11 IU/L (ref 0–32)
AST SERPL-CCNC: 15 IU/L (ref 0–40)
BILIRUB SERPL-MCNC: 0.6 MG/DL (ref 0–1.2)
BUN SERPL-MCNC: 15 MG/DL (ref 8–27)
BUN/CREAT SERPL: 18 (ref 12–28)
CALCIUM SERPL-MCNC: 10.2 MG/DL (ref 8.7–10.3)
CHLORIDE SERPL-SCNC: 103 MMOL/L (ref 96–106)
CHOLEST SERPL-MCNC: 186 MG/DL (ref 100–199)
CO2 SERPL-SCNC: 26 MMOL/L (ref 20–29)
CREAT SERPL-MCNC: 0.84 MG/DL (ref 0.57–1)
EGFRCR SERPLBLD CKD-EPI 2021: 77 ML/MIN/1.73
GLOBULIN SER CALC-MCNC: 3.4 G/DL (ref 1.5–4.5)
GLUCOSE SERPL-MCNC: 74 MG/DL (ref 70–99)
HDLC SERPL-MCNC: 52 MG/DL
LDLC SERPL CALC-MCNC: 117 MG/DL (ref 0–99)
POTASSIUM SERPL-SCNC: 4.1 MMOL/L (ref 3.5–5.2)
PROT SERPL-MCNC: 8 G/DL (ref 6–8.5)
SODIUM SERPL-SCNC: 144 MMOL/L (ref 134–144)
TRIGL SERPL-MCNC: 91 MG/DL (ref 0–149)
VLDLC SERPL CALC-MCNC: 17 MG/DL (ref 5–40)

## 2024-01-26 ENCOUNTER — OFFICE VISIT (OUTPATIENT)
Facility: CLINIC | Age: 69
End: 2024-01-26
Payer: COMMERCIAL

## 2024-01-26 VITALS
SYSTOLIC BLOOD PRESSURE: 120 MMHG | HEART RATE: 74 BPM | BODY MASS INDEX: 32.49 KG/M2 | WEIGHT: 183.4 LBS | DIASTOLIC BLOOD PRESSURE: 81 MMHG | RESPIRATION RATE: 16 BRPM | OXYGEN SATURATION: 98 %

## 2024-01-26 DIAGNOSIS — N18.30 STAGE 3 CHRONIC KIDNEY DISEASE, UNSPECIFIED WHETHER STAGE 3A OR 3B CKD (HCC): ICD-10-CM

## 2024-01-26 DIAGNOSIS — M54.2 NECK PAIN: ICD-10-CM

## 2024-01-26 DIAGNOSIS — I10 ESSENTIAL (PRIMARY) HYPERTENSION: Primary | ICD-10-CM

## 2024-01-26 DIAGNOSIS — E78.5 HYPERLIPIDEMIA, UNSPECIFIED HYPERLIPIDEMIA TYPE: ICD-10-CM

## 2024-01-26 DIAGNOSIS — R20.2 LEFT HAND PARESTHESIA: ICD-10-CM

## 2024-01-26 DIAGNOSIS — A60.00 GENITAL HERPES SIMPLEX, UNSPECIFIED SITE: ICD-10-CM

## 2024-01-26 PROCEDURE — 99214 OFFICE O/P EST MOD 30 MIN: CPT | Performed by: FAMILY MEDICINE

## 2024-01-26 PROCEDURE — 3074F SYST BP LT 130 MM HG: CPT | Performed by: FAMILY MEDICINE

## 2024-01-26 PROCEDURE — 3079F DIAST BP 80-89 MM HG: CPT | Performed by: FAMILY MEDICINE

## 2024-01-26 PROCEDURE — 1123F ACP DISCUSS/DSCN MKR DOCD: CPT | Performed by: FAMILY MEDICINE

## 2024-01-26 RX ORDER — VALACYCLOVIR HYDROCHLORIDE 500 MG/1
TABLET, FILM COATED ORAL
Qty: 60 TABLET | Refills: 12 | Status: SHIPPED | OUTPATIENT
Start: 2024-01-26

## 2024-01-26 ASSESSMENT — PATIENT HEALTH QUESTIONNAIRE - PHQ9
SUM OF ALL RESPONSES TO PHQ QUESTIONS 1-9: 0
SUM OF ALL RESPONSES TO PHQ QUESTIONS 1-9: 0
SUM OF ALL RESPONSES TO PHQ9 QUESTIONS 1 & 2: 0
2. FEELING DOWN, DEPRESSED OR HOPELESS: 0
SUM OF ALL RESPONSES TO PHQ QUESTIONS 1-9: 0
1. LITTLE INTEREST OR PLEASURE IN DOING THINGS: 0
SUM OF ALL RESPONSES TO PHQ QUESTIONS 1-9: 0

## 2024-01-26 NOTE — PROGRESS NOTES
Carissa Deleon presents today for   Chief Complaint   Patient presents with    Hypertension    Cholesterol Problem    Chronic Kidney Disease       Is someone accompanying this pt? No     Is the patient using any DME equipment during OV? No     Depression Screening:      3/30/2023    10:53 AM 3/7/2023     8:42 AM 12/7/2022     9:22 AM 8/31/2022     8:47 AM 7/29/2022     8:57 AM 2/24/2022     8:40 AM   PHQ-9 Questionaire   Little interest or pleasure in doing things 0 0 0 0 0 0   Feeling down, depressed, or hopeless 0 0 0 0 0 0   PHQ-9 Total Score 0 0 0 0 0 0        NETTE 7-Anxiety        No data to display                   Learning Assessment:  No question data found.       Health Maintenance reviewed and discussed and ordered per Provider.  Transportation Needs: Unknown (3/7/2023)    PRAPARE - Transportation     Lack of Transportation (Medical): Not on file     Lack of Transportation (Non-Medical): Patient declined      Food Insecurity: Not on file (3/7/2023)     Financial Resource Strain: Patient Declined (3/7/2023)    Overall Financial Resource Strain (CARDIA)     Difficulty of Paying Living Expenses: Patient declined     Housing Stability: Unknown (3/7/2023)    Housing Stability Vital Sign     Unable to Pay for Housing in the Last Year: Not on file     Number of Places Lived in the Last Year: Not on file     Unstable Housing in the Last Year: Patient refused       Did you provide resources if patient requested them? No       Health Maintenance Due   Topic Date Due    Shingles vaccine (1 of 2) Never done    Respiratory Syncytial Virus (RSV) Pregnant or age 60 yrs+ (1 - 1-dose 60+ series) Never done    COVID-19 Vaccine (5 - 2023-24 season) 09/01/2023   .      \"Have you been to the ER, urgent care clinic since your last visit?  Hospitalized since your last visit?\"    NO    “Have you seen or consulted any other health care providers outside of [facility] since your last visit?”    NO           
Exam  Vitals and nursing note reviewed.   Constitutional:       General: She is not in acute distress.     Appearance: Normal appearance.   HENT:      Head: Normocephalic and atraumatic.      Right Ear: External ear normal.      Left Ear: External ear normal.      Nose: Nose normal.      Mouth/Throat:      Mouth: Mucous membranes are moist.   Eyes:      Extraocular Movements: Extraocular movements intact.      Conjunctiva/sclera: Conjunctivae normal.   Cardiovascular:      Rate and Rhythm: Normal rate and regular rhythm.      Heart sounds: No murmur heard.     No friction rub. No gallop.   Pulmonary:      Effort: Pulmonary effort is normal.      Breath sounds: Normal breath sounds. No wheezing, rhonchi or rales.   Musculoskeletal:         General: Normal range of motion.      Left wrist: No swelling, deformity or tenderness.      Cervical back: Normal range of motion.   Skin:     General: Skin is warm and dry.   Neurological:      Mental Status: She is alert and oriented to person, place, and time.      Coordination: Coordination normal.   Psychiatric:         Mood and Affect: Mood normal.         Behavior: Behavior normal.         Thought Content: Thought content normal.         Judgment: Judgment normal.                     An electronic signature was used to authenticate this note.    --Ally Mclaughlin MD

## 2024-01-30 PROBLEM — M54.2 NECK PAIN: Status: ACTIVE | Noted: 2024-01-30

## 2024-01-30 PROBLEM — R20.2 LEFT HAND PARESTHESIA: Status: ACTIVE | Noted: 2024-01-30

## 2024-02-08 ENCOUNTER — TELEPHONE (OUTPATIENT)
Facility: CLINIC | Age: 69
End: 2024-02-08

## 2024-02-08 NOTE — TELEPHONE ENCOUNTER
----- Message from Essence Brown sent at 2/8/2024  1:46 PM EST -----  Subject: Message to Provider    QUESTIONS  Information for Provider? Patient would like to speak to Dr. Arnoldo burks   about a form she was supposed to fax to the [employer].   ---------------------------------------------------------------------------  --------------  CALL BACK INFO  [phone]; OK to leave message on voicemail  ---------------------------------------------------------------------------  --------------  SCRIPT ANSWERS  Relationship to Patient? Self

## 2024-02-08 NOTE — TELEPHONE ENCOUNTER
----- Message from Essence Brown sent at 2/8/2024  1:46 PM EST -----  Subject: Message to Provider    QUESTIONS  Information for Provider? Patient would like to speak to Dr. Arnoldo burks   about a form she was supposed to fax to the [employer].   ---------------------------------------------------------------------------  --------------  CALL BACK INFO  [phone]; OK to leave message on voicemail  ---------------------------------------------------------------------------  --------------  SCRIPT ANSWERS  Relationship to Patient? Self

## 2024-02-08 NOTE — TELEPHONE ENCOUNTER
----- Message from Essence Brown sent at 2/8/2024  1:46 PM EST -----  Subject: Message to Provider    QUESTIONS  Information for Provider? Patient would like to speak to Dr. Arnoldo burks   about a form she was supposed to fax to the [employer].   ---------------------------------------------------------------------------  --------------  CALL BACK INFO  [phone]; OK to leave message on voicemail  ---------------------------------------------------------------------------  --------------  SCRIPT ANSWERS  Relationship to Patient? Self

## 2024-02-09 ENCOUNTER — TRANSCRIBE ORDERS (OUTPATIENT)
Facility: HOSPITAL | Age: 69
End: 2024-02-09

## 2024-02-09 ENCOUNTER — TELEPHONE (OUTPATIENT)
Facility: CLINIC | Age: 69
End: 2024-02-09

## 2024-02-09 DIAGNOSIS — Z12.31 VISIT FOR SCREENING MAMMOGRAM: Primary | ICD-10-CM

## 2024-02-09 NOTE — TELEPHONE ENCOUNTER
Spoke to patient  she just needed employee health form refaxed . Form faxed  again to [facility]

## 2024-03-13 ENCOUNTER — HOSPITAL ENCOUNTER (OUTPATIENT)
Facility: HOSPITAL | Age: 69
Setting detail: RECURRING SERIES
Discharge: HOME | End: 2024-03-16
Payer: COMMERCIAL

## 2024-03-13 PROCEDURE — 97161 PT EVAL LOW COMPLEX 20 MIN: CPT

## 2024-03-13 NOTE — THERAPY EVALUATION
to PLOF and address remaining functional goals. (see flow sheet as applicable)     Details if applicable:  see flowsheet   Total  5 Total Reminder: [facility] bill using total billable min of TIMED therapeutic procedures (example: do not include dry needle or estim unattended, both untimed codes, in totals to left)     Physical Therapy Evaluation Cervical Spine     SUBJECTIVE  Chief Complaint: Ongoing neck pain the L side predominately overall and at times.     Mechanism of injury: Woke one night and had L sided neck pain and has been about the same since, notes baseline numbness/tingling but that was prior to onset of neck pain.     Symptoms  Aggravated by:   [] Bending [] Sitting [] Standing [] Reaching Overhead   [x] Moving [x] Cough [] Sneeze [] Eating   [] AM  [] PM  Lying:  [] sup   [] pro   [] sidelying   [x] Other: Turning to the L side      Eased by:    [] Bending [] Sitting [] Standing \              Lying: [] sup  [] pro  [] sidelying   [] Moving [] AM  [x] PM  [] Other:     General Health:  Red Flags Indicated? [] Yes    [x] No  [] Yes [x] No Recent weight change (If yes, due to dieting? [] Yes  [] No)   [] Yes [x] No Persistent cough  [] Yes [x] No Unremitting pain at night  [] Yes [x] No Dizziness  [] Yes [x] No Blurred vision  [] Yes [x] No Hands more cold or painful in cold weather  [] Yes [x] No Ringing in ears  [] Yes [x] No Difficulty swallowing  [] Yes [x] No Dysfunction of bowel or bladder  [] Yes [x] No Recent illness within past 3 weeks (i.e, cold, flu)  [] Yes [x] No Jaw pain    Past History/Treatments: none     Diagnostic Tests: [] Lab work [] X-rays    [] CT [] MRI     [] Other:  Results:      Headaches: Do you have headaches? [] Yes   [x] No  How often do you get headaches?    How long does the headache last?  What aggravates it?  What relieves it?  Does the headache coincide with any other symptoms (visual disturbances, light sensitivity)?  Where is the headache?  Does it change 
ease of ADLs.  Evaluation:  Cervical Spine AROM Flexion 45 degs, extension 60 degs, SB R and L 40 degs, Rotation R and L 60 degs  3. Patient will increase FOTO score to 69 to indicate increased functional mobility.  Evaluation:  58  4. Patient will improve work ergonomics using dual computer screens to minimize cervical spine irritation in order to improve work tolerance..  Evaluation:  dual screen with one positioned to the L and the other in front      Frequency / Duration: Patient to be seen 1-2 times per week for 8 WEEKS    Patient/ Caregiver education and instruction: Diagnosis, prognosis, self care, activity modification, and exercises [x]  Plan of care has been reviewed with PTA    Certification Period: 3/13/24    Bobbi Guidry, PT       3/13/2024       3:13 PM    Payor: ENDER / Plan: [employer] / Product Type: *No Product type* /     No Physician signature required

## 2024-03-19 ENCOUNTER — TELEPHONE (OUTPATIENT)
Facility: HOSPITAL | Age: 69
End: 2024-03-19

## 2024-03-19 NOTE — TELEPHONE ENCOUNTER
Called patient to schedule follow up visits. Patient's insurance approved 6 visits. Per Bobbi, patient is to come in 1-2x 4 weeks. No answer, LVM.

## 2024-03-20 ENCOUNTER — TELEPHONE (OUTPATIENT)
Facility: HOSPITAL | Age: 69
End: 2024-03-20

## 2024-03-20 NOTE — TELEPHONE ENCOUNTER
Returned call to pt to schedule follow up PT appts. Left message with Ms. Lei for pt to return our call.

## 2024-03-20 NOTE — TELEPHONE ENCOUNTER
Patient called requesting an appt for next week, any day but monday 3/25, at 4:20 or later. Has been added to the wait list.

## 2024-03-21 ENCOUNTER — OFFICE VISIT (OUTPATIENT)
Facility: CLINIC | Age: 69
End: 2024-03-21
Payer: COMMERCIAL

## 2024-03-21 VITALS
OXYGEN SATURATION: 100 % | SYSTOLIC BLOOD PRESSURE: 143 MMHG | DIASTOLIC BLOOD PRESSURE: 81 MMHG | HEIGHT: 63 IN | BODY MASS INDEX: 33.13 KG/M2 | WEIGHT: 187 LBS | HEART RATE: 55 BPM | RESPIRATION RATE: 14 BRPM | TEMPERATURE: 97.8 F

## 2024-03-21 DIAGNOSIS — M54.2 NECK PAIN: ICD-10-CM

## 2024-03-21 DIAGNOSIS — N64.4 BREAST PAIN, LEFT: Primary | ICD-10-CM

## 2024-03-21 PROCEDURE — 3077F SYST BP >= 140 MM HG: CPT | Performed by: FAMILY MEDICINE

## 2024-03-21 PROCEDURE — 99213 OFFICE O/P EST LOW 20 MIN: CPT | Performed by: FAMILY MEDICINE

## 2024-03-21 PROCEDURE — 3079F DIAST BP 80-89 MM HG: CPT | Performed by: FAMILY MEDICINE

## 2024-03-21 PROCEDURE — 1123F ACP DISCUSS/DSCN MKR DOCD: CPT | Performed by: FAMILY MEDICINE

## 2024-03-21 SDOH — ECONOMIC STABILITY: HOUSING INSECURITY
IN THE LAST 12 MONTHS, WAS THERE A TIME WHEN YOU DID NOT HAVE A STEADY PLACE TO SLEEP OR SLEPT IN A SHELTER (INCLUDING NOW)?: NO

## 2024-03-21 SDOH — ECONOMIC STABILITY: INCOME INSECURITY: HOW HARD IS IT FOR YOU TO PAY FOR THE VERY BASICS LIKE FOOD, HOUSING, MEDICAL CARE, AND HEATING?: NOT VERY HARD

## 2024-03-21 SDOH — ECONOMIC STABILITY: FOOD INSECURITY: WITHIN THE PAST 12 MONTHS, THE FOOD YOU BOUGHT JUST DIDN'T LAST AND YOU DIDN'T HAVE MONEY TO GET MORE.: NEVER TRUE

## 2024-03-21 SDOH — ECONOMIC STABILITY: FOOD INSECURITY: WITHIN THE PAST 12 MONTHS, YOU WORRIED THAT YOUR FOOD WOULD RUN OUT BEFORE YOU GOT MONEY TO BUY MORE.: NEVER TRUE

## 2024-03-21 ASSESSMENT — PATIENT HEALTH QUESTIONNAIRE - PHQ9
SUM OF ALL RESPONSES TO PHQ9 QUESTIONS 1 & 2: 0
SUM OF ALL RESPONSES TO PHQ QUESTIONS 1-9: 0
1. LITTLE INTEREST OR PLEASURE IN DOING THINGS: NOT AT ALL
SUM OF ALL RESPONSES TO PHQ QUESTIONS 1-9: 0
2. FEELING DOWN, DEPRESSED OR HOPELESS: NOT AT ALL
SUM OF ALL RESPONSES TO PHQ QUESTIONS 1-9: 0
SUM OF ALL RESPONSES TO PHQ QUESTIONS 1-9: 0

## 2024-03-21 NOTE — PROGRESS NOTES
Carissa Deleon (:  1955) is a 69 y.o. female,Established patient, here for evaluation of the following chief complaint(s):  Cholesterol Problem, Chronic Kidney Disease, and Hypertension         ASSESSMENT/PLAN:  1. Breast pain, left  -     [facility] DIGITAL DIAGNOSTIC W OR WO CAD LEFT; Future  -     External Referral To Breast Clinic  2. Neck pain  Assessment & Plan:   Monitored by specialist- no acute findings meriting change in the plan      Return if symptoms worsen or fail to improve.         Subjective   SUBJECTIVE/OBJECTIVE:  HPI  Pt presents for eval of L breast pain.    She had similar issues in the past and had some itching then.  That went away.  She currently has discomfort in the L upper outer quadrant and some pain near the armpit as well.    Pt has seen Dr Nix in the past.      Pt has had neck pain recently.  She went to [facility] and had f/u with rheum.  Pt was referred to PT.  She will start soon.      Review of Systems   Constitutional: Negative.    HENT: Negative.     Respiratory: Negative.     Cardiovascular: Negative.    Genitourinary:         L breast pain   Musculoskeletal:  Positive for neck pain.   All other systems reviewed and are negative.         Objective   Physical Exam  Vitals and nursing note reviewed.   Constitutional:       General: She is not in acute distress.     Appearance: Normal appearance.   HENT:      Head: Normocephalic and atraumatic.      Right Ear: External ear normal.      Left Ear: External ear normal.      Nose: Nose normal.      Mouth/Throat:      Mouth: Mucous membranes are moist.   Eyes:      Extraocular Movements: Extraocular movements intact.      Conjunctiva/sclera: Conjunctivae normal.   Cardiovascular:      Rate and Rhythm: Normal rate and regular rhythm.      Heart sounds: No murmur heard.     No friction rub. No gallop.   Pulmonary:      Effort: Pulmonary effort is normal.      Breath sounds: Normal breath sounds. No wheezing, rhonchi or rales. 
      Did you provide resources if patient requested them? Yes patient declined      Health Maintenance Due   Topic Date Due    Shingles vaccine (1 of 2) Never done    Respiratory Syncytial Virus (RSV) Pregnant or age 60 yrs+ (1 - 1-dose 60+ series) Never done    COVID-19 Vaccine (5 - 2023-24 season) 09/01/2023   .      \"Have you been to the ER, urgent care clinic since your last visit?  Hospitalized since your last visit?\"    NO    “Have you seen or consulted any other health care providers outside of [facility] since your last visit?”    NO

## 2024-03-25 ENCOUNTER — TELEPHONE (OUTPATIENT)
Facility: CLINIC | Age: 69
End: 2024-03-25

## 2024-03-25 DIAGNOSIS — N64.4 BREAST PAIN, LEFT: Primary | ICD-10-CM

## 2024-03-25 ASSESSMENT — ENCOUNTER SYMPTOMS: RESPIRATORY NEGATIVE: 1

## 2024-04-01 ENCOUNTER — TELEPHONE (OUTPATIENT)
Facility: HOSPITAL | Age: 69
End: 2024-04-01

## 2024-04-09 ENCOUNTER — APPOINTMENT (OUTPATIENT)
Facility: HOSPITAL | Age: 69
End: 2024-04-09
Payer: COMMERCIAL

## 2024-04-09 ENCOUNTER — TELEPHONE (OUTPATIENT)
Facility: HOSPITAL | Age: 69
End: 2024-04-09

## 2024-04-09 NOTE — TELEPHONE ENCOUNTER
Patient Cancel < 24 HRS Pt called to CX as she was not feeling well. Said that she would be in for her next appt.

## 2024-04-12 ENCOUNTER — HOSPITAL ENCOUNTER (OUTPATIENT)
Facility: HOSPITAL | Age: 69
End: 2024-04-12
Attending: FAMILY MEDICINE
Payer: COMMERCIAL

## 2024-04-12 ENCOUNTER — HOSPITAL ENCOUNTER (OUTPATIENT)
Facility: HOSPITAL | Age: 69
Discharge: HOME | End: 2024-04-12
Attending: FAMILY MEDICINE
Payer: COMMERCIAL

## 2024-04-12 VITALS — WEIGHT: 187 LBS | HEIGHT: 63 IN | BODY MASS INDEX: 33.13 KG/M2

## 2024-04-12 DIAGNOSIS — N64.4 BREAST PAIN, LEFT: ICD-10-CM

## 2024-04-12 PROCEDURE — G0279 TOMOSYNTHESIS, MAMMO: HCPCS

## 2024-04-15 ENCOUNTER — HOSPITAL ENCOUNTER (OUTPATIENT)
Facility: HOSPITAL | Age: 69
Setting detail: RECURRING SERIES
Discharge: HOME | End: 2024-04-18
Payer: COMMERCIAL

## 2024-04-15 PROCEDURE — 97110 THERAPEUTIC EXERCISES: CPT

## 2024-04-15 PROCEDURE — 97140 MANUAL THERAPY 1/> REGIONS: CPT

## 2024-04-15 PROCEDURE — 97530 THERAPEUTIC ACTIVITIES: CPT

## 2024-04-15 NOTE — PROGRESS NOTES
movement patterns, analyze and modify for postural abnormalities, and instruct in home and community integration to address functional deficits and attain remaining goals.    Progress toward goals / Updated goals:  [x]  See Progress Note/Recertification      Next PN/ RC due 5/15/2024  Auth due (visit number/ date) 6 visits, 5/11/2024    PLAN  - Continue Plan of Care    Mahnaz Correia PT    4/15/2024    12:29 PM    Future Appointments   Date Time Provider Department Center   4/15/2024  4:20 PM Mahnaz Correia, PT [facility]   4/22/2024  4:20 PM Mahnaz Correia, PT [facility]   4/29/2024  4:20 PM Mahnaz Correia, PT [facility]   5/18/2024  8:00 AM [facility] RM 4 3D [facility] [place]   6/7/2024  9:00 AM Ally Mclaughlin MD [facility]

## 2024-04-15 NOTE — THERAPY RECERTIFICATION
Care.    If you have any questions/comments please contact us directly.  Thank you for allowing us to assist in the care of your patient.    Mahnaz Correia, PT       4/15/2024       12:36 PM

## 2024-04-22 ENCOUNTER — HOSPITAL ENCOUNTER (OUTPATIENT)
Facility: HOSPITAL | Age: 69
Setting detail: RECURRING SERIES
Discharge: HOME | End: 2024-04-25
Payer: COMMERCIAL

## 2024-04-22 PROCEDURE — 97110 THERAPEUTIC EXERCISES: CPT

## 2024-04-22 PROCEDURE — 97140 MANUAL THERAPY 1/> REGIONS: CPT

## 2024-04-22 NOTE — PROGRESS NOTES
PHYSICAL / OCCUPATIONAL THERAPY - DAILY TREATMENT NOTE    Patient Name: Carissa Deleon    Date: 2024    : 1955  Insurance: Payor: ENDER / Plan: [employer] / Product Type: *No Product type* /      Patient  verified Yes     Visit #   Current / Total 1 4-8   Time   In / Out 4:20 5:10   Pain   In / Out 3/10 110   Subjective Functional Status/Changes: Pt reports she had soreness in her neck of her last visit but no inc in pain. States she does \"a little something\" of her HEP (describes as more neck movements vs regimented HEP provided to her)     TREATMENT AREA =  Neck pain [M54.2]    OBJECTIVE    Modalities Rationale:     decrease pain and increase tissue extensibility to improve patient's ability to progress to PLOF and address remaining functional goals.     min [] Estim Unattended, type/location:                                      []  w/ice    []  w/heat    min [] Estim Attended, type/location:                                     []  w/US     []  w/ice    []  w/heat    []  TENS insruct      min []  Mechanical Traction: type/lbs                   []  pro   []  sup   []  int   []  cont    []  before manual    []  after manual    min []  Ultrasound, settings/location:     10 min  unbill []  Ice     [x]  Heat    location/position: To the c/s in reclined long sit    min []  Paraffin,  details:     min []  Vasopneumatic Device, press/temp:     min []  Whirlpool / Fluido:    If using vaso (only need to measure limb vaso being performed on)      pre-treatment girth :       post-treatment girth :       measured at (landmark location) :      min []  Other:    Skin assessment post-treatment:   Intact      Therapeutic Procedures:    Tx Min Billable or 1:1 Min (if diff from Tx Min) Procedure, Rationale, Specifics   30 30 [id number] Therapeutic Exercise (timed):  increase ROM, strength, coordination, balance, and proprioception to improve patient's ability to progress to PLOF and address remaining

## 2024-04-29 ENCOUNTER — HOSPITAL ENCOUNTER (OUTPATIENT)
Facility: HOSPITAL | Age: 69
Setting detail: RECURRING SERIES
Discharge: HOME | End: 2024-05-02
Payer: COMMERCIAL

## 2024-04-29 PROCEDURE — 97530 THERAPEUTIC ACTIVITIES: CPT

## 2024-04-29 PROCEDURE — 97110 THERAPEUTIC EXERCISES: CPT

## 2024-04-29 NOTE — PROGRESS NOTES
PHYSICAL / OCCUPATIONAL THERAPY - DAILY TREATMENT NOTE    Patient Name: Carissa Deleon    Date: 2024    : 1955  Insurance: Payor: ENDER / Plan: [employer] / Product Type: *No Product type* /      Patient  verified Yes     Visit #   Current / Total 2 4-8   Time   In / Out 4:29 5:13   Pain   In / Out 3/10 1/10   Subjective Functional Status/Changes: Pt reports her neck is stiff today. States her R shoulder is bothering her today. States she \"moves her neck around in bed\" but does not complete formal HEP. Does not notice any significant change in her pain/sx since beginning therapy.      TREATMENT AREA =  Neck pain [M54.2]    OBJECTIVE    Modalities Rationale:     decrease pain and increase tissue extensibility to improve patient's ability to progress to PLOF and address remaining functional goals.     min [] Estim Unattended, type/location:                                      []  w/ice    []  w/heat    min [] Estim Attended, type/location:                                     []  w/US     []  w/ice    []  w/heat    []  TENS insruct      min []  Mechanical Traction: type/lbs                   []  pro   []  sup   []  int   []  cont    []  before manual    []  after manual    min []  Ultrasound, settings/location:     10 min  unbill []  Ice     [x]  Heat    location/position: To the c/s in reclined long sit    min []  Paraffin,  details:     min []  Vasopneumatic Device, press/temp:     min []  Whirlpool / Fluido:    If using vaso (only need to measure limb vaso being performed on)      pre-treatment girth :       post-treatment girth :       measured at (landmark location) :      min []  Other:    Skin assessment post-treatment:   Intact      Therapeutic Procedures:    Tx Min Billable or 1:1 Min (if diff from Tx Min) Procedure, Rationale, Specifics   24 24 [id number] Therapeutic Exercise (timed):  increase ROM, strength, coordination, balance, and proprioception to improve patient's ability

## 2024-05-09 ENCOUNTER — TELEPHONE (OUTPATIENT)
Facility: HOSPITAL | Age: 69
End: 2024-05-09

## 2024-05-09 NOTE — TELEPHONE ENCOUNTER
Called patient to see if she was interested in continuing with PT. Patient has two approved visits left. Auth expires on 5/11, will need to submit for auth ext if patient wishes to continue PT.

## 2024-05-20 ENCOUNTER — TELEPHONE (OUTPATIENT)
Facility: HOSPITAL | Age: 69
End: 2024-05-20

## 2024-05-20 NOTE — TELEPHONE ENCOUNTER
Called patient to schedule follow up visits. Patient's insurance approved 4 additional visits. No answer. LVM.

## 2024-05-21 ENCOUNTER — TELEPHONE (OUTPATIENT)
Facility: HOSPITAL | Age: 69
End: 2024-05-21

## 2024-05-21 NOTE — TELEPHONE ENCOUNTER
Patient's insurance approved 4 additional visits. Called patient to schedule follow up visits. No answer. LVM.

## 2024-05-29 ENCOUNTER — TELEPHONE (OUTPATIENT)
Facility: HOSPITAL | Age: 69
End: 2024-05-29

## 2024-05-29 NOTE — TELEPHONE ENCOUNTER
Patient called requesting to schedule follow ups. Patient was unable to come in today and has been made aware of our 30 day policy. Patient's chart has been discharged. Patient expressed understanding.

## 2024-06-06 NOTE — PROGRESS NOTES
ASSESSMENT/PLAN:  1. Essential (primary) hypertension  Assessment & Plan:   Well-controlled, continue current medications  Orders:  -     Lipid Panel; Future  -     Comprehensive Metabolic Panel; Future  2. Hyperlipidemia, unspecified hyperlipidemia type  -     Lipid Panel; Future  -     Comprehensive Metabolic Panel; Future  3. Stage 3 chronic kidney disease, unspecified whether stage 3a or 3b CKD (HCC)  -     Lipid Panel; Future  -     Comprehensive Metabolic Panel; Future  4. Breast pain, left  Assessment & Plan:    Resolved.   5. Myelodysplastic syndrome, unspecified (HCC)  Assessment & Plan:   Monitored by specialist- no acute findings meriting change in the plan  6. Chronic pain of right knee  -     [facility] - Brent Moura, , Orthopedic Surgery(General/Total Joint), [place] ([facility])        Return in about 4 months (around 10/7/2024) for HTN, HLD, lab review, specialist eval.      SUBJECTIVE/OBJECTIVE:    Chief Complaint   Patient presents with    IFG    Cholesterol Problem    Chronic Kidney Disease    Hypertension         HPI    Carissa Deleon is a 69 y.o. female presenting today for follow up of htn, hld, ckd.    Patient had surgery for her left breast pain April 17, 2024.  She was prescribed Elocon cream.  They discussed skin punch biopsy.  Patient declined.  Today she reports that the rash has resolved with the creams she was applying.    Patient does not need medication refills today.      New concerns today: pt c/o L hand numbness in her fingers.  She has prior hx of neck pain but that has resolved.        Pt reports R knee pain since falling 1 yr ago.  She would like to have this evaluated with ortho.        Review of Systems   Constitutional: Negative.    HENT: Negative.     Respiratory: Negative.     Cardiovascular: Negative.    All other systems reviewed and are negative.      Physical Exam  Vitals and nursing note reviewed.   Constitutional:       General: She is not in acute

## 2024-06-07 ENCOUNTER — OFFICE VISIT (OUTPATIENT)
Facility: CLINIC | Age: 69
End: 2024-06-07
Payer: COMMERCIAL

## 2024-06-07 VITALS
TEMPERATURE: 98 F | DIASTOLIC BLOOD PRESSURE: 65 MMHG | OXYGEN SATURATION: 95 % | BODY MASS INDEX: 33.13 KG/M2 | HEIGHT: 63 IN | SYSTOLIC BLOOD PRESSURE: 130 MMHG | HEART RATE: 84 BPM | WEIGHT: 187 LBS | RESPIRATION RATE: 13 BRPM

## 2024-06-07 DIAGNOSIS — D46.9 MYELODYSPLASTIC SYNDROME, UNSPECIFIED (HCC): ICD-10-CM

## 2024-06-07 DIAGNOSIS — M25.561 CHRONIC PAIN OF RIGHT KNEE: ICD-10-CM

## 2024-06-07 DIAGNOSIS — N18.30 STAGE 3 CHRONIC KIDNEY DISEASE, UNSPECIFIED WHETHER STAGE 3A OR 3B CKD (HCC): ICD-10-CM

## 2024-06-07 DIAGNOSIS — I10 ESSENTIAL (PRIMARY) HYPERTENSION: Primary | ICD-10-CM

## 2024-06-07 DIAGNOSIS — E78.5 HYPERLIPIDEMIA, UNSPECIFIED HYPERLIPIDEMIA TYPE: ICD-10-CM

## 2024-06-07 DIAGNOSIS — N64.4 BREAST PAIN, LEFT: ICD-10-CM

## 2024-06-07 DIAGNOSIS — I10 ESSENTIAL (PRIMARY) HYPERTENSION: ICD-10-CM

## 2024-06-07 DIAGNOSIS — G89.29 CHRONIC PAIN OF RIGHT KNEE: ICD-10-CM

## 2024-06-07 PROCEDURE — 1123F ACP DISCUSS/DSCN MKR DOCD: CPT | Performed by: FAMILY MEDICINE

## 2024-06-07 PROCEDURE — 3078F DIAST BP <80 MM HG: CPT | Performed by: FAMILY MEDICINE

## 2024-06-07 PROCEDURE — 99214 OFFICE O/P EST MOD 30 MIN: CPT | Performed by: FAMILY MEDICINE

## 2024-06-07 PROCEDURE — 3075F SYST BP GE 130 - 139MM HG: CPT | Performed by: FAMILY MEDICINE

## 2024-06-07 NOTE — PROGRESS NOTES
Carissa DARIA Deleon presents today for   Chief Complaint   Patient presents with    IFG    Cholesterol Problem    Chronic Kidney Disease    Hypertension       Is someone accompanying this pt? No     Is the patient using any DME equipment during OV? No     Depression Screening:      3/21/2024    12:27 PM 1/26/2024     8:50 AM 3/30/2023    10:53 AM 3/7/2023     8:42 AM 12/7/2022     9:22 AM 8/31/2022     8:47 AM 7/29/2022     8:57 AM   PHQ-9 Questionaire   Little interest or pleasure in doing things 0 0 0 0 0 0 0   Feeling down, depressed, or hopeless 0 0 0 0 0 0 0   PHQ-9 Total Score 0 0 0 0 0 0 0        NETTE 7-Anxiety        No data to display                 Travel Screening:    Travel Screening     No screening recorded since 06/06/24 0000       Travel History   Travel since 05/07/24    No documented travel since 05/07/24          Health Maintenance reviewed and discussed and ordered per Provider.  Transportation Needs: Unknown (3/21/2024)    PRAPARE - Transportation     Lack of Transportation (Medical): Not on file     Lack of Transportation (Non-Medical): No      Food Insecurity: No Food Insecurity (3/21/2024)    Hunger Vital Sign     Worried About Running Out of Food in the Last Year: Never true     Ran Out of Food in the Last Year: Never true     Financial Resource Strain: Low Risk  (3/21/2024)    Overall Financial Resource Strain (CARDIA)     Difficulty of Paying Living Expenses: Not very hard     Housing Stability: Unknown (3/21/2024)    Housing Stability Vital Sign     Unable to Pay for Housing in the Last Year: Not on file     Number of Places Lived in the Last Year: Not on file     Unstable Housing in the Last Year: No       Did you provide resources if patient requested them? Yes patient declined       Health Maintenance Due   Topic Date Due    Shingles vaccine (1 of 2) Never done    Respiratory Syncytial Virus (RSV) Pregnant or age 60 yrs+ (1 - 1-dose 60+ series) Never done    COVID-19 Vaccine (5 - 2023-24

## 2024-07-11 NOTE — PROGRESS NOTES
latanoprost (XALATAN) 0.005 % ophthalmic solution Place 1 drop into both eyes daily      lipase-protease-amylase (CREON) 21054-571594 units CPEP delayed release capsule 2 capsules daily      polyethylene glycol (GLYCOLAX) 17 GM/SCOOP powder Take 17 g by mouth daily       No current facility-administered medications for this visit.        No Known Allergies    Past Surgical History:   Procedure Laterality Date    HYSTERECTOMY (CERVIX STATUS UNKNOWN)         Social History     Socioeconomic History    Marital status: Single     Spouse name: Not on file    Number of children: Not on file    Years of education: Not on file    Highest education level: Not on file   Occupational History    Not on file   Tobacco Use    Smoking status: Never    Smokeless tobacco: Never   Vaping Use    Vaping Use: Never used   Substance and Sexual Activity    Alcohol use: No    Drug use: No    Sexual activity: Not on file   Other Topics Concern    Not on file   Social History Narrative    Not on file     Social Determinants of Health     Financial Resource Strain: Low Risk  (3/21/2024)    Overall Financial Resource Strain (CARDIA)     Difficulty of Paying Living Expenses: Not very hard   Food Insecurity: No Food Insecurity (3/21/2024)    Hunger Vital Sign     Worried About Running Out of Food in the Last Year: Never true     Ran Out of Food in the Last Year: Never true   Transportation Needs: Unknown (3/21/2024)    PRAPARE - Transportation     Lack of Transportation (Medical): Not on file     Lack of Transportation (Non-Medical): No   Physical Activity: Not on file   Stress: Not on file   Social Connections: Not on file   Intimate Partner Violence: Not on file   Housing Stability: Unknown (3/21/2024)    Housing Stability Vital Sign     Unable to Pay for Housing in the Last Year: Not on file     Number of Places Lived in the Last Year: Not on file     Unstable Housing in the Last Year: No       REVIEW OF SYSTEMS:      Negative except for that

## 2024-07-12 ENCOUNTER — OFFICE VISIT (OUTPATIENT)
Age: 69
End: 2024-07-12
Payer: COMMERCIAL

## 2024-07-12 VITALS — BODY MASS INDEX: 32.6 KG/M2 | HEIGHT: 63 IN | WEIGHT: 184 LBS

## 2024-07-12 DIAGNOSIS — M17.11 PRIMARY OSTEOARTHRITIS OF RIGHT KNEE: Primary | ICD-10-CM

## 2024-07-12 PROCEDURE — 20610 DRAIN/INJ JOINT/BURSA W/O US: CPT

## 2024-07-12 PROCEDURE — 1123F ACP DISCUSS/DSCN MKR DOCD: CPT

## 2024-07-12 PROCEDURE — 99204 OFFICE O/P NEW MOD 45 MIN: CPT

## 2024-07-12 PROCEDURE — 73564 X-RAY EXAM KNEE 4 OR MORE: CPT

## 2024-07-12 RX ORDER — TRIAMCINOLONE ACETONIDE 40 MG/ML
80 INJECTION, SUSPENSION INTRA-ARTICULAR; INTRAMUSCULAR ONCE
Status: COMPLETED | OUTPATIENT
Start: 2024-07-12 | End: 2024-07-12

## 2024-07-12 RX ORDER — LIDOCAINE HYDROCHLORIDE 10 MG/ML
2 INJECTION, SOLUTION INFILTRATION; PERINEURAL ONCE
Status: COMPLETED | OUTPATIENT
Start: 2024-07-12 | End: 2024-07-12

## 2024-07-12 RX ADMIN — TRIAMCINOLONE ACETONIDE 80 MG: 40 INJECTION, SUSPENSION INTRA-ARTICULAR; INTRAMUSCULAR at 09:18

## 2024-07-12 RX ADMIN — LIDOCAINE HYDROCHLORIDE 2 ML: 10 INJECTION, SOLUTION INFILTRATION; PERINEURAL at 09:18

## 2024-07-12 NOTE — ASSESSMENT & PLAN NOTE
After explaining potential risk of infection, skin discoloration, hyperglycemia, or flushing, I obtained written consent the patient would like to move forward with a right knee intra-articular injection the patient was prepped in normal sterile fashion with freeze spray and alcohol.  80mg kenalog and 2mL 2% lidocaine was injected .  The needle was withdrawn, the area was cleansed and a sterile bandage was applied.  The patient tolerated the procedure well.

## 2024-08-14 ASSESSMENT — ENCOUNTER SYMPTOMS: RESPIRATORY NEGATIVE: 1

## 2024-08-16 ENCOUNTER — OFFICE VISIT (OUTPATIENT)
Facility: CLINIC | Age: 69
End: 2024-08-16
Payer: COMMERCIAL

## 2024-08-16 VITALS
WEIGHT: 179 LBS | HEART RATE: 58 BPM | DIASTOLIC BLOOD PRESSURE: 83 MMHG | OXYGEN SATURATION: 100 % | RESPIRATION RATE: 20 BRPM | SYSTOLIC BLOOD PRESSURE: 133 MMHG | TEMPERATURE: 98.1 F | BODY MASS INDEX: 31.71 KG/M2

## 2024-08-16 DIAGNOSIS — R10.9 LEFT FLANK PAIN: ICD-10-CM

## 2024-08-16 DIAGNOSIS — Z80.41 FAMILY HISTORY OF OVARIAN CANCER: Primary | ICD-10-CM

## 2024-08-16 DIAGNOSIS — R10.12 LUQ PAIN: ICD-10-CM

## 2024-08-16 DIAGNOSIS — R10.32 LLQ PAIN: ICD-10-CM

## 2024-08-16 PROCEDURE — 3079F DIAST BP 80-89 MM HG: CPT | Performed by: FAMILY MEDICINE

## 2024-08-16 PROCEDURE — 3075F SYST BP GE 130 - 139MM HG: CPT | Performed by: FAMILY MEDICINE

## 2024-08-16 PROCEDURE — 99214 OFFICE O/P EST MOD 30 MIN: CPT | Performed by: FAMILY MEDICINE

## 2024-08-16 PROCEDURE — 1123F ACP DISCUSS/DSCN MKR DOCD: CPT | Performed by: FAMILY MEDICINE

## 2024-08-16 SDOH — ECONOMIC STABILITY: FOOD INSECURITY: WITHIN THE PAST 12 MONTHS, YOU WORRIED THAT YOUR FOOD WOULD RUN OUT BEFORE YOU GOT MONEY TO BUY MORE.: NEVER TRUE

## 2024-08-16 SDOH — ECONOMIC STABILITY: INCOME INSECURITY: HOW HARD IS IT FOR YOU TO PAY FOR THE VERY BASICS LIKE FOOD, HOUSING, MEDICAL CARE, AND HEATING?: NOT HARD AT ALL

## 2024-08-16 SDOH — ECONOMIC STABILITY: FOOD INSECURITY: WITHIN THE PAST 12 MONTHS, THE FOOD YOU BOUGHT JUST DIDN'T LAST AND YOU DIDN'T HAVE MONEY TO GET MORE.: NEVER TRUE

## 2024-08-16 ASSESSMENT — PATIENT HEALTH QUESTIONNAIRE - PHQ9
SUM OF ALL RESPONSES TO PHQ QUESTIONS 1-9: 0
SUM OF ALL RESPONSES TO PHQ QUESTIONS 1-9: 0
2. FEELING DOWN, DEPRESSED OR HOPELESS: NOT AT ALL
SUM OF ALL RESPONSES TO PHQ QUESTIONS 1-9: 0
1. LITTLE INTEREST OR PLEASURE IN DOING THINGS: NOT AT ALL
SUM OF ALL RESPONSES TO PHQ QUESTIONS 1-9: 0
SUM OF ALL RESPONSES TO PHQ9 QUESTIONS 1 & 2: 0

## 2024-08-16 NOTE — PROGRESS NOTES
Carissa Deleon (:  1955) is a 69 y.o. female,Established patient, here for evaluation of the following chief complaint(s):  Abdominal Pain (Patient report LUQ pain over the past 3 weeks. Patient denies any problems with voiding but has been experiencing some diarrhea for about 1 week. )         Assessment & Plan  Family history of ovarian cancer    Copy of prior genetic testing from 3/2021 found and printed for pt.  Result briefly discussed.  Pt encouraged to discuss further with her hematologist.      Orders:    US NON OB TRANSVAGINAL; Future    LUQ pain    Suspect msk.  Pt very worried about ovarian ca.  Recent ct abd/pelvis reviewed, last pelvic u/s from 2023 reviewed as well.  Pt remains concerned.  Would like repeat imaging.      Orders:    US NON OB TRANSVAGINAL; Future    Left flank pain    Rec otc analgesic prn.  Completely nl exam.      Orders:    US NON OB TRANSVAGINAL; Future    LLQ pain       Orders:    US NON OB TRANSVAGINAL; Future      No follow-ups on file.       Subjective   HPI  Patient presents for follow-up of ER visit on 2024 for abdominal pain.  She reports that she started having issues about 3 wks ago.  She saw her gyn and was given terconazole for vag irritation.  Pt cont to have some concerns about her LUQ pain.  However, she is concerned because her mom had ovarian cancer. Pt went [place] on a trip and developed diarrhea while she was away.      Review of Systems   Constitutional: Negative.    HENT: Negative.     Respiratory: Negative.     Cardiovascular: Negative.    All other systems reviewed and are negative.         Objective   Physical Exam  Vitals and nursing note reviewed.   Constitutional:       General: She is not in acute distress.     Appearance: Normal appearance.   HENT:      Head: Normocephalic and atraumatic.      Right Ear: External ear normal.      Left Ear: External ear normal.      Nose: Nose normal.      Mouth/Throat:      Mouth: Mucous membranes 
age 60 yrs+ (1 - 1-dose 60+ series) Never done    COVID-19 Vaccine (5 - 2023-24 season) 09/01/2023    Flu vaccine (1) 08/01/2024   .      \"Have you been to the ER, urgent care clinic since your last visit?  Hospitalized since your last visit?\"    YES - When: approximately 2  weeks ago.  Where and Why: abd pain.    “Have you seen or consulted any other health care providers outside of [facility] since your last visit?”    NO

## 2024-08-30 ENCOUNTER — HOSPITAL ENCOUNTER (OUTPATIENT)
Facility: HOSPITAL | Age: 69
Discharge: HOME | End: 2024-08-30
Attending: FAMILY MEDICINE
Payer: COMMERCIAL

## 2024-08-30 DIAGNOSIS — R10.32 LLQ PAIN: ICD-10-CM

## 2024-08-30 DIAGNOSIS — R10.12 LUQ PAIN: ICD-10-CM

## 2024-08-30 DIAGNOSIS — Z80.41 FAMILY HISTORY OF OVARIAN CANCER: ICD-10-CM

## 2024-08-30 DIAGNOSIS — R10.9 LEFT FLANK PAIN: ICD-10-CM

## 2024-08-30 PROCEDURE — 93975 VASCULAR STUDY: CPT

## 2024-08-30 PROCEDURE — 76856 US EXAM PELVIC COMPLETE: CPT

## 2024-10-16 ENCOUNTER — OFFICE VISIT (OUTPATIENT)
Age: 69
End: 2024-10-16
Payer: COMMERCIAL

## 2024-10-16 ENCOUNTER — CLINICAL DOCUMENTATION (OUTPATIENT)
Age: 69
End: 2024-10-16

## 2024-10-16 VITALS
DIASTOLIC BLOOD PRESSURE: 82 MMHG | BODY MASS INDEX: 32.07 KG/M2 | TEMPERATURE: 97.5 F | SYSTOLIC BLOOD PRESSURE: 142 MMHG | OXYGEN SATURATION: 98 % | WEIGHT: 181 LBS | RESPIRATION RATE: 18 BRPM | HEART RATE: 72 BPM | HEIGHT: 63 IN

## 2024-10-16 DIAGNOSIS — N18.30 STAGE 3 CHRONIC KIDNEY DISEASE, UNSPECIFIED WHETHER STAGE 3A OR 3B CKD (HCC): Chronic | ICD-10-CM

## 2024-10-16 DIAGNOSIS — I10 ESSENTIAL (PRIMARY) HYPERTENSION: Chronic | ICD-10-CM

## 2024-10-16 DIAGNOSIS — G47.33 OSA (OBSTRUCTIVE SLEEP APNEA): Primary | ICD-10-CM

## 2024-10-16 DIAGNOSIS — D46.4 REFRACTORY ANEMIA (HCC): ICD-10-CM

## 2024-10-16 PROBLEM — H02.883 MEIBOMIAN GLAND DYSFUNCTION (MGD) OF BOTH EYES: Status: ACTIVE | Noted: 2024-10-16

## 2024-10-16 PROBLEM — H02.886 MEIBOMIAN GLAND DYSFUNCTION (MGD) OF BOTH EYES: Status: ACTIVE | Noted: 2024-10-16

## 2024-10-16 PROCEDURE — 3077F SYST BP >= 140 MM HG: CPT | Performed by: OTOLARYNGOLOGY

## 2024-10-16 PROCEDURE — 1123F ACP DISCUSS/DSCN MKR DOCD: CPT | Performed by: OTOLARYNGOLOGY

## 2024-10-16 PROCEDURE — 3079F DIAST BP 80-89 MM HG: CPT | Performed by: OTOLARYNGOLOGY

## 2024-10-16 PROCEDURE — 99204 OFFICE O/P NEW MOD 45 MIN: CPT | Performed by: OTOLARYNGOLOGY

## 2024-10-16 RX ORDER — MOMETASONE FUROATE 1 MG/G
CREAM TOPICAL DAILY
COMMUNITY
Start: 2024-04-17

## 2024-10-16 ASSESSMENT — PATIENT HEALTH QUESTIONNAIRE - PHQ9
SUM OF ALL RESPONSES TO PHQ QUESTIONS 1-9: 0
SUM OF ALL RESPONSES TO PHQ QUESTIONS 1-9: 0
1. LITTLE INTEREST OR PLEASURE IN DOING THINGS: NOT AT ALL
2. FEELING DOWN, DEPRESSED OR HOPELESS: NOT AT ALL
SUM OF ALL RESPONSES TO PHQ QUESTIONS 1-9: 0
SUM OF ALL RESPONSES TO PHQ QUESTIONS 1-9: 0
SUM OF ALL RESPONSES TO PHQ9 QUESTIONS 1 & 2: 0

## 2024-10-16 ASSESSMENT — SLEEP AND FATIGUE QUESTIONNAIRES
HOW LIKELY ARE YOU TO NOD OFF OR FALL ASLEEP WHILE SITTING AND READING: WOULD NEVER DOZE
HOW LIKELY ARE YOU TO NOD OFF OR FALL ASLEEP WHILE LYING DOWN TO REST IN THE AFTERNOON WHEN CIRCUMSTANCES PERMIT: WOULD NEVER DOZE
HOW LIKELY ARE YOU TO NOD OFF OR FALL ASLEEP WHILE SITTING AND TALKING TO SOMEONE: WOULD NEVER DOZE
ESS TOTAL SCORE: 0
HOW LIKELY ARE YOU TO NOD OFF OR FALL ASLEEP WHILE SITTING INACTIVE IN A PUBLIC PLACE: WOULD NEVER DOZE
HOW LIKELY ARE YOU TO NOD OFF OR FALL ASLEEP WHILE SITTING QUIETLY AFTER LUNCH WITHOUT ALCOHOL: WOULD NEVER DOZE
HOW LIKELY ARE YOU TO NOD OFF OR FALL ASLEEP WHILE WATCHING TV: WOULD NEVER DOZE
HOW LIKELY ARE YOU TO NOD OFF OR FALL ASLEEP IN A CAR, WHILE STOPPED FOR A FEW MINUTES IN TRAFFIC: WOULD NEVER DOZE
HOW LIKELY ARE YOU TO NOD OFF OR FALL ASLEEP WHEN YOU ARE A PASSENGER IN A CAR FOR AN HOUR WITHOUT A BREAK: WOULD NEVER DOZE

## 2024-10-16 NOTE — PROGRESS NOTES
[facility] [place] [facility]:   Order sent to [facility] 10-.

## 2024-10-16 NOTE — PATIENT INSTRUCTIONS
Please make a follow up appointment to discuss the results of your sleep study. If this is impossible for some reason, please send me a \"My Chart\" message so that I may get back with you in a timely manner.    The [facility] Lab is located in the [employer] [facility], adjacent to [facility]. The lab is on the second floor. The direct number to call for sleep study related questions is: [phone].    Please call our clinic back at [phone] or send a message on [facility] if you have any questions or concerns or if you are experiencing any of the following:     You have not received a follow up appointment within 30 days prior the recommended follow up time.    If you are not tolerating treatment plan and/or not able to obtain equipment or prescribed medication(s).  if you are experiencing any difficulties with the Durable Medical Equipment  (DME) Company you may be using or is assigned to you.  Two weeks have passed and you have not received an appointment for a scheduled procedure.  Two weeks have passed since you underwent a test and/or procedure and you have not received your results.     If you are using a CPAP/BIPAP, or Home Ventilator Device- Please note the following.  Currently, many DMEs are experiencing supply chain difficulties and orders for equipment may be back logged several weeks.     Your  Durable Medical Equipment (DME ) company is supposed to provide you with replacement filters, tubing and masks. You can either call your DME when you need new supplies or you can arrange for an automatic shipment schedule.    Your need to be seen by our office at lat minimum of every 12 months in order to renew the prescription for these supplies.   Please make note of who your DME company is and their phone number.   Please make sure that you clean your mask and hosing on a regular basis.  Your DME can provide you with additional information regarding proper care and cleaning of your

## 2024-10-16 NOTE — PROGRESS NOTES
Carissa Deleon presents today for   Chief Complaint   Patient presents with    Sleep Apnea       Is someone accompanying this pt? no    Is the patient using any DME equipment during OV? no    -DME Company: [employer]     Have you ever had a sleep study done before? yes    Depression Screening:      10/16/2024     9:48 AM   PHQ-9    Little interest or pleasure in doing things 0   Feeling down, depressed, or hopeless 0   PHQ-2 Score 0   PHQ-9 Total Score 0        Ambrose Sleepiness Scale:      10/16/2024     9:52 AM   Sleep Medicine   Sitting and reading 0   Watching TV 0   Sitting, inactive in a public place (e.g. a theatre or a meeting) 0   As a passenger in a car for an hour without a break 0   Lying down to rest in the afternoon when circumstances permit 0   Sitting and talking to someone 0   Sitting quietly after a lunch without alcohol 0   In a car, while stopped for a few minutes in traffic 0   Ambrose Sleepiness Score 0   Neck (Inches) 14       Stop-Bang:      10/16/2024     9:00 AM   STOP-BANG QUESTIONNAIRE   Are you a loud and/or regular snorer? 1   Do you often feel tired or groggy upon awakening or do you awaken with a headache? 0   Have you been observed to gasp or stop breathing during sleep? 0   Are you often tired or fatigued during wake time hours?  0   Do you fall asleep sitting, reading, watching TV or driving? 0   Do you often have problems with memory or concentration? 0   Do you have or are you being treated for high blood pressure? 1   Recent BMI (Calculated) 0   Age older than 50 years old? 1=Yes   Is your neck circumference greater than 17 inches (Male) or 16 inches (Female)? 0   Gender - Male 0=No         Coordination of Care:  1. Have you been to the ER, urgent care clinic since your last visit? Hospitalized since your last visit? no    2. Have you seen or consulted any other health care providers outside of the [facility] since your last visit? Include any pap smears or colon

## 2024-10-16 NOTE — ASSESSMENT & PLAN NOTE
Overall, uses her CPAP consistently more than 80% of the time.  However, some nights the usage is less than 4 hours.  Overall her settings appear to be appropriate.  Some nights she falls asleep without using her machine and then wakes up in the middle of the night and puts her mask on and turns the machine on.    She also does travel occasionally and does not bring her machine because her obstructive sleep apnea is mild.    I did tell her that, while not knowing how accurate this is, her obstructive sleep apnea was mild but her O2 [place] on her sleep study was 65% and she spent 14 minutes below 88%.  That may be reason to continue treatment regardless.  She certainly is not sleepy during the day and continues to be followed for hypertension and anemia and chronic kidney disease.

## 2024-11-05 ENCOUNTER — HOSPITAL ENCOUNTER (OUTPATIENT)
Facility: HOSPITAL | Age: 69
Setting detail: SPECIMEN
Discharge: HOME | End: 2024-11-08

## 2024-11-05 LAB — LABCORP SPECIMEN COLLECTION: NORMAL

## 2024-11-05 PROCEDURE — 99001 SPECIMEN HANDLING PT-LAB: CPT

## 2024-11-06 LAB
ALBUMIN SERPL-MCNC: 4.2 G/DL (ref 3.9–4.9)
ALP SERPL-CCNC: 91 IU/L (ref 44–121)
ALT SERPL-CCNC: 11 IU/L (ref 0–32)
AST SERPL-CCNC: 21 IU/L (ref 0–40)
BILIRUB SERPL-MCNC: 0.6 MG/DL (ref 0–1.2)
BUN SERPL-MCNC: 16 MG/DL (ref 8–27)
BUN/CREAT SERPL: 19 (ref 12–28)
CALCIUM SERPL-MCNC: 9.8 MG/DL (ref 8.7–10.3)
CHLORIDE SERPL-SCNC: 102 MMOL/L (ref 96–106)
CHOLEST SERPL-MCNC: 168 MG/DL (ref 100–199)
CO2 SERPL-SCNC: 27 MMOL/L (ref 20–29)
CREAT SERPL-MCNC: 0.86 MG/DL (ref 0.57–1)
EGFRCR SERPLBLD CKD-EPI 2021: 74 ML/MIN/1.73
GLOBULIN SER CALC-MCNC: 2.9 G/DL (ref 1.5–4.5)
GLUCOSE SERPL-MCNC: 79 MG/DL (ref 70–99)
HDLC SERPL-MCNC: 57 MG/DL
LDLC SERPL CALC-MCNC: 99 MG/DL (ref 0–99)
POTASSIUM SERPL-SCNC: 4 MMOL/L (ref 3.5–5.2)
PROT SERPL-MCNC: 7.1 G/DL (ref 6–8.5)
SODIUM SERPL-SCNC: 141 MMOL/L (ref 134–144)
TRIGL SERPL-MCNC: 62 MG/DL (ref 0–149)
VLDLC SERPL CALC-MCNC: 12 MG/DL (ref 5–40)

## 2024-11-11 ENCOUNTER — OFFICE VISIT (OUTPATIENT)
Facility: CLINIC | Age: 69
End: 2024-11-11
Payer: COMMERCIAL

## 2024-11-11 VITALS
DIASTOLIC BLOOD PRESSURE: 78 MMHG | SYSTOLIC BLOOD PRESSURE: 128 MMHG | HEIGHT: 63 IN | HEART RATE: 75 BPM | BODY MASS INDEX: 32.43 KG/M2 | OXYGEN SATURATION: 98 % | TEMPERATURE: 97.9 F | RESPIRATION RATE: 14 BRPM | WEIGHT: 183 LBS

## 2024-11-11 DIAGNOSIS — G47.33 OSA (OBSTRUCTIVE SLEEP APNEA): ICD-10-CM

## 2024-11-11 DIAGNOSIS — E78.5 HYPERLIPIDEMIA, UNSPECIFIED HYPERLIPIDEMIA TYPE: Chronic | ICD-10-CM

## 2024-11-11 DIAGNOSIS — I10 ESSENTIAL (PRIMARY) HYPERTENSION: Primary | Chronic | ICD-10-CM

## 2024-11-11 DIAGNOSIS — Z23 NEEDS FLU SHOT: ICD-10-CM

## 2024-11-11 PROCEDURE — 1123F ACP DISCUSS/DSCN MKR DOCD: CPT | Performed by: FAMILY MEDICINE

## 2024-11-11 PROCEDURE — 3078F DIAST BP <80 MM HG: CPT | Performed by: FAMILY MEDICINE

## 2024-11-11 PROCEDURE — 3074F SYST BP LT 130 MM HG: CPT | Performed by: FAMILY MEDICINE

## 2024-11-11 PROCEDURE — 99214 OFFICE O/P EST MOD 30 MIN: CPT | Performed by: FAMILY MEDICINE

## 2024-11-11 SDOH — ECONOMIC STABILITY: FOOD INSECURITY: WITHIN THE PAST 12 MONTHS, THE FOOD YOU BOUGHT JUST DIDN'T LAST AND YOU DIDN'T HAVE MONEY TO GET MORE.: NEVER TRUE

## 2024-11-11 SDOH — ECONOMIC STABILITY: FOOD INSECURITY: WITHIN THE PAST 12 MONTHS, YOU WORRIED THAT YOUR FOOD WOULD RUN OUT BEFORE YOU GOT MONEY TO BUY MORE.: NEVER TRUE

## 2024-11-11 SDOH — ECONOMIC STABILITY: INCOME INSECURITY: HOW HARD IS IT FOR YOU TO PAY FOR THE VERY BASICS LIKE FOOD, HOUSING, MEDICAL CARE, AND HEATING?: NOT HARD AT ALL

## 2024-11-11 ASSESSMENT — PATIENT HEALTH QUESTIONNAIRE - PHQ9
2. FEELING DOWN, DEPRESSED OR HOPELESS: NOT AT ALL
SUM OF ALL RESPONSES TO PHQ QUESTIONS 1-9: 0
SUM OF ALL RESPONSES TO PHQ9 QUESTIONS 1 & 2: 0
1. LITTLE INTEREST OR PLEASURE IN DOING THINGS: NOT AT ALL

## 2024-11-11 NOTE — PROGRESS NOTES
ASSESSMENT/PLAN:  1. Essential (primary) hypertension  Assessment & Plan:   Chronic, at goal (stable), continue current treatment plan  2. Hyperlipidemia, unspecified hyperlipidemia type  Assessment & Plan:   Chronic, at goal (stable), continue current treatment plan  3. DOREEN (obstructive sleep apnea)  Assessment & Plan:   Monitored by specialist- no acute findings meriting change in the plan  4. Needs flu shot        Return in about 4 months (around 3/11/2025) for HTN, HLD, (no labs).      SUBJECTIVE/OBJECTIVE:    Chief Complaint   Patient presents with    Cholesterol Problem    Hypertension    Discuss Labs         HPI    Carissa Deleon is a 69 y.o. female presenting today for    4 months  follow up of htn, hld.  Pt has been doing well overall.     Patient had labs on 11/5/24.  Labs reviewed in detail with patient     Pt had f/u with sleep med for doreen.  Pt was encouraged to use her machine, even when traveling.      Pt had f/u with ophtho for glaucoma.      Patient does not need medication refills today.      New concerns today: Pt would like to have a flu shot today.             Review of Systems   Constitutional: Negative.    HENT: Negative.     Respiratory: Negative.     Cardiovascular: Negative.    All other systems reviewed and are negative.      Physical Exam  Vitals and nursing note reviewed.   Constitutional:       General: She is not in acute distress.     Appearance: Normal appearance.   HENT:      Head: Normocephalic and atraumatic.      Right Ear: External ear normal.      Left Ear: External ear normal.      Nose: Nose normal.      Mouth/Throat:      Mouth: Mucous membranes are moist.   Eyes:      Extraocular Movements: Extraocular movements intact.      Conjunctiva/sclera: Conjunctivae normal.   Cardiovascular:      Rate and Rhythm: Normal rate and regular rhythm.      Heart sounds: No murmur heard.     No friction rub. No gallop.   Pulmonary:      Effort: Pulmonary effort is normal.      Breath

## 2024-11-11 NOTE — PROGRESS NOTES
Carissa Deleon presents today for   Chief Complaint   Patient presents with    Cholesterol Problem    Hypertension    Discuss Labs       Is someone accompanying this pt? no    Is the patient using any DME equipment during OV? no    Depression Screenin/11/2024     8:33 AM 10/16/2024     9:48 AM 2024     8:33 AM 3/21/2024    12:27 PM 2024     8:50 AM 3/30/2023    10:53 AM 3/7/2023     8:42 AM   PHQ-9 Questionaire   Little interest or pleasure in doing things 0 0 0 0 0 0 0   Feeling down, depressed, or hopeless 0 0 0 0 0 0 0   PHQ-9 Total Score 0 0 0 0 0 0 0        NETTE 7-Anxiety        No data to display                 Travel Screening:    Travel Screening       Question Response    Have you been in contact with someone who was sick? No / Unsure    Do you have any of the following new or worsening symptoms? None of these    Have you traveled internationally or domestically in the last month? No          Travel History   Travel since 10/11/24    No documented travel since 10/11/24          Health Maintenance reviewed and discussed and ordered per Provider.  Transportation Needs: Unknown (2024)    PRAPARE - Transportation     Lack of Transportation (Medical): Not on file     Lack of Transportation (Non-Medical): No      Food Insecurity: No Food Insecurity (2024)    Hunger Vital Sign     Worried About Running Out of Food in the Last Year: Never true     Ran Out of Food in the Last Year: Never true     Financial Resource Strain: Low Risk  (2024)    Overall Financial Resource Strain (CARDIA)     Difficulty of Paying Living Expenses: Not hard at all     Housing Stability: Unknown (2024)    Housing Stability Vital Sign     Unable to Pay for Housing in the Last Year: Not on file     Number of Times Moved in the Last Year: Not on file     Homeless in the Last Year: No       Did you provide resources if patient requested them? Yes patient declined      Health Maintenance Due   Topic

## 2024-11-25 NOTE — TELEPHONE ENCOUNTER
Last seen 11/11/2024   Last labs 8/22/23  Last filled    Next appointment 3/11/2025     Lab Results   Component Value Date     11/05/2024    K 4.0 11/05/2024     11/05/2024    CO2 27 11/05/2024    BUN 16 11/05/2024    CREATININE 0.86 11/05/2024    GLUCOSE 79 11/05/2024    CALCIUM 9.8 11/05/2024    BILITOT 0.6 11/05/2024    ALKPHOS 91 11/05/2024    AST 21 11/05/2024    ALT 11 11/05/2024    LABGLOM 74 11/05/2024    GFRAA 75 02/05/2022    AGRATIO 1.4 01/15/2024    GLOB 3.8 04/14/2023

## 2024-11-26 RX ORDER — LOSARTAN POTASSIUM AND HYDROCHLOROTHIAZIDE 12.5; 1 MG/1; MG/1
1 TABLET ORAL DAILY
Qty: 90 TABLET | Refills: 4 | Status: SHIPPED | OUTPATIENT
Start: 2024-11-26

## 2024-11-26 RX ORDER — SIMVASTATIN 20 MG
20 TABLET ORAL NIGHTLY
Qty: 90 TABLET | Refills: 4 | Status: SHIPPED | OUTPATIENT
Start: 2024-11-26

## 2025-03-11 ENCOUNTER — TRANSCRIBE ORDERS (OUTPATIENT)
Facility: HOSPITAL | Age: 70
End: 2025-03-11

## 2025-03-11 ENCOUNTER — OFFICE VISIT (OUTPATIENT)
Facility: CLINIC | Age: 70
End: 2025-03-11
Payer: COMMERCIAL

## 2025-03-11 ENCOUNTER — TELEPHONE (OUTPATIENT)
Facility: CLINIC | Age: 70
End: 2025-03-11

## 2025-03-11 VITALS
HEART RATE: 87 BPM | BODY MASS INDEX: 33.49 KG/M2 | WEIGHT: 189 LBS | SYSTOLIC BLOOD PRESSURE: 132 MMHG | HEIGHT: 63 IN | RESPIRATION RATE: 12 BRPM | OXYGEN SATURATION: 99 % | TEMPERATURE: 98.3 F | DIASTOLIC BLOOD PRESSURE: 82 MMHG

## 2025-03-11 DIAGNOSIS — E78.5 HYPERLIPIDEMIA, UNSPECIFIED HYPERLIPIDEMIA TYPE: Chronic | ICD-10-CM

## 2025-03-11 DIAGNOSIS — E87.6 HYPOKALEMIA: ICD-10-CM

## 2025-03-11 DIAGNOSIS — G47.33 OSA (OBSTRUCTIVE SLEEP APNEA): ICD-10-CM

## 2025-03-11 DIAGNOSIS — N18.30 STAGE 3 CHRONIC KIDNEY DISEASE, UNSPECIFIED WHETHER STAGE 3A OR 3B CKD (HCC): ICD-10-CM

## 2025-03-11 DIAGNOSIS — D46.4 REFRACTORY ANEMIA (HCC): ICD-10-CM

## 2025-03-11 DIAGNOSIS — I10 ESSENTIAL (PRIMARY) HYPERTENSION: Primary | Chronic | ICD-10-CM

## 2025-03-11 DIAGNOSIS — I10 ESSENTIAL (PRIMARY) HYPERTENSION: Chronic | ICD-10-CM

## 2025-03-11 DIAGNOSIS — Z12.31 OTHER SCREENING MAMMOGRAM: Primary | ICD-10-CM

## 2025-03-11 DIAGNOSIS — N64.4 BREAST PAIN, LEFT: ICD-10-CM

## 2025-03-11 PROCEDURE — 3075F SYST BP GE 130 - 139MM HG: CPT | Performed by: FAMILY MEDICINE

## 2025-03-11 PROCEDURE — 99214 OFFICE O/P EST MOD 30 MIN: CPT | Performed by: FAMILY MEDICINE

## 2025-03-11 PROCEDURE — 3078F DIAST BP <80 MM HG: CPT | Performed by: FAMILY MEDICINE

## 2025-03-11 PROCEDURE — 1123F ACP DISCUSS/DSCN MKR DOCD: CPT | Performed by: FAMILY MEDICINE

## 2025-03-11 SDOH — ECONOMIC STABILITY: FOOD INSECURITY: WITHIN THE PAST 12 MONTHS, THE FOOD YOU BOUGHT JUST DIDN'T LAST AND YOU DIDN'T HAVE MONEY TO GET MORE.: NEVER TRUE

## 2025-03-11 SDOH — ECONOMIC STABILITY: FOOD INSECURITY: WITHIN THE PAST 12 MONTHS, YOU WORRIED THAT YOUR FOOD WOULD RUN OUT BEFORE YOU GOT MONEY TO BUY MORE.: NEVER TRUE

## 2025-03-11 ASSESSMENT — PATIENT HEALTH QUESTIONNAIRE - PHQ9
SUM OF ALL RESPONSES TO PHQ QUESTIONS 1-9: 0
SUM OF ALL RESPONSES TO PHQ QUESTIONS 1-9: 0
2. FEELING DOWN, DEPRESSED OR HOPELESS: NOT AT ALL
SUM OF ALL RESPONSES TO PHQ QUESTIONS 1-9: 0
SUM OF ALL RESPONSES TO PHQ QUESTIONS 1-9: 0
1. LITTLE INTEREST OR PLEASURE IN DOING THINGS: NOT AT ALL

## 2025-03-11 NOTE — PROGRESS NOTES
ASSESSMENT/PLAN:  1. Essential (primary) hypertension  Assessment & Plan:   Chronic, at goal (stable), continue current treatment plan  Orders:  -     Lipid Panel; Future  -     Comprehensive Metabolic Panel; Future  2. Hypokalemia  -     Lipid Panel; Future  -     Comprehensive Metabolic Panel; Future  3. Hyperlipidemia, unspecified hyperlipidemia type  -     Lipid Panel; Future  -     Comprehensive Metabolic Panel; Future  4. DOREEN (obstructive sleep apnea)  Assessment & Plan:   Monitored by specialist- no acute findings meriting change in the plan  5. Refractory anemia (HCC)  Assessment & Plan:   Monitored by specialist- no acute findings meriting change in the plan  6. Stage 3 chronic kidney disease, unspecified whether stage 3a or 3b CKD (HCC)  Assessment & Plan:     Orders:  -     Lipid Panel; Future  -     Comprehensive Metabolic Panel; Future  7. Breast pain, left  -     MADELINE DIGITAL DIAGNOSTIC W OR WO CAD BILATERAL; Future  -     US BREAST LIMITED LEFT; Future        Return in about 4 months (around 7/11/2025) for HTN, HLD, specialist eval.      SUBJECTIVE/OBJECTIVE:    Chief Complaint   Patient presents with    Cholesterol Problem    Hypertension         HPI    Carissa Deleon is a 70 y.o. female presenting today for    4 months  follow up of htn, hld.  Pt has been feeling well overall.  She forgot her bp med today.    Patient does  need medication refills today.      New concerns today: pt cont to have R shoulder pain.  She has seen Dr Prateek Bermudez in the past.  Last appt there was 12/1/22 for R rotator cuff arthropathy.  She has had good improvement with steroid injections in the past.  She is now having limited ROM.  She also notes some loss of strength in the R shoulder.       Pt c/o L breast pain.     Review of Systems   Constitutional: Negative.    HENT: Negative.     Respiratory: Negative.     Cardiovascular: Negative.    Genitourinary:         L breast pain   Musculoskeletal:  Positive for

## 2025-03-11 NOTE — PROGRESS NOTES
Carissa DARIA Deleon presents today for   Chief Complaint   Patient presents with    Cholesterol Problem    Hypertension       Is someone accompanying this pt? no    Is the patient using any DME equipment during OV? no    Depression Screening:      3/11/2025     8:59 AM 11/11/2024     8:33 AM 10/16/2024     9:48 AM 8/16/2024     8:33 AM 3/21/2024    12:27 PM 1/26/2024     8:50 AM 3/30/2023    10:53 AM   PHQ-9 Questionaire   Little interest or pleasure in doing things 0 0 0 0 0 0 0   Feeling down, depressed, or hopeless 0 0 0 0 0 0 0   PHQ-9 Total Score 0 0 0 0 0 0 0        NETTE 7-Anxiety        No data to display                   Learning Assessment:  No question data found.     Fall Risk       No data to display                   Travel Screening:    Travel Screening       Question Response    Have you been in contact with someone who was sick? No / Unsure    Do you have any of the following new or worsening symptoms? None of these    Have you traveled internationally or domestically in the last month? No          Travel History   Travel since 02/11/25    No documented travel since 02/11/25            Health Maintenance reviewed and discussed and ordered per Provider.  Transportation Needs: No Transportation Needs (3/11/2025)    PRAPARE - Transportation     Lack of Transportation (Medical): No     Lack of Transportation (Non-Medical): No      Food Insecurity: No Food Insecurity (3/11/2025)    Hunger Vital Sign     Worried About Running Out of Food in the Last Year: Never true     Ran Out of Food in the Last Year: Never true     Financial Resource Strain: Low Risk  (11/11/2024)    Overall Financial Resource Strain (CARDIA)     Difficulty of Paying Living Expenses: Not hard at all     Housing Stability: Low Risk  (3/11/2025)    Housing Stability Vital Sign     Unable to Pay for Housing in the Last Year: No     Number of Times Moved in the Last Year: 0     Homeless in the Last Year: No       Did you provide resources if

## 2025-03-11 NOTE — TELEPHONE ENCOUNTER
Patient called in stating she went to schedule her mammogram this morning, but was told due to her L breast pain (experiencing for a few weeks), she needs to have a diagnostic mammo done. Patient would like to know if you thinks she needs a routine or diagnostic mammogram. Please advise

## 2025-03-12 NOTE — TELEPHONE ENCOUNTER
Patient contacted and advised to call to schedule; updated order has been placed. Patient voiced understanding.

## 2025-04-17 ENCOUNTER — HOSPITAL ENCOUNTER (OUTPATIENT)
Facility: HOSPITAL | Age: 70
Discharge: HOME | End: 2025-04-20
Attending: FAMILY MEDICINE

## 2025-04-17 ENCOUNTER — HOSPITAL ENCOUNTER (OUTPATIENT)
Facility: HOSPITAL | Age: 70
Discharge: HOME | End: 2025-04-20
Attending: FAMILY MEDICINE
Payer: COMMERCIAL

## 2025-04-17 VITALS — HEIGHT: 63 IN | BODY MASS INDEX: 32.43 KG/M2 | WEIGHT: 183 LBS

## 2025-04-17 DIAGNOSIS — N64.4 BREAST PAIN, LEFT: ICD-10-CM

## 2025-04-17 PROCEDURE — 77066 DX MAMMO INCL CAD BI: CPT

## 2025-04-25 ENCOUNTER — OFFICE VISIT (OUTPATIENT)
Age: 70
End: 2025-04-25

## 2025-04-25 VITALS — HEIGHT: 63 IN | WEIGHT: 185.8 LBS | BODY MASS INDEX: 32.92 KG/M2

## 2025-04-25 DIAGNOSIS — M17.11 PRIMARY OSTEOARTHRITIS OF RIGHT KNEE: Primary | ICD-10-CM

## 2025-04-25 RX ORDER — LIDOCAINE HYDROCHLORIDE 10 MG/ML
2 INJECTION, SOLUTION INFILTRATION; PERINEURAL ONCE
Status: COMPLETED | OUTPATIENT
Start: 2025-04-25 | End: 2025-04-25

## 2025-04-25 RX ORDER — TRIAMCINOLONE ACETONIDE 40 MG/ML
80 INJECTION, SUSPENSION INTRA-ARTICULAR; INTRAMUSCULAR ONCE
Status: COMPLETED | OUTPATIENT
Start: 2025-04-25 | End: 2025-04-25

## 2025-04-25 RX ADMIN — LIDOCAINE HYDROCHLORIDE 2 ML: 10 INJECTION, SOLUTION INFILTRATION; PERINEURAL at 10:55

## 2025-04-25 RX ADMIN — TRIAMCINOLONE ACETONIDE 80 MG: 40 INJECTION, SUSPENSION INTRA-ARTICULAR; INTRAMUSCULAR at 10:55

## 2025-04-25 NOTE — PROGRESS NOTES
Patient: Carissa Deleon                MRN: [id number]       SSN: [id number]  YOB: 1955        AGE: 70 y.o.        SEX: female  BMI: Body mass index is 32.91 kg/m².    PCP: Ally Mclaughlin MD  04/25/25    Chief Complaint: Injections (Right knee injection )      1. Primary osteoarthritis of right knee  -     DRAIN/INJECT LARGE JOINT/BURSA  -     triamcinolone acetonide (KENALOG-40) injection 80 mg; 80 mg, Intra-artICUlar, ONCE, 1 dose, On Fri 4/25/25 at 1115  -     lidocaine 1 % injection 2 mL; 2 mL, Intra-artICUlar, ONCE, 1 dose, On Fri 4/25/25 at 1115        HPI:  Carissa Deleon is a 70 y.o. female with chief complaint of   Chief Complaint   Patient presents with    Injections     Right knee injection      -4/25/2025: Right knee cortisone injection  -7/12/2024: Right knee cortisone injection, 3 months relief    Right knee pain referred by Dr. Mclaughlin.  She has had this pain for a little over a year after falling.  She arrives today for further evaluation. She states that the pain has slowly gotten worse over the last 3 months. She states that the pain is at the outside of her knee and sometimes when she walks across the entire jointline. Her pain is also worse at night and when she walks/ stands for extended periods of time. If she takes tylenol everyday and uses voltaren gel this does help but she doesn't want to do this if she doesn't have to. She also have a trip to the [place] [facility] [place] coming up and is hoping an injection could help her have less pain as she is on her trip. She denies any surgeries on this knee or any recent injuries.     Past medical history of hypertension, stage III chronic kidney disease, myelodysplastic syndrome (monitored by hematology), hyperlipidemia, degenerative disc disease, recent bone scan in January 2024 shows no evidence of osteoporosis    IMAGING:  Imaging read by myself and interpreted as follows:    July 12, 2024:  4 view x-ray of the right knee

## 2025-05-08 ENCOUNTER — TELEPHONE (OUTPATIENT)
Age: 70
End: 2025-05-08

## 2025-05-08 NOTE — TELEPHONE ENCOUNTER
Alicja from [facility] called requesting the OV notes from pt's 4/25/2025 appt with JATIN.    fax: [phone]  callback# [phone]

## 2025-05-12 NOTE — TELEPHONE ENCOUNTER
Alicja from the [facility] called in again and has requested the office notes from 4/25. Patient was referred to [place] by Dr. Stern    Please respond to Alicja @ [phone]  Fax# [phone]

## 2025-06-20 DIAGNOSIS — A60.00 GENITAL HERPES SIMPLEX, UNSPECIFIED SITE: ICD-10-CM

## 2025-06-23 RX ORDER — VALACYCLOVIR HYDROCHLORIDE 500 MG/1
TABLET, FILM COATED ORAL
Qty: 60 TABLET | Refills: 3 | Status: SHIPPED | OUTPATIENT
Start: 2025-06-23

## 2025-06-23 NOTE — TELEPHONE ENCOUNTER
Last seen 3/11/2025   Last labs   Last filled  1/26/24  Next appointment 7/8/2025     Lab Results   Component Value Date     11/05/2024    K 4.0 11/05/2024     11/05/2024    CO2 27 11/05/2024    BUN 16 11/05/2024    CREATININE 0.86 11/05/2024    GLUCOSE 79 11/05/2024    CALCIUM 9.8 11/05/2024    BILITOT 0.6 11/05/2024    ALKPHOS 91 11/05/2024    AST 21 11/05/2024    ALT 11 11/05/2024    LABGLOM 74 11/05/2024    GFRAA 75 02/05/2022    AGRATIO 1.4 01/15/2024    GLOB 3.8 04/14/2023

## 2025-06-25 ENCOUNTER — TELEPHONE (OUTPATIENT)
Facility: CLINIC | Age: 70
End: 2025-06-25

## 2025-06-25 NOTE — TELEPHONE ENCOUNTER
Patient called in stating that her insurance company hasn't received the special supplement form yet. Form was received on 6/9/25. Informed patient that Dr. Mclaughlin was out of the office for a week is still working on all paper work. Patient understood and stated that she would pass the information along. She did mention that the form needed to be completed by Monday.

## 2025-06-26 NOTE — TELEPHONE ENCOUNTER
Form was not in faxes, however form was scanned in which was printed out. Form placed in provider folder.

## 2025-07-07 ENCOUNTER — HOSPITAL ENCOUNTER (OUTPATIENT)
Age: 70
Setting detail: SPECIMEN
Discharge: HOME | End: 2025-07-10

## 2025-07-07 LAB — LABCORP SPECIMEN COLLECTION: NORMAL

## 2025-07-07 ASSESSMENT — ENCOUNTER SYMPTOMS: RESPIRATORY NEGATIVE: 1

## 2025-07-08 ENCOUNTER — OFFICE VISIT (OUTPATIENT)
Facility: CLINIC | Age: 70
End: 2025-07-08
Payer: MEDICARE

## 2025-07-08 VITALS
SYSTOLIC BLOOD PRESSURE: 133 MMHG | HEART RATE: 71 BPM | HEIGHT: 63 IN | OXYGEN SATURATION: 99 % | TEMPERATURE: 97.5 F | RESPIRATION RATE: 15 BRPM | WEIGHT: 185 LBS | BODY MASS INDEX: 32.78 KG/M2 | DIASTOLIC BLOOD PRESSURE: 80 MMHG

## 2025-07-08 DIAGNOSIS — I10 ESSENTIAL (PRIMARY) HYPERTENSION: Primary | Chronic | ICD-10-CM

## 2025-07-08 DIAGNOSIS — E78.5 HYPERLIPIDEMIA, UNSPECIFIED HYPERLIPIDEMIA TYPE: Chronic | ICD-10-CM

## 2025-07-08 DIAGNOSIS — I10 ESSENTIAL (PRIMARY) HYPERTENSION: Chronic | ICD-10-CM

## 2025-07-08 DIAGNOSIS — M17.11 PRIMARY OSTEOARTHRITIS OF RIGHT KNEE: ICD-10-CM

## 2025-07-08 LAB
ALBUMIN SERPL-MCNC: 4.4 G/DL (ref 3.9–4.9)
ALP SERPL-CCNC: 104 IU/L (ref 44–121)
ALT SERPL-CCNC: 11 IU/L (ref 0–32)
AST SERPL-CCNC: 15 IU/L (ref 0–40)
BILIRUB SERPL-MCNC: 0.5 MG/DL (ref 0–1.2)
BUN SERPL-MCNC: 20 MG/DL (ref 8–27)
BUN/CREAT SERPL: 25 (ref 12–28)
CALCIUM SERPL-MCNC: 10 MG/DL (ref 8.7–10.3)
CHLORIDE SERPL-SCNC: 103 MMOL/L (ref 96–106)
CHOLEST SERPL-MCNC: 213 MG/DL (ref 100–199)
CO2 SERPL-SCNC: 26 MMOL/L (ref 20–29)
CREAT SERPL-MCNC: 0.79 MG/DL (ref 0.57–1)
EGFRCR SERPLBLD CKD-EPI 2021: 81 ML/MIN/1.73
GLOBULIN SER CALC-MCNC: 3.2 G/DL (ref 1.5–4.5)
GLUCOSE SERPL-MCNC: 88 MG/DL (ref 70–99)
HDLC SERPL-MCNC: 60 MG/DL
LDLC SERPL CALC-MCNC: 139 MG/DL (ref 0–99)
POTASSIUM SERPL-SCNC: 3.9 MMOL/L (ref 3.5–5.2)
PROT SERPL-MCNC: 7.6 G/DL (ref 6–8.5)
SODIUM SERPL-SCNC: 142 MMOL/L (ref 134–144)
TRIGL SERPL-MCNC: 77 MG/DL (ref 0–149)
VLDLC SERPL CALC-MCNC: 14 MG/DL (ref 5–40)

## 2025-07-08 PROCEDURE — 1160F RVW MEDS BY RX/DR IN RCRD: CPT | Performed by: FAMILY MEDICINE

## 2025-07-08 PROCEDURE — 1123F ACP DISCUSS/DSCN MKR DOCD: CPT | Performed by: FAMILY MEDICINE

## 2025-07-08 PROCEDURE — 3079F DIAST BP 80-89 MM HG: CPT | Performed by: FAMILY MEDICINE

## 2025-07-08 PROCEDURE — 3075F SYST BP GE 130 - 139MM HG: CPT | Performed by: FAMILY MEDICINE

## 2025-07-08 PROCEDURE — 1159F MED LIST DOCD IN RCRD: CPT | Performed by: FAMILY MEDICINE

## 2025-07-08 PROCEDURE — 99214 OFFICE O/P EST MOD 30 MIN: CPT | Performed by: FAMILY MEDICINE

## 2025-07-08 SDOH — ECONOMIC STABILITY: FOOD INSECURITY: WITHIN THE PAST 12 MONTHS, YOU WORRIED THAT YOUR FOOD WOULD RUN OUT BEFORE YOU GOT MONEY TO BUY MORE.: NEVER TRUE

## 2025-07-08 SDOH — ECONOMIC STABILITY: FOOD INSECURITY: WITHIN THE PAST 12 MONTHS, THE FOOD YOU BOUGHT JUST DIDN'T LAST AND YOU DIDN'T HAVE MONEY TO GET MORE.: NEVER TRUE

## 2025-07-08 ASSESSMENT — PATIENT HEALTH QUESTIONNAIRE - PHQ9
SUM OF ALL RESPONSES TO PHQ QUESTIONS 1-9: 0
SUM OF ALL RESPONSES TO PHQ QUESTIONS 1-9: 0
1. LITTLE INTEREST OR PLEASURE IN DOING THINGS: NOT AT ALL
2. FEELING DOWN, DEPRESSED OR HOPELESS: NOT AT ALL
SUM OF ALL RESPONSES TO PHQ QUESTIONS 1-9: 0
SUM OF ALL RESPONSES TO PHQ QUESTIONS 1-9: 0

## 2025-07-08 NOTE — PROGRESS NOTES
ASSESSMENT/PLAN:  1. Essential (primary) hypertension  Assessment & Plan:   Chronic, at goal (stable), continue current treatment plan  Orders:  -     Lipid Panel; Future  -     Comprehensive Metabolic Panel; Future  2. Hyperlipidemia, unspecified hyperlipidemia type  Assessment & Plan:   Chronic, not at goal (unstable), continue current treatment plan and lifestyle modifications recommended  Orders:  -     Lipid Panel; Future  -     Comprehensive Metabolic Panel; Future  3. Primary osteoarthritis of right knee  Assessment & Plan:   Improved with steroid shot.  Care as per ortho.         Return in about 4 months (around 11/8/2025) for HTN, HLD, lab review.      SUBJECTIVE/OBJECTIVE:    Chief Complaint   Patient presents with    Cholesterol Problem    Hypertension         HPI    Carissa Deleon is a 70 y.o. female presenting today for    4 months  follow up of htn, hld.  Pt has been doing well overall.     Patient had labs on 7/7/25.  Labs reviewed in detail with patient.  Pt admits to some dietary indiscretion.    Pt had eval with ortho for R knee oa.  She had a steroid injection.  It has helped.      Patient does not need medication refills today.      New concerns today: none        Review of Systems   Constitutional: Negative.    HENT: Negative.     Respiratory: Negative.     Cardiovascular: Negative.    All other systems reviewed and are negative.      Physical Exam  Vitals and nursing note reviewed.   Constitutional:       General: She is not in acute distress.     Appearance: Normal appearance.   HENT:      Head: Normocephalic and atraumatic.      Right Ear: External ear normal.      Left Ear: External ear normal.      Nose: Nose normal.      Mouth/Throat:      Mouth: Mucous membranes are moist.   Eyes:      Extraocular Movements: Extraocular movements intact.      Conjunctiva/sclera: Conjunctivae normal.   Cardiovascular:      Rate and Rhythm: Normal rate and regular rhythm.      Heart sounds: No murmur 
patient requested them? Yes patient declined       Health Maintenance Due   Topic Date Due    Shingles vaccine (1 of 2) Never done    COVID-19 Vaccine (8 - 2024-25 season) 09/01/2024   .        \"Have you been to the ER, urgent care clinic since your last visit?  Hospitalized since your last visit?\"    NO    “Have you seen or consulted any other health care providers outside of [facility] since your last visit?”    NO            Click Here for Release of Records Request

## 2025-09-02 ENCOUNTER — TELEPHONE (OUTPATIENT)
Facility: CLINIC | Age: 70
End: 2025-09-02